# Patient Record
Sex: FEMALE | Race: WHITE | Employment: OTHER | URBAN - METROPOLITAN AREA
[De-identification: names, ages, dates, MRNs, and addresses within clinical notes are randomized per-mention and may not be internally consistent; named-entity substitution may affect disease eponyms.]

---

## 2019-09-09 ENCOUNTER — OFFICE VISIT (OUTPATIENT)
Dept: FAMILY MEDICINE CLINIC | Age: 74
End: 2019-09-09
Payer: MEDICARE

## 2019-09-09 VITALS
BODY MASS INDEX: 40.92 KG/M2 | SYSTOLIC BLOOD PRESSURE: 136 MMHG | HEIGHT: 59 IN | WEIGHT: 203 LBS | DIASTOLIC BLOOD PRESSURE: 80 MMHG

## 2019-09-09 DIAGNOSIS — Z76.89 ENCOUNTER TO ESTABLISH CARE: Primary | ICD-10-CM

## 2019-09-09 DIAGNOSIS — J45.40 MODERATE PERSISTENT ASTHMA WITHOUT COMPLICATION: ICD-10-CM

## 2019-09-09 DIAGNOSIS — R60.0 LOWER EXTREMITY EDEMA: ICD-10-CM

## 2019-09-09 DIAGNOSIS — E66.01 MORBID OBESITY WITH BMI OF 40.0-44.9, ADULT (HCC): ICD-10-CM

## 2019-09-09 LAB
A/G RATIO: 1.6 (ref 1.1–2.2)
ALBUMIN SERPL-MCNC: 4.5 G/DL (ref 3.4–5)
ALP BLD-CCNC: 75 U/L (ref 40–129)
ALT SERPL-CCNC: 18 U/L (ref 10–40)
ANION GAP SERPL CALCULATED.3IONS-SCNC: 17 MMOL/L (ref 3–16)
AST SERPL-CCNC: 26 U/L (ref 15–37)
BASOPHILS ABSOLUTE: 0 K/UL (ref 0–0.2)
BASOPHILS RELATIVE PERCENT: 0.8 %
BILIRUB SERPL-MCNC: 0.8 MG/DL (ref 0–1)
BUN BLDV-MCNC: 21 MG/DL (ref 7–20)
CALCIUM SERPL-MCNC: 10 MG/DL (ref 8.3–10.6)
CHLORIDE BLD-SCNC: 97 MMOL/L (ref 99–110)
CO2: 27 MMOL/L (ref 21–32)
CREAT SERPL-MCNC: 0.8 MG/DL (ref 0.6–1.2)
EOSINOPHILS ABSOLUTE: 0.1 K/UL (ref 0–0.6)
EOSINOPHILS RELATIVE PERCENT: 1.5 %
GFR AFRICAN AMERICAN: >60
GFR NON-AFRICAN AMERICAN: >60
GLOBULIN: 2.9 G/DL
GLUCOSE BLD-MCNC: 81 MG/DL (ref 70–99)
HCT VFR BLD CALC: 41.8 % (ref 36–48)
HEMOGLOBIN: 14.4 G/DL (ref 12–16)
LYMPHOCYTES ABSOLUTE: 1.7 K/UL (ref 1–5.1)
LYMPHOCYTES RELATIVE PERCENT: 35.1 %
MCH RBC QN AUTO: 31.2 PG (ref 26–34)
MCHC RBC AUTO-ENTMCNC: 34.5 G/DL (ref 31–36)
MCV RBC AUTO: 90.5 FL (ref 80–100)
MONOCYTES ABSOLUTE: 0.4 K/UL (ref 0–1.3)
MONOCYTES RELATIVE PERCENT: 9.2 %
NEUTROPHILS ABSOLUTE: 2.5 K/UL (ref 1.7–7.7)
NEUTROPHILS RELATIVE PERCENT: 53.4 %
PDW BLD-RTO: 13.1 % (ref 12.4–15.4)
PLATELET # BLD: 149 K/UL (ref 135–450)
PMV BLD AUTO: 9.6 FL (ref 5–10.5)
POTASSIUM SERPL-SCNC: 3.8 MMOL/L (ref 3.5–5.1)
RBC # BLD: 4.61 M/UL (ref 4–5.2)
SODIUM BLD-SCNC: 141 MMOL/L (ref 136–145)
TOTAL PROTEIN: 7.4 G/DL (ref 6.4–8.2)
WBC # BLD: 4.7 K/UL (ref 4–11)

## 2019-09-09 PROCEDURE — 1036F TOBACCO NON-USER: CPT | Performed by: FAMILY MEDICINE

## 2019-09-09 PROCEDURE — 99203 OFFICE O/P NEW LOW 30 MIN: CPT | Performed by: FAMILY MEDICINE

## 2019-09-09 PROCEDURE — 1090F PRES/ABSN URINE INCON ASSESS: CPT | Performed by: FAMILY MEDICINE

## 2019-09-09 PROCEDURE — G8427 DOCREV CUR MEDS BY ELIG CLIN: HCPCS | Performed by: FAMILY MEDICINE

## 2019-09-09 PROCEDURE — 1123F ACP DISCUSS/DSCN MKR DOCD: CPT | Performed by: FAMILY MEDICINE

## 2019-09-09 PROCEDURE — G8400 PT W/DXA NO RESULTS DOC: HCPCS | Performed by: FAMILY MEDICINE

## 2019-09-09 PROCEDURE — 3017F COLORECTAL CA SCREEN DOC REV: CPT | Performed by: FAMILY MEDICINE

## 2019-09-09 PROCEDURE — 4040F PNEUMOC VAC/ADMIN/RCVD: CPT | Performed by: FAMILY MEDICINE

## 2019-09-09 PROCEDURE — 36415 COLL VENOUS BLD VENIPUNCTURE: CPT | Performed by: FAMILY MEDICINE

## 2019-09-09 PROCEDURE — G8417 CALC BMI ABV UP PARAM F/U: HCPCS | Performed by: FAMILY MEDICINE

## 2019-09-09 RX ORDER — METOLAZONE 5 MG/1
5 TABLET ORAL DAILY
COMMUNITY
End: 2019-09-09 | Stop reason: DRUGHIGH

## 2019-09-09 RX ORDER — ALBUTEROL SULFATE 90 UG/1
2 AEROSOL, METERED RESPIRATORY (INHALATION) EVERY 6 HOURS PRN
COMMUNITY
End: 2020-01-02 | Stop reason: SDUPTHER

## 2019-09-09 SDOH — HEALTH STABILITY: MENTAL HEALTH: HOW OFTEN DO YOU HAVE A DRINK CONTAINING ALCOHOL?: NEVER

## 2019-09-09 ASSESSMENT — PATIENT HEALTH QUESTIONNAIRE - PHQ9
SUM OF ALL RESPONSES TO PHQ QUESTIONS 1-9: 0
1. LITTLE INTEREST OR PLEASURE IN DOING THINGS: 0
SUM OF ALL RESPONSES TO PHQ QUESTIONS 1-9: 0
2. FEELING DOWN, DEPRESSED OR HOPELESS: 0
SUM OF ALL RESPONSES TO PHQ9 QUESTIONS 1 & 2: 0

## 2019-09-09 ASSESSMENT — ENCOUNTER SYMPTOMS
SHORTNESS OF BREATH: 0
TROUBLE SWALLOWING: 0
VOMITING: 0
FACIAL SWELLING: 0
DIARRHEA: 0
SINUS PRESSURE: 0
SORE THROAT: 0
CHEST TIGHTNESS: 0
NAUSEA: 0
WHEEZING: 0
COUGH: 0
ABDOMINAL PAIN: 0
RHINORRHEA: 0

## 2019-09-09 NOTE — PROGRESS NOTES
Prior to Visit Medications    Medication Sig Taking?  Authorizing Provider   albuterol sulfate HFA (PROAIR HFA) 108 (90 Base) MCG/ACT inhaler Inhale 2 puffs into the lungs every 6 hours as needed for Wheezing Yes Historical Provider, MD        Allergies   Allergen Reactions    Dye [Iodides] Other (See Comments)     IVP dye, pt does not remember he reaction, just that the nurses stated to not let anyone give to her ever again       Past Medical History:   Diagnosis Date    Asthma        Past Surgical History:   Procedure Laterality Date    APPENDECTOMY      CHOLECYSTECTOMY         Social History     Socioeconomic History    Marital status: Single     Spouse name: Not on file    Number of children: Not on file    Years of education: Not on file    Highest education level: Not on file   Occupational History    Not on file   Social Needs    Financial resource strain: Not on file    Food insecurity:     Worry: Not on file     Inability: Not on file    Transportation needs:     Medical: Not on file     Non-medical: Not on file   Tobacco Use    Smoking status: Never Smoker    Smokeless tobacco: Never Used   Substance and Sexual Activity    Alcohol use: Never     Frequency: Never    Drug use: Never    Sexual activity: Not on file   Lifestyle    Physical activity:     Days per week: Not on file     Minutes per session: Not on file    Stress: Not on file   Relationships    Social connections:     Talks on phone: Not on file     Gets together: Not on file     Attends Nondenominational service: Not on file     Active member of club or organization: Not on file     Attends meetings of clubs or organizations: Not on file     Relationship status: Not on file    Intimate partner violence:     Fear of current or ex partner: Not on file     Emotionally abused: Not on file     Physically abused: Not on file     Forced sexual activity: Not on file   Other Topics Concern    Not on file   Social History Narrative   

## 2019-09-12 ASSESSMENT — ENCOUNTER SYMPTOMS: BACK PAIN: 1

## 2019-09-23 ENCOUNTER — OFFICE VISIT (OUTPATIENT)
Dept: FAMILY MEDICINE CLINIC | Age: 74
End: 2019-09-23
Payer: MEDICARE

## 2019-09-23 VITALS
BODY MASS INDEX: 41.49 KG/M2 | WEIGHT: 205.8 LBS | SYSTOLIC BLOOD PRESSURE: 132 MMHG | HEIGHT: 59 IN | DIASTOLIC BLOOD PRESSURE: 78 MMHG

## 2019-09-23 DIAGNOSIS — J45.901 MODERATE ASTHMA WITH EXACERBATION, UNSPECIFIED WHETHER PERSISTENT: Primary | ICD-10-CM

## 2019-09-23 DIAGNOSIS — J30.9 ALLERGIC RHINITIS, UNSPECIFIED SEASONALITY, UNSPECIFIED TRIGGER: ICD-10-CM

## 2019-09-23 PROCEDURE — 99213 OFFICE O/P EST LOW 20 MIN: CPT | Performed by: FAMILY MEDICINE

## 2019-09-23 PROCEDURE — G8417 CALC BMI ABV UP PARAM F/U: HCPCS | Performed by: FAMILY MEDICINE

## 2019-09-23 PROCEDURE — G8400 PT W/DXA NO RESULTS DOC: HCPCS | Performed by: FAMILY MEDICINE

## 2019-09-23 PROCEDURE — 3017F COLORECTAL CA SCREEN DOC REV: CPT | Performed by: FAMILY MEDICINE

## 2019-09-23 PROCEDURE — G8427 DOCREV CUR MEDS BY ELIG CLIN: HCPCS | Performed by: FAMILY MEDICINE

## 2019-09-23 PROCEDURE — 1123F ACP DISCUSS/DSCN MKR DOCD: CPT | Performed by: FAMILY MEDICINE

## 2019-09-23 PROCEDURE — 4040F PNEUMOC VAC/ADMIN/RCVD: CPT | Performed by: FAMILY MEDICINE

## 2019-09-23 PROCEDURE — 1036F TOBACCO NON-USER: CPT | Performed by: FAMILY MEDICINE

## 2019-09-23 PROCEDURE — 1090F PRES/ABSN URINE INCON ASSESS: CPT | Performed by: FAMILY MEDICINE

## 2019-09-23 RX ORDER — LORATADINE 10 MG/1
10 TABLET ORAL DAILY
Qty: 30 TABLET | Refills: 2 | Status: SHIPPED | OUTPATIENT
Start: 2019-09-23 | End: 2019-10-23

## 2019-09-23 RX ORDER — ALBUTEROL SULFATE 2.5 MG/3ML
2.5 SOLUTION RESPIRATORY (INHALATION) EVERY 6 HOURS PRN
Qty: 120 EACH | Refills: 3 | Status: SHIPPED | OUTPATIENT
Start: 2019-09-23 | End: 2021-01-08

## 2019-09-23 ASSESSMENT — ENCOUNTER SYMPTOMS
FACIAL SWELLING: 0
WHEEZING: 0
TROUBLE SWALLOWING: 0
ABDOMINAL PAIN: 0
SORE THROAT: 0
SINUS PRESSURE: 0
VOMITING: 0
NAUSEA: 0
DIARRHEA: 0
CHEST TIGHTNESS: 1
RHINORRHEA: 0
SHORTNESS OF BREATH: 0
COUGH: 1

## 2019-09-26 ENCOUNTER — OFFICE VISIT (OUTPATIENT)
Dept: FAMILY MEDICINE CLINIC | Age: 74
End: 2019-09-26
Payer: MEDICARE

## 2019-09-26 VITALS
DIASTOLIC BLOOD PRESSURE: 82 MMHG | BODY MASS INDEX: 41.24 KG/M2 | SYSTOLIC BLOOD PRESSURE: 130 MMHG | HEIGHT: 59 IN | WEIGHT: 204.6 LBS

## 2019-09-26 DIAGNOSIS — S40.022A CONTUSION OF LEFT UPPER EXTREMITY, INITIAL ENCOUNTER: Primary | ICD-10-CM

## 2019-09-26 DIAGNOSIS — M79.602 LEFT ARM PAIN: ICD-10-CM

## 2019-09-26 PROCEDURE — 1123F ACP DISCUSS/DSCN MKR DOCD: CPT | Performed by: FAMILY MEDICINE

## 2019-09-26 PROCEDURE — G8417 CALC BMI ABV UP PARAM F/U: HCPCS | Performed by: FAMILY MEDICINE

## 2019-09-26 PROCEDURE — 3017F COLORECTAL CA SCREEN DOC REV: CPT | Performed by: FAMILY MEDICINE

## 2019-09-26 PROCEDURE — 1036F TOBACCO NON-USER: CPT | Performed by: FAMILY MEDICINE

## 2019-09-26 PROCEDURE — 1090F PRES/ABSN URINE INCON ASSESS: CPT | Performed by: FAMILY MEDICINE

## 2019-09-26 PROCEDURE — 4040F PNEUMOC VAC/ADMIN/RCVD: CPT | Performed by: FAMILY MEDICINE

## 2019-09-26 PROCEDURE — G8400 PT W/DXA NO RESULTS DOC: HCPCS | Performed by: FAMILY MEDICINE

## 2019-09-26 PROCEDURE — G8427 DOCREV CUR MEDS BY ELIG CLIN: HCPCS | Performed by: FAMILY MEDICINE

## 2019-09-26 PROCEDURE — 99213 OFFICE O/P EST LOW 20 MIN: CPT | Performed by: FAMILY MEDICINE

## 2019-09-26 ASSESSMENT — ENCOUNTER SYMPTOMS
SORE THROAT: 0
ABDOMINAL PAIN: 0
VOMITING: 0
SHORTNESS OF BREATH: 0
NAUSEA: 0
SINUS PRESSURE: 0
RHINORRHEA: 0
COLOR CHANGE: 1
DIARRHEA: 0

## 2019-09-26 NOTE — PROGRESS NOTES
lb 9.6 oz (92.8 kg). Chemistry        Component Value Date/Time     09/09/2019 1202    K 3.8 09/09/2019 1202    CL 97 (L) 09/09/2019 1202    CO2 27 09/09/2019 1202    BUN 21 (H) 09/09/2019 1202    CREATININE 0.8 09/09/2019 1202        Component Value Date/Time    CALCIUM 10.0 09/09/2019 1202    ALKPHOS 75 09/09/2019 1202    AST 26 09/09/2019 1202    ALT 18 09/09/2019 1202    BILITOT 0.8 09/09/2019 1202          Lab Results   Component Value Date    WBC 4.7 09/09/2019    HGB 14.4 09/09/2019    HCT 41.8 09/09/2019    MCV 90.5 09/09/2019     09/09/2019     No results found for: LABA1C  No results found for: EAG  No results found for: LABA1C  No components found for: CHLPL  No results found for: TRIG  No results found for: HDL  No results found for: LDLCALC  No results found for: LABVLDL    Physical Exam   Constitutional: She is oriented to person, place, and time. She appears well-developed and well-nourished. HENT:   Head: Normocephalic and atraumatic. Cardiovascular: Normal rate, regular rhythm and normal heart sounds. No murmur heard. Pulmonary/Chest: Effort normal and breath sounds normal. She has no wheezes. Musculoskeletal: She exhibits tenderness. She exhibits no edema. Patient had pain with palpation over left bicep   Patient had normal ROM  Color change from mid bicep to distal bicep  Black blue in color  No pain in elbow or shoulder noted. Flexion extension wnl   Neurological: She is alert and oriented to person, place, and time. Skin: Skin is warm and dry. There is erythema. ASSESSMENT/PLAN:  1. Contusion of left upper extremity, initial encounter  Patient declined imaging at this time  Will use conservative treatment  Educated on signs and symptoms for immediate evaluation in the ER. Questions were answered during the visit. 2. Left arm pain  Tylenol/Ibuprofen will be used  Elevate arm  Ice as needed  RTC in one week for follow up sooner if needed.        Return in about 1 week (around 10/3/2019) for Folllow up for arm pain.

## 2019-10-03 ENCOUNTER — OFFICE VISIT (OUTPATIENT)
Dept: FAMILY MEDICINE CLINIC | Age: 74
End: 2019-10-03
Payer: MEDICARE

## 2019-10-03 VITALS
HEIGHT: 59 IN | WEIGHT: 205.6 LBS | DIASTOLIC BLOOD PRESSURE: 80 MMHG | SYSTOLIC BLOOD PRESSURE: 132 MMHG | BODY MASS INDEX: 41.45 KG/M2

## 2019-10-03 DIAGNOSIS — S46.212D TEAR OF LEFT BICEPS MUSCLE, SUBSEQUENT ENCOUNTER: Primary | ICD-10-CM

## 2019-10-03 DIAGNOSIS — M79.644 THUMB PAIN, RIGHT: ICD-10-CM

## 2019-10-03 PROCEDURE — 1090F PRES/ABSN URINE INCON ASSESS: CPT | Performed by: FAMILY MEDICINE

## 2019-10-03 PROCEDURE — G8417 CALC BMI ABV UP PARAM F/U: HCPCS | Performed by: FAMILY MEDICINE

## 2019-10-03 PROCEDURE — 3017F COLORECTAL CA SCREEN DOC REV: CPT | Performed by: FAMILY MEDICINE

## 2019-10-03 PROCEDURE — 4040F PNEUMOC VAC/ADMIN/RCVD: CPT | Performed by: FAMILY MEDICINE

## 2019-10-03 PROCEDURE — G8400 PT W/DXA NO RESULTS DOC: HCPCS | Performed by: FAMILY MEDICINE

## 2019-10-03 PROCEDURE — 1123F ACP DISCUSS/DSCN MKR DOCD: CPT | Performed by: FAMILY MEDICINE

## 2019-10-03 PROCEDURE — G8427 DOCREV CUR MEDS BY ELIG CLIN: HCPCS | Performed by: FAMILY MEDICINE

## 2019-10-03 PROCEDURE — G8484 FLU IMMUNIZE NO ADMIN: HCPCS | Performed by: FAMILY MEDICINE

## 2019-10-03 PROCEDURE — 99213 OFFICE O/P EST LOW 20 MIN: CPT | Performed by: FAMILY MEDICINE

## 2019-10-03 PROCEDURE — 1036F TOBACCO NON-USER: CPT | Performed by: FAMILY MEDICINE

## 2019-10-03 ASSESSMENT — ENCOUNTER SYMPTOMS
CHEST TIGHTNESS: 0
DIARRHEA: 0
RHINORRHEA: 0
ABDOMINAL PAIN: 0
NAUSEA: 0
VOMITING: 0
SHORTNESS OF BREATH: 0
SORE THROAT: 0

## 2019-10-07 ENCOUNTER — OFFICE VISIT (OUTPATIENT)
Dept: ORTHOPEDIC SURGERY | Age: 74
End: 2019-10-07
Payer: MEDICARE

## 2019-10-07 VITALS
HEART RATE: 83 BPM | RESPIRATION RATE: 17 BRPM | WEIGHT: 205.69 LBS | BODY MASS INDEX: 41.47 KG/M2 | HEIGHT: 59 IN | SYSTOLIC BLOOD PRESSURE: 178 MMHG | DIASTOLIC BLOOD PRESSURE: 78 MMHG

## 2019-10-07 DIAGNOSIS — S46.212A BICEPS MUSCLE TEAR, LEFT, INITIAL ENCOUNTER: ICD-10-CM

## 2019-10-07 DIAGNOSIS — M25.522 LEFT ELBOW PAIN: Primary | ICD-10-CM

## 2019-10-07 PROCEDURE — 1123F ACP DISCUSS/DSCN MKR DOCD: CPT | Performed by: ORTHOPAEDIC SURGERY

## 2019-10-07 PROCEDURE — 99203 OFFICE O/P NEW LOW 30 MIN: CPT | Performed by: ORTHOPAEDIC SURGERY

## 2019-10-07 PROCEDURE — 4040F PNEUMOC VAC/ADMIN/RCVD: CPT | Performed by: ORTHOPAEDIC SURGERY

## 2019-10-07 PROCEDURE — G8417 CALC BMI ABV UP PARAM F/U: HCPCS | Performed by: ORTHOPAEDIC SURGERY

## 2019-10-07 PROCEDURE — 3017F COLORECTAL CA SCREEN DOC REV: CPT | Performed by: ORTHOPAEDIC SURGERY

## 2019-10-07 PROCEDURE — G8484 FLU IMMUNIZE NO ADMIN: HCPCS | Performed by: ORTHOPAEDIC SURGERY

## 2019-10-07 PROCEDURE — G8427 DOCREV CUR MEDS BY ELIG CLIN: HCPCS | Performed by: ORTHOPAEDIC SURGERY

## 2019-10-07 PROCEDURE — G8400 PT W/DXA NO RESULTS DOC: HCPCS | Performed by: ORTHOPAEDIC SURGERY

## 2019-10-07 PROCEDURE — 1036F TOBACCO NON-USER: CPT | Performed by: ORTHOPAEDIC SURGERY

## 2019-10-07 PROCEDURE — 1090F PRES/ABSN URINE INCON ASSESS: CPT | Performed by: ORTHOPAEDIC SURGERY

## 2019-11-14 ENCOUNTER — OFFICE VISIT (OUTPATIENT)
Dept: FAMILY MEDICINE CLINIC | Age: 74
End: 2019-11-14
Payer: MEDICARE

## 2019-11-14 VITALS
HEIGHT: 59 IN | BODY MASS INDEX: 42.62 KG/M2 | DIASTOLIC BLOOD PRESSURE: 78 MMHG | SYSTOLIC BLOOD PRESSURE: 134 MMHG | WEIGHT: 211.4 LBS

## 2019-11-14 DIAGNOSIS — M79.644 THUMB PAIN, RIGHT: Primary | ICD-10-CM

## 2019-11-14 PROCEDURE — 4040F PNEUMOC VAC/ADMIN/RCVD: CPT | Performed by: FAMILY MEDICINE

## 2019-11-14 PROCEDURE — G8400 PT W/DXA NO RESULTS DOC: HCPCS | Performed by: FAMILY MEDICINE

## 2019-11-14 PROCEDURE — 3017F COLORECTAL CA SCREEN DOC REV: CPT | Performed by: FAMILY MEDICINE

## 2019-11-14 PROCEDURE — 1123F ACP DISCUSS/DSCN MKR DOCD: CPT | Performed by: FAMILY MEDICINE

## 2019-11-14 PROCEDURE — 1090F PRES/ABSN URINE INCON ASSESS: CPT | Performed by: FAMILY MEDICINE

## 2019-11-14 PROCEDURE — G8417 CALC BMI ABV UP PARAM F/U: HCPCS | Performed by: FAMILY MEDICINE

## 2019-11-14 PROCEDURE — 99213 OFFICE O/P EST LOW 20 MIN: CPT | Performed by: FAMILY MEDICINE

## 2019-11-14 PROCEDURE — G8427 DOCREV CUR MEDS BY ELIG CLIN: HCPCS | Performed by: FAMILY MEDICINE

## 2019-11-14 PROCEDURE — G8484 FLU IMMUNIZE NO ADMIN: HCPCS | Performed by: FAMILY MEDICINE

## 2019-11-14 PROCEDURE — 1036F TOBACCO NON-USER: CPT | Performed by: FAMILY MEDICINE

## 2019-11-14 RX ORDER — IBUPROFEN 800 MG/1
800 TABLET ORAL 2 TIMES DAILY PRN
Qty: 60 TABLET | Refills: 0 | Status: SHIPPED | OUTPATIENT
Start: 2019-11-14 | End: 2020-01-30 | Stop reason: ALTCHOICE

## 2019-11-14 ASSESSMENT — ENCOUNTER SYMPTOMS
SHORTNESS OF BREATH: 0
NAUSEA: 0
VOMITING: 0
RHINORRHEA: 0
DIARRHEA: 0
CHEST TIGHTNESS: 0
COUGH: 0
WHEEZING: 0
ABDOMINAL PAIN: 0

## 2019-11-26 ENCOUNTER — OFFICE VISIT (OUTPATIENT)
Dept: ORTHOPEDIC SURGERY | Age: 74
End: 2019-11-26
Payer: MEDICARE

## 2019-11-26 VITALS
WEIGHT: 211.42 LBS | BODY MASS INDEX: 42.62 KG/M2 | RESPIRATION RATE: 16 BRPM | HEIGHT: 59 IN | SYSTOLIC BLOOD PRESSURE: 170 MMHG | DIASTOLIC BLOOD PRESSURE: 76 MMHG

## 2019-11-26 DIAGNOSIS — S60.011A CONTUSION OF RIGHT THUMB WITHOUT DAMAGE TO NAIL, INITIAL ENCOUNTER: ICD-10-CM

## 2019-11-26 DIAGNOSIS — M79.641 PAIN OF RIGHT HAND: Primary | ICD-10-CM

## 2019-11-26 PROCEDURE — 1123F ACP DISCUSS/DSCN MKR DOCD: CPT | Performed by: ORTHOPAEDIC SURGERY

## 2019-11-26 PROCEDURE — 99214 OFFICE O/P EST MOD 30 MIN: CPT | Performed by: ORTHOPAEDIC SURGERY

## 2019-11-26 PROCEDURE — L3908 WHO COCK-UP NONMOLDE PRE OTS: HCPCS | Performed by: ORTHOPAEDIC SURGERY

## 2019-11-26 PROCEDURE — G8427 DOCREV CUR MEDS BY ELIG CLIN: HCPCS | Performed by: ORTHOPAEDIC SURGERY

## 2019-11-26 PROCEDURE — 4040F PNEUMOC VAC/ADMIN/RCVD: CPT | Performed by: ORTHOPAEDIC SURGERY

## 2019-11-26 PROCEDURE — 1090F PRES/ABSN URINE INCON ASSESS: CPT | Performed by: ORTHOPAEDIC SURGERY

## 2019-11-26 PROCEDURE — G8400 PT W/DXA NO RESULTS DOC: HCPCS | Performed by: ORTHOPAEDIC SURGERY

## 2019-11-26 PROCEDURE — 1036F TOBACCO NON-USER: CPT | Performed by: ORTHOPAEDIC SURGERY

## 2019-11-26 PROCEDURE — G8417 CALC BMI ABV UP PARAM F/U: HCPCS | Performed by: ORTHOPAEDIC SURGERY

## 2019-11-26 PROCEDURE — G8484 FLU IMMUNIZE NO ADMIN: HCPCS | Performed by: ORTHOPAEDIC SURGERY

## 2019-11-26 PROCEDURE — 3017F COLORECTAL CA SCREEN DOC REV: CPT | Performed by: ORTHOPAEDIC SURGERY

## 2020-01-02 ENCOUNTER — OFFICE VISIT (OUTPATIENT)
Dept: FAMILY MEDICINE CLINIC | Age: 75
End: 2020-01-02
Payer: MEDICARE

## 2020-01-02 VITALS
BODY MASS INDEX: 43.46 KG/M2 | SYSTOLIC BLOOD PRESSURE: 138 MMHG | HEIGHT: 59 IN | DIASTOLIC BLOOD PRESSURE: 78 MMHG | WEIGHT: 215.6 LBS

## 2020-01-02 PROCEDURE — 3017F COLORECTAL CA SCREEN DOC REV: CPT | Performed by: FAMILY MEDICINE

## 2020-01-02 PROCEDURE — 4040F PNEUMOC VAC/ADMIN/RCVD: CPT | Performed by: FAMILY MEDICINE

## 2020-01-02 PROCEDURE — G8417 CALC BMI ABV UP PARAM F/U: HCPCS | Performed by: FAMILY MEDICINE

## 2020-01-02 PROCEDURE — 1090F PRES/ABSN URINE INCON ASSESS: CPT | Performed by: FAMILY MEDICINE

## 2020-01-02 PROCEDURE — 1123F ACP DISCUSS/DSCN MKR DOCD: CPT | Performed by: FAMILY MEDICINE

## 2020-01-02 PROCEDURE — G8427 DOCREV CUR MEDS BY ELIG CLIN: HCPCS | Performed by: FAMILY MEDICINE

## 2020-01-02 PROCEDURE — G8484 FLU IMMUNIZE NO ADMIN: HCPCS | Performed by: FAMILY MEDICINE

## 2020-01-02 PROCEDURE — 99213 OFFICE O/P EST LOW 20 MIN: CPT | Performed by: FAMILY MEDICINE

## 2020-01-02 PROCEDURE — G8400 PT W/DXA NO RESULTS DOC: HCPCS | Performed by: FAMILY MEDICINE

## 2020-01-02 PROCEDURE — 1036F TOBACCO NON-USER: CPT | Performed by: FAMILY MEDICINE

## 2020-01-02 RX ORDER — AZITHROMYCIN 250 MG/1
250 TABLET, FILM COATED ORAL SEE ADMIN INSTRUCTIONS
Qty: 6 TABLET | Refills: 0 | Status: SHIPPED | OUTPATIENT
Start: 2020-01-02 | End: 2020-01-07

## 2020-01-02 RX ORDER — ALBUTEROL SULFATE 90 UG/1
2 AEROSOL, METERED RESPIRATORY (INHALATION) EVERY 6 HOURS PRN
Qty: 1 INHALER | Refills: 0 | Status: SHIPPED | OUTPATIENT
Start: 2020-01-02

## 2020-01-02 RX ORDER — METHYLPREDNISOLONE 4 MG/1
TABLET ORAL
Qty: 21 TABLET | Refills: 0 | Status: SHIPPED | OUTPATIENT
Start: 2020-01-02 | End: 2020-01-30

## 2020-01-02 ASSESSMENT — ENCOUNTER SYMPTOMS
TROUBLE SWALLOWING: 0
VOMITING: 0
ABDOMINAL PAIN: 0
SORE THROAT: 0
SHORTNESS OF BREATH: 0
DIARRHEA: 0
SINUS PRESSURE: 0
NAUSEA: 0
RHINORRHEA: 0

## 2020-01-02 NOTE — PROGRESS NOTES
2020     Chau Parra (:  1945) is a 69 y.o. female, here for evaluation of the following medical concerns:    Patient presented to the clinic due to one week history of chest pressure, problems taking a deep breath that makes it worse, she feels restricted, possible night time wheezing but somewhat resembles a regular asthma attack other times no, exertion doesn't seems to make it better or worse, doesn't radiate, has mild dry cough, does improve with inhaler, places an instrument and has made it difficult to flow into the instrument, denied edema of her legs, denied chest pain, denied pain radiating down her arm or into her back, no substantial weight gain. Vitals are wnl, Pulse ox 98 percent with rest.     Asthma- generally controlled with rare use of inhaler, has used it more frequently and has improved with symptoms but symptoms haven't resolved. Patient denied n, v, or diarrhea. HPI    Review of Systems   Constitutional: Positive for fatigue. Negative for activity change, fever and unexpected weight change. HENT: Negative for congestion, ear pain, rhinorrhea, sinus pressure, sore throat and trouble swallowing. Eyes: Negative for visual disturbance. Respiratory: Positive for cough, chest tightness and wheezing. Negative for shortness of breath. Cardiovascular: Negative for chest pain and leg swelling. Gastrointestinal: Negative for abdominal pain, diarrhea, nausea and vomiting. Musculoskeletal: Negative for arthralgias. Neurological: Negative for dizziness, syncope, numbness and headaches. Prior to Visit Medications    Medication Sig Taking? Authorizing Provider   azithromycin (ZITHROMAX) 250 MG tablet Take 1 tablet by mouth See Admin Instructions for 5 days 500mg on day 1 followed by 250mg on days 2 - 5 Yes Orlando Munguia, DO   methylPREDNISolone (MEDROL, BRADY,) 4 MG tablet Take as directed.  Yes Manuel Carmona, DO   albuterol sulfate HFA (PROAIR HFA) 108 (90 Base)

## 2020-01-03 ENCOUNTER — TELEPHONE (OUTPATIENT)
Dept: FAMILY MEDICINE CLINIC | Age: 75
End: 2020-01-03

## 2020-01-03 NOTE — TELEPHONE ENCOUNTER
Patient stated that she is running low grade fever and has a headache, she is worried about the medication contributing to this. She denied slurred speech, facial droop, chest pain, or unilateral weakness.   She stated that the headache was minimal.

## 2020-01-03 NOTE — TELEPHONE ENCOUNTER
Patient was in yesterday for an appt with dr Terrell Landa. She states she took the two medications that were presribed and went to bed. She woke up this morning with a terrible headache. Could this be a side effect of the medication. Please return call.

## 2020-01-05 ASSESSMENT — ENCOUNTER SYMPTOMS
CHEST TIGHTNESS: 1
WHEEZING: 1
COUGH: 1

## 2020-01-06 ENCOUNTER — HOSPITAL ENCOUNTER (OUTPATIENT)
Dept: GENERAL RADIOLOGY | Age: 75
Discharge: HOME | End: 2020-01-06
Payer: MEDICARE

## 2020-01-06 ENCOUNTER — HOSPITAL ENCOUNTER (OUTPATIENT)
Age: 75
Discharge: HOME | End: 2020-01-06
Payer: MEDICARE

## 2020-01-06 ENCOUNTER — OFFICE VISIT (OUTPATIENT)
Dept: FAMILY MEDICINE CLINIC | Age: 75
End: 2020-01-06
Payer: MEDICARE

## 2020-01-06 VITALS
WEIGHT: 207.4 LBS | BODY MASS INDEX: 41.81 KG/M2 | HEIGHT: 59 IN | SYSTOLIC BLOOD PRESSURE: 138 MMHG | DIASTOLIC BLOOD PRESSURE: 80 MMHG

## 2020-01-06 PROCEDURE — G8427 DOCREV CUR MEDS BY ELIG CLIN: HCPCS | Performed by: FAMILY MEDICINE

## 2020-01-06 PROCEDURE — 71046 X-RAY EXAM CHEST 2 VIEWS: CPT

## 2020-01-06 PROCEDURE — 4040F PNEUMOC VAC/ADMIN/RCVD: CPT | Performed by: FAMILY MEDICINE

## 2020-01-06 PROCEDURE — 1090F PRES/ABSN URINE INCON ASSESS: CPT | Performed by: FAMILY MEDICINE

## 2020-01-06 PROCEDURE — 99213 OFFICE O/P EST LOW 20 MIN: CPT | Performed by: FAMILY MEDICINE

## 2020-01-06 PROCEDURE — 1036F TOBACCO NON-USER: CPT | Performed by: FAMILY MEDICINE

## 2020-01-06 PROCEDURE — G8484 FLU IMMUNIZE NO ADMIN: HCPCS | Performed by: FAMILY MEDICINE

## 2020-01-06 PROCEDURE — G8400 PT W/DXA NO RESULTS DOC: HCPCS | Performed by: FAMILY MEDICINE

## 2020-01-06 PROCEDURE — 3017F COLORECTAL CA SCREEN DOC REV: CPT | Performed by: FAMILY MEDICINE

## 2020-01-06 PROCEDURE — 71110 X-RAY EXAM RIBS BIL 3 VIEWS: CPT

## 2020-01-06 PROCEDURE — G8417 CALC BMI ABV UP PARAM F/U: HCPCS | Performed by: FAMILY MEDICINE

## 2020-01-06 PROCEDURE — 1123F ACP DISCUSS/DSCN MKR DOCD: CPT | Performed by: FAMILY MEDICINE

## 2020-01-06 RX ORDER — AMOXICILLIN 875 MG/1
875 TABLET, COATED ORAL 2 TIMES DAILY
Qty: 20 TABLET | Refills: 0 | Status: SHIPPED | OUTPATIENT
Start: 2020-01-06 | End: 2020-01-16

## 2020-01-06 ASSESSMENT — PATIENT HEALTH QUESTIONNAIRE - PHQ9
2. FEELING DOWN, DEPRESSED OR HOPELESS: 0
SUM OF ALL RESPONSES TO PHQ QUESTIONS 1-9: 0
SUM OF ALL RESPONSES TO PHQ QUESTIONS 1-9: 0
SUM OF ALL RESPONSES TO PHQ9 QUESTIONS 1 & 2: 0
1. LITTLE INTEREST OR PLEASURE IN DOING THINGS: 0

## 2020-01-06 ASSESSMENT — ENCOUNTER SYMPTOMS
DIARRHEA: 0
ABDOMINAL PAIN: 0
SORE THROAT: 0
WHEEZING: 0
NAUSEA: 0
VOMITING: 0
RHINORRHEA: 1
CHEST TIGHTNESS: 0
SHORTNESS OF BREATH: 0
SINUS PRESSURE: 0
TROUBLE SWALLOWING: 0

## 2020-01-06 NOTE — PROGRESS NOTES
days 500mg on day 1 followed by 250mg on days 2 - 5 Yes Orlando Munguia, DO   methylPREDNISolone (MEDROL, BRADY,) 4 MG tablet Take as directed. Yes Orlando Munguia, DO   albuterol sulfate HFA (PROAIR HFA) 108 (90 Base) MCG/ACT inhaler Inhale 2 puffs into the lungs every 6 hours as needed for Wheezing Yes Orlando Munguia, DO   ibuprofen (ADVIL;MOTRIN) 800 MG tablet Take 1 tablet by mouth 2 times daily as needed for Pain Yes Derrell Jarvis DO   Nebulizers (AIRIAL COMPACT MINI NEBULIZER) MISC 1 nebule by Does not apply route 4 times daily Yes Orlando Munguia, DO   albuterol (PROVENTIL) (2.5 MG/3ML) 0.083% nebulizer solution Take 3 mLs by nebulization every 6 hours as needed for Wheezing Yes Derrell Jarvis DO        Social History     Tobacco Use    Smoking status: Never Smoker    Smokeless tobacco: Never Used   Substance Use Topics    Alcohol use: Never     Frequency: Never        Vitals:    01/06/20 1354   BP: 138/80   Weight: 207 lb 6.4 oz (94.1 kg)   Height: 4' 11\" (1.499 m)     Estimated body mass index is 41.89 kg/m² as calculated from the following:    Height as of this encounter: 4' 11\" (1.499 m). Weight as of this encounter: 207 lb 6.4 oz (94.1 kg). Physical Exam  Vitals signs and nursing note reviewed. Constitutional:       Appearance: She is well-developed. She is obese. HENT:      Head: Normocephalic and atraumatic. Right Ear: Tympanic membrane, ear canal and external ear normal.      Left Ear: Ear canal and external ear normal.      Nose: Nose normal.      Mouth/Throat:      Mouth: Mucous membranes are moist.   Eyes:      Conjunctiva/sclera: Conjunctivae normal.      Pupils: Pupils are equal, round, and reactive to light. Neck:      Musculoskeletal: Normal range of motion and neck supple. Cardiovascular:      Rate and Rhythm: Normal rate and regular rhythm. Heart sounds: Normal heart sounds. Pulmonary:      Breath sounds: Normal breath sounds. No wheezing.       Comments: Patient has

## 2020-01-07 ASSESSMENT — ENCOUNTER SYMPTOMS: COUGH: 1

## 2020-01-28 ENCOUNTER — TELEPHONE (OUTPATIENT)
Dept: FAMILY MEDICINE CLINIC | Age: 75
End: 2020-01-28

## 2020-01-28 NOTE — TELEPHONE ENCOUNTER
Patient states she has been in 2 times in January for sinus and chest congestion. Patient states she has been on steroids, antibiotics, and an inhaler and she continues to have Short of Breath. Patient is requesting a returned call.
Do not rub the eye

## 2020-01-30 ENCOUNTER — OFFICE VISIT (OUTPATIENT)
Dept: FAMILY MEDICINE CLINIC | Age: 75
End: 2020-01-30
Payer: MEDICARE

## 2020-01-30 ENCOUNTER — HOSPITAL ENCOUNTER (OUTPATIENT)
Age: 75
Setting detail: OBSERVATION
Discharge: HOME | End: 2020-01-31
Attending: EMERGENCY MEDICINE | Admitting: INTERNAL MEDICINE
Payer: MEDICARE

## 2020-01-30 ENCOUNTER — APPOINTMENT (OUTPATIENT)
Dept: GENERAL RADIOLOGY | Age: 75
End: 2020-01-30
Payer: MEDICARE

## 2020-01-30 VITALS
BODY MASS INDEX: 42.7 KG/M2 | HEIGHT: 59 IN | SYSTOLIC BLOOD PRESSURE: 150 MMHG | DIASTOLIC BLOOD PRESSURE: 80 MMHG | WEIGHT: 211.8 LBS

## 2020-01-30 PROBLEM — R07.9 CHEST PAIN: Status: ACTIVE | Noted: 2020-01-30

## 2020-01-30 PROBLEM — R06.02 SHORTNESS OF BREATH: Status: ACTIVE | Noted: 2020-01-30

## 2020-01-30 LAB
A/G RATIO: 1.2 (ref 1.1–2.2)
ALBUMIN SERPL-MCNC: 4 G/DL (ref 3.4–5)
ALP BLD-CCNC: 75 U/L (ref 40–129)
ALT SERPL-CCNC: 27 U/L (ref 10–40)
ANION GAP SERPL CALCULATED.3IONS-SCNC: 12 MMOL/L (ref 3–16)
AST SERPL-CCNC: 29 U/L (ref 15–37)
BASOPHILS ABSOLUTE: 0 K/UL (ref 0–0.2)
BASOPHILS RELATIVE PERCENT: 0.7 %
BILIRUB SERPL-MCNC: 0.7 MG/DL (ref 0–1)
BUN BLDV-MCNC: 34 MG/DL (ref 7–20)
CALCIUM SERPL-MCNC: 9.6 MG/DL (ref 8.3–10.6)
CHLORIDE BLD-SCNC: 100 MMOL/L (ref 99–110)
CO2: 28 MMOL/L (ref 21–32)
CREAT SERPL-MCNC: 0.8 MG/DL (ref 0.6–1.2)
D DIMER: <200 NG/ML DDU (ref 0–229)
EKG ATRIAL RATE: 81 BPM
EKG DIAGNOSIS: NORMAL
EKG P AXIS: 35 DEGREES
EKG P-R INTERVAL: 134 MS
EKG Q-T INTERVAL: 386 MS
EKG QRS DURATION: 100 MS
EKG QTC CALCULATION (BAZETT): 448 MS
EKG R AXIS: -2 DEGREES
EKG T AXIS: 56 DEGREES
EKG VENTRICULAR RATE: 81 BPM
EOSINOPHILS ABSOLUTE: 0.1 K/UL (ref 0–0.6)
EOSINOPHILS RELATIVE PERCENT: 2 %
GFR AFRICAN AMERICAN: >60
GFR NON-AFRICAN AMERICAN: >60
GLOBULIN: 3.4 G/DL
GLUCOSE BLD-MCNC: 110 MG/DL (ref 70–99)
HCT VFR BLD CALC: 40.5 % (ref 36–48)
HEMOGLOBIN: 13.9 G/DL (ref 12–16)
LYMPHOCYTES ABSOLUTE: 1.4 K/UL (ref 1–5.1)
LYMPHOCYTES RELATIVE PERCENT: 33 %
MCH RBC QN AUTO: 30.7 PG (ref 26–34)
MCHC RBC AUTO-ENTMCNC: 34.3 G/DL (ref 31–36)
MCV RBC AUTO: 89.4 FL (ref 80–100)
MONOCYTES ABSOLUTE: 0.4 K/UL (ref 0–1.3)
MONOCYTES RELATIVE PERCENT: 9.5 %
NEUTROPHILS ABSOLUTE: 2.4 K/UL (ref 1.7–7.7)
NEUTROPHILS RELATIVE PERCENT: 54.8 %
PDW BLD-RTO: 13.3 % (ref 12.4–15.4)
PLATELET # BLD: 160 K/UL (ref 135–450)
PMV BLD AUTO: 8.7 FL (ref 5–10.5)
POTASSIUM REFLEX MAGNESIUM: 4.3 MMOL/L (ref 3.5–5.1)
PRO-BNP: 72 PG/ML (ref 0–124)
RBC # BLD: 4.54 M/UL (ref 4–5.2)
SODIUM BLD-SCNC: 140 MMOL/L (ref 136–145)
TOTAL PROTEIN: 7.4 G/DL (ref 6.4–8.2)
TROPONIN: <0.01 NG/ML
WBC # BLD: 4.3 K/UL (ref 4–11)

## 2020-01-30 PROCEDURE — 93010 ELECTROCARDIOGRAM REPORT: CPT | Performed by: INTERNAL MEDICINE

## 2020-01-30 PROCEDURE — 6370000000 HC RX 637 (ALT 250 FOR IP): Performed by: INTERNAL MEDICINE

## 2020-01-30 PROCEDURE — G8400 PT W/DXA NO RESULTS DOC: HCPCS | Performed by: FAMILY MEDICINE

## 2020-01-30 PROCEDURE — 85025 COMPLETE CBC W/AUTO DIFF WBC: CPT

## 2020-01-30 PROCEDURE — 36415 COLL VENOUS BLD VENIPUNCTURE: CPT

## 2020-01-30 PROCEDURE — 83880 ASSAY OF NATRIURETIC PEPTIDE: CPT

## 2020-01-30 PROCEDURE — 80053 COMPREHEN METABOLIC PANEL: CPT

## 2020-01-30 PROCEDURE — 93005 ELECTROCARDIOGRAM TRACING: CPT | Performed by: EMERGENCY MEDICINE

## 2020-01-30 PROCEDURE — 6360000002 HC RX W HCPCS: Performed by: INTERNAL MEDICINE

## 2020-01-30 PROCEDURE — 1090F PRES/ABSN URINE INCON ASSESS: CPT | Performed by: FAMILY MEDICINE

## 2020-01-30 PROCEDURE — 4040F PNEUMOC VAC/ADMIN/RCVD: CPT | Performed by: FAMILY MEDICINE

## 2020-01-30 PROCEDURE — 84484 ASSAY OF TROPONIN QUANT: CPT

## 2020-01-30 PROCEDURE — 2580000003 HC RX 258: Performed by: INTERNAL MEDICINE

## 2020-01-30 PROCEDURE — G0378 HOSPITAL OBSERVATION PER HR: HCPCS

## 2020-01-30 PROCEDURE — 85379 FIBRIN DEGRADATION QUANT: CPT

## 2020-01-30 PROCEDURE — 6370000000 HC RX 637 (ALT 250 FOR IP): Performed by: EMERGENCY MEDICINE

## 2020-01-30 PROCEDURE — 94640 AIRWAY INHALATION TREATMENT: CPT

## 2020-01-30 PROCEDURE — G8427 DOCREV CUR MEDS BY ELIG CLIN: HCPCS | Performed by: FAMILY MEDICINE

## 2020-01-30 PROCEDURE — 96374 THER/PROPH/DIAG INJ IV PUSH: CPT

## 2020-01-30 PROCEDURE — 99285 EMERGENCY DEPT VISIT HI MDM: CPT

## 2020-01-30 PROCEDURE — 99214 OFFICE O/P EST MOD 30 MIN: CPT | Performed by: FAMILY MEDICINE

## 2020-01-30 PROCEDURE — 71046 X-RAY EXAM CHEST 2 VIEWS: CPT

## 2020-01-30 PROCEDURE — 1036F TOBACCO NON-USER: CPT | Performed by: FAMILY MEDICINE

## 2020-01-30 PROCEDURE — G8484 FLU IMMUNIZE NO ADMIN: HCPCS | Performed by: FAMILY MEDICINE

## 2020-01-30 PROCEDURE — 1123F ACP DISCUSS/DSCN MKR DOCD: CPT | Performed by: FAMILY MEDICINE

## 2020-01-30 PROCEDURE — 3017F COLORECTAL CA SCREEN DOC REV: CPT | Performed by: FAMILY MEDICINE

## 2020-01-30 PROCEDURE — G8417 CALC BMI ABV UP PARAM F/U: HCPCS | Performed by: FAMILY MEDICINE

## 2020-01-30 PROCEDURE — 94761 N-INVAS EAR/PLS OXIMETRY MLT: CPT

## 2020-01-30 RX ORDER — ASPIRIN 81 MG/1
324 TABLET, CHEWABLE ORAL ONCE
Status: COMPLETED | OUTPATIENT
Start: 2020-01-30 | End: 2020-01-30

## 2020-01-30 RX ORDER — IPRATROPIUM BROMIDE AND ALBUTEROL SULFATE 2.5; .5 MG/3ML; MG/3ML
1 SOLUTION RESPIRATORY (INHALATION) ONCE
Status: COMPLETED | OUTPATIENT
Start: 2020-01-30 | End: 2020-01-30

## 2020-01-30 RX ORDER — IBUPROFEN 400 MG/1
400 TABLET ORAL
Status: DISCONTINUED | OUTPATIENT
Start: 2020-01-30 | End: 2020-01-31 | Stop reason: HOSPADM

## 2020-01-30 RX ORDER — ATORVASTATIN CALCIUM 20 MG/1
20 TABLET, FILM COATED ORAL NIGHTLY
Status: DISCONTINUED | OUTPATIENT
Start: 2020-01-30 | End: 2020-01-31 | Stop reason: HOSPADM

## 2020-01-30 RX ORDER — ALBUTEROL SULFATE 2.5 MG/3ML
2.5 SOLUTION RESPIRATORY (INHALATION) EVERY 6 HOURS PRN
Status: DISCONTINUED | OUTPATIENT
Start: 2020-01-30 | End: 2020-01-31 | Stop reason: HOSPADM

## 2020-01-30 RX ORDER — SODIUM CHLORIDE 0.9 % (FLUSH) 0.9 %
10 SYRINGE (ML) INJECTION EVERY 12 HOURS SCHEDULED
Status: DISCONTINUED | OUTPATIENT
Start: 2020-01-30 | End: 2020-01-31 | Stop reason: HOSPADM

## 2020-01-30 RX ORDER — SODIUM CHLORIDE 0.9 % (FLUSH) 0.9 %
10 SYRINGE (ML) INJECTION PRN
Status: DISCONTINUED | OUTPATIENT
Start: 2020-01-30 | End: 2020-01-31 | Stop reason: HOSPADM

## 2020-01-30 RX ORDER — NITROGLYCERIN 0.4 MG/1
0.4 TABLET SUBLINGUAL EVERY 5 MIN PRN
Status: DISCONTINUED | OUTPATIENT
Start: 2020-01-30 | End: 2020-01-31 | Stop reason: HOSPADM

## 2020-01-30 RX ORDER — ASPIRIN 81 MG/1
81 TABLET, CHEWABLE ORAL DAILY
Status: DISCONTINUED | OUTPATIENT
Start: 2020-01-31 | End: 2020-01-31 | Stop reason: HOSPADM

## 2020-01-30 RX ORDER — IPRATROPIUM BROMIDE AND ALBUTEROL SULFATE 2.5; .5 MG/3ML; MG/3ML
1 SOLUTION RESPIRATORY (INHALATION)
Status: DISCONTINUED | OUTPATIENT
Start: 2020-01-30 | End: 2020-01-31 | Stop reason: HOSPADM

## 2020-01-30 RX ORDER — HYDRALAZINE HYDROCHLORIDE 20 MG/ML
10 INJECTION INTRAMUSCULAR; INTRAVENOUS EVERY 6 HOURS PRN
Status: DISCONTINUED | OUTPATIENT
Start: 2020-01-30 | End: 2020-01-31 | Stop reason: HOSPADM

## 2020-01-30 RX ORDER — ALBUTEROL SULFATE 90 UG/1
2 AEROSOL, METERED RESPIRATORY (INHALATION) EVERY 6 HOURS PRN
Status: DISCONTINUED | OUTPATIENT
Start: 2020-01-30 | End: 2020-01-31 | Stop reason: HOSPADM

## 2020-01-30 RX ORDER — ONDANSETRON 2 MG/ML
4 INJECTION INTRAMUSCULAR; INTRAVENOUS EVERY 6 HOURS PRN
Status: DISCONTINUED | OUTPATIENT
Start: 2020-01-30 | End: 2020-01-31 | Stop reason: HOSPADM

## 2020-01-30 RX ADMIN — IBUPROFEN 400 MG: 400 TABLET ORAL at 16:31

## 2020-01-30 RX ADMIN — ASPIRIN 81 MG 324 MG: 81 TABLET ORAL at 11:45

## 2020-01-30 RX ADMIN — IPRATROPIUM BROMIDE AND ALBUTEROL SULFATE 1 AMPULE: .5; 3 SOLUTION RESPIRATORY (INHALATION) at 21:04

## 2020-01-30 RX ADMIN — IPRATROPIUM BROMIDE AND ALBUTEROL SULFATE 1 AMPULE: .5; 3 SOLUTION RESPIRATORY (INHALATION) at 12:04

## 2020-01-30 RX ADMIN — Medication 10 ML: at 20:42

## 2020-01-30 RX ADMIN — IPRATROPIUM BROMIDE AND ALBUTEROL SULFATE 1 AMPULE: .5; 3 SOLUTION RESPIRATORY (INHALATION) at 16:55

## 2020-01-30 RX ADMIN — ATORVASTATIN CALCIUM 20 MG: 20 TABLET, FILM COATED ORAL at 20:42

## 2020-01-30 RX ADMIN — ENOXAPARIN SODIUM 40 MG: 40 INJECTION SUBCUTANEOUS at 20:42

## 2020-01-30 RX ADMIN — HYDRALAZINE HYDROCHLORIDE 10 MG: 20 INJECTION INTRAMUSCULAR; INTRAVENOUS at 16:31

## 2020-01-30 ASSESSMENT — ENCOUNTER SYMPTOMS
CHEST TIGHTNESS: 1
SHORTNESS OF BREATH: 1
DIARRHEA: 0
WHEEZING: 0
RHINORRHEA: 0
SINUS PRESSURE: 0
ABDOMINAL PAIN: 0
GASTROINTESTINAL NEGATIVE: 1
VOMITING: 0
SHORTNESS OF BREATH: 1
CHEST TIGHTNESS: 1
COUGH: 0
SORE THROAT: 0
NAUSEA: 0
COUGH: 0
EYES NEGATIVE: 1
FACIAL SWELLING: 0
BACK PAIN: 1

## 2020-01-30 ASSESSMENT — HEART SCORE: ECG: 1

## 2020-01-30 ASSESSMENT — PAIN SCALES - GENERAL
PAINLEVEL_OUTOF10: 0

## 2020-01-30 NOTE — PROGRESS NOTES
4 Eyes Admission Assessment     I agree as the admission nurse that 2 RN's have performed a thorough Head to Toe Skin Assessment on the patient. ALL assessment sites listed below have been assessed on admission.        Areas assessed by both nurses: Nori/Kerri  [x]   Head, Face, and Ears   [x]   Shoulders, Back, and Chest  [x]   Arms, Elbows, and Hands   [x]   Coccyx, Sacrum, and Ischum  [x]   Legs, Feet, and Heels        Does the Patient have Skin Breakdown?  scattered abrasions and bruises         Cooper Prevention initiated:  NA   Wound Care Orders initiated:  NA      [facility] nurse consulted for Pressure Injury (Stage 3,4, Unstageable, DTI, NWPT, and Complex wounds):  NA      Nurse 1 eSignature: Electronically signed by Stiven Ruiz RN on 1/30/20 at 3:32 PM    **SHARE this note so that the co-signing nurse is able to place an eSignature**    Nurse 2 eSignature: Electronically signed by Michelle Hadley RN on 1/30/20 at 4:43 PM

## 2020-01-30 NOTE — ED NOTES
[facility] to [facility], all questions answered. Patient has 20G IV in [address] [place] [address]. Patient NSR on cardiac monitor, breathing regular, no distress, is person, place  and time. Sent to room 3103 via stretcher.      Amanda Justice RN  01/30/20 3092

## 2020-01-30 NOTE — PATIENT INSTRUCTIONS
Thank you for coming. Please take you medications as prescribed. Please continue to eat a balanced diet and exercise. Go to ER  If you have questions please let me know via phone or email.

## 2020-01-30 NOTE — PLAN OF CARE
Problem: Falls - Risk of:  Goal: Will remain free from falls  Description  Will remain free from falls  Outcome: Ongoing  Note:   Fall risk assessment completed. Fall precautions in place. Call light within reach. Pt educated on calling for assistance before getting up. Walkway free of clutter. Will continue to monitor. Goal: Absence of physical injury  Description  Absence of physical injury  Outcome: Ongoing     Problem: Tissue Perfusion - Cardiopulmonary, Altered:  Goal: Absence of angina  Description  Absence of angina  Outcome: Ongoing  Note:   Pt denies chest pain thus far. Will continue to monitor. Goal: Hemodynamic stability will improve  Description  Hemodynamic stability will improve  Outcome: Ongoing  Note:   Vitals:    01/30/20 1532   BP: (!) 181/78   Pulse: 85   Resp: 16   Temp: 98.5 °F (36.9 °C)   SpO2: 95%       Goal: Will show no evidence of cardiac arrhythmias  Description  Will show no evidence of cardiac arrhythmias  Outcome: Ongoing  Note:   Pt being monitored on telemetry monitor. Rhythm is NSR. Will continue to monitor.     Electronically signed by Shan Stoll on 1/30/2020 at 4:48 PM

## 2020-01-30 NOTE — ED PROVIDER NOTES
[address] ENCOUNTER        Pt Name: Mauri Alvarez  MRN: [id number]  [place] 1945  Date of evaluation: 1/30/2020  Provider: Ioana Beasley MD  PCP: Yohannes Gold DO      CHIEF COMPLAINT       Chief Complaint   Patient presents with    Shortness of Breath     pt states that she is having SOB and PCP sent her        HISTORY OFPRESENT ILLNESS   (Location/Symptom, Timing/Onset, Context/Setting, Quality, Duration, Modifying Factors,Severity)  Note limiting factors. Mauri Alvarez is a 69 y.o. female with history of asthma, mainly as a child, presenting due to shortness of breath for the last 3 to 4 days, with a feeling of difficulty taking deep breath but she denies any pain or heaviness associated with it. It is more a feeling that she just cannot get a full breath. She has been able to function as usual.  No exact similar episodes in the past.  She denies any recent asthma exacerbations, these were whenever she was much younger. She does not feel that she is wheezing at all. No change in coughing. She denies any cardiac history. No history of DVT or PE. She went to her PCP prior to coming here and was advised that she should come to the ED for further evaluation. Nursing Notes were all reviewed and agreed with or any disagreements were addressed  in the HPI. REVIEW OF SYSTEMS    (2-9 systems for level 4, 10 or more for level 5)     Review of Systems   Constitutional: Negative for activity change, appetite change, chills and fever. HENT: Negative. Eyes: Negative. Respiratory: Positive for chest tightness and shortness of breath. Negative for cough. Cardiovascular: Negative. Negative for chest pain. Gastrointestinal: Negative. Genitourinary: Negative. Negative for dysuria and flank pain. Musculoskeletal: Negative. Skin: Negative. Neurological: Negative. Negative for weakness, light-headedness, numbness and headaches.    Hematological: Value Ref Range    Troponin <0.01 <0.01 ng/mL   Troponin   Result Value Ref Range    Troponin <0.01 <0.01 ng/mL   EKG 12 Lead   Result Value Ref Range    Ventricular Rate 81 BPM    Atrial Rate 81 BPM    P-R Interval 134 ms    QRS Duration 100 ms    Q-T Interval 386 ms    QTc Calculation (Bazett) 448 ms    P Axis 35 degrees    R Axis -2 degrees    T Axis 56 degrees    Diagnosis       Normal sinus rhythmModerate voltage criteria for LVH, may be normal variantPoor R wave progressionNonspecific T wave abnormalityAbnormal ECGNo previous ECGs availableConfirmed by SHADY JIMÉNEZ, Esa Sanchez ([id number]) on 1/30/2020 2:39:19 PM       All other labs were within normal range or not returned as of this dictation. EKG: All EKG's are interpreted by the Emergency Department Physician who either signs or co-signs this chart in the absence of a cardiologist.    EKG by my interpretation shows normal sinus rhythm with rate of 81, normal axis, QTC of 448, possible very slight less than 1 mm ST depression in leads I, V5, with no ST elevation seen. No prior EKG for comparison. EKG interpretation is somewhat limited by some baseline artifact. RADIOLOGY:   plain film images such as CT, Ultrasound and MRI are read by the radiologist. Plain radiographic images are visualized and preliminarily interpreted by the ED Provider with the below findings:    Xr Chest Standard (2 Vw)    Result Date: 1/30/2020  EXAMINATION: TWO XRAY VIEWS OF THE CHEST 1/30/2020 11:51 am COMPARISON: January 6, 2020 HISTORY: ORDERING SYSTEM PROVIDED HISTORY: dyspnea TECHNOLOGIST PROVIDED HISTORY: Reason for exam:->dyspnea Reason for Exam: dyspnea Acuity: Acute Type of Exam: Initial Additional signs and symptoms: Shortness of Breath (pt states that she is having SOB and PCP sent her ) FINDINGS: No evidence of pneumonia, edema or other acute pulmonary process. No evidence of acute process of the cardiac or mediastinal structures.  No evidence of pneumothorax or pleural difficult to characterize chest heaviness versus pressure that feels atypical from prior asthma exacerbations. She is not hypoxic on room air. Initially tachycardic. No history of DVT or PE.  D-dimer undetectable. EKG shows some very slight ST depressions in a few leads with no ST elevations. Initial troponin undetectable. Chest x-ray shows no focal consolidations or infiltrates. Heart score 4. She was given DuoNeb's with some possible improvement of her chest heaviness/pressure and shortness of breath however symptoms did not completely completely resolve and she does still have risk factors for some cardiac disease with age, EKG changes without prior for comparison. Plan will be to admit for ACS rule out and monitoring. FINAL IMPRESSION      1.  Chest pain, unspecified type          DISPOSITION/PLAN   DISPOSITION Admitted 01/30/2020 01:28:21 PM      PATIENT REFERRED TO:  Surya Kaiser DO  [address] [place] [address]  [address] [facility]  [phone]            DISCHARGE MEDICATIONS:  Current Discharge Medication List          DISCONTINUED MEDICATIONS:  Current Discharge Medication List                   (Please note that portions of this note were completed with a voice recognition program.  Efforts were made to edit the dictations but occasionally words are mis-transcribed.)    Francis Hansen MD (electronically signed)            Francis Hansen MD  01/30/20 3633

## 2020-01-30 NOTE — PROGRESS NOTES
Regular rhythm. Tachycardia present. Heart sounds: Normal heart sounds. No murmur. Pulmonary:      Breath sounds: Normal breath sounds. Comments: Very faint inspirations  When taking deep breath complains of ache/sharp chest pain  Unable to appreciate breath sounds  Chest:      Chest wall: Tenderness present. Abdominal:      General: Bowel sounds are normal.      Palpations: Abdomen is soft. Musculoskeletal:         General: Swelling and tenderness present. Skin:     General: Skin is warm and dry. Neurological:      Mental Status: She is alert and oriented to person, place, and time. Psychiatric:         Behavior: Behavior normal.         Thought Content: Thought content normal.         Judgment: Judgment normal.         ASSESSMENT/PLAN:  1. Chest pain varying with breathing  Sent to ER for further workup due to EKG machine not working  Concerned about possible PE? Let swelling  Called and discussed this with ER    2. Dyspnea on exertion  Secondary to asthma? Medication and treatment hasn't worked  No D dimer to this point    3. Fatigue, unspecified type  Secondary to above  Questions answered  Patient will go to the ER  RTC after visit. 4.  Lower leg edema  Worse today  Has been raising feet        Return for Folllow up.

## 2020-01-30 NOTE — ED TRIAGE NOTES
Patient to the ED with c/o SOB. States h/o asthma. Also states she has an inhaler at home but tries not to use it unless she absolutely have to. No signs of distress noted.

## 2020-01-30 NOTE — PROGRESS NOTES
Pharmacy Medication Reconciliation Note     List of medications patient is currently taking is complete.     Allergies:  Dye [iodides]    Source of information:   1. patient    Notes regarding home medications:   1. only has been taking diclofenac routinely    Denies taking any other OTC or herbal medications    Bhavna Gilbert PharmD, BCPS  1/30/2020  3:57 PM

## 2020-01-30 NOTE — H&P
into the lungs every 6 hours as needed for Wheezing 1/2/20   Qing Stevens, DO   ibuprofen (ADVIL;MOTRIN) 800 MG tablet Take 1 tablet by mouth 2 times daily as needed for Pain 11/14/19   Qing Stevens DO   Nebulizers (AIRIAL COMPACT MINI NEBULIZER) MISC 1 nebule by Does not apply route 4 times daily 9/23/19   Qing Stevens DO   albuterol (PROVENTIL) (2.5 MG/3ML) 0.083% nebulizer solution Take 3 mLs by nebulization every 6 hours as needed for Wheezing 9/23/19   Qing Stevens DO       Allergy(ies):  Dye [iodides]    Social History:  TOBACCO:  reports that she has never smoked. She has never used smokeless tobacco.  ETOH:  reports no history of alcohol use. Family History:      Problem Relation Age of Onset   Jameson Rule Migraines Mother        Review of Systems:  Pertinent positives are listed in HPI. At least 10-point ROS reviewed and were negative. Vitals and physical examination:  BP (!) 161/69   Pulse 80   Temp 97.4 °F (36.3 °C)   Resp 12   Wt 212 lb 1.3 oz (96.2 kg)   SpO2 100%   BMI 42.84 kg/m²   Gen/overall appearance: Not in acute distress. Alert. Oriented x3. Head: Normocephalic, atraumatic  Eyes: EOMI, good acuity  ENT: Oral mucosa moist  Neck: No JVD, thyromegaly  CVS: Nml S1S2, no MRG, RRR  Pulm: Clear bilaterally. No crackles/wheezes  Gastrointestinal: Soft, NT/ND, +BS  Musculoskeletal: No edema. Warm  Neuro: No focal deficit. Moves extremity spontaneously. Psychiatry: Appropriate affect. Not agitated. Skin: Warm, dry with normal turgor.  No rash  Capillary refill: Brisk,< 3 seconds   Peripheral Pulses: +2 palpable, equal bilaterally       Labs/imaging/EKG:  CBC:   Recent Labs     01/30/20  1130   WBC 4.3   HGB 13.9        BMP:    Recent Labs     01/30/20  1130      K 4.3      CO2 28   BUN 34*   CREATININE 0.8   GLUCOSE 110*     Hepatic:   Recent Labs     01/30/20  1130   AST 29   ALT 27   BILITOT 0.7   ALKPHOS 75       Xr Chest Standard (2 Vw)    Result Date:

## 2020-01-31 VITALS
SYSTOLIC BLOOD PRESSURE: 154 MMHG | RESPIRATION RATE: 14 BRPM | BODY MASS INDEX: 42.53 KG/M2 | DIASTOLIC BLOOD PRESSURE: 80 MMHG | TEMPERATURE: 98.2 F | HEIGHT: 59 IN | HEART RATE: 101 BPM | WEIGHT: 210.98 LBS | OXYGEN SATURATION: 95 %

## 2020-01-31 LAB
CHOLESTEROL, TOTAL: 222 MG/DL (ref 0–199)
HCT VFR BLD CALC: 39.9 % (ref 36–48)
HDLC SERPL-MCNC: 61 MG/DL (ref 40–60)
HEMOGLOBIN: 13.7 G/DL (ref 12–16)
LDL CHOLESTEROL CALCULATED: 135 MG/DL
LV EF: 58 %
LV EF: 67 %
LVEF MODALITY: NORMAL
LVEF MODALITY: NORMAL
MCH RBC QN AUTO: 30.9 PG (ref 26–34)
MCHC RBC AUTO-ENTMCNC: 34.5 G/DL (ref 31–36)
MCV RBC AUTO: 89.6 FL (ref 80–100)
PDW BLD-RTO: 13.3 % (ref 12.4–15.4)
PLATELET # BLD: 164 K/UL (ref 135–450)
PMV BLD AUTO: 8.5 FL (ref 5–10.5)
RBC # BLD: 4.45 M/UL (ref 4–5.2)
TRIGL SERPL-MCNC: 129 MG/DL (ref 0–150)
VLDLC SERPL CALC-MCNC: 26 MG/DL
WBC # BLD: 6.4 K/UL (ref 4–11)

## 2020-01-31 PROCEDURE — 80061 LIPID PANEL: CPT

## 2020-01-31 PROCEDURE — 93306 TTE W/DOPPLER COMPLETE: CPT

## 2020-01-31 PROCEDURE — A9502 TC99M TETROFOSMIN: HCPCS | Performed by: INTERNAL MEDICINE

## 2020-01-31 PROCEDURE — 6370000000 HC RX 637 (ALT 250 FOR IP): Performed by: INTERNAL MEDICINE

## 2020-01-31 PROCEDURE — 36415 COLL VENOUS BLD VENIPUNCTURE: CPT

## 2020-01-31 PROCEDURE — 85027 COMPLETE CBC AUTOMATED: CPT

## 2020-01-31 PROCEDURE — G0378 HOSPITAL OBSERVATION PER HR: HCPCS

## 2020-01-31 PROCEDURE — 78452 HT MUSCLE IMAGE SPECT MULT: CPT

## 2020-01-31 PROCEDURE — 6360000002 HC RX W HCPCS: Performed by: INTERNAL MEDICINE

## 2020-01-31 PROCEDURE — 2580000003 HC RX 258: Performed by: INTERNAL MEDICINE

## 2020-01-31 PROCEDURE — 3430000000 HC RX DIAGNOSTIC RADIOPHARMACEUTICAL: Performed by: INTERNAL MEDICINE

## 2020-01-31 PROCEDURE — 93017 CV STRESS TEST TRACING ONLY: CPT

## 2020-01-31 RX ORDER — PANTOPRAZOLE SODIUM 40 MG/1
40 TABLET, DELAYED RELEASE ORAL
Qty: 90 TABLET | Refills: 1 | Status: SHIPPED | OUTPATIENT
Start: 2020-01-31 | End: 2021-01-08

## 2020-01-31 RX ADMIN — REGADENOSON 0.4 MG: 0.08 INJECTION, SOLUTION INTRAVENOUS at 10:18

## 2020-01-31 RX ADMIN — ASPIRIN 81 MG 81 MG: 81 TABLET ORAL at 08:17

## 2020-01-31 RX ADMIN — TETROFOSMIN 30 MILLICURIE: 1.38 INJECTION, POWDER, LYOPHILIZED, FOR SOLUTION INTRAVENOUS at 10:31

## 2020-01-31 RX ADMIN — TETROFOSMIN 10 MILLICURIE: 1.38 INJECTION, POWDER, LYOPHILIZED, FOR SOLUTION INTRAVENOUS at 07:07

## 2020-01-31 RX ADMIN — IBUPROFEN 400 MG: 400 TABLET ORAL at 08:17

## 2020-01-31 RX ADMIN — Medication 10 ML: at 08:18

## 2020-01-31 ASSESSMENT — PAIN SCALES - GENERAL: PAINLEVEL_OUTOF10: 0

## 2020-01-31 NOTE — PROGRESS NOTES
Pt rested quietly in bed with eyes closed. Denies needs this morning. VS remain stable. Call light in reach. Will monitor.

## 2020-01-31 NOTE — PROGRESS NOTES
Pt discharged to home. Transportation here with wheelchair. No family present. Transported in personal vehicle by patient. Discharge instructions and personal belongings given to pt. Rx sent electronically to [address]. Explanation of discharge medications and instructions understood by verbal statement. No questions, comments or concerns at this time.  Electronically signed by Uvaldo Roca RN on 1/31/2020 at 2:46 PM

## 2020-01-31 NOTE — PROGRESS NOTES
Pt AAO x4. No c/o pain at this time. Sitting up in the chair. Assessment completed and charted. No SOB. Pt denies needs. Call light in reach. Will monitor.

## 2020-01-31 NOTE — PLAN OF CARE
Problem: Falls - Risk of:  Goal: Will remain free from falls  Description  Will remain free from falls  1/31/2020 0058 by Lianna Álvarez RN  Outcome: Ongoing     Problem: Falls - Risk of:  Goal: Absence of physical injury  Description  Absence of physical injury  1/31/2020 0058 by Lianna Álvarez RN  Outcome: Ongoing   Pt is a moderate fall risk. Gait steady. Able and aware to call for assistance if needed.    Problem: Tissue Perfusion - Cardiopulmonary, Altered:  Goal: Absence of angina  Description  Absence of angina  1/31/2020 0058 by Lianna Álvarez RN  Outcome: Ongoing     Problem: Tissue Perfusion - Cardiopulmonary, Altered:  Goal: Hemodynamic stability will improve  Description  Hemodynamic stability will improve  1/31/2020 0058 by Lianna Álvarez RN  Outcome: Ongoing     Problem: Tissue Perfusion - Cardiopulmonary, Altered:  Goal: Will show no evidence of cardiac arrhythmias  Description  Will show no evidence of cardiac arrhythmias  1/31/2020 0058 by Lianna Álvarez RN  Outcome: Ongoing

## 2020-01-31 NOTE — PROGRESS NOTES
Pt A&O in the chair. Pt has no complaints at this time. AM medications given with a sip of water. Pt UAL in the room. Call light within reach. Able to make needs known. Will monitor.  Electronically signed by Kwesi Lopez RN on 1/31/2020 at 8:29 AM

## 2020-02-06 ENCOUNTER — OFFICE VISIT (OUTPATIENT)
Dept: FAMILY MEDICINE CLINIC | Age: 75
End: 2020-02-06
Payer: MEDICARE

## 2020-02-06 VITALS
HEIGHT: 59 IN | SYSTOLIC BLOOD PRESSURE: 132 MMHG | DIASTOLIC BLOOD PRESSURE: 84 MMHG | BODY MASS INDEX: 41.69 KG/M2 | WEIGHT: 206.8 LBS

## 2020-02-06 PROCEDURE — 4040F PNEUMOC VAC/ADMIN/RCVD: CPT | Performed by: FAMILY MEDICINE

## 2020-02-06 PROCEDURE — 1123F ACP DISCUSS/DSCN MKR DOCD: CPT | Performed by: FAMILY MEDICINE

## 2020-02-06 PROCEDURE — G8417 CALC BMI ABV UP PARAM F/U: HCPCS | Performed by: FAMILY MEDICINE

## 2020-02-06 PROCEDURE — 1036F TOBACCO NON-USER: CPT | Performed by: FAMILY MEDICINE

## 2020-02-06 PROCEDURE — 99214 OFFICE O/P EST MOD 30 MIN: CPT | Performed by: FAMILY MEDICINE

## 2020-02-06 PROCEDURE — G8427 DOCREV CUR MEDS BY ELIG CLIN: HCPCS | Performed by: FAMILY MEDICINE

## 2020-02-06 PROCEDURE — G8484 FLU IMMUNIZE NO ADMIN: HCPCS | Performed by: FAMILY MEDICINE

## 2020-02-06 PROCEDURE — 3017F COLORECTAL CA SCREEN DOC REV: CPT | Performed by: FAMILY MEDICINE

## 2020-02-06 PROCEDURE — 1090F PRES/ABSN URINE INCON ASSESS: CPT | Performed by: FAMILY MEDICINE

## 2020-02-06 PROCEDURE — G8400 PT W/DXA NO RESULTS DOC: HCPCS | Performed by: FAMILY MEDICINE

## 2020-02-06 ASSESSMENT — ENCOUNTER SYMPTOMS
NAUSEA: 0
SINUS PRESSURE: 0
RHINORRHEA: 0
COUGH: 0
SORE THROAT: 0
DIARRHEA: 0
ABDOMINAL PAIN: 0
VOMITING: 0
WHEEZING: 0
SHORTNESS OF BREATH: 0
CHEST TIGHTNESS: 0

## 2020-02-06 NOTE — PROGRESS NOTES
2020     Betsy Oliver (:  1945) is a 69 y.o. female, here for evaluation of the following medical concerns:    1. Hospital follow up- patient was evaluated in the hospital for two days,  BP was elevated, shortness of breath, chest heaviness? Resolved, Troponin's negative, EKG was wnl, stress test unremarkable, Echo unremarkable, BP improved, released without etiology other than asthma. 2.  Chest pain varying with breathing- much improved, resolved? patient has had this for the last month,, nebulizer treatments are helping, last chest x ray was wnl, patient stated that she feels \"fine\" at rest, stated that the pain is a pressure/ache, BP is elevated today but other vitals are wnl. Patient is pleasant in the exam room.      2.  asthma/Dyspnea on exertion-patient has bilateral leg edema, she has had SOB with movement but much improved, BP is wnl today, continue on medication.     She is currently stable and is able to get to the ER.        HPI    Review of Systems   Constitutional: Negative for activity change, fatigue, fever and unexpected weight change. HENT: Negative for congestion, ear pain, mouth sores, rhinorrhea, sinus pressure and sore throat. Eyes: Negative for visual disturbance. Respiratory: Negative for cough, chest tightness, shortness of breath and wheezing. Cardiovascular: Negative for chest pain and leg swelling. Gastrointestinal: Negative for abdominal pain, diarrhea, nausea and vomiting. Endocrine: Negative for cold intolerance, heat intolerance, polydipsia and polyphagia. Genitourinary: Negative for flank pain, frequency, pelvic pain and urgency. Musculoskeletal: Positive for arthralgias. Skin: Negative for rash. Allergic/Immunologic: Negative for food allergies. Neurological: Negative for dizziness, tremors, syncope, numbness and headaches. Hematological: Does not bruise/bleed easily. Psychiatric/Behavioral: Negative for dysphoric mood.  The patient is No wheezing or rales. Abdominal:      General: Bowel sounds are normal.      Palpations: Abdomen is soft. Tenderness: There is no abdominal tenderness. Skin:     General: Skin is warm and dry. Neurological:      Mental Status: She is alert and oriented to person, place, and time. Psychiatric:         Behavior: Behavior normal.         Thought Content: Thought content normal.         Judgment: Judgment normal.         ASSESSMENT/PLAN:  1. Hospital discharge follow-up  Much improved  Discussed medication  Answered questions   Patient will continue to follow up as needed. 2. Moderate persistent asthma without complication  Need to PFT but patient is reluctant at this time due to just getting out of hospital  Stable today  Will follow up in one month to discuss this further. 3. Shortness of breath  Resolved today    4. Chest pain, unspecified type  Resolved today  Questions answered  Discussed signs and symptoms for immediate evaluation in the ER. If returns or symptoms still present may refer to Cardiology      Return in about 4 weeks (around 3/5/2020).

## 2020-03-05 ENCOUNTER — OFFICE VISIT (OUTPATIENT)
Dept: FAMILY MEDICINE CLINIC | Age: 75
End: 2020-03-05
Payer: MEDICARE

## 2020-03-05 VITALS
HEIGHT: 59 IN | SYSTOLIC BLOOD PRESSURE: 130 MMHG | DIASTOLIC BLOOD PRESSURE: 84 MMHG | BODY MASS INDEX: 42.78 KG/M2 | WEIGHT: 212.2 LBS

## 2020-03-05 PROCEDURE — 1090F PRES/ABSN URINE INCON ASSESS: CPT | Performed by: FAMILY MEDICINE

## 2020-03-05 PROCEDURE — 99214 OFFICE O/P EST MOD 30 MIN: CPT | Performed by: FAMILY MEDICINE

## 2020-03-05 PROCEDURE — 1036F TOBACCO NON-USER: CPT | Performed by: FAMILY MEDICINE

## 2020-03-05 PROCEDURE — G8417 CALC BMI ABV UP PARAM F/U: HCPCS | Performed by: FAMILY MEDICINE

## 2020-03-05 PROCEDURE — G8427 DOCREV CUR MEDS BY ELIG CLIN: HCPCS | Performed by: FAMILY MEDICINE

## 2020-03-05 PROCEDURE — 1123F ACP DISCUSS/DSCN MKR DOCD: CPT | Performed by: FAMILY MEDICINE

## 2020-03-05 PROCEDURE — 4040F PNEUMOC VAC/ADMIN/RCVD: CPT | Performed by: FAMILY MEDICINE

## 2020-03-05 PROCEDURE — G8400 PT W/DXA NO RESULTS DOC: HCPCS | Performed by: FAMILY MEDICINE

## 2020-03-05 PROCEDURE — 3017F COLORECTAL CA SCREEN DOC REV: CPT | Performed by: FAMILY MEDICINE

## 2020-03-05 PROCEDURE — G8484 FLU IMMUNIZE NO ADMIN: HCPCS | Performed by: FAMILY MEDICINE

## 2020-03-05 RX ORDER — FUROSEMIDE 20 MG/1
20 TABLET ORAL DAILY
Qty: 30 TABLET | Refills: 0 | Status: SHIPPED | OUTPATIENT
Start: 2020-03-05 | End: 2020-04-27

## 2020-03-05 ASSESSMENT — ENCOUNTER SYMPTOMS
SINUS PRESSURE: 0
TROUBLE SWALLOWING: 0
ABDOMINAL PAIN: 0
NAUSEA: 0
SORE THROAT: 0
RHINORRHEA: 0
WHEEZING: 0
SHORTNESS OF BREATH: 0
CHEST TIGHTNESS: 0
VOMITING: 0
DIARRHEA: 0

## 2020-03-05 NOTE — PROGRESS NOTES
3/5/2020     Maxx Gunn (:  1945) is a 69 y.o. female, here for evaluation of the following medical concerns:  Patient is following up from recent hospitalization she had last month. Feeling well today and doesn't have a new complaint. Asthma- patient is stable today, feels well, discussed treatment, patient feels she is stable on Proair as needed and has nebulizer if needed. Leg edema- acute on chronic issues, patient hasn't had leg edema since she started with me as her PCP, patient is elevating her feet at night and reduce sodium. Patient was on Lasix in the past but hasn't used this for sometime, patient denied pain but believes there has been mild erythema. Obesity- discussed need to lose weight, need to improve exercise and improving her diet. Today, denied chest pain, sob, nv, or diarrhea. HPI    Review of Systems   Constitutional: Negative for activity change, fatigue, fever and unexpected weight change. HENT: Negative for congestion, ear pain, mouth sores, rhinorrhea, sinus pressure, sore throat and trouble swallowing. Respiratory: Positive for cough. Negative for chest tightness, shortness of breath and wheezing. Cardiovascular: Positive for leg swelling. Negative for chest pain and palpitations. Gastrointestinal: Negative for abdominal pain, diarrhea, nausea and vomiting. Musculoskeletal: Negative for arthralgias. Skin: Negative for color change and rash. Neurological: Negative for dizziness, tremors, syncope, numbness and headaches. Psychiatric/Behavioral: Negative for dysphoric mood. The patient is not nervous/anxious. Prior to Visit Medications    Medication Sig Taking?  Authorizing Provider   furosemide (LASIX) 20 MG tablet Take 1 tablet by mouth daily Yes Orlando Munguia DO   pantoprazole (PROTONIX) 40 MG tablet Take 1 tablet by mouth every morning (before breakfast) Yes Analilia Bennett MD   diclofenac (VOLTAREN) 50 MG EC tablet Take 1 oriented to person, place, and time. ASSESSMENT/PLAN:  1. Moderate persistent asthma without complication  Take medication as prescribed. Continue to use nebulizer when needed  Discussed conservative treatment  Discussed signs and symptoms for immediate evaluation in the ER  RTC if no improved. Tylenol/Ibuprofen for pain    2. Bilateral leg edema  Take medication as prescribed. Raise feet, reduce sodium  Discussed conservative treatment  Discussed signs and symptoms for immediate evaluation in the ER  RTC if no improved. Tylenol/Ibuprofen for pain    3. Class 3 severe obesity due to excess calories without serious comorbidity with body mass index (BMI) of 40.0 to 44.9 in adult [facility])  Discussed the need to exercise 30 minutes each day. Monitor diet and push water. Reduce refined carbs and replace with fiber and vegetables. Decrease portion size and limit snacking, stop eating after dinner  Count calories. Return in about 3 months (around 6/5/2020).

## 2020-03-08 ASSESSMENT — ENCOUNTER SYMPTOMS
COLOR CHANGE: 0
COUGH: 1

## 2020-04-27 ENCOUNTER — TELEPHONE (OUTPATIENT)
Dept: FAMILY MEDICINE CLINIC | Age: 75
End: 2020-04-27

## 2020-04-27 ENCOUNTER — VIRTUAL VISIT (OUTPATIENT)
Dept: FAMILY MEDICINE CLINIC | Age: 75
End: 2020-04-27
Payer: MEDICARE

## 2020-04-27 VITALS — WEIGHT: 212 LBS | BODY MASS INDEX: 42.74 KG/M2 | HEIGHT: 59 IN

## 2020-04-27 PROCEDURE — 99442 PR PHYS/QHP TELEPHONE EVALUATION 11-20 MIN: CPT | Performed by: FAMILY MEDICINE

## 2020-04-27 RX ORDER — METOLAZONE 5 MG/1
TABLET ORAL
Qty: 30 TABLET | Refills: 1 | Status: SHIPPED | OUTPATIENT
Start: 2020-04-27 | End: 2020-06-04

## 2020-04-27 NOTE — PROGRESS NOTES
Clive Jackson is a 69 y.o. female evaluated via telephone on 4/27/2020. Consent:  She and/or health care decision maker is aware that that she may receive a bill for this telephone service, depending on her insurance coverage, and has provided verbal consent to proceed: Yes      Documentation:  I communicated with the patient and/or health care decision maker about     1. peripherial edema- patient has had increased edema recently, on feet at home, was prescribed Lasix but forgot to inform me she couldn't tolerate this, tends to give her GI symptoms. Patient generally takes Metolazone 5 mg as needed, stated that feet and lower ankles have pitting edema, heavy at times. Today, she denied chest pain, sob, n, v, or diarrhea. Details of this discussion including any medical advice provided:     Edema lower legs. Take medication as prescribed. Educated on side effects. Raise legs   Discussed conservative treatment such as reducing sodium and losing weight. Discussed signs and symptoms for immediate evaluation in the ER  RTC if no improved. I affirm this is a Patient Initiated Episode with an Established Patient who has not had a related appointment within my department in the past 7 days or scheduled within the next 24 hours.     Patient identification was verified at the start of the visit: Yes    Total Time: minutes: 11-20 minutes    Note: not billable if this call serves to triage the patient into an appointment for the relevant concern      Vicky Mai

## 2020-04-27 NOTE — TELEPHONE ENCOUNTER
Patient states she cannot take Lasix due to it causing major GI upset. Patient requesting to see if MD could call an alternative diuretic to [facility]. Patient requesting a returned call.

## 2020-06-03 ENCOUNTER — OFFICE VISIT (OUTPATIENT)
Dept: FAMILY MEDICINE CLINIC | Age: 75
End: 2020-06-03
Payer: MEDICARE

## 2020-06-03 VITALS
WEIGHT: 213.8 LBS | SYSTOLIC BLOOD PRESSURE: 142 MMHG | BODY MASS INDEX: 43.1 KG/M2 | DIASTOLIC BLOOD PRESSURE: 82 MMHG | HEIGHT: 59 IN

## 2020-06-03 PROCEDURE — 1090F PRES/ABSN URINE INCON ASSESS: CPT | Performed by: FAMILY MEDICINE

## 2020-06-03 PROCEDURE — 1123F ACP DISCUSS/DSCN MKR DOCD: CPT | Performed by: FAMILY MEDICINE

## 2020-06-03 PROCEDURE — 3017F COLORECTAL CA SCREEN DOC REV: CPT | Performed by: FAMILY MEDICINE

## 2020-06-03 PROCEDURE — G8400 PT W/DXA NO RESULTS DOC: HCPCS | Performed by: FAMILY MEDICINE

## 2020-06-03 PROCEDURE — 99213 OFFICE O/P EST LOW 20 MIN: CPT | Performed by: FAMILY MEDICINE

## 2020-06-03 PROCEDURE — 1036F TOBACCO NON-USER: CPT | Performed by: FAMILY MEDICINE

## 2020-06-03 PROCEDURE — G8417 CALC BMI ABV UP PARAM F/U: HCPCS | Performed by: FAMILY MEDICINE

## 2020-06-03 PROCEDURE — G8427 DOCREV CUR MEDS BY ELIG CLIN: HCPCS | Performed by: FAMILY MEDICINE

## 2020-06-03 PROCEDURE — 4040F PNEUMOC VAC/ADMIN/RCVD: CPT | Performed by: FAMILY MEDICINE

## 2020-06-04 RX ORDER — METOLAZONE 5 MG/1
TABLET ORAL
Qty: 30 TABLET | Refills: 1 | Status: SHIPPED | OUTPATIENT
Start: 2020-06-04 | End: 2020-07-30

## 2020-06-04 ASSESSMENT — ENCOUNTER SYMPTOMS
COUGH: 1
WHEEZING: 0
NAUSEA: 0
CHEST TIGHTNESS: 1
ABDOMINAL PAIN: 0
STRIDOR: 0
VOMITING: 0
RHINORRHEA: 0
DIARRHEA: 0
SHORTNESS OF BREATH: 0

## 2020-06-19 ENCOUNTER — OFFICE VISIT (OUTPATIENT)
Dept: FAMILY MEDICINE CLINIC | Age: 75
End: 2020-06-19
Payer: MEDICARE

## 2020-06-19 VITALS
BODY MASS INDEX: 44.43 KG/M2 | SYSTOLIC BLOOD PRESSURE: 140 MMHG | WEIGHT: 220.4 LBS | DIASTOLIC BLOOD PRESSURE: 84 MMHG | HEIGHT: 59 IN

## 2020-06-19 PROCEDURE — 4040F PNEUMOC VAC/ADMIN/RCVD: CPT | Performed by: FAMILY MEDICINE

## 2020-06-19 PROCEDURE — 99214 OFFICE O/P EST MOD 30 MIN: CPT | Performed by: FAMILY MEDICINE

## 2020-06-19 PROCEDURE — G8400 PT W/DXA NO RESULTS DOC: HCPCS | Performed by: FAMILY MEDICINE

## 2020-06-19 PROCEDURE — 1090F PRES/ABSN URINE INCON ASSESS: CPT | Performed by: FAMILY MEDICINE

## 2020-06-19 PROCEDURE — 1036F TOBACCO NON-USER: CPT | Performed by: FAMILY MEDICINE

## 2020-06-19 PROCEDURE — G8417 CALC BMI ABV UP PARAM F/U: HCPCS | Performed by: FAMILY MEDICINE

## 2020-06-19 PROCEDURE — 1123F ACP DISCUSS/DSCN MKR DOCD: CPT | Performed by: FAMILY MEDICINE

## 2020-06-19 PROCEDURE — 3017F COLORECTAL CA SCREEN DOC REV: CPT | Performed by: FAMILY MEDICINE

## 2020-06-19 PROCEDURE — G8427 DOCREV CUR MEDS BY ELIG CLIN: HCPCS | Performed by: FAMILY MEDICINE

## 2020-06-19 NOTE — PROGRESS NOTES
Mental Status: She is alert. She is disoriented. Cranial Nerves: No cranial nerve deficit. Motor: No weakness. Coordination: Coordination normal.      Deep Tendon Reflexes: Reflexes normal.   Psychiatric:         Behavior: Behavior normal.         Thought Content: Thought content normal.         Judgment: Judgment normal.         ASSESSMENT/PLAN:  1. Injury of head, initial encounter  Stable  Continue with otc medication  Discussed signs and symptoms from immediate evaluation in the ER. Answered questions    2. Cervical pain (neck)  Patient will use otc medication  She was educated on the medication and its use. She will  Use ice, heat, and stretching. She was shown stretching exercises. She will push fluids and rest.   RTC if not improved. 3. Fall, initial encounter  Discussed risk prevention  Move rugs  Good shoes  Generally not an issue for the patient. Return in about 3 months (around 9/19/2020).

## 2020-06-21 ASSESSMENT — ENCOUNTER SYMPTOMS
COLOR CHANGE: 1
SHORTNESS OF BREATH: 0
SORE THROAT: 0
ABDOMINAL PAIN: 0
RHINORRHEA: 0
DIARRHEA: 0
NAUSEA: 0
VOMITING: 0

## 2020-06-22 ENCOUNTER — TELEPHONE (OUTPATIENT)
Dept: FAMILY MEDICINE CLINIC | Age: 75
End: 2020-06-22

## 2020-07-02 ENCOUNTER — OFFICE VISIT (OUTPATIENT)
Dept: FAMILY MEDICINE CLINIC | Age: 75
End: 2020-07-02
Payer: MEDICARE

## 2020-07-02 VITALS
BODY MASS INDEX: 43.99 KG/M2 | TEMPERATURE: 97.7 F | WEIGHT: 218.2 LBS | HEIGHT: 59 IN | DIASTOLIC BLOOD PRESSURE: 88 MMHG | SYSTOLIC BLOOD PRESSURE: 136 MMHG

## 2020-07-02 PROCEDURE — 4040F PNEUMOC VAC/ADMIN/RCVD: CPT | Performed by: FAMILY MEDICINE

## 2020-07-02 PROCEDURE — G8417 CALC BMI ABV UP PARAM F/U: HCPCS | Performed by: FAMILY MEDICINE

## 2020-07-02 PROCEDURE — 1090F PRES/ABSN URINE INCON ASSESS: CPT | Performed by: FAMILY MEDICINE

## 2020-07-02 PROCEDURE — G8427 DOCREV CUR MEDS BY ELIG CLIN: HCPCS | Performed by: FAMILY MEDICINE

## 2020-07-02 PROCEDURE — 1036F TOBACCO NON-USER: CPT | Performed by: FAMILY MEDICINE

## 2020-07-02 PROCEDURE — 1123F ACP DISCUSS/DSCN MKR DOCD: CPT | Performed by: FAMILY MEDICINE

## 2020-07-02 PROCEDURE — 3017F COLORECTAL CA SCREEN DOC REV: CPT | Performed by: FAMILY MEDICINE

## 2020-07-02 PROCEDURE — G8400 PT W/DXA NO RESULTS DOC: HCPCS | Performed by: FAMILY MEDICINE

## 2020-07-02 PROCEDURE — 99214 OFFICE O/P EST MOD 30 MIN: CPT | Performed by: FAMILY MEDICINE

## 2020-07-02 ASSESSMENT — ENCOUNTER SYMPTOMS
NAUSEA: 0
DIARRHEA: 0
VOMITING: 0
SHORTNESS OF BREATH: 0
SINUS PRESSURE: 0
RHINORRHEA: 0
ABDOMINAL PAIN: 0
SORE THROAT: 0

## 2020-07-02 NOTE — PATIENT INSTRUCTIONS
Thank you for coming. Please take you medications as prescribed. Please continue to eat a balanced diet and exercise. Continue with ice. If you have questions please let me know via phone or email.

## 2020-07-02 NOTE — PROGRESS NOTES
2020     Marie Graham (:  1945) is a 69 y.o. female, here for evaluation of the following medical concerns:    HPI     1. Fall- tripped over step at home carrying groceries, fell striking her head. She denied LOC, has a large abrasion on forehead, denied falling due to dizziness or weakness. This occurred two weeks agoand she didn't seek help until two weeks ago. She reported edema and had CT ordered. Patient never had imaging obtained due to edema and pain improving.      2.  Head injury- struck left forehead, has abrasion, mild edema over left eye, no LOC, no memory problems no headache, denied dizziness, denied vision loss, denied hearing problem, denied other symptoms secondary to concussion. Much improved today, edema has almost completely resolved.      Today, denied chest pain, sob, n, v, or diarrhea. Review of Systems   Constitutional: Negative for activity change, fatigue, fever and unexpected weight change. HENT: Negative for congestion, ear pain, rhinorrhea, sinus pressure and sore throat. Eyes: Negative for visual disturbance. Respiratory: Negative for shortness of breath. Cardiovascular: Negative for chest pain and leg swelling. Gastrointestinal: Negative for abdominal pain, diarrhea, nausea and vomiting. Musculoskeletal: Negative for arthralgias. Neurological: Positive for headaches. Negative for dizziness, tremors, syncope and numbness. Psychiatric/Behavioral: Negative for suicidal ideas. The patient is not nervous/anxious. Prior to Visit Medications    Medication Sig Taking?  Authorizing Provider   metOLazone (ZAROXOLYN) 5 MG tablet TAKE ONE TABLET DAILY AS NEEDED FOR SWELLING Yes Orlando Munguia DO   pantoprazole (PROTONIX) 40 MG tablet Take 1 tablet by mouth every morning (before breakfast) Yes Analilia Collier MD   diclofenac (VOLTAREN) 50 MG EC tablet Take 1 tablet by mouth 3 times daily (with meals) Yes Lula Mcardle, DO   albuterol sulfate HFA (PROAIR HFA) 108 (90 Base) MCG/ACT inhaler Inhale 2 puffs into the lungs every 6 hours as needed for Wheezing Yes Lula Mcardle, DO   Nebulizers (AIRIAL COMPACT MINI NEBULIZER) MISC 1 nebule by Does not apply route 4 times daily Yes Orlando Munguia, DO   albuterol (PROVENTIL) (2.5 MG/3ML) 0.083% nebulizer solution Take 3 mLs by nebulization every 6 hours as needed for Wheezing Yes Lula Mcardle, DO        Social History     Tobacco Use    Smoking status: Never Smoker    Smokeless tobacco: Never Used   Substance Use Topics    Alcohol use: Never     Frequency: Never        Vitals:    07/02/20 1320   BP: 136/88   Temp: 97.7 °F (36.5 °C)   Weight: 218 lb 3.2 oz (99 kg)   Height: 4' 11\" (1.499 m)     Estimated body mass index is 44.07 kg/m² as calculated from the following:    Height as of this encounter: 4' 11\" (1.499 m). Weight as of this encounter: 218 lb 3.2 oz (99 kg). Physical Exam  Vitals signs and nursing note reviewed. Constitutional:       Appearance: She is well-developed. She is obese. HENT:      Head: Normocephalic and atraumatic. Right Ear: Tympanic membrane, ear canal and external ear normal.      Left Ear: Tympanic membrane, ear canal and external ear normal.      Nose: Nose normal.   Eyes:      Conjunctiva/sclera: Conjunctivae normal.      Pupils: Pupils are equal, round, and reactive to light. Neck:      Musculoskeletal: Normal range of motion and neck supple. Cardiovascular:      Rate and Rhythm: Normal rate and regular rhythm. Heart sounds: Normal heart sounds. No murmur. Pulmonary:      Effort: Pulmonary effort is normal.      Breath sounds: Normal breath sounds. No wheezing. Abdominal:      General: Bowel sounds are normal.      Palpations: Abdomen is soft. Tenderness: There is no abdominal tenderness. Skin:     General: Skin is warm and dry. Neurological:      Mental Status: She is alert and oriented to person, place, and time.    Psychiatric: Behavior: Behavior normal.         Thought Content: Thought content normal.         Judgment: Judgment normal.         ASSESSMENT/PLAN:  1. Injury of head, sequela  Stable  Continue with otc medication  Discussed signs and symptoms from immediate evaluation in the ER. Answered questions    2. Edema of left orbit  Told to have imaging if symptoms dont improve  Answered quesitons  Reassured the patient      No follow-ups on file.

## 2020-07-13 ENCOUNTER — TELEPHONE (OUTPATIENT)
Dept: FAMILY MEDICINE CLINIC | Age: 75
End: 2020-07-13

## 2020-07-13 NOTE — TELEPHONE ENCOUNTER
Patient is calling because someone called about a order from the hospital that Dr. Anais Reddy ordered. I looked in her chart and it might have been the CT's that were ordered back on 6/22/20, but I'm not sure. I advised her to call the number back to be certain. Patient said she isn't having any more double vision or symptoms so she will likely NOT get those CT's done at this time, but will discuss it further with Dr. Anais Reddy at her next appt.

## 2020-07-16 ENCOUNTER — TELEPHONE (OUTPATIENT)
Dept: FAMILY MEDICINE CLINIC | Age: 75
End: 2020-07-16

## 2020-07-16 NOTE — TELEPHONE ENCOUNTER
Patient states she received a call from this office number stating they needed to update her records. Stated they are from Dr. Marguerite Santos office and stated they had a thick accent. Patient thought the call was fishy and ended the call with them quickly. Told patient our office is not calling to update records and that we update patient info in office on a signed form. Patient just wanted to inform this office.

## 2020-07-30 RX ORDER — METOLAZONE 5 MG/1
TABLET ORAL
Qty: 30 TABLET | Refills: 1 | Status: SHIPPED | OUTPATIENT
Start: 2020-07-30 | End: 2020-09-24

## 2020-08-03 ENCOUNTER — OFFICE VISIT (OUTPATIENT)
Dept: FAMILY MEDICINE CLINIC | Age: 75
End: 2020-08-03
Payer: MEDICARE

## 2020-08-03 VITALS
BODY MASS INDEX: 44.43 KG/M2 | DIASTOLIC BLOOD PRESSURE: 82 MMHG | HEIGHT: 59 IN | WEIGHT: 220.4 LBS | SYSTOLIC BLOOD PRESSURE: 132 MMHG | TEMPERATURE: 98 F

## 2020-08-03 PROCEDURE — 1036F TOBACCO NON-USER: CPT | Performed by: FAMILY MEDICINE

## 2020-08-03 PROCEDURE — G8400 PT W/DXA NO RESULTS DOC: HCPCS | Performed by: FAMILY MEDICINE

## 2020-08-03 PROCEDURE — G8417 CALC BMI ABV UP PARAM F/U: HCPCS | Performed by: FAMILY MEDICINE

## 2020-08-03 PROCEDURE — 3017F COLORECTAL CA SCREEN DOC REV: CPT | Performed by: FAMILY MEDICINE

## 2020-08-03 PROCEDURE — 1123F ACP DISCUSS/DSCN MKR DOCD: CPT | Performed by: FAMILY MEDICINE

## 2020-08-03 PROCEDURE — 4040F PNEUMOC VAC/ADMIN/RCVD: CPT | Performed by: FAMILY MEDICINE

## 2020-08-03 PROCEDURE — 99214 OFFICE O/P EST MOD 30 MIN: CPT | Performed by: FAMILY MEDICINE

## 2020-08-03 PROCEDURE — 1090F PRES/ABSN URINE INCON ASSESS: CPT | Performed by: FAMILY MEDICINE

## 2020-08-03 PROCEDURE — G8427 DOCREV CUR MEDS BY ELIG CLIN: HCPCS | Performed by: FAMILY MEDICINE

## 2020-08-03 NOTE — PROGRESS NOTES
8/3/2020     Aubrey Wiley (:  1945) is a 70 y.o. female, here for evaluation of the following medical concerns:    HPI  1.  Fall- tripped over step at home carrying groceries, fell striking her head.  She denied LOC, has a large abrasion on forehead, denied falling due to dizziness or weakness.  This occurred just under two months ago, she didn't seek help until several weeks ago. She reported edema and had CT ordered but never had this obtained. .  Head injury- struck left forehead, has abrasion, mild edema over left eye, no LOC, no memory problems no headache, denied dizziness, denied vision loss, denied hearing problem, denied other symptoms secondary to concussion.  Much improved today, edema has almost completely resolved. 2. Colon cancer screening-  Patient is needing screening, never has had this before. 3.  Asthma- patient is stable today, feels well, discussed treatment, patient feels she is stable on Proair as needed and has nebulizer if needed     Today, denied chest pain, sob, n, v, or diarrhea. Review of Systems   Constitutional: Negative for activity change, fatigue, fever and unexpected weight change. HENT: Negative for congestion, ear pain, rhinorrhea and sore throat. Respiratory: Negative for shortness of breath. Cardiovascular: Negative for chest pain. Gastrointestinal: Negative for abdominal pain, diarrhea, nausea and vomiting. Neurological: Negative for dizziness, facial asymmetry, speech difficulty, light-headedness, numbness and headaches. Prior to Visit Medications    Medication Sig Taking?  Authorizing Provider   metOLazone (ZAROXOLYN) 5 MG tablet TAKE ONE TABLET DAILY AS NEEDED FOR SWELLING Yes Orlando Munguia,    pantoprazole (PROTONIX) 40 MG tablet Take 1 tablet by mouth every morning (before breakfast) Yes Analilia Handley MD   diclofenac (VOLTAREN) 50 MG EC tablet Take 1 tablet by mouth 3 times daily (with meals) Yes Jose Rafael Pichardo,  albuterol sulfate HFA (PROAIR HFA) 108 (90 Base) MCG/ACT inhaler Inhale 2 puffs into the lungs every 6 hours as needed for Wheezing Yes Renetta Renee DO   Nebulizers (AIRIAL COMPACT MINI NEBULIZER) MISC 1 nebule by Does not apply route 4 times daily Yes Orlando Munguia, DO   albuterol (PROVENTIL) (2.5 MG/3ML) 0.083% nebulizer solution Take 3 mLs by nebulization every 6 hours as needed for Wheezing Yes Renetta Renee, DO        Social History     Tobacco Use    Smoking status: Never Smoker    Smokeless tobacco: Never Used   Substance Use Topics    Alcohol use: Never     Frequency: Never        Vitals:    08/03/20 1057 08/03/20 1117   BP: (!) 142/88 132/82   Temp: 98 °F (36.7 °C)    Weight: 220 lb 6.4 oz (100 kg)    Height: 4' 11\" (1.499 m)      Estimated body mass index is 44.52 kg/m² as calculated from the following:    Height as of this encounter: 4' 11\" (1.499 m). Weight as of this encounter: 220 lb 6.4 oz (100 kg). Physical Exam  Vitals signs and nursing note reviewed. HENT:      Head: Normocephalic. Right Ear: Tympanic membrane, ear canal and external ear normal.      Left Ear: Tympanic membrane, ear canal and external ear normal.      Nose: Nose normal.   Cardiovascular:      Rate and Rhythm: Regular rhythm. Heart sounds: Normal heart sounds. No murmur. Pulmonary:      Breath sounds: Normal breath sounds. No wheezing. Abdominal:      Palpations: Abdomen is soft. Tenderness: There is no abdominal tenderness. Neurological:      General: No focal deficit present. Mental Status: She is alert and oriented to person, place, and time. Cranial Nerves: No cranial nerve deficit. Motor: No weakness. Psychiatric:         Behavior: Behavior normal.         Judgment: Judgment normal.         ASSESSMENT/PLAN:  1. Moderate persistent asthma without complication  Stable  Continue with medication  Answered questions    2.  Colon cancer screening  Referred for procedure  Educated on the importance of having this done. Answered questions  - Cologuaelpidio (For External Results Only); Future    3. Injury of head, sequela  Stable  Continue with medication for pain as needed  Answered questions    *I continue to educate the patient on the need for AWV as well as vaccinations. She understands but refuses to have these obtained. Return in about 3 months (around 11/3/2020).

## 2020-08-08 ASSESSMENT — ENCOUNTER SYMPTOMS
SHORTNESS OF BREATH: 0
SORE THROAT: 0
RHINORRHEA: 0
VOMITING: 0
DIARRHEA: 0
ABDOMINAL PAIN: 0
NAUSEA: 0

## 2020-08-12 ENCOUNTER — OFFICE VISIT (OUTPATIENT)
Dept: FAMILY MEDICINE CLINIC | Age: 75
End: 2020-08-12
Payer: MEDICARE

## 2020-08-12 VITALS
HEIGHT: 59 IN | DIASTOLIC BLOOD PRESSURE: 88 MMHG | WEIGHT: 219.6 LBS | TEMPERATURE: 98.4 F | BODY MASS INDEX: 44.27 KG/M2 | SYSTOLIC BLOOD PRESSURE: 138 MMHG

## 2020-08-12 PROCEDURE — 1123F ACP DISCUSS/DSCN MKR DOCD: CPT | Performed by: FAMILY MEDICINE

## 2020-08-12 PROCEDURE — 4040F PNEUMOC VAC/ADMIN/RCVD: CPT | Performed by: FAMILY MEDICINE

## 2020-08-12 PROCEDURE — G8417 CALC BMI ABV UP PARAM F/U: HCPCS | Performed by: FAMILY MEDICINE

## 2020-08-12 PROCEDURE — G8400 PT W/DXA NO RESULTS DOC: HCPCS | Performed by: FAMILY MEDICINE

## 2020-08-12 PROCEDURE — G8427 DOCREV CUR MEDS BY ELIG CLIN: HCPCS | Performed by: FAMILY MEDICINE

## 2020-08-12 PROCEDURE — 1036F TOBACCO NON-USER: CPT | Performed by: FAMILY MEDICINE

## 2020-08-12 PROCEDURE — 99213 OFFICE O/P EST LOW 20 MIN: CPT | Performed by: FAMILY MEDICINE

## 2020-08-12 PROCEDURE — 3017F COLORECTAL CA SCREEN DOC REV: CPT | Performed by: FAMILY MEDICINE

## 2020-08-12 PROCEDURE — 1090F PRES/ABSN URINE INCON ASSESS: CPT | Performed by: FAMILY MEDICINE

## 2020-08-12 ASSESSMENT — ENCOUNTER SYMPTOMS
VOMITING: 0
COUGH: 0
SINUS PRESSURE: 0
SORE THROAT: 0
WHEEZING: 0
NAUSEA: 0
RHINORRHEA: 0
ABDOMINAL PAIN: 0
DIARRHEA: 0
SHORTNESS OF BREATH: 0
CHEST TIGHTNESS: 0

## 2020-08-12 NOTE — PROGRESS NOTES
2020     Stephanie Rosales (:  1945) is a 70 y.o. female, here for evaluation of the following medical concerns:    HPI     Asthma-  patient has chest congestion today,  patient feels she is stable on Proair as needed and has nebulizer if needed, chest to help breathing, patient is not using nebulizer at this time jut her inhaler, mild wheezing today, difficult at times with deep inspiration, denied fever or chills, Denied rhinorrhea or nasal congestion. Today, denied chest pain, sob, n,, v, or diarrhea. Review of Systems   Constitutional: Negative for activity change, fatigue, fever and unexpected weight change. HENT: Negative for congestion, ear pain, rhinorrhea, sinus pressure and sore throat. Respiratory: Negative for cough, chest tightness, shortness of breath and wheezing. Cardiovascular: Negative for chest pain and leg swelling. Gastrointestinal: Negative for abdominal pain, diarrhea, nausea and vomiting. Musculoskeletal: Negative for arthralgias. Skin: Negative for rash. Neurological: Negative for dizziness, tremors, syncope, numbness and headaches. Psychiatric/Behavioral: Negative for dysphoric mood. The patient is not nervous/anxious. Prior to Visit Medications    Medication Sig Taking?  Authorizing Provider   metOLazone (ZAROXOLYN) 5 MG tablet TAKE ONE TABLET DAILY AS NEEDED FOR SWELLING Yes Orlando Munguia DO   pantoprazole (PROTONIX) 40 MG tablet Take 1 tablet by mouth every morning (before breakfast) Yes Analilia Petty MD   diclofenac (VOLTAREN) 50 MG EC tablet Take 1 tablet by mouth 3 times daily (with meals) Yes Orlando Munguia DO   albuterol sulfate HFA (PROAIR HFA) 108 (90 Base) MCG/ACT inhaler Inhale 2 puffs into the lungs every 6 hours as needed for Wheezing Yes Haley Williamson, DO   Nebulizers (AIRIAL COMPACT MINI NEBULIZER) MISC 1 nebule by Does not apply route 4 times daily Yes Orlando Munguia, DO   albuterol (PROVENTIL) (2.5 MG/3ML) 0.083% nebulizer solution Take 3 mLs by nebulization every 6 hours as needed for Wheezing Yes Nuria Cole DO        Social History     Tobacco Use    Smoking status: Never Smoker    Smokeless tobacco: Never Used   Substance Use Topics    Alcohol use: Never     Frequency: Never        Vitals:    08/12/20 1037   BP: 138/88   Temp: 98.4 °F (36.9 °C)   Weight: 219 lb 9.6 oz (99.6 kg)   Height: 4' 11\" (1.499 m)     Estimated body mass index is 44.35 kg/m² as calculated from the following:    Height as of this encounter: 4' 11\" (1.499 m). Weight as of this encounter: 219 lb 9.6 oz (99.6 kg). Physical Exam  Vitals signs reviewed. Constitutional:       Appearance: She is well-developed. HENT:      Head: Normocephalic and atraumatic. Right Ear: External ear normal.      Left Ear: External ear normal.   Cardiovascular:      Rate and Rhythm: Normal rate and regular rhythm. Heart sounds: Normal heart sounds. No murmur. Pulmonary:      Effort: Pulmonary effort is normal. No respiratory distress. Breath sounds: Normal breath sounds. No stridor. No wheezing, rhonchi or rales. Chest:      Chest wall: No tenderness. Abdominal:      General: Bowel sounds are normal.      Palpations: Abdomen is soft. Tenderness: There is no abdominal tenderness. Neurological:      Mental Status: She is alert and oriented to person, place, and time. Psychiatric:         Behavior: Behavior normal.         Judgment: Judgment normal.         ASSESSMENT/PLAN:  1. Moderate persistent asthma without complication  Take medication as prescribed. Needs to use nebulizer more. Push fluids and rest  Discussed conservative treatment  Discussed signs and symptoms for immediate evaluation in the ER  RTC if no improved. Tylenol/Ibuprofen for pain      Return in about 3 months (around 11/12/2020).

## 2020-08-14 ENCOUNTER — TELEPHONE (OUTPATIENT)
Dept: FAMILY MEDICINE CLINIC | Age: 75
End: 2020-08-14

## 2020-08-14 NOTE — TELEPHONE ENCOUNTER
Spoke to pt - states she got a call that her Cologuard results were in and she tried to get the results, but the number didn't work.  I told the pt that they usually fax us the results and I have not come across them yet, but will give her a call back when they come in

## 2020-08-14 NOTE — TELEPHONE ENCOUNTER
----- Message from Ranjit Shah sent at 8/14/2020  4:31 PM EDT -----  Subject: Results Request    QUESTIONS  Which lab or imaging result is the patient calling about? color guard test  Which provider ordered the test?   At what location was the test performed? Date the test was performed? Additional Information for Provider?   ---------------------------------------------------------------------------  --------------  CALL BACK INFO  What is the best way for the office to contact you? OK to leave message on   voicemail  Preferred Call Back Phone Number?  [phone]

## 2020-08-20 ENCOUNTER — TELEPHONE (OUTPATIENT)
Dept: FAMILY MEDICINE CLINIC | Age: 75
End: 2020-08-20

## 2020-08-20 NOTE — TELEPHONE ENCOUNTER
Patient is calling for her Cologuard results. I do see them scanned in but wasn't sure if Dr. Maegan Thomas had reviewed them yet.  Please call patient at [phone]

## 2020-09-24 RX ORDER — METOLAZONE 5 MG/1
TABLET ORAL
Qty: 30 TABLET | Refills: 1 | Status: SHIPPED | OUTPATIENT
Start: 2020-09-24 | End: 2020-11-06

## 2020-11-03 ENCOUNTER — OFFICE VISIT (OUTPATIENT)
Dept: FAMILY MEDICINE CLINIC | Age: 75
End: 2020-11-03
Payer: MEDICARE

## 2020-11-03 ENCOUNTER — HOSPITAL ENCOUNTER (OUTPATIENT)
Dept: VASCULAR LAB | Age: 75
Discharge: HOME | End: 2020-11-03
Payer: MEDICARE

## 2020-11-03 VITALS
DIASTOLIC BLOOD PRESSURE: 84 MMHG | SYSTOLIC BLOOD PRESSURE: 132 MMHG | WEIGHT: 223.6 LBS | BODY MASS INDEX: 45.08 KG/M2 | HEIGHT: 59 IN | TEMPERATURE: 97.7 F

## 2020-11-03 PROBLEM — E66.01 CLASS 3 SEVERE OBESITY DUE TO EXCESS CALORIES WITHOUT SERIOUS COMORBIDITY WITH BODY MASS INDEX (BMI) OF 45.0 TO 49.9 IN ADULT (HCC): Status: ACTIVE | Noted: 2020-11-03

## 2020-11-03 PROBLEM — E66.813 CLASS 3 SEVERE OBESITY DUE TO EXCESS CALORIES WITHOUT SERIOUS COMORBIDITY WITH BODY MASS INDEX (BMI) OF 45.0 TO 49.9 IN ADULT: Status: ACTIVE | Noted: 2020-11-03

## 2020-11-03 LAB
BASOPHILS ABSOLUTE: 0 K/UL (ref 0–0.2)
BASOPHILS RELATIVE PERCENT: 0.6 %
EOSINOPHILS ABSOLUTE: 0.1 K/UL (ref 0–0.6)
EOSINOPHILS RELATIVE PERCENT: 1.6 %
HCT VFR BLD CALC: 39 % (ref 36–48)
HEMOGLOBIN: 13.5 G/DL (ref 12–16)
LYMPHOCYTES ABSOLUTE: 1.9 K/UL (ref 1–5.1)
LYMPHOCYTES RELATIVE PERCENT: 34 %
MCH RBC QN AUTO: 29.9 PG (ref 26–34)
MCHC RBC AUTO-ENTMCNC: 34.6 G/DL (ref 31–36)
MCV RBC AUTO: 86.6 FL (ref 80–100)
MONOCYTES ABSOLUTE: 0.5 K/UL (ref 0–1.3)
MONOCYTES RELATIVE PERCENT: 8.6 %
NEUTROPHILS ABSOLUTE: 3.2 K/UL (ref 1.7–7.7)
NEUTROPHILS RELATIVE PERCENT: 55.2 %
PDW BLD-RTO: 13.4 % (ref 12.4–15.4)
PLATELET # BLD: 206 K/UL (ref 135–450)
PMV BLD AUTO: 8.7 FL (ref 5–10.5)
RBC # BLD: 4.51 M/UL (ref 4–5.2)
WBC # BLD: 5.7 K/UL (ref 4–11)

## 2020-11-03 PROCEDURE — 36415 COLL VENOUS BLD VENIPUNCTURE: CPT | Performed by: FAMILY MEDICINE

## 2020-11-03 PROCEDURE — G8417 CALC BMI ABV UP PARAM F/U: HCPCS | Performed by: FAMILY MEDICINE

## 2020-11-03 PROCEDURE — G8484 FLU IMMUNIZE NO ADMIN: HCPCS | Performed by: FAMILY MEDICINE

## 2020-11-03 PROCEDURE — 1123F ACP DISCUSS/DSCN MKR DOCD: CPT | Performed by: FAMILY MEDICINE

## 2020-11-03 PROCEDURE — 4040F PNEUMOC VAC/ADMIN/RCVD: CPT | Performed by: FAMILY MEDICINE

## 2020-11-03 PROCEDURE — G8427 DOCREV CUR MEDS BY ELIG CLIN: HCPCS | Performed by: FAMILY MEDICINE

## 2020-11-03 PROCEDURE — 99214 OFFICE O/P EST MOD 30 MIN: CPT | Performed by: FAMILY MEDICINE

## 2020-11-03 PROCEDURE — 93971 EXTREMITY STUDY: CPT

## 2020-11-03 PROCEDURE — G8400 PT W/DXA NO RESULTS DOC: HCPCS | Performed by: FAMILY MEDICINE

## 2020-11-03 PROCEDURE — 1090F PRES/ABSN URINE INCON ASSESS: CPT | Performed by: FAMILY MEDICINE

## 2020-11-03 PROCEDURE — 1036F TOBACCO NON-USER: CPT | Performed by: FAMILY MEDICINE

## 2020-11-03 PROCEDURE — 3017F COLORECTAL CA SCREEN DOC REV: CPT | Performed by: FAMILY MEDICINE

## 2020-11-03 RX ORDER — CEPHALEXIN 500 MG/1
500 CAPSULE ORAL 3 TIMES DAILY
Qty: 30 CAPSULE | Refills: 0 | Status: SHIPPED | OUTPATIENT
Start: 2020-11-03 | End: 2020-11-13

## 2020-11-03 ASSESSMENT — ENCOUNTER SYMPTOMS
DIARRHEA: 0
NAUSEA: 0
SINUS PRESSURE: 0
ABDOMINAL PAIN: 0
WHEEZING: 0
TROUBLE SWALLOWING: 0
SORE THROAT: 0
RHINORRHEA: 0
COLOR CHANGE: 1
CHEST TIGHTNESS: 0
COUGH: 0
VOMITING: 0
SHORTNESS OF BREATH: 0

## 2020-11-03 ASSESSMENT — PATIENT HEALTH QUESTIONNAIRE - PHQ9
1. LITTLE INTEREST OR PLEASURE IN DOING THINGS: 0
SUM OF ALL RESPONSES TO PHQ QUESTIONS 1-9: 0
SUM OF ALL RESPONSES TO PHQ9 QUESTIONS 1 & 2: 0
SUM OF ALL RESPONSES TO PHQ QUESTIONS 1-9: 0
2. FEELING DOWN, DEPRESSED OR HOPELESS: 0
SUM OF ALL RESPONSES TO PHQ QUESTIONS 1-9: 0

## 2020-11-03 NOTE — PROGRESS NOTES
11/3/2020     Rigo Godwin (:  1945) is a 70 y.o. female, here for evaluation of the following medical concerns:    HPI     Asthma-  patient has chest congestion today,  patient feels she is stable on Proair as needed and has nebulizer if needed, chest to help breathing, patient is not using nebulizer at this time jut her inhaler, mild wheezing today, difficult at times with deep inspiration, denied fever or chills, Denied rhinorrhea or nasal congestion. Right leg edema- back of the leg, patient has swelling in her leg, erythematous,has possible nodule secondary to cellulitis, patient is having pain and discomfort. Very erythematous and edematous today, particularly above the knee, medial aspect, pain with palpation, she did add much improved since several days ago.      Today, denied chest pain, sob, n,, v, or diarrhea  Review of Systems   Constitutional: Negative for activity change, fatigue, fever and unexpected weight change. HENT: Negative for congestion, ear pain, rhinorrhea, sinus pressure, sore throat and trouble swallowing. Respiratory: Negative for cough, chest tightness, shortness of breath and wheezing. Cardiovascular: Negative for chest pain and leg swelling. Gastrointestinal: Negative for abdominal pain, diarrhea, nausea and vomiting. Endocrine: Negative for cold intolerance, heat intolerance, polydipsia and polyphagia. Musculoskeletal: Positive for arthralgias, gait problem and myalgias. Skin: Positive for color change and wound. Negative for rash. Neurological: Negative for dizziness, tremors, syncope, numbness and headaches. Psychiatric/Behavioral: Negative for dysphoric mood. The patient is not nervous/anxious. Prior to Visit Medications    Medication Sig Taking?  Authorizing Provider   cephALEXin (KEFLEX) 500 MG capsule Take 1 capsule by mouth 3 times daily for 10 days Yes Orlando Munguia DO   metOLazone (ZAROXOLYN) 5 MG tablet TAKE ONE TABLET DAILY AS NEEDED FOR SWELLING Yes Orlando Munguia, DO   pantoprazole (PROTONIX) 40 MG tablet Take 1 tablet by mouth every morning (before breakfast) Yes Analilia Engle MD   diclofenac (VOLTAREN) 50 MG EC tablet Take 1 tablet by mouth 3 times daily (with meals) Yes Orlando UPTON Ezra, DO   albuterol sulfate HFA (PROAIR HFA) 108 (90 Base) MCG/ACT inhaler Inhale 2 puffs into the lungs every 6 hours as needed for Wheezing Yes Chika Meals, DO   Nebulizers (AIRIAL COMPACT MINI NEBULIZER) MISC 1 nebule by Does not apply route 4 times daily Yes Orlando UPTON Ezra, DO   albuterol (PROVENTIL) (2.5 MG/3ML) 0.083% nebulizer solution Take 3 mLs by nebulization every 6 hours as needed for Wheezing Yes Chika Meals, DO        Social History     Tobacco Use    Smoking status: Never Smoker    Smokeless tobacco: Never Used   Substance Use Topics    Alcohol use: Never     Frequency: Never        Vitals:    11/03/20 1054   BP: 132/84   Temp: 97.7 °F (36.5 °C)   Weight: 223 lb 9.6 oz (101.4 kg)   Height: 4' 11\" (1.499 m)     Estimated body mass index is 45.16 kg/m² as calculated from the following:    Height as of this encounter: 4' 11\" (1.499 m). Weight as of this encounter: 223 lb 9.6 oz (101.4 kg). Physical Exam  Vitals signs and nursing note reviewed. Constitutional:       Appearance: She is well-developed. HENT:      Head: Normocephalic and atraumatic. Right Ear: Tympanic membrane, ear canal and external ear normal.      Left Ear: Tympanic membrane, ear canal and external ear normal.   Neck:      Musculoskeletal: Neck supple. Thyroid: No thyromegaly. Cardiovascular:      Rate and Rhythm: Normal rate and regular rhythm. Heart sounds: No murmur. Pulmonary:      Effort: Pulmonary effort is normal.      Breath sounds: Normal breath sounds. No wheezing or rales. Abdominal:      General: Bowel sounds are normal.      Palpations: Abdomen is soft. Tenderness: There is no abdominal tenderness. Musculoskeletal: Normal range of motion. General: Swelling and tenderness present. Comments: See HPI  Edema, nodule? Difficult to see due to location   Skin:     Findings: Erythema and rash present. Neurological:      Mental Status: She is alert and oriented to person, place, and time. Psychiatric:         Behavior: Behavior normal.         Judgment: Judgment normal.         ASSESSMENT/PLAN:  1. Moderate persistent asthma without complication  Take medication as prescribed. Discussed conservative treatment  Discussed signs and symptoms for immediate evaluation in the ER  RTC if no improved. Tylenol/Ibuprofen for pain  - CBC Auto Differential    2. Class 3 severe obesity due to excess calories without serious comorbidity with body mass index (BMI) of 45.0 to 49.9 in [facility])  Discussed the need to exercise 30 minutes each day. Reduce refined carbs and replace with fiber and vegetables. Decrease portion size and limit snacking, stop eating after dinner  Count calories. - CBC Auto Differential    3. Cellulitis of right lower extremity  Take medication as prescribed. Push fluids and rest  Discussed conservative treatment  Discussed signs and symptoms for immediate evaluation in the ER  RTC if no improved. Tylenol/Ibuprofen for pain  - VL Extremity Venous Right; Future  - CBC Auto Differential    4. Leg edema, right  Will obtained ultrasound stat  Discussed signs and symptoms for immediate evaluation in the ER. Questions answered  Reassured the patient. - VL Extremity Venous Right; Future  - CBC Auto Differential      Return in about 4 weeks (around 12/1/2020).

## 2020-11-03 NOTE — PATIENT INSTRUCTIONS
Thank you for coming. Please take you medications as prescribed. Please continue to eat a balanced diet and exercise. Labs were obtained and we will contact you with the results. Clean with soap and water. If you have questions please let me know via phone or email.

## 2020-11-06 RX ORDER — METOLAZONE 5 MG/1
TABLET ORAL
Qty: 30 TABLET | Refills: 1 | Status: SHIPPED | OUTPATIENT
Start: 2020-11-06 | End: 2020-12-01 | Stop reason: SDUPTHER

## 2020-12-01 ENCOUNTER — TELEPHONE (OUTPATIENT)
Dept: FAMILY MEDICINE CLINIC | Age: 75
End: 2020-12-01

## 2020-12-01 ENCOUNTER — VIRTUAL VISIT (OUTPATIENT)
Dept: FAMILY MEDICINE CLINIC | Age: 75
End: 2020-12-01
Payer: MEDICARE

## 2020-12-01 PROCEDURE — 99442 PR PHYS/QHP TELEPHONE EVALUATION 11-20 MIN: CPT | Performed by: FAMILY MEDICINE

## 2020-12-01 RX ORDER — METOLAZONE 5 MG/1
TABLET ORAL
Qty: 30 TABLET | Refills: 2 | Status: SHIPPED | OUTPATIENT
Start: 2020-12-01 | End: 2021-03-04

## 2020-12-01 NOTE — PROGRESS NOTES
Maegan Garcia is a 70 y.o. female evaluated via telephone on 12/1/2020. Consent:  She and/or health care decision maker is aware that that she may receive a bill for this telephone service, depending on her insurance coverage, and has provided verbal consent to proceed: Yes      Documentation:  I communicated with the patient and/or health care decision maker about     1. Asthma- well controlled at this time, patient is using medication as needed, not have a problem today. 2.  Ulcer-on right ankle-has two, 1 the size of dime, the other nickel size, has had these in the past and has used steroid cream on these, believes there is some discharge, open, no pus, no erythema? Patient was advised to clean with soap and water, send a picture and please follow up. Details of this discussion including any medical advice provided:     1. Asthma  Take medication as prescribed. Discussed conservative treatment  Discussed signs and symptoms for immediate evaluation in the ER  RTC if no improved. 2.  Wound  Use medication as prescribed. Clean with soap and water  Keep raised  Discussed sending pictures   Discussed signs and symptoms for immediate evaluation in the ER,discussed signs of infection. RTC if no improved. Tylenol/Ibuprofen for pain    I affirm this is a Patient Initiated Episode with a Patient who has not had a related appointment within my department in the past 7 days or scheduled within the next 24 hours.     Patient identification was verified at the start of the visit: Yes    Total Time: minutes: 11-20 minutes    Note: not billable if this call serves to triage the patient into an appointment for the relevant concern      Anyi Pierson

## 2020-12-01 NOTE — TELEPHONE ENCOUNTER
Patient is calling to let Marco Like know that she uploaded the images in my chart of her leg that he requested she was unsure what she should do please advise

## 2020-12-02 ENCOUNTER — TELEPHONE (OUTPATIENT)
Dept: FAMILY MEDICINE CLINIC | Age: 75
End: 2020-12-02

## 2020-12-02 NOTE — TELEPHONE ENCOUNTER
Patient is calling stating Mabelgayle Read saw the picture of her legs and he was wondering what the white stuff was on the top of the sore she states it may of been the clotrimazole she put on it she put that on it on Tuesday or she thinks it may be from the glare of the sun her daughter took the picture at a weird angle she said please call her cell phone first [phone] if you dont get her there call her home number and leave a message thanks

## 2020-12-03 NOTE — TELEPHONE ENCOUNTER
I left a message for the patient informing her that I did see her scabs and to continue with medication that was provided.

## 2020-12-11 NOTE — TELEPHONE ENCOUNTER
Patient states she needs a med refill on the below medication for a lesion on her left ankle. Please return patient's call to confirm med refilled.

## 2020-12-18 ENCOUNTER — TELEPHONE (OUTPATIENT)
Dept: FAMILY MEDICINE CLINIC | Age: 75
End: 2020-12-18

## 2021-01-08 ENCOUNTER — OFFICE VISIT (OUTPATIENT)
Dept: FAMILY MEDICINE CLINIC | Age: 76
End: 2021-01-08
Payer: MEDICARE

## 2021-01-08 VITALS
DIASTOLIC BLOOD PRESSURE: 78 MMHG | BODY MASS INDEX: 46.33 KG/M2 | WEIGHT: 229.8 LBS | SYSTOLIC BLOOD PRESSURE: 136 MMHG | HEIGHT: 59 IN | TEMPERATURE: 97.5 F

## 2021-01-08 DIAGNOSIS — L03.90 CELLULITIS, UNSPECIFIED CELLULITIS SITE: ICD-10-CM

## 2021-01-08 DIAGNOSIS — L97.919 ULCER OF RIGHT LOWER EXTREMITY, UNSPECIFIED ULCER STAGE (HCC): Primary | ICD-10-CM

## 2021-01-08 PROCEDURE — 99214 OFFICE O/P EST MOD 30 MIN: CPT | Performed by: FAMILY MEDICINE

## 2021-01-08 PROCEDURE — 3017F COLORECTAL CA SCREEN DOC REV: CPT | Performed by: FAMILY MEDICINE

## 2021-01-08 PROCEDURE — G8427 DOCREV CUR MEDS BY ELIG CLIN: HCPCS | Performed by: FAMILY MEDICINE

## 2021-01-08 PROCEDURE — 4040F PNEUMOC VAC/ADMIN/RCVD: CPT | Performed by: FAMILY MEDICINE

## 2021-01-08 PROCEDURE — 1090F PRES/ABSN URINE INCON ASSESS: CPT | Performed by: FAMILY MEDICINE

## 2021-01-08 PROCEDURE — 1036F TOBACCO NON-USER: CPT | Performed by: FAMILY MEDICINE

## 2021-01-08 PROCEDURE — G8417 CALC BMI ABV UP PARAM F/U: HCPCS | Performed by: FAMILY MEDICINE

## 2021-01-08 PROCEDURE — G8484 FLU IMMUNIZE NO ADMIN: HCPCS | Performed by: FAMILY MEDICINE

## 2021-01-08 PROCEDURE — 1123F ACP DISCUSS/DSCN MKR DOCD: CPT | Performed by: FAMILY MEDICINE

## 2021-01-08 PROCEDURE — G8400 PT W/DXA NO RESULTS DOC: HCPCS | Performed by: FAMILY MEDICINE

## 2021-01-08 RX ORDER — CEPHALEXIN 500 MG/1
500 CAPSULE ORAL 3 TIMES DAILY
Qty: 30 CAPSULE | Refills: 0 | Status: SHIPPED | OUTPATIENT
Start: 2021-01-08 | End: 2021-01-26 | Stop reason: ALTCHOICE

## 2021-01-08 RX ORDER — TRIAMTERENE AND HYDROCHLOROTHIAZIDE 37.5; 25 MG/1; MG/1
1 TABLET ORAL PRN
COMMUNITY
Start: 2020-03-27 | End: 2021-05-24 | Stop reason: SDUPTHER

## 2021-01-08 RX ORDER — CLINDAMYCIN HYDROCHLORIDE 300 MG/1
300 CAPSULE ORAL 3 TIMES DAILY
Qty: 30 CAPSULE | Refills: 0 | Status: SHIPPED
Start: 2021-01-08 | End: 2021-01-08 | Stop reason: SINTOL

## 2021-01-08 ASSESSMENT — PATIENT HEALTH QUESTIONNAIRE - PHQ9
1. LITTLE INTEREST OR PLEASURE IN DOING THINGS: 0
SUM OF ALL RESPONSES TO PHQ QUESTIONS 1-9: 0
2. FEELING DOWN, DEPRESSED OR HOPELESS: 0
SUM OF ALL RESPONSES TO PHQ QUESTIONS 1-9: 0

## 2021-01-08 ASSESSMENT — ENCOUNTER SYMPTOMS
ABDOMINAL PAIN: 0
DIARRHEA: 0
SHORTNESS OF BREATH: 0
VOMITING: 0
NAUSEA: 0

## 2021-01-08 NOTE — PROGRESS NOTES
2021     Dale Almodovar (:  1945) is a 70 y.o. female, here for evaluation of the following medical concerns:    HPI     Asthma-  patient has chest congestion today,  patient feels she is stable on Proair as needed and has nebulizer if needed, chest to help breathing, patient is not using nebulizer at this time jut her inhaler, mild wheezing today, difficult at times with deep inspiration, denied fever or chills, Denied rhinorrhea or nasal congestion.      Right leg cellulitis/edema- right leg, lateral aspect, ankle, patient has swelling in her leg, erythematous,has possible nodule secondary to cellulitis, patient is not  having pain or discomfort at this time. Very erythematous and edematous today, particularly above right ankle,  medial and lateral aspect, pain with palpation, she believes his is much improved from several weeks ago?, patient stated she has similar ulcer infection that took one year to resolve. Present for the past 4-5 weeks.      Today, denied chest pain, sob, n,, v, or diarrhea  Review of Systems   Constitutional: Negative for activity change, fatigue, fever and unexpected weight change. Respiratory: Negative for shortness of breath. Cardiovascular: Positive for leg swelling. Negative for chest pain and palpitations. Gastrointestinal: Negative for abdominal pain, diarrhea, nausea and vomiting. Musculoskeletal: Positive for arthralgias. Skin: Positive for color change. Negative for rash. Neurological: Negative for dizziness, numbness and headaches. Psychiatric/Behavioral: Negative for hallucinations. Prior to Visit Medications    Medication Sig Taking?  Authorizing Provider   triamterene-hydroCHLOROthiazide (MAXZIDE-25) 37.5-25 MG per tablet Take 1 tablet by mouth as needed Yes Historical Provider, MD   cephALEXin (KEFLEX) 500 MG capsule Take 1 capsule by mouth 3 times daily Yes En Kaur DO mupirocin (BACTROBAN) 2 % ointment APPLY TO AFFECTED AREA THREE TIMES A DAY Yes Orlando Munguia, DO   metOLazone (ZAROXOLYN) 5 MG tablet TAKE ONE TABLET DAILY AS NEEDED FOR SWELLING Yes Orlando Munguia, DO   albuterol sulfate HFA (PROAIR HFA) 108 (90 Base) MCG/ACT inhaler Inhale 2 puffs into the lungs every 6 hours as needed for Wheezing Yes Timoteo Mckenzie DO   Nebulizers (AIRIAL COMPACT MINI NEBULIZER) MISC 1 nebule by Does not apply route 4 times daily Yes Timoteo Mckenzie, DO        Social History     Tobacco Use    Smoking status: Never Smoker    Smokeless tobacco: Never Used   Substance Use Topics    Alcohol use: Never     Frequency: Never        Vitals:    01/08/21 1253 01/08/21 1318   BP: (!) 142/84 136/78   Temp: 97.5 °F (36.4 °C)    Weight: 229 lb 12.8 oz (104.2 kg)    Height: 4' 11\" (1.499 m)      Estimated body mass index is 46.41 kg/m² as calculated from the following:    Height as of this encounter: 4' 11\" (1.499 m). Weight as of this encounter: 229 lb 12.8 oz (104.2 kg). Physical Exam  Vitals signs and nursing note reviewed. Constitutional:       Appearance: She is well-developed. HENT:      Head: Normocephalic and atraumatic. Right Ear: External ear normal.      Left Ear: External ear normal.   Cardiovascular:      Rate and Rhythm: Normal rate and regular rhythm. Heart sounds: Normal heart sounds. Pulmonary:      Effort: Pulmonary effort is normal.      Breath sounds: Normal breath sounds. Musculoskeletal:         General: Swelling and tenderness present. Comments: Tim. 2 cm ulcer on lateral aspect of right ankle  Medial aspect of ankle very erythematous and edematous   Skin:     General: Skin is dry. Findings: Erythema and lesion present. Neurological:      Mental Status: She is alert and oriented to person, place, and time. Psychiatric:         Behavior: Behavior normal.         Thought Content:  Thought content normal.         Judgment: Judgment normal. ASSESSMENT/PLAN:  1. Ulcer of right lower extremity, unspecified ulcer stage ([address]  Stable but needs to have this evaluated as soon as possible by wound cliic  Continue with medication   Keep appointments with specialist.   Answered questions  - External Referral To Wound Clinic    2. Cellulitis, unspecified cellulitis site  Antibiotic provided for 10 days  Push fluids and rest  Use Tylenol/Ibuprofen for pain  Please rtc if not improved. Clean with soap and water  Very important to follow up  Continue to use topical antibotic  - External Referral To Wound Clinic  *spent dina. 25 minutes educated and informing patient concerning her infection, answered questions and reassured. Return in about 1 week (around 1/15/2021).

## 2021-01-10 ASSESSMENT — ENCOUNTER SYMPTOMS: COLOR CHANGE: 1

## 2021-01-11 ENCOUNTER — TELEPHONE (OUTPATIENT)
Dept: FAMILY MEDICINE CLINIC | Age: 76
End: 2021-01-11

## 2021-01-11 NOTE — TELEPHONE ENCOUNTER
Pt called and states she was put on cephalexin and clindamycin. The pharmacy filled both even tho the clinda was d/c. She states she is having very runny yellow loose stools since taking them. She would like to know if she is not supposed to be taking them both and if this is what's causing her stool problems.

## 2021-01-11 NOTE — TELEPHONE ENCOUNTER
I called the patient and told her to only take the Cephalexin. The other one was discontinued. She appreciated the call. She had only taken one of the Clindamycin.

## 2021-01-12 ENCOUNTER — HOSPITAL ENCOUNTER (OUTPATIENT)
Dept: WOUND CARE | Age: 76
Discharge: HOME | End: 2021-01-12
Payer: MEDICARE

## 2021-01-12 VITALS
HEIGHT: 59 IN | SYSTOLIC BLOOD PRESSURE: 143 MMHG | HEART RATE: 96 BPM | RESPIRATION RATE: 16 BRPM | TEMPERATURE: 97.6 F | WEIGHT: 229.94 LBS | DIASTOLIC BLOOD PRESSURE: 76 MMHG | BODY MASS INDEX: 46.36 KG/M2

## 2021-01-12 DIAGNOSIS — R60.0 LOCALIZED EDEMA: ICD-10-CM

## 2021-01-12 DIAGNOSIS — L97.511 RIGHT FOOT ULCER, LIMITED TO BREAKDOWN OF SKIN (HCC): ICD-10-CM

## 2021-01-12 DIAGNOSIS — I87.2 PERIPHERAL VENOUS INSUFFICIENCY: ICD-10-CM

## 2021-01-12 DIAGNOSIS — L97.915 NON-PRESSURE CHRONIC ULCER OF RIGHT LOWER LEG WITH MUSCLE INVOLVEMENT WITHOUT EVIDENCE OF NECROSIS (HCC): Primary | ICD-10-CM

## 2021-01-12 PROCEDURE — 11104 PUNCH BX SKIN SINGLE LESION: CPT | Performed by: EMERGENCY MEDICINE

## 2021-01-12 PROCEDURE — 11104 PUNCH BX SKIN SINGLE LESION: CPT

## 2021-01-12 PROCEDURE — 99204 OFFICE O/P NEW MOD 45 MIN: CPT | Performed by: EMERGENCY MEDICINE

## 2021-01-12 PROCEDURE — 99213 OFFICE O/P EST LOW 20 MIN: CPT

## 2021-01-12 PROCEDURE — 11043 DBRDMT MUSC&/FSCA 1ST 20/<: CPT | Performed by: EMERGENCY MEDICINE

## 2021-01-12 PROCEDURE — 88305 TISSUE EXAM BY PATHOLOGIST: CPT

## 2021-01-12 RX ORDER — LIDOCAINE HYDROCHLORIDE 20 MG/ML
JELLY TOPICAL ONCE
Status: CANCELLED | OUTPATIENT
Start: 2021-01-12 | End: 2021-01-12

## 2021-01-12 RX ORDER — LIDOCAINE 40 MG/G
CREAM TOPICAL ONCE
Status: CANCELLED | OUTPATIENT
Start: 2021-01-12 | End: 2021-01-12

## 2021-01-12 RX ORDER — GINSENG 100 MG
CAPSULE ORAL ONCE
Status: CANCELLED | OUTPATIENT
Start: 2021-01-12 | End: 2021-01-12

## 2021-01-12 RX ORDER — BACITRACIN ZINC AND POLYMYXIN B SULFATE 500; 1000 [USP'U]/G; [USP'U]/G
OINTMENT TOPICAL ONCE
Status: CANCELLED | OUTPATIENT
Start: 2021-01-12 | End: 2021-01-12

## 2021-01-12 RX ORDER — BACITRACIN, NEOMYCIN, POLYMYXIN B 400; 3.5; 5 [USP'U]/G; MG/G; [USP'U]/G
OINTMENT TOPICAL ONCE
Status: CANCELLED | OUTPATIENT
Start: 2021-01-12 | End: 2021-01-12

## 2021-01-12 RX ORDER — GENTAMICIN SULFATE 1 MG/G
OINTMENT TOPICAL ONCE
Status: CANCELLED | OUTPATIENT
Start: 2021-01-12 | End: 2021-01-12

## 2021-01-12 RX ORDER — BETAMETHASONE DIPROPIONATE 0.05 %
OINTMENT (GRAM) TOPICAL ONCE
Status: CANCELLED | OUTPATIENT
Start: 2021-01-12 | End: 2021-01-12

## 2021-01-12 RX ORDER — LIDOCAINE 50 MG/G
OINTMENT TOPICAL ONCE
Status: CANCELLED | OUTPATIENT
Start: 2021-01-12 | End: 2021-01-12

## 2021-01-12 RX ORDER — LIDOCAINE HYDROCHLORIDE 40 MG/ML
SOLUTION TOPICAL ONCE
Status: CANCELLED | OUTPATIENT
Start: 2021-01-12 | End: 2021-01-12

## 2021-01-12 RX ORDER — CLOBETASOL PROPIONATE 0.5 MG/G
OINTMENT TOPICAL ONCE
Status: CANCELLED | OUTPATIENT
Start: 2021-01-12 | End: 2021-01-12

## 2021-01-12 RX ORDER — LIDOCAINE HYDROCHLORIDE 40 MG/ML
SOLUTION TOPICAL ONCE
Status: COMPLETED | OUTPATIENT
Start: 2021-01-12 | End: 2021-01-12

## 2021-01-12 RX ADMIN — LIDOCAINE HYDROCHLORIDE 2.5 ML: 40 SOLUTION TOPICAL at 08:38

## 2021-01-12 ASSESSMENT — PAIN SCALES - GENERAL: PAINLEVEL_OUTOF10: 0

## 2021-01-12 NOTE — LETTER
[address] [facility]  [phone]  Dept: [phone]                 Dear Sarah Sow DO:     Luis Fernando RECORD NUMBER: [id number]   AGE: 70 y.o. GENDER: female : 1945   TODAYS DATE: 2021        We would like to thank you for referring Ms. Whitney to our Wound Care and [facility]. We appreciate your confidence in us and will strive to provide the best care possible for your patient. You can follow their plan of care in [facility] as needed. Please call us if you have any questions and we look forward to assisting your patient.     Sincerely,      [address] [place] [address] Pkwy and Hyperbaric Oxygen Therapy
[address]  [address] [facility]  [phone]  Dept: [phone]        Thank you MsTracie Devonte for choosing [facility] for your Wound Care needs. We hope you found your first visit both encouraging and educational.  We look forward to serving you throughout the healing process. Before your next visit please review the information you received in your [employer]. The first visit can be overwhelming and this is a useful tool to refresh what information you may have been given. If you find yourself with any questions prior to your upcoming appointment, please feel free to contact us. Often wounds can be challenging and it is our goal to see you through the healing process with as much support possible. Again, thank you for choosing [facility]!     Sincerely,      The Staff of [address] [place] [address] Pkwy and Hyperbaric Oxygen Therapy
preferably within the same week to promote the most effective healing time for your wound(s). 7. If you will be late for an appointment, please call our center to be sure that the provider can still see you when you arrive. If you are more than 15 minutes late your appointment may need to be rescheduled. 8. If two (2) appointments are missed without notifying us, your care plan may be discontinued. The same may happen if multiple visits are cancelled or rescheduled, even with notice. A missed visit is time when another patient, who also needs care, could have been seen. Thank you for your understanding and consideration.

## 2021-01-12 NOTE — H&P
[address]   History and Physical Note   Procedure Note    Eric Gilford  MEDICAL RECORD NUMBER:  [id number]  AGE: 70 y.o. GENDER: female  : 1945  EPISODE DATE:  2021    Subjective:     Chief Complaint   Patient presents with    Wound Check     Initial Wound Visit on Right Lower Leg and Right Plantar Foot; Pt has been using Betamethasone cream and was referred by Dr. Zonia Bruno of [facility]     Eric Gilford is a 70 y.o. female who presents today as initial visit to the wound center for an evaluation of a wound/ulcer. Wound started on 2020    History of Wound Context: 72-year-old female with a history of asthma, hypertension who has a nonhealing wound to the right lower extremity with associated edema, erythema since around 2020. The patient has been using betamethasone cream without significant improvement. She was evaluated by her PCP recently and subsequently referred here for wound care recommendations. Does have some associated pain with the wound. Otherwise no other complaints. Denies any fever, chills, chest pain, shortness of breath. Patient has been treated for cellulitis recently and was initially placed on Keflex as well as Clindamycin but did have some loose watery stools after taking the antibiotics. Clindamycin has since been discontinued. She is currently still on Keflex. Patient states she has a history of right knee surgeries, multiple, and unfortunately has remaining right lower extremity edema with right knee deformity, immobilization.   Patient has a history of a large ulcer to the right lateral leg previously that went on to heal.    Wound/Ulcer Pain Timing/Severity: intermittent  Quality of pain: aching, burning  Severity:  2 / 10   Modifying Factors: Pain worsens with walking and Pain is relieved/improved with rest  Associated Signs/Symptoms: edema, erythema and pain    Ulcer Identification:  Ulcer Type: Respiratory: Denies cough or shortness of breath. Cardiovascular: Denies chest pain or edema. GI: Denies abdominal pain, nausea, vomiting, bloody stools or diarrhea. : Denies dysuria. Musculoskeletal: Denies back pain or joint pain. Integument: Denies rash. Neurologic: Denies headache, focal weakness or sensory changes. Endocrine: Denies polyuria or polydipsia. Lymphatic: Denies swollen glands. Psychiatric: Denies depression or anxiety. otherwise neg     Objective:      BP (!) 143/76   Pulse 96   Temp 97.6 °F (36.4 °C) (Temporal)   Resp 16   Ht 4' 11\" (1.499 m)   Wt 229 lb 15 oz (104.3 kg)   BMI 46.44 kg/m²     Wt Readings from Last 3 Encounters:   01/12/21 229 lb 15 oz (104.3 kg)   01/08/21 229 lb 12.8 oz (104.2 kg)   11/03/20 223 lb 9.6 oz (101.4 kg)       PHYSICAL EXAM  General Appearance/Constitutional: alert and oriented to person, place and time, well-developed and well nourished, and in no acute distress. nontoxic  Skin: warm and dry, no rash, positive wound per LDA documentation  Head: normocephalic and atraumatic  Eyes: extraocular eye movements intact, conjunctivae normal, and sclera anicteric  ENT: hearing grossly normal bilaterally. Normal appearance  Pulmonary/Chest: no chest wall tenderness and clear anteriorly  Cardiovascular: normal rate and regular rhythm  GI: abdomen soft, non-tender and non-distended  Musculoskeletal: normal range of motion of joints. Nontender calves. No cyanosis. Nontender calves. Edema 2-3+ RLE.1-2+ LLE. Large scar noted to right lateral leg  Neurologic: no gross cranial nerve deficit and speech normal. No focal deficits. Mental status normal      Assessment:     77-year-old female with a nonhealing nonpressure ulcer to right lateral lower leg. Severity of fascia muscle involvement without necrosis.   Unclear etiology but I suspect there is some underlying venous stasis, with stasis dermatitis, severe leg edema due to previous knee surgery as well as possible autoimmune Lidocaine Liquid Topical(2.5 ml)     Debridement: Excisional Debridement    Using forceps and #11 blade scalpel the wound(s)/ulcer(s) was/were debrided down through and including the removal of muscle/fascia.         Devitalized Tissue Debrided:  fibrin, biofilm, slough, necrotic/eschar and exudate    Pre Debridement Measurements:  Are located in the [facility]  Documentation Flow Sheet    Wound/Ulcer #: 1    Post Debridement Measurements:  Wound/Ulcer Descriptions are Pre Debridement except measurements:    Wound 01/12/21 Leg Right;Lateral;Lower #1 Noted 1-1-21 (Active)   Wound Image   01/12/21 0835   Dressing Status Other (Comment) 01/12/21 0835   Wound Length (cm) 2 cm 01/12/21 0835   Wound Width (cm) 1.4 cm 01/12/21 0835   Wound Depth (cm) 0.2 cm 01/12/21 0835   Wound Surface Area (cm^2) 2.8 cm^2 01/12/21 0835   Wound Volume (cm^3) 0.56 cm^3 01/12/21 0835   Wound Assessment Slough 01/12/21 0835   Odor None 01/12/21 0835   Consuelo-wound Assessment Dry/flaky 01/12/21 0835   Margins Attached edges 01/12/21 0835   Wound Thickness Description not for Pressure Injury Full thickness 01/12/21 0835   Number of days: 0       Wound 01/12/21 Foot Right;Plantar #2 Noted 1/12/21 (Active)   Wound Image   01/12/21 0835   Dressing Status Other (Comment) 01/12/21 0835   Wound Length (cm) 0.5 cm 01/12/21 0835   Wound Width (cm) 0.3 cm 01/12/21 0835   Wound Depth (cm) 0.1 cm 01/12/21 0835   Wound Surface Area (cm^2) 0.15 cm^2 01/12/21 0835   Wound Volume (cm^3) 0.02 cm^3 01/12/21 0835   Wound Assessment Granulation tissue;Slough 01/12/21 0835   Drainage Amount Scant 01/12/21 0835   Drainage Description Sanguinous 01/12/21 0835   Odor None 01/12/21 0835   Consuelo-wound Assessment Dry/flaky 01/12/21 0835   Margins Attached edges 01/12/21 0835   Wound Thickness Description not for Pressure Injury Full thickness 01/12/21 0835   Number of days: 0          Percent of Wound(s)/Ulcer(s) Debrided: 100%    Total Surface Area Debrided:  2.8 sq cm Diabetic/Pressure/Non Pressure Ulcers only:  Ulcer: Non-Pressure ulcer, Muscle/fascia involvement without necrosis     Estimated Blood Loss:  Minimal    Hemostasis Achieved:  by pressure    Procedural Pain:  0  / 10     Post Procedural Pain:  0 / 10     Response to treatment:  Well tolerated by patient. Procedure  Biopsy Note    Biopsy Type:  Incisional Biopsy of skin single lesion (wedge including simple closure, when performed)    Performed by: Cecilio Bell MD    Consent obtained: Yes    Time out taken: Yes    Pain Control: Anesthetic: 2% Lidocaine Injectable(1ml)    Location of Biopsy:  Wound/Ulcer Number(s)  Wound/Ulcer # 1    Wound 01/12/21 Leg Right;Lateral;Lower #1 Noted 1-1-21 (Active)   Wound Image   01/12/21 0835   Dressing Status Other (Comment) 01/12/21 0835   Wound Length (cm) 2 cm 01/12/21 0835   Wound Width (cm) 1.4 cm 01/12/21 0835   Wound Depth (cm) 0.2 cm 01/12/21 0835   Wound Surface Area (cm^2) 2.8 cm^2 01/12/21 0835   Wound Volume (cm^3) 0.56 cm^3 01/12/21 0835   Post-Procedure Length (cm) 2 cm 01/12/21 0918   Post-Procedure Width (cm) 1.4 cm 01/12/21 0918   Post-Procedure Depth (cm) 0.3 cm 01/12/21 0918   Post-Procedure Surface Area (cm^2) 2.8 cm^2 01/12/21 0918   Post-Procedure Volume (cm^3) 0.84 cm^3 01/12/21 0918   Wound Assessment Slough 01/12/21 0835   Odor None 01/12/21 0835   Consuelo-wound Assessment Dry/flaky 01/12/21 0835   Margins Attached edges 01/12/21 0835   Wound Thickness Description not for Pressure Injury Full thickness 01/12/21 0835   Number of days: 0       Wound 01/12/21 Foot Right;Plantar #2 Noted 1/12/21 (Active)   Wound Image   01/12/21 0835   Dressing Status Other (Comment) 01/12/21 0835   Wound Length (cm) 0.5 cm 01/12/21 0835   Wound Width (cm) 0.3 cm 01/12/21 0835   Wound Depth (cm) 0.1 cm 01/12/21 0835   Wound Surface Area (cm^2) 0.15 cm^2 01/12/21 0835   Wound Volume (cm^3) 0.02 cm^3 01/12/21 0835   Post-Procedure Length (cm) 0.5 cm 01/12/21 0918   Post-Procedure Width (cm) 0.3 cm 01/12/21 0918   Post-Procedure Depth (cm) 0.1 cm 01/12/21 0918   Post-Procedure Surface Area (cm^2) 0.15 cm^2 01/12/21 0918   Post-Procedure Volume (cm^3) 0.02 cm^3 01/12/21 0918   Wound Assessment Granulation tissue;Slough 01/12/21 0835   Drainage Amount Scant 01/12/21 0835   Drainage Description Sanguinous 01/12/21 0835   Odor None 01/12/21 0835   Consuelo-wound Assessment Dry/flaky 01/12/21 0835   Margins Attached edges 01/12/21 0835   Wound Thickness Description not for Pressure Injury Full thickness 01/12/21 0835   Number of days: 0     Biopsy performed with: #11 blade    Instruments:#11 blade scalpel     Specimen sent to Pathology:Yes    Total number of Biopsy Specimen: 1     Estimated Blood Loss: with a small amount of bleeding    Hemostasis Achieved:by pressure    Procedural Pain:0  / 10     Post Procedural Pain:0 / 10     Response to treatment:Well tolerated by patient. Treatment Note please see attached Discharge Instructions    Written patient dismissal instructions given to patient and signed by patient or POA.              Electronically signed by Shira So MD on 1/12/2021 at 8:58 AM

## 2021-01-13 NOTE — PLAN OF CARE
Patient Name:  Levonne Mcburney  YOB: 1945  Today's Date:  January 13, 2021  Medical Record Number:  [id number]  Provider:    [address] [place] [address] [address]kw[address]   Appointment Treatment Guidelines        The [address] [place] [address] Pkwy Appointment Treatment Guidelines were reviewed on January 13, 2021 with the patient. Ms. Rueben Severin understanding of the [address] [place] [address] Pkwy Appointment Treatment Guidelines.       Electronically signed by Anita Pederson RN on 1/13/21 at 8:45 AM EST

## 2021-01-19 ENCOUNTER — HOSPITAL ENCOUNTER (OUTPATIENT)
Dept: WOUND CARE | Age: 76
Discharge: HOME | End: 2021-01-19
Payer: MEDICARE

## 2021-01-19 VITALS
TEMPERATURE: 97.1 F | SYSTOLIC BLOOD PRESSURE: 152 MMHG | HEART RATE: 91 BPM | RESPIRATION RATE: 16 BRPM | DIASTOLIC BLOOD PRESSURE: 84 MMHG

## 2021-01-19 DIAGNOSIS — I87.2 PERIPHERAL VENOUS INSUFFICIENCY: ICD-10-CM

## 2021-01-19 DIAGNOSIS — L97.915 NON-PRESSURE CHRONIC ULCER OF RIGHT LOWER LEG WITH MUSCLE INVOLVEMENT WITHOUT EVIDENCE OF NECROSIS (HCC): Primary | ICD-10-CM

## 2021-01-19 DIAGNOSIS — R60.0 LOCALIZED EDEMA: ICD-10-CM

## 2021-01-19 DIAGNOSIS — L97.511 RIGHT FOOT ULCER, LIMITED TO BREAKDOWN OF SKIN (HCC): ICD-10-CM

## 2021-01-19 PROCEDURE — 11043 DBRDMT MUSC&/FSCA 1ST 20/<: CPT | Performed by: EMERGENCY MEDICINE

## 2021-01-19 PROCEDURE — 11043 DBRDMT MUSC&/FSCA 1ST 20/<: CPT

## 2021-01-19 RX ORDER — LIDOCAINE HYDROCHLORIDE 20 MG/ML
JELLY TOPICAL ONCE
Status: CANCELLED | OUTPATIENT
Start: 2021-01-19 | End: 2021-01-19

## 2021-01-19 RX ORDER — LIDOCAINE 50 MG/G
OINTMENT TOPICAL ONCE
Status: CANCELLED | OUTPATIENT
Start: 2021-01-19 | End: 2021-01-19

## 2021-01-19 RX ORDER — LIDOCAINE 40 MG/G
CREAM TOPICAL ONCE
Status: CANCELLED | OUTPATIENT
Start: 2021-01-19 | End: 2021-01-19

## 2021-01-19 RX ORDER — LIDOCAINE HYDROCHLORIDE 40 MG/ML
SOLUTION TOPICAL ONCE
Status: COMPLETED | OUTPATIENT
Start: 2021-01-19 | End: 2021-01-19

## 2021-01-19 RX ORDER — CLOBETASOL PROPIONATE 0.5 MG/G
OINTMENT TOPICAL ONCE
Status: CANCELLED | OUTPATIENT
Start: 2021-01-19 | End: 2021-01-19

## 2021-01-19 RX ORDER — LIDOCAINE HYDROCHLORIDE 40 MG/ML
SOLUTION TOPICAL ONCE
Status: CANCELLED | OUTPATIENT
Start: 2021-01-19 | End: 2021-01-19

## 2021-01-19 RX ORDER — BACITRACIN ZINC AND POLYMYXIN B SULFATE 500; 1000 [USP'U]/G; [USP'U]/G
OINTMENT TOPICAL ONCE
Status: CANCELLED | OUTPATIENT
Start: 2021-01-19 | End: 2021-01-19

## 2021-01-19 RX ORDER — GINSENG 100 MG
CAPSULE ORAL ONCE
Status: CANCELLED | OUTPATIENT
Start: 2021-01-19 | End: 2021-01-19

## 2021-01-19 RX ORDER — GENTAMICIN SULFATE 1 MG/G
OINTMENT TOPICAL ONCE
Status: CANCELLED | OUTPATIENT
Start: 2021-01-19 | End: 2021-01-19

## 2021-01-19 RX ORDER — BACITRACIN, NEOMYCIN, POLYMYXIN B 400; 3.5; 5 [USP'U]/G; MG/G; [USP'U]/G
OINTMENT TOPICAL ONCE
Status: CANCELLED | OUTPATIENT
Start: 2021-01-19 | End: 2021-01-19

## 2021-01-19 RX ORDER — BETAMETHASONE DIPROPIONATE 0.05 %
OINTMENT (GRAM) TOPICAL ONCE
Status: CANCELLED | OUTPATIENT
Start: 2021-01-19 | End: 2021-01-19

## 2021-01-19 RX ADMIN — LIDOCAINE HYDROCHLORIDE 2.5 ML: 40 SOLUTION TOPICAL at 10:57

## 2021-01-19 ASSESSMENT — PAIN SCALES - GENERAL: PAINLEVEL_OUTOF10: 0

## 2021-01-26 ENCOUNTER — HOSPITAL ENCOUNTER (OUTPATIENT)
Dept: WOUND CARE | Age: 76
Discharge: HOME | End: 2021-01-26
Payer: MEDICARE

## 2021-01-26 VITALS — HEART RATE: 108 BPM | DIASTOLIC BLOOD PRESSURE: 80 MMHG | SYSTOLIC BLOOD PRESSURE: 158 MMHG | TEMPERATURE: 97 F

## 2021-01-26 DIAGNOSIS — L97.912 NON-PRESSURE CHRONIC ULCER OF RIGHT LOWER LEG WITH FAT LAYER EXPOSED (HCC): ICD-10-CM

## 2021-01-26 DIAGNOSIS — L97.915 NON-PRESSURE CHRONIC ULCER OF RIGHT LOWER LEG WITH MUSCLE INVOLVEMENT WITHOUT EVIDENCE OF NECROSIS (HCC): Primary | ICD-10-CM

## 2021-01-26 DIAGNOSIS — L97.511 RIGHT FOOT ULCER, LIMITED TO BREAKDOWN OF SKIN (HCC): ICD-10-CM

## 2021-01-26 DIAGNOSIS — I87.2 PERIPHERAL VENOUS INSUFFICIENCY: ICD-10-CM

## 2021-01-26 DIAGNOSIS — R60.0 LOCALIZED EDEMA: ICD-10-CM

## 2021-01-26 PROCEDURE — 11042 DBRDMT SUBQ TIS 1ST 20SQCM/<: CPT | Performed by: EMERGENCY MEDICINE

## 2021-01-26 PROCEDURE — 11042 DBRDMT SUBQ TIS 1ST 20SQCM/<: CPT

## 2021-01-26 RX ORDER — BETAMETHASONE DIPROPIONATE 0.05 %
OINTMENT (GRAM) TOPICAL ONCE
Status: CANCELLED | OUTPATIENT
Start: 2021-01-26 | End: 2021-01-26

## 2021-01-26 RX ORDER — BACITRACIN ZINC AND POLYMYXIN B SULFATE 500; 1000 [USP'U]/G; [USP'U]/G
OINTMENT TOPICAL ONCE
Status: CANCELLED | OUTPATIENT
Start: 2021-01-26 | End: 2021-01-26

## 2021-01-26 RX ORDER — LIDOCAINE 50 MG/G
OINTMENT TOPICAL ONCE
Status: CANCELLED | OUTPATIENT
Start: 2021-01-26 | End: 2021-01-26

## 2021-01-26 RX ORDER — BACITRACIN, NEOMYCIN, POLYMYXIN B 400; 3.5; 5 [USP'U]/G; MG/G; [USP'U]/G
OINTMENT TOPICAL ONCE
Status: CANCELLED | OUTPATIENT
Start: 2021-01-26 | End: 2021-01-26

## 2021-01-26 RX ORDER — GINSENG 100 MG
CAPSULE ORAL ONCE
Status: CANCELLED | OUTPATIENT
Start: 2021-01-26 | End: 2021-01-26

## 2021-01-26 RX ORDER — LIDOCAINE 40 MG/G
CREAM TOPICAL ONCE
Status: CANCELLED | OUTPATIENT
Start: 2021-01-26 | End: 2021-01-26

## 2021-01-26 RX ORDER — LIDOCAINE HYDROCHLORIDE 20 MG/ML
JELLY TOPICAL ONCE
Status: CANCELLED | OUTPATIENT
Start: 2021-01-26 | End: 2021-01-26

## 2021-01-26 RX ORDER — LIDOCAINE HYDROCHLORIDE 40 MG/ML
SOLUTION TOPICAL ONCE
Status: CANCELLED | OUTPATIENT
Start: 2021-01-26 | End: 2021-01-26

## 2021-01-26 RX ORDER — LIDOCAINE HYDROCHLORIDE 40 MG/ML
SOLUTION TOPICAL ONCE
Status: COMPLETED | OUTPATIENT
Start: 2021-01-26 | End: 2021-01-26

## 2021-01-26 RX ORDER — CLOBETASOL PROPIONATE 0.5 MG/G
OINTMENT TOPICAL ONCE
Status: CANCELLED | OUTPATIENT
Start: 2021-01-26 | End: 2021-01-26

## 2021-01-26 RX ORDER — GENTAMICIN SULFATE 1 MG/G
OINTMENT TOPICAL ONCE
Status: CANCELLED | OUTPATIENT
Start: 2021-01-26 | End: 2021-01-26

## 2021-01-26 RX ADMIN — LIDOCAINE HYDROCHLORIDE 2.5 ML: 40 SOLUTION TOPICAL at 11:07

## 2021-01-26 ASSESSMENT — PAIN SCALES - GENERAL: PAINLEVEL_OUTOF10: 0

## 2021-01-26 NOTE — PROGRESS NOTES
[address]   Progress Note       [address] RECORD NUMBER:  [id number]  AGE: 70 y.o. GENDER: female  : 1945  EPISODE DATE:  2021    Subjective:     Chief Complaint   Patient presents with    Wound Check     F/U VISIT - right leg wound        Patient without any new wound care issues. Symptoms or pertinent wound history since last visit: none Denies any other constitutional symptoms otherwise. Has been tolerating wound care dressings without problems. Patient feels that wound is improving. Assessment:     61-year-old female with a nonhealing nonpressure ulcer to right lateral lower leg. Severity of fascia muscle involvement without necrosis. Unclear etiology but I suspect there is some underlying venous stasis, with stasis dermatitis, severe leg edema due to previous knee surgery     Patient Active Problem List   Diagnosis Code    Moderate persistent asthma without complication Q97.81    Shortness of breath R06.02    Chest pain R07.9    Class 3 severe obesity due to excess calories without serious comorbidity with body mass index (BMI) of 45.0 to 49.9 in adult ([address] E66.01, Z68.42    Non-pressure chronic ulcer of right lower leg with fat layer exposed ([address] L97.912    Peripheral venous insufficiency I87.2    Localized edema R60.0    Right foot ulcer, limited to breakdown of skin ([address] L97.511       Comorbid conditions affecting wound healing and/or addressed today: Steroid use/immunosuppression, asthma, hypertension, venous stasis, obesity  Education and discussion held with patient regarding these disease processes including issues related to the wound(s). Wound evaluation:   Right plantar surface wound is healed today. Right lateral lower leg wound still with scarred down tissue, fibrotic but it is sig less. Fibrous nonviable tissue noted. Overall wound is getting smaller, more shallow, improving. Plan:     1.  Wound care: Debridement done (see note below). Please see attached Discharge Instructions for specific wound treatment plan. 2. Offloading and edema control: Elevation. Since we need to change to Santyl to the wound daily, will use Ace for compression. 3. Infection: No signs of acute infection. Continue to monitor. 4. Circulation: Strongly palpable pulses. No ischemic vascular changes. We will continue to monitor. I think the patient probably has adequate outflow for healing but may need additional studies depending on progress at next visit. 5. Nutrition: Encourage protein emphasis with meals. Continue with adding collagen supplement to her diet. Patient was provided samples of liquigel protein shots. 6. Biopsy results reviewed with patient previously. The findings are those of an ulcer bed with adjacent reactive epidermal hyperplasia.  The findings are not those of a vasculitis or   pyogenic granuloma.         Procedure Note  Indications:  Based on my examination of this patient's wound(s)/ulcer(s) today, debridement is required to promote healing and evaluate the wound base. Performed by: Annie Watson MD  Consent obtained:  Yes  Time out taken:  Yes  Pain Control: Anesthetic  Anesthetic: 4% Lidocaine Liquid Topical(2.5ml)     Debridement: Excisional Debridement  Using curette the wound(s)/ulcer(s) was/were debrided down through and including the removal of subcutaneous tissue.         Devitalized Tissue Debrided:  fibrin, biofilm, slough and exudate    Pre Debridement Measurements:  Are located in the [facility]  Documentation Flow Sheet    Wound/Ulcer #: 1    Post Debridement Measurements:  Wound/Ulcer Descriptions are Pre Debridement except measurements:    Wound 01/12/21 Leg Right;Lateral;Lower #1 Noted 1-1-21 (Active)   Wound Image   01/12/21 0835   Dressing Status New dressing applied 01/19/21 1126   Wound Cleansed Cleansed with saline 01/19/21 1126   Dressing/Treatment Other (comment) 01/19/21 1126   Wound Length the wound. Otherwise no other complaints. Denies any fever, chills, chest pain, shortness of breath. Patient has been treated for cellulitis recently and was initially placed on Keflex as well as Clindamycin but did have some loose watery stools after taking the antibiotics. Clindamycin has since been discontinued. She is currently still on Keflex. Patient states she has a history of right knee surgeries, multiple, and unfortunately has remaining right lower extremity edema with right knee deformity, immobilization. Patient has a history of a large ulcer to the right lateral leg previously that went on to heal.     Wound/Ulcer Pain Timing/Severity: intermittent  Quality of pain: aching, burning  Severity:  2 / 10   Modifying Factors: Pain worsens with walking and Pain is relieved/improved with rest  Associated Signs/Symptoms: edema, erythema and pain     Ulcer Identification:  Ulcer Type: venous    Objective:    BP (!) 158/80   Pulse 108   Temp 97 °F (36.1 °C) (Temporal)   Wt Readings from Last 3 Encounters:   01/12/21 229 lb 15 oz (104.3 kg)   01/08/21 229 lb 12.8 oz (104.2 kg)   11/03/20 223 lb 9.6 oz (101.4 kg)       PHYSICAL EXAM  General Appearance: alert and oriented to person, place and time, well-developed and well nourished, and in no acute distress  Skin: warm and dry, no rash  Head: normocephalic and atraumatic  Eyes: extraocular eye movements intact, conjunctivae normal, and sclera anicteric  ENT: hearing grossly normal bilaterally. normal  Pulmonary/Chest: no chest wall tenderness and clear anteriorly  Cardiovascular: normal rate and regular rhythm  Abdomen: soft, non-tender and non-distended  Extremities: no cyanosis and no clubbing  Musculoskeletal: normal range of motion of joints. Nontender calves. No cyanosis. Edema 1+ BLE  Neurologic: no cranial nerve deficit and speech normal. No focal deficits.  Mental status normal    PAST MEDICAL HISTORY        Diagnosis Date    Asthma        PAST SURGICAL HISTORY    Past Surgical History:   Procedure Laterality Date    APPENDECTOMY      CHOLECYSTECTOMY         FAMILY HISTORY    Family History   Problem Relation Age of Onset    Migraines Mother        SOCIAL HISTORY    Social History     Tobacco Use    Smoking status: Never Smoker    Smokeless tobacco: Never Used   Substance Use Topics    Alcohol Use     Frequency: Never    Drug use: Never       ALLERGIES    Allergies   Allergen Reactions    Dye [Iodides] Other (See Comments)     IVP dye, pt does not remember he reaction, just that the nurses stated to not let anyone give to her ever again       MEDICATIONS    Current Outpatient Medications on File Prior to Encounter   Medication Sig Dispense Refill    triamterene-hydroCHLOROthiazide (MAXZIDE-25) 37.5-25 MG per tablet Take 1 tablet by mouth as needed      mupirocin (BACTROBAN) 2 % ointment APPLY TO AFFECTED AREA THREE TIMES A DAY 22 g 0    metOLazone (ZAROXOLYN) 5 MG tablet TAKE ONE TABLET DAILY AS NEEDED FOR SWELLING 30 tablet 2    albuterol sulfate HFA (PROAIR HFA) 108 (90 Base) MCG/ACT inhaler Inhale 2 puffs into the lungs every 6 hours as needed for Wheezing 1 Inhaler 0    Nebulizers (AIRIAL COMPACT MINI NEBULIZER) MISC 1 nebule by Does not apply route 4 times daily 1 each 0     No current facility-administered medications on file prior to encounter. REVIEW OF SYSTEMS  A comprehensive review of systems was negative except for: wound, mild edema    Written patient dismissal instructions given to patient and signed by patient or POA.          Discharge Instructions       [address] and [facility]   Physician Orders and Discharge Instructions  [place] [id number], [place] [id number]  Telephone: [phone]     NAME:  Antonia Zamarripa OF BIRTH:  1945  MEDICAL RECORD NUMBER:  [id number]  DATE:  1/26/2021     Wound affected leg(s) from mid foot to knee making sure to cover the heel. Remove every night before going to bed.              []? Elevate leg(s) above the level of the heart when sitting. []? Avoid prolonged standing in one place. []? Elevate arm/hand above the level of the heart      []? RightArm     []? LeftArm             Compression:  Apply:  []? Multilayer Compression Wrap Applied in Clinic    []? RightLeg      []? Left Leg              []? Multi-layer compression. Do not get leg(s) with wrap wet. If wraps become too tight call the center or completely remove the wrap. []? Elevate leg(s) above the level of the heart when sitting. []? Avoid prolonged standing in one place.     Off-Loading:   []? Off-loading when    []? walking       []? in bed         []? sitting  []? Total non-weight bearing  []? Right Leg  []? Left Leg          []? Assistive Device     []? [place]        []? Cane           []? Wheelchair  []? Crutches              []? Surgical shoe    []? Podus Boot(s)   []? Foam Boot(s)  []? Roll About              []? Cast Boot   []? CROW Boot  []? Other:           Dietary:  [x]? Diet as tolerated:                    Activity:  [x]? Activity as tolerated:       If you are still having pain after you go home:  [x]? Elevate the affected limb. [x]? Use over-the-counter medications you would normally use for pain as permitted by your family doctor. [x]? For persistent pain not relieved by the above interventions, please call your family doctor.   Michael Mejia   Return Appointment:  []? Wound and dressing supply provider:   []? ECF or Home Healthcare:  []? Wound Assessment:          []? Physician or NP scheduled for Wound Assessment:   [x]? Return Appointment: With Dr. Antoine Reyes  in  [facility])  []?  Ordered tests:      Nurse Case Manger:  [place]   Electronically signed by Laura Ortega RN on 1/26/2021 at 11:16 AM     [address] Information: Should you experience any significant changes in your wound(s) or have questions about your wound care, please contact the [address] [place] [address] at [address] 8:00 am - 4:30 pm and Friday 8:00 am - 12:30 pm.  If you need help with your wound outside these hours and cannot wait until we are again available, contact your PCP or go to the hospital emergency room.      PLEASE NOTE: IF YOU ARE UNABLE TO OBTAIN WOUND SUPPLIES, CONTINUE TO USE THE SUPPLIES YOU HAVE AVAILABLE UNTIL YOU ARE ABLE TO [facility]. IT IS MOST IMPORTANT TO KEEP THE WOUND COVERED AT ALL TIMES.      Physician Signature:_______________________     Date: ___________ Time:  ____________                                Dr. Sammy Dhaliwal        Electronically signed by Ness [place] MD on 1/26/2021 at 11:17 AM

## 2021-02-02 ENCOUNTER — HOSPITAL ENCOUNTER (OUTPATIENT)
Dept: WOUND CARE | Age: 76
Discharge: HOME | End: 2021-02-02
Payer: MEDICARE

## 2021-02-02 VITALS
RESPIRATION RATE: 16 BRPM | TEMPERATURE: 97.2 F | WEIGHT: 228.62 LBS | SYSTOLIC BLOOD PRESSURE: 152 MMHG | HEART RATE: 94 BPM | DIASTOLIC BLOOD PRESSURE: 86 MMHG | BODY MASS INDEX: 46.18 KG/M2

## 2021-02-02 DIAGNOSIS — R60.0 LOCALIZED EDEMA: ICD-10-CM

## 2021-02-02 DIAGNOSIS — L97.912 NON-PRESSURE CHRONIC ULCER OF RIGHT LOWER LEG WITH FAT LAYER EXPOSED (HCC): Primary | ICD-10-CM

## 2021-02-02 DIAGNOSIS — L97.511 RIGHT FOOT ULCER, LIMITED TO BREAKDOWN OF SKIN (HCC): ICD-10-CM

## 2021-02-02 DIAGNOSIS — I87.2 PERIPHERAL VENOUS INSUFFICIENCY: ICD-10-CM

## 2021-02-02 PROCEDURE — 11042 DBRDMT SUBQ TIS 1ST 20SQCM/<: CPT

## 2021-02-02 PROCEDURE — 11042 DBRDMT SUBQ TIS 1ST 20SQCM/<: CPT | Performed by: EMERGENCY MEDICINE

## 2021-02-02 RX ORDER — BACITRACIN, NEOMYCIN, POLYMYXIN B 400; 3.5; 5 [USP'U]/G; MG/G; [USP'U]/G
OINTMENT TOPICAL ONCE
Status: CANCELLED | OUTPATIENT
Start: 2021-02-02 | End: 2021-02-02

## 2021-02-02 RX ORDER — CLOBETASOL PROPIONATE 0.5 MG/G
OINTMENT TOPICAL ONCE
Status: CANCELLED | OUTPATIENT
Start: 2021-02-02 | End: 2021-02-02

## 2021-02-02 RX ORDER — LIDOCAINE HYDROCHLORIDE 40 MG/ML
SOLUTION TOPICAL ONCE
Status: CANCELLED | OUTPATIENT
Start: 2021-02-02 | End: 2021-02-02

## 2021-02-02 RX ORDER — BACITRACIN ZINC AND POLYMYXIN B SULFATE 500; 1000 [USP'U]/G; [USP'U]/G
OINTMENT TOPICAL ONCE
Status: CANCELLED | OUTPATIENT
Start: 2021-02-02 | End: 2021-02-02

## 2021-02-02 RX ORDER — GENTAMICIN SULFATE 1 MG/G
OINTMENT TOPICAL ONCE
Status: CANCELLED | OUTPATIENT
Start: 2021-02-02 | End: 2021-02-02

## 2021-02-02 RX ORDER — LIDOCAINE HYDROCHLORIDE 40 MG/ML
SOLUTION TOPICAL ONCE
Status: COMPLETED | OUTPATIENT
Start: 2021-02-02 | End: 2021-02-02

## 2021-02-02 RX ORDER — LIDOCAINE HYDROCHLORIDE 20 MG/ML
JELLY TOPICAL ONCE
Status: CANCELLED | OUTPATIENT
Start: 2021-02-02 | End: 2021-02-02

## 2021-02-02 RX ORDER — BETAMETHASONE DIPROPIONATE 0.05 %
OINTMENT (GRAM) TOPICAL ONCE
Status: CANCELLED | OUTPATIENT
Start: 2021-02-02 | End: 2021-02-02

## 2021-02-02 RX ORDER — LIDOCAINE 50 MG/G
OINTMENT TOPICAL ONCE
Status: CANCELLED | OUTPATIENT
Start: 2021-02-02 | End: 2021-02-02

## 2021-02-02 RX ORDER — LIDOCAINE 40 MG/G
CREAM TOPICAL ONCE
Status: CANCELLED | OUTPATIENT
Start: 2021-02-02 | End: 2021-02-02

## 2021-02-02 RX ORDER — GINSENG 100 MG
CAPSULE ORAL ONCE
Status: CANCELLED | OUTPATIENT
Start: 2021-02-02 | End: 2021-02-02

## 2021-02-02 RX ADMIN — LIDOCAINE HYDROCHLORIDE 2.5 ML: 40 SOLUTION TOPICAL at 11:07

## 2021-02-02 ASSESSMENT — PAIN SCALES - GENERAL: PAINLEVEL_OUTOF10: 0

## 2021-02-02 NOTE — PROGRESS NOTES
[address]   Progress Note       [address] RECORD NUMBER:  [id number]  AGE: 70 y.o. GENDER: female  : 1945  EPISODE DATE:  2021    Subjective:     Chief Complaint   Patient presents with    Wound Check     Follow up for right lower leg wound. Patient without any new wound care issues. Symptoms or pertinent wound history since last visit: none Denies any other constitutional symptoms otherwise. Has been tolerating wound care dressings without problems. Patient feels that wound is improving. Assessment:     29-year-old female with a nonhealing nonpressure ulcer to right lateral lower leg. Severity of fascia muscle involvement without necrosis. Unclear etiology but I suspect there is some underlying venous stasis, with stasis dermatitis, severe leg edema due to previous knee surgery     Problem List Items Addressed This Visit     Non-pressure chronic ulcer of right lower leg with fat layer exposed ([address] - Primary    Peripheral venous insufficiency    Localized edema    Right foot ulcer, limited to breakdown of skin (HCC)        Comorbid conditions affecting wound healing and/or addressed today: Steroid use/immunosuppression, asthma, hypertension, venous stasis, obesity  Education and discussion held with patient regarding these disease processes including issues related to the wound(s). Wound evaluation:   Right plantar surface wound is healed today. Right lateral lower leg wound still with scarred down tissue, fibrotic but it is again sig less. Fibrous nonviable tissue noted. Again, overall wound is getting smaller, more shallow, improving. Plan:     1. Wound care: Debridement done (see note below). Please see attached Discharge Instructions for specific wound treatment plan. 2. Offloading and edema control: Elevation. Since we need to change to [place] to the wound daily, will use Ace for compression.   3. Infection: No signs of acute infection. Continue to monitor. 4. Circulation: Strongly palpable pulses. No ischemic vascular changes. We will continue to monitor. I think the patient probably has adequate outflow for healing but may need additional studies depending on progress at next visit. 5. Nutrition: Encourage protein emphasis with meals. Continue with adding collagen supplement to her diet. Patient was provided samples of liquigel protein shots. 6. Biopsy results reviewed with patient previously. The findings are those of an ulcer bed with adjacent reactive epidermal hyperplasia.  The findings are not those of a vasculitis or   pyogenic granuloma.         Procedure Note  Indications:  Based on my examination of this patient's wound(s)/ulcer(s) today, debridement is required to promote healing and evaluate the wound base. Performed by: Heriberto Suarez MD  Consent obtained:  Yes  Time out taken:  Yes  Pain Control: Anesthetic  Anesthetic: 4% Lidocaine Liquid Topical(2.5)     Debridement: Excisional Debridement  Using curette the wound(s)/ulcer(s) was/were debrided down through and including the removal of subcutaneous tissue. Devitalized Tissue Debrided:  fibrin, biofilm, slough and exudate    Pre Debridement Measurements:  Are located in the [place]  Documentation Flow Sheet    Wound/Ulcer #: 1    Post Debridement Measurements:  Wound/Ulcer Descriptions are Pre Debridement except measurements:    Wound 01/12/21 Leg Right;Lateral;Lower #1 Noted 1-1-21 (Active)   Wound Image   02/02/21 1101   Dressing Status Old drainage noted 02/02/21 1101   Wound Cleansed Cleansed with saline 01/19/21 1126   Dressing/Treatment Alginate;Collagen with Ag;Dry dressing;Moisten with saline; Roll gauze 01/26/21 1130   Wound Length (cm) 1.1 cm 02/02/21 1101   Wound Width (cm) 0.4 cm 02/02/21 1101   Wound Depth (cm) 0.1 cm 02/02/21 1101   Wound Surface Area (cm^2) 0.44 cm^2 02/02/21 1101   Change in Wound Size % (l*w) 84.29 02/02/21 1101   Wound have some loose watery stools after taking the antibiotics. Clindamycin has since been discontinued. She is currently still on Keflex. Patient states she has a history of right knee surgeries, multiple, and unfortunately has remaining right lower extremity edema with right knee deformity, immobilization. Patient has a history of a large ulcer to the right lateral leg previously that went on to heal.     Wound/Ulcer Pain Timing/Severity: intermittent  Quality of pain: aching, burning  Severity:  2 / 10   Modifying Factors: Pain worsens with walking and Pain is relieved/improved with rest  Associated Signs/Symptoms: edema, erythema and pain     Ulcer Identification:  Ulcer Type: venous    Objective:    BP (!) 152/86   Pulse 94   Temp 97.2 °F (36.2 °C) (Infrared)   Resp 16   Wt 228 lb 9.9 oz (103.7 kg)   BMI 46.18 kg/m²   Wt Readings from Last 3 Encounters:   02/02/21 228 lb 9.9 oz (103.7 kg)   01/12/21 229 lb 15 oz (104.3 kg)   01/08/21 229 lb 12.8 oz (104.2 kg)       PHYSICAL EXAM  General Appearance: alert and oriented to person, place and time, well-developed and well nourished, and in no acute distress  Skin: warm and dry, no rash  Head: normocephalic and atraumatic  Eyes: extraocular eye movements intact, conjunctivae normal, and sclera anicteric  ENT: hearing grossly normal bilaterally. normal  Pulmonary/Chest: no chest wall tenderness and clear anteriorly  Cardiovascular: normal rate and regular rhythm  Abdomen: soft, non-tender and non-distended  Extremities: no cyanosis and no clubbing  Musculoskeletal: normal range of motion of joints. Nontender calves. No cyanosis. Edema 1+ BLE  Neurologic: no cranial nerve deficit and speech normal. No focal deficits.  Mental status normal    PAST MEDICAL HISTORY        Diagnosis Date    Asthma        PAST SURGICAL HISTORY    Past Surgical History:   Procedure Laterality Date    APPENDECTOMY      CHOLECYSTECTOMY         FAMILY HISTORY    Family History   Problem Relation Age of Onset    Migraines Mother        SOCIAL HISTORY    Social History     Tobacco Use    Smoking status: Never Smoker    Smokeless tobacco: Never Used   Substance Use Topics    Alcohol Use     Frequency: Never    Drug use: Never       ALLERGIES    Allergies   Allergen Reactions    Dye [Iodides] Other (See Comments)     IVP dye, pt does not remember he reaction, just that the nurses stated to not let anyone give to her ever again       MEDICATIONS    Current Outpatient Medications on File Prior to Encounter   Medication Sig Dispense Refill    triamterene-hydroCHLOROthiazide (MAXZIDE-25) 37.5-25 MG per tablet Take 1 tablet by mouth as needed      mupirocin (BACTROBAN) 2 % ointment APPLY TO AFFECTED AREA THREE TIMES A DAY 22 g 0    metOLazone (ZAROXOLYN) 5 MG tablet TAKE ONE TABLET DAILY AS NEEDED FOR SWELLING 30 tablet 2    albuterol sulfate HFA (PROAIR HFA) 108 (90 Base) MCG/ACT inhaler Inhale 2 puffs into the lungs every 6 hours as needed for Wheezing 1 Inhaler 0    Nebulizers (AIRIAL COMPACT MINI NEBULIZER) MISC 1 nebule by Does not apply route 4 times daily 1 each 0     No current facility-administered medications on file prior to encounter. REVIEW OF SYSTEMS  A comprehensive review of systems was negative except for: wound, mild edema    Written patient dismissal instructions given to patient and signed by patient or POA. Discharge [address] and Hyperbaric Oxygen Therapy   Physician Orders and Discharge Instructions  [place] [id number], [facility]  Telephone: [phone]      FAX [phone]     NAME: Heather Gunn  DATE OF BIRTH:  1945  MEDICAL RECORD NUMBER:  [id number]  DATE:  2/2/2021     Wound Cleansing:   Do not scrub or use excessive force. Cleanse wound prior to applying a clean dressing with:  [x]? ? Normal Saline  [x]? ? Keep Wound Dry in [employer]    []?? Wound Cleanser   []? ? Cleanse wound with Mild Soap & Water  []? ? May Shower at Discharge   []? ? Other:        Topical Treatments:  Do not apply lotions, creams, or ointments to wound bed unless directed. []?? Apply moisturizing lotion to skin surrounding the wound prior to dressing change. []?? Apply antifungal ointment to skin surrounding the wound prior to dressing change. []?? Apply thin film of moisture barrier ointment to skin immediately around wound. []?? Other:                  Dressings:                  Wound Location Right Lower Leg   [x]? ? Apply Primary Dressing:                                            [x]? ? COLLAGEN WITH SILVER DAMPENED WITH NORMAL SALINE, PLAIN ALGINATE  [x]? ? Cover and Secure with:                   [x]? ? Gauze        [x]? ? Filiberto          []? ? Roll gauze              [x]? ? Ace Wrap Toes to Knee   []? ? Cover Roll Tape     []? ? ABD                                      []? ? Other:               Avoid contact of tape with skin. [x]? ? Change dressing:   []? ?Kaitlin Cooney           []? ? Every Other Day    [x]? ? Three times per week              []? ? Once a week          []? ? Do Not Change Dressing     []? ? Other:                Edema Control:  Apply:  []?? Compression Stocking       []? ? Right Leg     []? ? Left Leg              []? ? Tubigrip      []? ? Right Leg Double Layer      []? ? Left Leg Double Layer                                                  []? ? Right Leg Single Layer       []? ? Left Leg Single Layer              []? ? SpandaGrip            []? ? Right Leg     []? ? Left Leg                                      []? ?Low compression 5-10 mm/Hg                                             []? ? Medium compression 10-20 mm/Hg                                      []? ? High compression  20-30 mm/Hg              every morning immediately when getting up should be applied to affected leg(s) from mid foot to knee making sure to cover the heel.  Remove every night before going to bed.              []? ? Elevate leg(s) above the level of the heart when sitting.                 []? ? Avoid prolonged standing in one place.              []? ? Elevate arm/hand above the level of the heart      []? ? RightArm     []? ? LeftArm             Compression:  Apply:  []?? Multilayer Compression Wrap Applied in Clinic    []? ? RightLeg      []? ? Left Leg              []? ? Multi-layer compression.  Do not get leg(s) with wrap wet.  If wraps become too tight call the center or completely remove the wrap.                      []? ? Elevate leg(s) above the level of the heart when sitting.                 []? ? Avoid prolonged standing in one place.     Off-Loading:   []?? Off-loading when    []? ? walking       []? ? in bed         []? ? sitting  []? ? Total non-weight bearing  []? ? Right Leg  []? ? Left Leg          []? ?Deb Sender Device     []? ? Walker        []? ? Cane           []? ? Wheelchair  []? ? Crutches              []? ? Surgical shoe    []? ? Podus Boot(s)   []? ? Foam Boot(s)  []? ? Roll About              []? ? Cast Boot   []? ?[employer]  []? ? Other:           Dietary:  [x]? ? Diet as tolerated:                    Activity:  [x]? ? Activity as tolerated:       If you are still having pain after you go home:  [x]? ? Elevate the affected limb.    [x]? ? Use over-the-counter medications you would normally use for pain as permitted by your family doctor. [x]? ? For persistent pain not relieved by the above interventions, please call your family doctor.             Return Appointment:  []?? Wound and dressing supply provider:   []?? ECF or Home Healthcare:  []?? Wound Assessment:          []? ? Physician or NP scheduled for Wound Assessment:   [x]? ? Return Appointment: With Dr. Warren Oliveros Week(s)  []? ? Ordered tests:      Nurse Case Manger: [id number] Information: Should you experience any significant changes in your wound(s) or have questions about your wound care, please contact the [facility] [address] 8:00 am - 4:30 pm and Friday 8:00 am - 12:30 pm.  If you need help with your wound outside these hours and cannot wait until we are again available, contact your PCP or go to the hospital emergency room.      PLEASE NOTE: IF YOU ARE UNABLE TO [address], CONTINUE TO USE THE SUPPLIES YOU HAVE AVAILABLE UNTIL YOU ARE ABLE TO [facility].  IT IS MOST IMPORTANT TO KEEP THE WOUND COVERED AT ALL TIMES.     Physician Signature:_______________________     Date: ___________ Time:  ____________                                Dr. Danielle August           Electronically signed by Ana Das MD on 2/2/2021 at 11:17 AM

## 2021-02-08 ENCOUNTER — OFFICE VISIT (OUTPATIENT)
Dept: FAMILY MEDICINE CLINIC | Age: 76
End: 2021-02-08
Payer: MEDICARE

## 2021-02-08 ENCOUNTER — HOSPITAL ENCOUNTER (OUTPATIENT)
Dept: WOUND CARE | Age: 76
Discharge: HOME | End: 2021-02-08
Payer: MEDICARE

## 2021-02-08 VITALS
TEMPERATURE: 97.7 F | BODY MASS INDEX: 45.76 KG/M2 | DIASTOLIC BLOOD PRESSURE: 84 MMHG | SYSTOLIC BLOOD PRESSURE: 132 MMHG | WEIGHT: 227 LBS | HEIGHT: 59 IN

## 2021-02-08 VITALS
TEMPERATURE: 97.8 F | SYSTOLIC BLOOD PRESSURE: 148 MMHG | HEART RATE: 101 BPM | RESPIRATION RATE: 16 BRPM | DIASTOLIC BLOOD PRESSURE: 83 MMHG

## 2021-02-08 DIAGNOSIS — L97.912 NON-PRESSURE CHRONIC ULCER OF RIGHT LOWER LEG WITH FAT LAYER EXPOSED (HCC): Primary | ICD-10-CM

## 2021-02-08 DIAGNOSIS — J45.40 MODERATE PERSISTENT ASTHMA WITHOUT COMPLICATION: ICD-10-CM

## 2021-02-08 DIAGNOSIS — I87.2 PERIPHERAL VENOUS INSUFFICIENCY: ICD-10-CM

## 2021-02-08 DIAGNOSIS — R60.0 LOCALIZED EDEMA: ICD-10-CM

## 2021-02-08 DIAGNOSIS — L97.511 RIGHT FOOT ULCER, LIMITED TO BREAKDOWN OF SKIN (HCC): ICD-10-CM

## 2021-02-08 DIAGNOSIS — E66.01 CLASS 3 SEVERE OBESITY DUE TO EXCESS CALORIES WITHOUT SERIOUS COMORBIDITY WITH BODY MASS INDEX (BMI) OF 45.0 TO 49.9 IN ADULT (HCC): ICD-10-CM

## 2021-02-08 DIAGNOSIS — L97.511 RIGHT FOOT ULCER, LIMITED TO BREAKDOWN OF SKIN (HCC): Primary | ICD-10-CM

## 2021-02-08 PROCEDURE — 11042 DBRDMT SUBQ TIS 1ST 20SQCM/<: CPT | Performed by: NURSE PRACTITIONER

## 2021-02-08 PROCEDURE — 11042 DBRDMT SUBQ TIS 1ST 20SQCM/<: CPT

## 2021-02-08 PROCEDURE — 1090F PRES/ABSN URINE INCON ASSESS: CPT | Performed by: FAMILY MEDICINE

## 2021-02-08 PROCEDURE — G8484 FLU IMMUNIZE NO ADMIN: HCPCS | Performed by: FAMILY MEDICINE

## 2021-02-08 PROCEDURE — 99214 OFFICE O/P EST MOD 30 MIN: CPT | Performed by: FAMILY MEDICINE

## 2021-02-08 PROCEDURE — 1036F TOBACCO NON-USER: CPT | Performed by: FAMILY MEDICINE

## 2021-02-08 PROCEDURE — G8400 PT W/DXA NO RESULTS DOC: HCPCS | Performed by: FAMILY MEDICINE

## 2021-02-08 PROCEDURE — G8427 DOCREV CUR MEDS BY ELIG CLIN: HCPCS | Performed by: FAMILY MEDICINE

## 2021-02-08 PROCEDURE — G8417 CALC BMI ABV UP PARAM F/U: HCPCS | Performed by: FAMILY MEDICINE

## 2021-02-08 PROCEDURE — 3017F COLORECTAL CA SCREEN DOC REV: CPT | Performed by: FAMILY MEDICINE

## 2021-02-08 PROCEDURE — 1123F ACP DISCUSS/DSCN MKR DOCD: CPT | Performed by: FAMILY MEDICINE

## 2021-02-08 PROCEDURE — 4040F PNEUMOC VAC/ADMIN/RCVD: CPT | Performed by: FAMILY MEDICINE

## 2021-02-08 RX ORDER — GENTAMICIN SULFATE 1 MG/G
OINTMENT TOPICAL ONCE
Status: CANCELLED | OUTPATIENT
Start: 2021-02-08 | End: 2021-02-08

## 2021-02-08 RX ORDER — LIDOCAINE HYDROCHLORIDE 20 MG/ML
JELLY TOPICAL ONCE
Status: CANCELLED | OUTPATIENT
Start: 2021-02-08 | End: 2021-02-08

## 2021-02-08 RX ORDER — BETAMETHASONE DIPROPIONATE 0.05 %
OINTMENT (GRAM) TOPICAL ONCE
Status: CANCELLED | OUTPATIENT
Start: 2021-02-08 | End: 2021-02-08

## 2021-02-08 RX ORDER — LIDOCAINE 40 MG/G
CREAM TOPICAL ONCE
Status: CANCELLED | OUTPATIENT
Start: 2021-02-08 | End: 2021-02-08

## 2021-02-08 RX ORDER — BACITRACIN ZINC AND POLYMYXIN B SULFATE 500; 1000 [USP'U]/G; [USP'U]/G
OINTMENT TOPICAL ONCE
Status: CANCELLED | OUTPATIENT
Start: 2021-02-08 | End: 2021-02-08

## 2021-02-08 RX ORDER — LIDOCAINE HYDROCHLORIDE 40 MG/ML
SOLUTION TOPICAL ONCE
Status: CANCELLED | OUTPATIENT
Start: 2021-02-08 | End: 2021-02-08

## 2021-02-08 RX ORDER — GINSENG 100 MG
CAPSULE ORAL ONCE
Status: CANCELLED | OUTPATIENT
Start: 2021-02-08 | End: 2021-02-08

## 2021-02-08 RX ORDER — LIDOCAINE HYDROCHLORIDE 40 MG/ML
SOLUTION TOPICAL ONCE
Status: COMPLETED | OUTPATIENT
Start: 2021-02-08 | End: 2021-02-08

## 2021-02-08 RX ORDER — BACITRACIN, NEOMYCIN, POLYMYXIN B 400; 3.5; 5 [USP'U]/G; MG/G; [USP'U]/G
OINTMENT TOPICAL ONCE
Status: CANCELLED | OUTPATIENT
Start: 2021-02-08 | End: 2021-02-08

## 2021-02-08 RX ORDER — CLOBETASOL PROPIONATE 0.5 MG/G
OINTMENT TOPICAL ONCE
Status: CANCELLED | OUTPATIENT
Start: 2021-02-08 | End: 2021-02-08

## 2021-02-08 RX ORDER — LIDOCAINE 50 MG/G
OINTMENT TOPICAL ONCE
Status: CANCELLED | OUTPATIENT
Start: 2021-02-08 | End: 2021-02-08

## 2021-02-08 RX ADMIN — LIDOCAINE HYDROCHLORIDE: 40 SOLUTION TOPICAL at 14:49

## 2021-02-08 ASSESSMENT — ENCOUNTER SYMPTOMS
NAUSEA: 0
SHORTNESS OF BREATH: 0
BACK PAIN: 0
COLOR CHANGE: 0
VOMITING: 0
ABDOMINAL PAIN: 0

## 2021-02-08 NOTE — PATIENT INSTRUCTIONS
Thank you for coming. Please take you medications as prescribed. Please continue to eat a balanced diet and exercise. If you have questions please let me know via phone or email.

## 2021-02-08 NOTE — PROGRESS NOTES
2021     Anita Hunt (:  1945) is a 70 y.o. female, here for evaluation of the following medical concerns:    HPI     Asthma-  patient has chest congestion today,  patient feels she is stable on Proair as needed and has nebulizer if needed, chest to help breathing, patient is not using nebulizer at this time jut her inhaler, mild wheezing today, difficult at times with deep inspiration, denied fever or chills, Denied rhinorrhea or nasal congestion.      Right leg cellulitis/edema- right leg, patient is going to the wound clinic, is still treating it at home,  lateral aspect, ankle, patient has swelling in her leg, erythematous,has possible nodule secondary to cellulitis, patient is not  having pain or discomfort at this time.  Very erythematous and edematous today, particularly above right ankle,  medial and lateral aspect, pain with palpation, she believes his is much improved from several weeks ago?, patient stated she has similar ulcer infection that took one year to resolve. Much improved at this time.      Today, denied chest pain, sob, n,, v, or diarrhea  Review of Systems   Constitutional: Negative for activity change, fatigue, fever and unexpected weight change. Respiratory: Negative for shortness of breath. Cardiovascular: Negative for chest pain. Gastrointestinal: Negative for abdominal pain, nausea and vomiting. Musculoskeletal: Positive for arthralgias. Negative for back pain and gait problem. Skin: Positive for wound. Negative for color change. Neurological: Negative for light-headedness and headaches. Psychiatric/Behavioral: Negative for dysphoric mood. The patient is not nervous/anxious. Prior to Visit Medications    Medication Sig Taking?  Authorizing Provider   triamterene-hydroCHLOROthiazide (MAXZIDE-25) 37.5-25 MG per tablet Take 1 tablet by mouth as needed Yes Historical Provider, MD mupirocin (BACTROBAN) 2 % ointment APPLY TO AFFECTED AREA THREE TIMES A DAY Yes Orlando Munguia, DO   metOLazone (ZAROXOLYN) 5 MG tablet TAKE ONE TABLET DAILY AS NEEDED FOR SWELLING Yes Orlando Munguia, DO   albuterol sulfate HFA (PROAIR HFA) 108 (90 Base) MCG/ACT inhaler Inhale 2 puffs into the lungs every 6 hours as needed for Wheezing Yes Darnell Johnston, DO   Nebulizers (AIRIAL COMPACT MINI NEBULIZER) MISC 1 nebule by Does not apply route 4 times daily Yes Norvel Preston, DO        Social History     Tobacco Use    Smoking status: Never Smoker    Smokeless tobacco: Never Used   Substance Use Topics    Alcohol Use     Frequency: Never        Vitals:    02/08/21 1336   BP: 132/84   Temp: 97.7 °F (36.5 °C)   Weight: 227 lb (103 kg)   Height: 4' 11\" (1.499 m)     Estimated body mass index is 45.85 kg/m² as calculated from the following:    Height as of this encounter: 4' 11\" (1.499 m). Weight as of this encounter: 227 lb (103 kg). Physical Exam  Vitals signs and nursing note reviewed. Constitutional:       Appearance: She is well-developed. HENT:      Head: Normocephalic and atraumatic. Right Ear: External ear normal.      Left Ear: External ear normal.   Cardiovascular:      Rate and Rhythm: Normal rate and regular rhythm. Heart sounds: Normal heart sounds. No murmur. Pulmonary:      Effort: Pulmonary effort is normal.      Breath sounds: Normal breath sounds. No wheezing. Abdominal:      General: Bowel sounds are normal.      Palpations: Abdomen is soft. Tenderness: There is no abdominal tenderness. Skin:     General: Skin is dry. Findings: Lesion and rash present. Neurological:      Mental Status: She is alert and oriented to person, place, and time. Psychiatric:         Behavior: Behavior normal.         Thought Content: Thought content normal.         ASSESSMENT/PLAN:  1.  Right foot ulcer, limited to breakdown of skin ([address]  Stable  Continue with medication Keep appointments with specialist.   May Hanna questions    2. Moderate persistent asthma without complication  Take medication as prescribed. Push fluids and rest  Discussed conservative treatment  Discussed signs and symptoms for immediate evaluation in the ER  RTC if no improved. Tylenol/Ibuprofen for pain    3. Obesity  Discussed the need to exercise 30 minutes each day. Monitor diet and push water. Reduce refined carbs and replace with fiber and vegetables. Decrease portion size and limit snacking, stop eating after dinner  Count calories. Return in about 3 months (around 5/8/2021).

## 2021-02-09 ENCOUNTER — HOSPITAL ENCOUNTER (OUTPATIENT)
Dept: WOUND CARE | Age: 76
Discharge: HOME | End: 2021-02-09
Payer: MEDICARE

## 2021-02-09 NOTE — PROGRESS NOTES
[address]   Progress Note and Procedure Note      [address] RECORD NUMBER:  [id number]  AGE: 70 y.o. GENDER: female  : 1945  EPISODE DATE:  2021    Subjective:     Chief Complaint   Patient presents with    Wound Check     Follow up for right lower leg wound          HISTORY of PRESENT ILLNESS HPI  Patient without any new wound care issues. Pt changes visit day related to expected weather conditions in am and pt's travel distance. Denies any other constitutional symptoms. Has been tolerating wound care dressings without problems. Patient feels that wound is improving. Just saw PCP this afternoon. 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 layer exposed ([address] 2021    Peripheral venous insufficiency 2021       PAST SURGICAL HISTORY    Past Surgical History:   Procedure Laterality Date    APPENDECTOMY      CHOLECYSTECTOMY         FAMILY HISTORY    Family History   Problem Relation Age of Onset    Migraines Mother        SOCIAL HISTORY    Social History     Tobacco Use    Smoking status: Never Smoker    Smokeless tobacco: Never Used   Substance Use Topics    Alcohol Use     Frequency: Never    Drug use: Never       ALLERGIES    Allergies   Allergen Reactions    Iodides Other (See Comments)     IVP dye, pt does not remember he reaction, just that the nurses stated to not let anyone give to her ever again    Furosemide      Pt. States it makes her \"ditzy\", dizzy, and gives her muscle ridgity and nausea.         MEDICATIONS    Current Outpatient Medications on File Prior to Encounter   Medication Sig Dispense Refill    triamterene-hydroCHLOROthiazide (MAXZIDE-25) 37.5-25 MG per tablet Take 1 tablet by mouth as needed      mupirocin (BACTROBAN) 2 % ointment APPLY TO AFFECTED AREA THREE TIMES A DAY 22 g 0    metOLazone (ZAROXOLYN) 5 MG tablet TAKE ONE TABLET DAILY AS NEEDED FOR SWELLING 30 tablet 2    albuterol sulfate HFA (PROAIR HFA) 108 (90 Base) MCG/ACT inhaler Inhale 2 puffs into the lungs every 6 hours as needed for Wheezing 1 Inhaler 0    Nebulizers (AIRIAL COMPACT MINI NEBULIZER) MISC 1 nebule by Does not apply route 4 times daily 1 each 0     No current facility-administered medications on file prior to encounter. REVIEW OF SYSTEMS  Review of Systems    Pertinent items are noted in HPI. Objective:      BP (!) 148/83   Pulse 101   Temp 97.8 °F (36.6 °C) (Temporal)   Resp 16     Wt Readings from Last 3 Encounters:   02/08/21 227 lb (103 kg)   02/02/21 228 lb 9.9 oz (103.7 kg)   01/12/21 229 lb 15 oz (104.3 kg)       PHYSICAL EXAM  Physical Exam    General Appearance: alert and oriented to person, place and time, obese and pale  Skin: warm and dry  Head: normocephalic and atraumatic  Eyes: pupils equal, round, and reactive to light  Pulmonary/Chest:  normal air movement, no respiratory distress  Cardiovascular: normal rate, regular rhythm and intact distal pulses  Wound:  Wound size improving, debrided to granular tissue, no overt signs of infection. Assessment:        Problem List Items Addressed This Visit     Non-pressure chronic ulcer of right lower leg with fat layer exposed ([address] - Primary    Peripheral venous insufficiency    Localized edema    Right foot ulcer, limited to breakdown of skin ([address]           Procedure Note  Indications:  Based on my examination of this patient's wound(s)/ulcer(s) today, debridement is required to promote healing and evaluate the wound base.     Performed by: VIDA Woodson - CNP    Consent obtained:  Yes    Time out taken:  Yes    Pain Control: Anesthetic  Anesthetic: 4% Lidocaine Liquid Topical(2.5 ml)       Debridement: Excisional Debridement    Using curette the wound(s)/ulcer(s) was/were debrided down through and including the removal of epidermis, dermis and subcutaneous tissue. Devitalized Tissue Debrided:  fibrin, biofilm and slough    Pre Debridement Measurements:  Are located in the [place]  Documentation Flow Sheet    Diabetic/Pressure/Non Pressure Ulcers only:  Ulcer: Non-Pressure ulcer, fat layer exposed     Wound/Ulcer #: 1    Post Debridement Measurements:  Wound/Ulcer Descriptions are Pre Debridement except measurements:    Wound 01/12/21 Leg Right;Lateral;Lower #1 Noted 1-1-21 (Active)   Wound Image   02/02/21 1101   Dressing Status New dressing applied 02/08/21 1517   Wound Cleansed Cleansed with saline 02/08/21 1517   Dressing/Treatment Other (comment) 02/08/21 1517   Wound Length (cm) 0.7 cm 02/08/21 1438   Wound Width (cm) 0.2 cm 02/08/21 1438   Wound Depth (cm) 0.1 cm 02/08/21 1438   Wound Surface Area (cm^2) 0.14 cm^2 02/08/21 1438   Change in Wound Size % (l*w) 95 02/08/21 1438   Wound Volume (cm^3) 0.01 cm^3 02/08/21 1438   Wound Healing % 98 02/08/21 1438   Post-Procedure Length (cm) 0.8 cm 02/08/21 1508   Post-Procedure Width (cm) 0.3 cm 02/08/21 1508   Post-Procedure Depth (cm) 0.2 cm 02/08/21 1508   Post-Procedure Surface Area (cm^2) 0.24 cm^2 02/08/21 1508   Post-Procedure Volume (cm^3) 0.05 cm^3 02/08/21 1508   Wound Assessment Granulation tissue;Slough 02/08/21 1438   Drainage Amount None 02/08/21 1438   Drainage Description Serosanguinous 02/08/21 1438   Odor None 02/08/21 1438   Consuelo-wound Assessment Dry/flaky 02/08/21 1438   Margins Attached edges 02/08/21 1438   Wound Thickness Description not for Pressure Injury Full thickness 02/08/21 1438   Number of days: 28          Total Surface Area Debrided:  0.24 sq cm     Estimated Blood Loss:  Minimal    Hemostasis Achieved:  not needed    Procedural Pain:  0  / 10     Post Procedural Pain:  0 / 10     Response to treatment:  Well tolerated by patient. Plan:   Pt education related to progress of wound to healing, very little wound depth, changed plan of care to provide moist wound healing. Pt in agreement and questions answered. Treatment Note please see attached Discharge Instructions    Written patient dismissal instructions given to patient and signed by patient or POA. Discharge Instructions         Discharge Instructions        [facility] [place] [facility]   Physician Orders and Discharge Instructions  [facility] [place]  [address] [facility] [address], [facility] [address]  Telephone: [phone]      FAX [phone]     NAME: Ralph Cunningham OF BIRTH:  1945  MEDICAL RECORD NUMBER:  [id number]  DATE:  2/8/2021     Wound Cleansing:   Do not scrub or use excessive force. Cleanse wound prior to applying a clean dressing with:  [x]? ?? Normal Saline  [x]? ?? Keep Wound Dry in Shower    []??? Wound Cleanser   []? ?? Cleanse wound with Mild Soap & Water  []??? May Shower at Discharge   []? ?? Other:        Topical Treatments:  Do not apply lotions, creams, or ointments to wound bed unless directed.   []??? Apply moisturizing lotion to skin surrounding the wound prior to dressing change.  []??? Apply antifungal ointment to skin surrounding the wound prior to dressing change.  []??? Apply thin film of moisture barrier ointment to skin immediately around wound.  []??? Other:                  Dressings:                  Wound Location Right Lower Leg   [x]? ?? Apply Primary Dressing:                                            [x]? ?? MEPITEL  [x]? ?? Cover and Secure with:                   [x]? ??Jolan Todd? ??           []? ?? Roll gauze              [x]? ?? Ace Wrap Toes to Knee   []? ?? Cover Roll Tape     []??? ABD                                      []? ?? Other:               Avoid contact of tape with skin. [x]??? Change dressing:   []??? Daily           []? ?? Every Other Day    [x]? ?? TWO times per week              []? ?? Once a week          []? ?? Do Not Change Dressing     []? ?? Other:                Edema Control:                          ACE WRAP TO RIGHT LEG   Apply:  []??? Compression Stocking       []? ? ? Right Leg     []? ? ?Left Leg              []? ?? Tubigrip      []? ? ? Right Leg Double Layer      []? ? ?Left Leg Double Layer                                                  []? ? ? Right Leg Single Layer       []? ? ?Left Leg Single Layer              []??? SpandaGrip            []? ? ? Right Leg     []? ? ?Left Leg                                      []? ? ?Low compression 5-10 mm/Hg                                             []? ? ? Medium compression 10-20 mm/Hg                                      []? ? ? High compression  20-30 mm/Hg              every morning immediately when getting up should be applied to affected leg(s) from mid foot to knee making sure to cover the heel.  Remove every night before going to bed.              [x]? ?? Elevate leg(s) above the level of the heart when sitting.                 [x]? ?? Avoid prolonged standing in one place.              []??? Elevate arm/hand above the level of the heart      []? ? ? RightArm     []? ? ? LeftArm             Compression:  Apply:  []??? Multilayer Compression Wrap Applied in Clinic    []? ? ? RightLeg      []? ? ?Left Leg              []? ?? Multi-layer compression.  Do not get leg(s) with wrap wet.  If wraps become too tight call the center or completely remove the wrap.                      []??? Elevate leg(s) above the level of the heart when sitting.                 []? ?? Avoid prolonged standing in one place.     Off-Loading:   []??? Off-loading when    []? ?? walking       []? ?? in bed         []? ?? sitting  []? ?? Total non-weight bearing  []??? Right Leg  []??? Left Leg          []??? Assistive Device     []??? Walker        []? ?? Cane           []??? Wheelchair  []??? Crutches              []??? Surgical shoe    []? ?? Podus Boot(s)   []??? Foam Boot(s)  []??? Roll About              []? ?? Cast Boot   []? ?? CROW Boot  []??? Other:           Dietary:  [x]??? Diet as tolerated:                    Activity:  [x]??? Activity as tolerated:       If you are still having pain after you go home:  [x]??? Elevate the affected limb.    [x]? ?? Use over-the-counter medications you would normally use for pain as permitted by your family doctor. [x]??? For persistent pain not relieved by the above interventions, please call your family doctor.             Return Appointment:  []??? Wound and dressing supply provider:   []??? ECF or Home Healthcare:  []??? Wound Assessment:          []? ?? Physician or NP scheduled for Wound Assessment:   [x]??? Return Appointment: With Dr. Stefania Mann [facility] Week(s)  []??? Ordered tests:      Nurse Case Manger: Jaylen Winkler    Electronically signed by Mar Nolan RN on 2/8/2021 at 3:09 PM    [address] [place] [address] Information: Should you experience any significant changes in your wound(s) or have questions about your wound care, please contact the [address] [place] [address] [phone] [address] 8:00 am - 4:30 pm and Friday 8:00 am - 12:30 pm.  If you need help with your wound outside these hours and cannot wait until we are again available, contact your PCP or go to the hospital emergency room.      PLEASE NOTE: IF YOU ARE UNABLE TO OBTAIN WOUND SUPPLIES, CONTINUE TO USE THE SUPPLIES YOU HAVE AVAILABLE UNTIL YOU ARE ABLE TO [facility].  IT IS MOST IMPORTANT TO KEEP THE WOUND COVERED AT ALL TIMES.     Physician Signature:_______________________     Date: ___________ Time:  ____________                                HR. Jaylen Larson CNP          Electronically signed by VIDA Bass CNP on 2/9/2021 at 12:36 PM

## 2021-02-16 ENCOUNTER — HOSPITAL ENCOUNTER (OUTPATIENT)
Dept: WOUND CARE | Age: 76
Discharge: HOME | End: 2021-02-16
Payer: MEDICARE

## 2021-02-23 ENCOUNTER — HOSPITAL ENCOUNTER (OUTPATIENT)
Dept: WOUND CARE | Age: 76
Discharge: HOME | End: 2021-02-23
Payer: MEDICARE

## 2021-02-23 VITALS
HEART RATE: 97 BPM | RESPIRATION RATE: 16 BRPM | TEMPERATURE: 97.2 F | SYSTOLIC BLOOD PRESSURE: 147 MMHG | DIASTOLIC BLOOD PRESSURE: 69 MMHG

## 2021-02-23 DIAGNOSIS — I87.2 PERIPHERAL VENOUS INSUFFICIENCY: ICD-10-CM

## 2021-02-23 DIAGNOSIS — L97.912 NON-PRESSURE CHRONIC ULCER OF RIGHT LOWER LEG WITH FAT LAYER EXPOSED (HCC): Primary | ICD-10-CM

## 2021-02-23 PROCEDURE — 99212 OFFICE O/P EST SF 10 MIN: CPT

## 2021-02-23 PROCEDURE — 99212 OFFICE O/P EST SF 10 MIN: CPT | Performed by: EMERGENCY MEDICINE

## 2021-02-23 ASSESSMENT — PAIN SCALES - GENERAL: PAINLEVEL_OUTOF10: 0

## 2021-02-23 NOTE — PROGRESS NOTES
[address]   Progress Note       [address] RECORD NUMBER:  [id number]  AGE: 70 y.o. GENDER: female  : 1945  EPISODE DATE:  2021    Subjective:     Chief Complaint   Patient presents with    Wound Check     Follow up for right lower leg wound. Patient without any new wound care issues. Symptoms or pertinent wound history since last visit: none Denies any other constitutional symptoms otherwise. Has been tolerating wound care dressings without problems. Patient feels that wound is improving. Assessment:     26-year-old female with a nonhealing nonpressure ulcer to right lateral lower leg. Severity of fascia muscle involvement without necrosis. Unclear etiology but I suspect there is some underlying venous stasis, with stasis dermatitis, severe leg edema due to previous knee surgery     Problem List Items Addressed This Visit     Non-pressure chronic ulcer of right lower leg with fat layer exposed ([address] - Primary    Peripheral venous insufficiency        Comorbid conditions affecting wound healing and/or addressed today: Steroid use/immunosuppression, asthma, hypertension, venous stasis, obesity  Education and discussion held with patient regarding these disease processes including issues related to the wound(s). Wound evaluation: All wounds healed. Plan:     1. Wound care: Please see attached Discharge Instructions for specific wound treatment plan. 2. Offloading and edema control: Elevation. Compression stockings. 3. Infection: No signs of acute infection. Continue to monitor. 4. Circulation: Strongly palpable pulses. No ischemic vascular changes. 5. Nutrition: Encourage protein emphasis with meals. Continue with adding collagen supplement to her diet. 6. Biopsy results reviewed with patient previously.   The findings are those of an ulcer bed with adjacent reactive epidermal hyperplasia.  The findings are not those of a vasculitis or   pyogenic granuloma.           Wound 01/12/21 Leg Right;Lateral;Lower #1 Noted 1-1-21 (Active)   Wound Image   02/23/21 1055   Dressing Status New dressing applied 02/08/21 1517   Wound Cleansed Cleansed with saline 02/08/21 1517   Dressing/Treatment Other (comment) 02/08/21 1517   Wound Length (cm) 0 cm 02/23/21 1055   Wound Width (cm) 0 cm 02/23/21 1055   Wound Depth (cm) 0 cm 02/23/21 1055   Wound Surface Area (cm^2) 0 cm^2 02/23/21 1055   Change in Wound Size % (l*w) 100 02/23/21 1055   Wound Volume (cm^3) 0 cm^3 02/23/21 1055   Wound Healing % 100 02/23/21 1055   Post-Procedure Length (cm) 0 cm 02/23/21 1117   Post-Procedure Width (cm) 0 cm 02/23/21 1117   Post-Procedure Depth (cm) 0 cm 02/23/21 1117   Post-Procedure Surface Area (cm^2) 0 cm^2 02/23/21 1117   Post-Procedure Volume (cm^3) 0 cm^3 02/23/21 1117   Wound Assessment Epithelialization 02/23/21 1055   Drainage Amount None 02/08/21 1438   Drainage Description Serosanguinous 02/08/21 1438   Odor None 02/08/21 1438   Consuelo-wound Assessment Dry/flaky 02/08/21 1438   Margins Attached edges 02/08/21 1438   Wound Thickness Description not for Pressure Injury Full thickness 02/08/21 1438   Number of days: 43        HISTORY of PRESENT ILLNESS HPI     Christian Wilburn is a 70 y.o. female who presents today for wound/ulcer evaluation. History of Wound Context on initial evaluation: Patient presenting as follow-up visit regarding a nonhealing wound to right lateral leg. Per initial evaluation history:70year-old female with a history of asthma, hypertension who has a nonhealing wound to the right lower extremity with associated edema, erythema since around 12/8/2020. The patient has been using betamethasone cream without significant improvement. She was evaluated by her PCP recently and subsequently referred here for wound care recommendations. Does have some associated pain with the wound. Otherwise no other complaints.   Denies any fever, lower leg with fat layer exposed ([facility] [address] 1/12/2021    Peripheral venous insufficiency 1/12/2021       PAST SURGICAL HISTORY    Past Surgical History:   Procedure Laterality Date    APPENDECTOMY      CHOLECYSTECTOMY         FAMILY HISTORY    Family History   Problem Relation Age of Onset    Migraines Mother        SOCIAL HISTORY    Social History     Tobacco Use    Smoking status: Never Smoker    Smokeless tobacco: Never Used   Substance Use Topics    Alcohol Use     Frequency: Never    Drug use: Never       ALLERGIES    Allergies   Allergen Reactions    Iodides Other (See Comments)     IVP dye, pt does not remember he reaction, just that the nurses stated to not let anyone give to her ever again    Furosemide      Pt. States it makes her \"ditzy\", dizzy, and gives her muscle ridgity and nausea. MEDICATIONS    Current Outpatient Medications on File Prior to Encounter   Medication Sig Dispense Refill    triamterene-hydroCHLOROthiazide (MAXZIDE-25) 37.5-25 MG per tablet Take 1 tablet by mouth as needed      mupirocin (BACTROBAN) 2 % ointment APPLY TO AFFECTED AREA THREE TIMES A DAY 22 g 0    metOLazone (ZAROXOLYN) 5 MG tablet TAKE ONE TABLET DAILY AS NEEDED FOR SWELLING 30 tablet 2    albuterol sulfate HFA (PROAIR HFA) 108 (90 Base) MCG/ACT inhaler Inhale 2 puffs into the lungs every 6 hours as needed for Wheezing 1 Inhaler 0    Nebulizers (AIRIAL COMPACT MINI NEBULIZER) MISC 1 nebule by Does not apply route 4 times daily 1 each 0     No current facility-administered medications on file prior to encounter. REVIEW OF SYSTEMS  A comprehensive review of systems was negative except for: wound, mild edema    Written patient dismissal instructions given to patient and signed by patient or POA.          Discharge Instructions         [address]  Physician Orders   [facility]  [address] [facility] [address], [address]  Telephone: [phone]    NAME:  Surjit Sexton OF BIRTH:  1945  MEDICAL RECORD NUMBER:  [id number]  DATE:  2/23/2021    Congratulations! You have completed your treatment. Return to your Primary Care Physician for all your health issues. Resume your ordinary activities as tolerated. Take your medications as prescribed by your primary care physician. Check your skin daily for cracks, bruises, sores, or dryness. Use a moisturizer as needed. Clean and dry your skin, using mild soap and warm water (not hot). Avoid alcohol and caffeine and do not smoke. Maintain a nutritious diet. Avoid pressure on your wound site. Keep your legs elevated above the level of the heart whenever possible. Physician Signature:______________________    Date: ___________ Time:  ____________    [facility]. [facility] LE      Electronically signed by Leighton Najjar, RN on 2/23/2021 at 11:18 AM                            Electronically signed by Addis Canales MD on 2/23/2021 at 11:19 AM

## 2021-03-04 RX ORDER — METOLAZONE 5 MG/1
TABLET ORAL
Qty: 30 TABLET | Refills: 1 | Status: SHIPPED | OUTPATIENT
Start: 2021-03-04 | End: 2021-05-12 | Stop reason: SDUPTHER

## 2021-03-10 ENCOUNTER — TELEPHONE (OUTPATIENT)
Dept: FAMILY MEDICINE CLINIC | Age: 76
End: 2021-03-10

## 2021-03-10 NOTE — TELEPHONE ENCOUNTER
I called the patient and she stated that she is getting calls from our office? But generally it is people trying to sell her medical equipment?

## 2021-03-10 NOTE — TELEPHONE ENCOUNTER
Patient is calling stating she received two calls from us and no message was left did  try to call her I don't see any notes in the chart

## 2021-03-12 NOTE — TELEPHONE ENCOUNTER
Called and spoke to Linh Ni. It seems she noticed our office phone # is being spoofed and someone is calling her saying you are ordering knee braces for her. She knew it was not accurate and wanted us to know.

## 2021-04-12 ENCOUNTER — HOSPITAL ENCOUNTER (OUTPATIENT)
Dept: WOUND CARE | Age: 76
Discharge: HOME | End: 2021-04-12
Payer: MEDICARE

## 2021-04-12 VITALS
DIASTOLIC BLOOD PRESSURE: 67 MMHG | TEMPERATURE: 97.2 F | HEART RATE: 91 BPM | RESPIRATION RATE: 16 BRPM | SYSTOLIC BLOOD PRESSURE: 161 MMHG

## 2021-04-12 DIAGNOSIS — L97.912 NON-PRESSURE CHRONIC ULCER OF RIGHT LOWER LEG WITH FAT LAYER EXPOSED (HCC): Primary | ICD-10-CM

## 2021-04-12 DIAGNOSIS — L97.511 RIGHT FOOT ULCER, LIMITED TO BREAKDOWN OF SKIN (HCC): ICD-10-CM

## 2021-04-12 DIAGNOSIS — R60.0 LOCALIZED EDEMA: ICD-10-CM

## 2021-04-12 DIAGNOSIS — M79.89 LEG SWELLING: ICD-10-CM

## 2021-04-12 DIAGNOSIS — L97.911 SKIN ULCER OF RIGHT LOWER LEG, LIMITED TO BREAKDOWN OF SKIN (HCC): ICD-10-CM

## 2021-04-12 DIAGNOSIS — I87.2 PERIPHERAL VENOUS INSUFFICIENCY: ICD-10-CM

## 2021-04-12 PROCEDURE — 99213 OFFICE O/P EST LOW 20 MIN: CPT

## 2021-04-12 PROCEDURE — 29581 APPL MULTLAYER CMPRN SYS LEG: CPT

## 2021-04-12 PROCEDURE — 11042 DBRDMT SUBQ TIS 1ST 20SQCM/<: CPT

## 2021-04-12 PROCEDURE — 97597 DBRDMT OPN WND 1ST 20 CM/<: CPT | Performed by: NURSE PRACTITIONER

## 2021-04-12 RX ORDER — GENTAMICIN SULFATE 1 MG/G
OINTMENT TOPICAL ONCE
Status: CANCELLED | OUTPATIENT
Start: 2021-04-12 | End: 2021-04-12

## 2021-04-12 RX ORDER — BACITRACIN, NEOMYCIN, POLYMYXIN B 400; 3.5; 5 [USP'U]/G; MG/G; [USP'U]/G
OINTMENT TOPICAL ONCE
Status: CANCELLED | OUTPATIENT
Start: 2021-04-12 | End: 2021-04-12

## 2021-04-12 RX ORDER — BACITRACIN ZINC AND POLYMYXIN B SULFATE 500; 1000 [USP'U]/G; [USP'U]/G
OINTMENT TOPICAL ONCE
Status: CANCELLED | OUTPATIENT
Start: 2021-04-12 | End: 2021-04-12

## 2021-04-12 RX ORDER — LIDOCAINE 50 MG/G
OINTMENT TOPICAL ONCE
Status: CANCELLED | OUTPATIENT
Start: 2021-04-12 | End: 2021-04-12

## 2021-04-12 RX ORDER — BETAMETHASONE DIPROPIONATE 0.05 %
OINTMENT (GRAM) TOPICAL ONCE
Status: CANCELLED | OUTPATIENT
Start: 2021-04-12 | End: 2021-04-12

## 2021-04-12 RX ORDER — LIDOCAINE 40 MG/G
CREAM TOPICAL ONCE
Status: CANCELLED | OUTPATIENT
Start: 2021-04-12 | End: 2021-04-12

## 2021-04-12 RX ORDER — GINSENG 100 MG
CAPSULE ORAL ONCE
Status: CANCELLED | OUTPATIENT
Start: 2021-04-12 | End: 2021-04-12

## 2021-04-12 RX ORDER — LIDOCAINE HYDROCHLORIDE 20 MG/ML
JELLY TOPICAL ONCE
Status: CANCELLED | OUTPATIENT
Start: 2021-04-12 | End: 2021-04-12

## 2021-04-12 RX ORDER — LIDOCAINE HYDROCHLORIDE 40 MG/ML
SOLUTION TOPICAL ONCE
Status: COMPLETED | OUTPATIENT
Start: 2021-04-12 | End: 2021-04-12

## 2021-04-12 RX ORDER — CLOBETASOL PROPIONATE 0.5 MG/G
OINTMENT TOPICAL ONCE
Status: CANCELLED | OUTPATIENT
Start: 2021-04-12 | End: 2021-04-12

## 2021-04-12 RX ORDER — LIDOCAINE HYDROCHLORIDE 40 MG/ML
SOLUTION TOPICAL ONCE
Status: CANCELLED | OUTPATIENT
Start: 2021-04-12 | End: 2021-04-12

## 2021-04-12 RX ADMIN — LIDOCAINE HYDROCHLORIDE 5 ML: 40 SOLUTION TOPICAL at 09:27

## 2021-04-12 ASSESSMENT — PAIN SCALES - GENERAL: PAINLEVEL_OUTOF10: 0

## 2021-04-12 NOTE — PLAN OF CARE
Multilayer Compression Wrap   (Not Unna) Below the Knee    NAME:  Sujey Aguilar OF BIRTH:  1945  MEDICAL RECORD NUMBER:  [id number]  DATE:  4/12/2021    Multilayer compression wrap: Removed old Multilayer wrap if indicated and wash leg with mild soap/water. Applied moisturizing agent to dry skin as needed. Applied primary and secondary dressing as ordered. Applied multilayered dressing below the knee to right lower leg. Applied multilayered dressing below the knee to left lower leg. Instructed patient/caregiver not to remove dressing and to keep it clean and dry. Instructed patient/caregiver on complications to report to provider, such as pain, numbness in toes, heavy drainage, and slippage of dressing. Instructed patient on purpose of compression dressing and on activity and exercise recommendations.       Electronically signed by Trevor Bailey RN on 4/12/2021 at 10:12 AM

## 2021-04-12 NOTE — PROGRESS NOTES
[address]   Progress Note and Procedure Note      [address] RECORD NUMBER:  [id number]  AGE: 70 y.o. GENDER: female  : 1945  EPISODE DATE:  2021    Subjective:     Chief Complaint   Patient presents with    Wound Check     reopened wound right lower lateral leg   and  new right lower posterior calf , noticed drainage , applied salve and kept covered         HISTORY of PRESENT ILLNESS HPI     Izabel Hedrick is a 70 y.o. female who presents today for wound/ulcer evaluation. History of Wound Context: recurrent right lower lateral and posterior leg ulcers. Patient reports that on 21 she noticed her bedding was wet and discovered the wound. She has not been wearing any compression; she relies solely on diuretics.   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 with fat layer exposed ([address] 2021    Peripheral venous insufficiency 2021    Right foot ulcer, limited to breakdown of skin ([address] 2021       PAST SURGICAL HISTORY    Past Surgical History:   Procedure Laterality Date    APPENDECTOMY      CHOLECYSTECTOMY         FAMILY HISTORY    Family History   Problem Relation Age of Onset    Migraines Mother        SOCIAL HISTORY    Social History     Tobacco Use    Smoking status: Never Smoker    Smokeless tobacco: Never Used   Substance Use Topics    Alcohol Use     Frequency: Never    Drug use: Never       ALLERGIES    Allergies   Allergen Reactions    Iodides Other (See Comments) IVP dye, pt does not remember he reaction, just that the nurses stated to not let anyone give to her ever again    Furosemide      Pt. States it makes her \"ditzy\", dizzy, and gives her muscle ridgity and nausea. MEDICATIONS    Current Outpatient Medications on File Prior to Encounter   Medication Sig Dispense Refill    metOLazone (ZAROXOLYN) 5 MG tablet TAKE ONE TABLET BY MOUTH DAILY AS NEEDED FOR SWELLING 30 tablet 1    triamterene-hydroCHLOROthiazide (MAXZIDE-25) 37.5-25 MG per tablet Take 1 tablet by mouth as needed      mupirocin (BACTROBAN) 2 % ointment APPLY TO AFFECTED AREA THREE TIMES A DAY 22 g 0    albuterol sulfate HFA (PROAIR HFA) 108 (90 Base) MCG/ACT inhaler Inhale 2 puffs into the lungs every 6 hours as needed for Wheezing 1 Inhaler 0    Nebulizers (AIRIAL COMPACT MINI NEBULIZER) MISC 1 nebule by Does not apply route 4 times daily 1 each 0     No current facility-administered medications on file prior to encounter. REVIEW OF SYSTEMS    Pertinent items are noted in HPI.       Objective:      BP (!) 161/67   Pulse 91   Temp 97.2 °F (36.2 °C) (Temporal)   Resp 16     Wt Readings from Last 3 Encounters:   02/08/21 227 lb (103 kg)   02/02/21 228 lb 9.9 oz (103.7 kg)   01/12/21 229 lb 15 oz (104.3 kg)       PHYSICAL EXAM    General Appearance: alert and oriented to person, place and time, well developed and well- nourished, in no acute distress  Skin: warm and dry, no rash or erythema  Head: normocephalic and atraumatic  Eyes: pupils equal, round, and reactive to light, extraocular eye movements intact, conjunctivae normal  Neck: supple and non-tender without mass  Pulmonary/Chest: normal air movement, no respiratory distress  Cardiovascular: Right DP +1  Extremities: no cyanosis or clubbing, RLE +2 pitting edema  Musculoskeletal: normal range of motion, no joint swelling, deformity or tenderness  Neurologic: reflexes normal and symmetric, no cranial nerve deficit, gait, coordination and speech normal      Assessment:        Problem List Items Addressed This Visit     Skin ulcer of right lower leg, limited to breakdown of skin (HCC)    Peripheral venous insufficiency    Localized edema    Leg swelling             Non-Excisional Debridement Procedure Note    Performed by: VIDA Aj CNP    Consent obtained:  Yes    Time out taken:  Yes     Pain Control: Anesthetic: 4% Lidocaine Liquid Topical(5ml)N/A    Debridement: Non-excisional Debridement    Devitalized Tissue Debrided: slough and necrotic/eschar      Instruments Used:  curette     Pre Debridement Measurements:  Are located in the [facility]  Documentation Flow Sheet  Wound/Ulcer #: 1 and 2     Post  Debridement Measurements:  Wound 04/12/21 Leg Right; Lower; Lateral #1 (4/7/21) (Active)   Wound Image   04/12/21 0927   Wound Etiology Venous 04/12/21 0927   Dressing/Treatment Collagen; Other (comment) 04/12/21 1012   Wound Length (cm) 1.8 cm 04/12/21 0927   Wound Width (cm) 1.6 cm 04/12/21 0927   Wound Depth (cm) 0.1 cm 04/12/21 0927   Wound Surface Area (cm^2) 2.88 cm^2 04/12/21 0927   Wound Volume (cm^3) 0.29 cm^3 04/12/21 0927   Post-Procedure Length (cm) 1.9 cm 04/12/21 0941   Post-Procedure Width (cm) 1.7 cm 04/12/21 0941   Post-Procedure Depth (cm) 0.2 cm 04/12/21 0941   Post-Procedure Surface Area (cm^2) 3.23 cm^2 04/12/21 0941   Post-Procedure Volume (cm^3) 0.65 cm^3 04/12/21 0941   Wound Assessment Granulation tissue;Slough 04/12/21 0927   Drainage Amount Scant 04/12/21 0927   Drainage Description Serosanguinous 04/12/21 0927   Odor None 04/12/21 0927   Consuelo-wound Assessment Dry/flaky 04/12/21 0927   Margins Undefined edges 04/12/21 0927   Wound Thickness Description not for Pressure Injury Full thickness 04/12/21 0927   Number of days: 0       Wound 04/12/21 Leg Right;Posterior; Lower #2 (4/7/21) (Active)   Wound Image   04/12/21 0927   Wound Etiology Venous 04/12/21 0927   Dressing/Treatment Collagen; Other (comment) 04/12/21 1012   Wound Length (cm) 2.2 cm 04/12/21 0927   Wound Width (cm) 2.3 cm 04/12/21 0927   Wound Depth (cm) 0.1 cm 04/12/21 0927   Wound Surface Area (cm^2) 5.06 cm^2 04/12/21 0927   Wound Volume (cm^3) 0.51 cm^3 04/12/21 0927   Post-Procedure Length (cm) 2.3 cm 04/12/21 0941   Post-Procedure Width (cm) 2.4 cm 04/12/21 0941   Post-Procedure Depth (cm) 0.2 cm 04/12/21 0941   Post-Procedure Surface Area (cm^2) 5.52 cm^2 04/12/21 0941   Post-Procedure Volume (cm^3) 1.1 cm^3 04/12/21 0941   Wound Assessment Slough;Granulation tissue 04/12/21 0927   Drainage Amount Small 04/12/21 0927   Drainage Description Serosanguinous 04/12/21 0927   Odor None 04/12/21 0927   Consuelo-wound Assessment Dry/flaky 04/12/21 0927   Margins Undefined edges 04/12/21 0927   Wound Thickness Description not for Pressure Injury Full thickness 04/12/21 0927   Number of days: 0       Percent of Wound/Ulcer Debrided:  100%    Total Surface Area Debrided:  8.75 sq cm    Diabetic/Pressure/Non Pressure Ulcers only:  Ulcer: N/A    Bleeding:  Minimal    Hemostasis Achieved:  by pressure    Procedural Pain: 0  / 10     Post Procedural Pain:  0 / 10     Response to treatment:  Well tolerated by patient. PATIENT EDUCATION focused on need for continued multi-layered compression wraps. They support vascular problems, help to heal leg ulcers and control leg swelling. The dressings can be worn up to a week before they need changed. Patient must keep the compression wraps dry. Plan for Circ-Aids (CompreFlex Lite) when wounds heal and leg swelling resolves. Plan:     Treatment Note please see attached Discharge Instructions    In my professional opinion this patient would benefit from HBO Therapy: No    Written patient dismissal instructions given to patient and signed by patient or POA.          Discharge Instructions       [address] and Hyperbaric Oxygen Therapy   Physician Orders and Discharge Instructions  [address]   [address], [address]  Telephone: [phone]    NAME:  Gabi Sebastian OF BIRTH:  1945  MEDICAL RECORD NUMBER:  [id number]  DATE:  4/12/2021    Wound Cleansing:   Do not scrub or use excessive force. Cleanse wound prior to applying a clean dressing with:  [x] Normal Saline  [x] Keep Wound Dry in Shower    [] Wound Cleanser   [] Cleanse wound with Mild Soap & Water  [] May Shower at Discharge   [] Other:       Topical Treatments:  Do not apply lotions, creams, or ointments to wound bed unless directed. [] Apply moisturizing lotion to skin surrounding the wound prior to dressing change.  [] Apply antifungal ointment to skin surrounding the wound prior to dressing change.  [] Apply thin film of moisture barrier ointment to skin immediately around wound. [] Other:       Dressings:      Wound Location : RIGHT LATERAL AND POSTERIOR LEG WOUNDS     [x] Apply Primary Dressing:         [x] Collagen      [x] Moisten with Saline      [] Other:      [x] Cover and Secure with:     [] Gauze [] Filiberto [] Roll gauze   [] Ace Wrap [] Cover Roll Tape [] ABD     [x] Other: OPTILOCK   Avoid contact of tape with skin. [] Change dressing: [] Daily    [] Every Other Day [] Three times per week   [] Once a week [x] Do Not Change Dressing   [] Other:         Edema Control:  Apply: [] Compression Stocking []Right Leg []Left Leg   [] Tubigrip []Right Leg Double Layer []Left Leg Double Layer       []Right Leg Single Layer []Left Leg Single Layer   [] SpandaGrip []Right Leg  []Left Leg      []Low compression 5-10 mm/Hg      []Medium compression 10-20 mm/Hg     []High compression  20-30 mm/Hg   every morning immediately when getting up should be applied to affected leg(s) from mid foot to knee making sure to cover the heel. Remove every night before going to bed.    [] Elevate leg(s) above the level of the heart when sitting. [] Avoid prolonged standing in one place. Compression: COBAN 2   Apply: [x] Multilayer Compression Wrap Applied in Clinic [x]RightLeg [x]Left Leg   [x] Multi-layer compression. Do not get leg(s) with wrap wet. If wraps become too tight call the center or completely remove the wrap. [x] Elevate leg(s) above the level of the heart when sitting. [x] Avoid prolonged standing in one place. Off-Loading:   [] Off-loading when [] walking  [] in bed [] sitting  [] Total non-weight bearing  [] Right Leg  [] Left Leg   [] Assistive Device [] Walker [] Cane  [] Wheelchair  [] Crutches   [] Surgical shoe    [] Podus Boot(s)   [] Foam Boot(s)  [] Roll About    [] Cast Boot [] CROW Boot  [] Other:       Dietary:  [x] Diet as tolerated:   [] Calorie Diabetic Diet:   [] No Added Salt:  [] Increase Protein:   [] Other:     Activity:  [x] Activity as tolerated:  [] Patient has no activity restrictions     [] Strict Bedrest: [] Remain off Work:     [] May return to full duty work:                                   [] Return to work with restrictions: If you are still having pain after you go home:  [x] Elevate the affected limb. [x] Use over-the-counter medications you would normally use for pain as permitted by your family doctor. [x] For persistent pain not relieved by the above interventions, please call your family doctor.              Return Appointment:  [] Wound and dressing supply provider:   [] ECF or Home Healthcare:  [] Wound Assessment: [] Physician or NP scheduled for Wound Assessment:   [x] Return Appointment: With DR Huy Liu  in  1 Week(s)  [] Ordered tests:     **4/12/2021 : SCRIPT GIVEN FOR CIRC-AIDS**    Nurse Case Manger: Ana Kamara     Electronically signed by Nakia Richards RN on 4/12/2021 at 9:28 AM     [facility] Information: Should you experience any significant changes in your wound(s) or have questions about your wound care, please contact the [facility] at [address] 8:00 am - 4:30 pm and Friday 8:00 am - 12:30 pm.  If you need help with your wound outside these hours and cannot wait until we are again available, contact your PCP or go to the hospital emergency room. PLEASE NOTE: IF YOU ARE UNABLE TO OBTAIN WOUND SUPPLIES, CONTINUE TO USE THE SUPPLIES YOU HAVE AVAILABLE UNTIL YOU ARE ABLE TO REACH US. IT IS MOST IMPORTANT TO KEEP THE WOUND COVERED AT ALL TIMES.      Physician Signature:_______________________    Date: ___________ Time:  ____________        Rojas Groves CNP                       Electronically signed by VIDA Galicia CNP on 4/12/2021 at 12:12 PM

## 2021-04-19 ENCOUNTER — HOSPITAL ENCOUNTER (OUTPATIENT)
Dept: WOUND CARE | Age: 76
Discharge: HOME | End: 2021-04-19
Payer: MEDICARE

## 2021-04-19 VITALS
DIASTOLIC BLOOD PRESSURE: 83 MMHG | TEMPERATURE: 97.5 F | RESPIRATION RATE: 16 BRPM | SYSTOLIC BLOOD PRESSURE: 156 MMHG | HEART RATE: 115 BPM

## 2021-04-19 DIAGNOSIS — L97.911 SKIN ULCER OF RIGHT LOWER LEG, LIMITED TO BREAKDOWN OF SKIN (HCC): ICD-10-CM

## 2021-04-19 DIAGNOSIS — M79.89 LEG SWELLING: Primary | ICD-10-CM

## 2021-04-19 DIAGNOSIS — I87.2 PERIPHERAL VENOUS INSUFFICIENCY: ICD-10-CM

## 2021-04-19 PROCEDURE — 97597 DBRDMT OPN WND 1ST 20 CM/<: CPT

## 2021-04-19 PROCEDURE — 97597 DBRDMT OPN WND 1ST 20 CM/<: CPT | Performed by: NURSE PRACTITIONER

## 2021-04-19 PROCEDURE — 29580 STRAPPING UNNA BOOT: CPT

## 2021-04-19 RX ORDER — GINSENG 100 MG
CAPSULE ORAL ONCE
Status: CANCELLED | OUTPATIENT
Start: 2021-04-19 | End: 2021-04-19

## 2021-04-19 RX ORDER — LIDOCAINE HYDROCHLORIDE 20 MG/ML
JELLY TOPICAL ONCE
Status: CANCELLED | OUTPATIENT
Start: 2021-04-19 | End: 2021-04-19

## 2021-04-19 RX ORDER — GENTAMICIN SULFATE 1 MG/G
OINTMENT TOPICAL ONCE
Status: CANCELLED | OUTPATIENT
Start: 2021-04-19 | End: 2021-04-19

## 2021-04-19 RX ORDER — BACITRACIN ZINC AND POLYMYXIN B SULFATE 500; 1000 [USP'U]/G; [USP'U]/G
OINTMENT TOPICAL ONCE
Status: CANCELLED | OUTPATIENT
Start: 2021-04-19 | End: 2021-04-19

## 2021-04-19 RX ORDER — LIDOCAINE HYDROCHLORIDE 40 MG/ML
SOLUTION TOPICAL ONCE
Status: COMPLETED | OUTPATIENT
Start: 2021-04-19 | End: 2021-04-19

## 2021-04-19 RX ORDER — LIDOCAINE 50 MG/G
OINTMENT TOPICAL ONCE
Status: CANCELLED | OUTPATIENT
Start: 2021-04-19 | End: 2021-04-19

## 2021-04-19 RX ORDER — BACITRACIN, NEOMYCIN, POLYMYXIN B 400; 3.5; 5 [USP'U]/G; MG/G; [USP'U]/G
OINTMENT TOPICAL ONCE
Status: CANCELLED | OUTPATIENT
Start: 2021-04-19 | End: 2021-04-19

## 2021-04-19 RX ORDER — CLOBETASOL PROPIONATE 0.5 MG/G
OINTMENT TOPICAL ONCE
Status: CANCELLED | OUTPATIENT
Start: 2021-04-19 | End: 2021-04-19

## 2021-04-19 RX ORDER — LIDOCAINE HYDROCHLORIDE 40 MG/ML
SOLUTION TOPICAL ONCE
Status: CANCELLED | OUTPATIENT
Start: 2021-04-19 | End: 2021-04-19

## 2021-04-19 RX ORDER — BETAMETHASONE DIPROPIONATE 0.05 %
OINTMENT (GRAM) TOPICAL ONCE
Status: CANCELLED | OUTPATIENT
Start: 2021-04-19 | End: 2021-04-19

## 2021-04-19 RX ORDER — LIDOCAINE 40 MG/G
CREAM TOPICAL ONCE
Status: CANCELLED | OUTPATIENT
Start: 2021-04-19 | End: 2021-04-19

## 2021-04-19 RX ADMIN — LIDOCAINE HYDROCHLORIDE 5 ML: 40 SOLUTION TOPICAL at 10:39

## 2021-04-19 ASSESSMENT — PAIN SCALES - GENERAL
PAINLEVEL_OUTOF10: 2
PAINLEVEL_OUTOF10: 0

## 2021-04-19 ASSESSMENT — PAIN DESCRIPTION - DESCRIPTORS: DESCRIPTORS: BURNING

## 2021-04-19 ASSESSMENT — PAIN DESCRIPTION - ORIENTATION: ORIENTATION: RIGHT;OUTER

## 2021-04-19 ASSESSMENT — PAIN DESCRIPTION - FREQUENCY: FREQUENCY: INTERMITTENT

## 2021-04-19 NOTE — PLAN OF CARE
Multilayer Compression Wrap   (Not Unna) Below the Knee    NAME:  Jose Baig  YOB: 1945  MEDICAL RECORD NUMBER:  [id number]  DATE:  4/19/2021    Multilayer compression wrap: Removed old Multilayer wrap if indicated and wash leg with mild soap/water. Applied primary and secondary dressing as ordered. Applied multilayered dressing below the knee to right lower leg. Applied multilayered dressing below the knee to left lower leg. Instructed patient/caregiver not to remove dressing and to keep it clean and dry. Instructed patient/caregiver on complications to report to provider, such as pain, numbness in toes, heavy drainage, and slippage of dressing. Instructed patient on purpose of compression dressing and on activity and exercise recommendations.       Electronically signed by Savanna Vang RN on 4/19/2021 at 11:34 AM

## 2021-04-19 NOTE — PROGRESS NOTES
ALLERGIES    Allergies   Allergen Reactions    Iodides Other (See Comments)     IVP dye, pt does not remember he reaction, just that the nurses stated to not let anyone give to her ever again    Furosemide      Pt. States it makes her \"ditzy\", dizzy, and gives her muscle ridgity and nausea. MEDICATIONS    Current Outpatient Medications on File Prior to Encounter   Medication Sig Dispense Refill    metOLazone (ZAROXOLYN) 5 MG tablet TAKE ONE TABLET BY MOUTH DAILY AS NEEDED FOR SWELLING 30 tablet 1    triamterene-hydroCHLOROthiazide (MAXZIDE-25) 37.5-25 MG per tablet Take 1 tablet by mouth as needed      mupirocin (BACTROBAN) 2 % ointment APPLY TO AFFECTED AREA THREE TIMES A DAY 22 g 0    albuterol sulfate HFA (PROAIR HFA) 108 (90 Base) MCG/ACT inhaler Inhale 2 puffs into the lungs every 6 hours as needed for Wheezing 1 Inhaler 0    Nebulizers (AIRIAL COMPACT MINI NEBULIZER) MISC 1 nebule by Does not apply route 4 times daily 1 each 0     No current facility-administered medications on file prior to encounter. REVIEW OF SYSTEMS    Pertinent items are noted in HPI.       Objective:      BP (!) 156/83   Pulse 115   Temp 97.5 °F (36.4 °C) (Temporal)   Resp 16     Wt Readings from Last 3 Encounters:   02/08/21 227 lb (103 kg)   02/02/21 228 lb 9.9 oz (103.7 kg)   01/12/21 229 lb 15 oz (104.3 kg)       PHYSICAL EXAM    General Appearance: alert and oriented to person, place and time, well developed and well- nourished, in no acute distress  Skin: warm and dry, no rash or erythema  Head: normocephalic and atraumatic  Eyes: pupils equal, round, and reactive to light, extraocular eye movements intact, conjunctivae normal  Neck: supple and non-tender without mass  Pulmonary/Chest: normal air movement, no respiratory distress  Cardiovascular: Right DP +1  Extremities: no cyanosis or clubbing, RLE +2 pitting edema  Musculoskeletal: normal range of motion, no joint swelling, deformity or tenderness  Neurologic: reflexes normal and symmetric, no cranial nerve deficit, gait, coordination and speech normal      Assessment:        Problem List Items Addressed This Visit     Skin ulcer of right lower leg, limited to breakdown of skin [facility])    Relevant Orders    Initiate Outpatient Wound Care Protocol    Peripheral venous insufficiency    Relevant Orders    Initiate Outpatient Wound Care Protocol    Leg swelling - Primary    Relevant Orders    Initiate Outpatient Wound Care Protocol           Non-Excisional Debridement Procedure Note    Performed by: VIDA Chaudhary CNP    Consent obtained:  Yes    Time out taken:  Yes     Pain Control: Anesthetic: 4% Lidocaine Liquid Topical(5 ml)     Debridement: Non-excisional Debridement    Devitalized Tissue Debrided: slough      Instruments Used:  curette     Pre Debridement Measurements:  Are located in the [facility]  Documentation Flow Sheet  Wound/Ulcer #: 1 and 2     Post  Debridement Measurements:  Wound 04/12/21 Leg Right; Lower; Lateral #1 (4/7/21) (Active)   Wound Image   04/12/21 0927   Wound Etiology Venous 04/12/21 0927   Dressing Status New dressing applied 04/19/21 1115   Wound Cleansed Cleansed with saline 04/19/21 1115   Dressing/Treatment Collagen 04/19/21 1115   Wound Length (cm) 1.6 cm 04/19/21 1031   Wound Width (cm) 1.4 cm 04/19/21 1031   Wound Depth (cm) 0.1 cm 04/19/21 1031   Wound Surface Area (cm^2) 2.24 cm^2 04/19/21 1031   Change in Wound Size % (l*w) 22.22 04/19/21 1031   Wound Volume (cm^3) 0.22 cm^3 04/19/21 1031   Wound Healing % 24 04/19/21 1031   Post-Procedure Length (cm) 1.7 cm 04/19/21 1041   Post-Procedure Width (cm) 1.5 cm 04/19/21 1041   Post-Procedure Depth (cm) 0.2 cm 04/19/21 1041   Post-Procedure Surface Area (cm^2) 2.55 cm^2 04/19/21 1041   Post-Procedure Volume (cm^3) 0.51 cm^3 04/19/21 1041   Wound Assessment Granulation tissue;Slough 04/19/21 1031   Drainage Amount Moderate 04/19/21 1031   Drainage Description Serosanguinous 04/19/21 1031   Odor None 04/19/21 1031   Consuelo-wound Assessment Dry/flaky 04/19/21 1031   Margins Undefined edges 04/19/21 1031   Wound Thickness Description not for Pressure Injury Full thickness 04/19/21 1031   Number of days: 7       Wound 04/12/21 Leg Right;Posterior; Lower #2 (4/7/21) (Active)   Wound Image   04/12/21 0927   Wound Etiology Venous 04/12/21 0927   Dressing Status New dressing applied 04/19/21 1115   Wound Cleansed Cleansed with saline 04/19/21 1115   Dressing/Treatment Collagen 04/19/21 1115   Wound Length (cm) 1.6 cm 04/19/21 1031   Wound Width (cm) 1.8 cm 04/19/21 1031   Wound Depth (cm) 0.1 cm 04/19/21 1031   Wound Surface Area (cm^2) 2.88 cm^2 04/19/21 1031   Change in Wound Size % (l*w) 43.08 04/19/21 1031   Wound Volume (cm^3) 0.29 cm^3 04/19/21 1031   Wound Healing % 43 04/19/21 1031   Post-Procedure Length (cm) 1.7 cm 04/19/21 1041   Post-Procedure Width (cm) 1.9 cm 04/19/21 1041   Post-Procedure Depth (cm) 0.2 cm 04/19/21 1041   Post-Procedure Surface Area (cm^2) 3.23 cm^2 04/19/21 1041   Post-Procedure Volume (cm^3) 0.65 cm^3 04/19/21 1041   Wound Assessment Slough;Granulation tissue 04/19/21 1031   Drainage Amount Moderate 04/19/21 1031   Drainage Description Serosanguinous 04/19/21 1031   Odor None 04/19/21 1031   Consuelo-wound Assessment Dry/flaky 04/19/21 1031   Margins Undefined edges 04/19/21 1031   Wound Thickness Description not for Pressure Injury Full thickness 04/19/21 1031   Number of days: 7       Percent of Wound/Ulcer Debrided:  100%    Total Surface Area Debrided:  5.78 sq cm    Diabetic/Pressure/Non Pressure Ulcers only:  Ulcer: N/A    Bleeding:  Minimal    Hemostasis Achieved:  by pressure    Procedural Pain: 0  / 10     Post Procedural Pain:  0 / 10     Response to treatment:  Well tolerated by patient. PATIENT EDUCATION focused on need for continued compression.  It was decided that we would try Coban 2.   They support vascular problems, help to heal leg ulcers and control leg swelling.  The dressings can be worn up to a week before they need changed.  Patient must keep the compression wraps dry.       Plan for Circ-Aids (CompreFlex Lite) when wounds heal and leg swelling resolves.       Plan:     Treatment Note please see attached Discharge Instructions    In my professional opinion this patient would benefit from HBO Therapy: No    Written patient dismissal instructions given to patient and signed by patient or POA. Discharge Instructions       [address] and Hyperbaric Oxygen Therapy   Physician Orders and Discharge Instructions  [facility]  [address] [facility] [address], [facility] [address]  Telephone: [phone]     NAME:  Eduin Worrell OF BIRTH:  1945  MEDICAL RECORD NUMBER:  [id number]  DATE:  4/19/2021     Wound Cleansing:   Do not scrub or use excessive force. Cleanse wound prior to applying a clean dressing with:  [x]? Normal Saline  [x]? Keep Wound Dry in Shower    []? Wound Cleanser   []? Cleanse wound with Mild Soap & Water  []? May Shower at Discharge   []? Other:        Topical Treatments:  Do not apply lotions, creams, or ointments to wound bed unless directed. []? Apply moisturizing lotion to skin surrounding the wound prior to dressing change. []? Apply antifungal ointment to skin surrounding the wound prior to dressing change. []? Apply thin film of moisture barrier ointment to skin immediately around wound. []? Other:                  Dressings:      Wound Location : RIGHT LATERAL AND POSTERIOR LEG WOUNDS                [x]? Apply Primary Dressing:                                             [x]? Collagen                     [x]? Moisten with Saline                                       []? Other:       [x]? Cover and Secure with:                   []? Gauze         []? Hunt Jennifer           []? Roll gauze              []? Ace Wrap   []?  Cover Roll Tape     []? ABD                                      [x]? Other: OPTILOCK              Avoid contact of tape with skin. []? Change dressing:   []? Daily           []? Every Other Day    []? Three times per week              []? Once a week          [x]? Do Not Change Dressing              []? Other:                      Edema Control:  Apply:  []? Compression Stocking       []? Right Leg     []? Left Leg              []? Tubigrip      []? Right Leg Double Layer      []? Left Leg Double Layer                                                  []?Right Leg Single Layer       []? Left Leg Single Layer              []? SpandaGrip            []? Right Leg     []? Left Leg                                      []?Low compression 5-10 mm/Hg                                             []?Medium compression 10-20 mm/Hg                                      []?High compression  20-30 mm/Hg              every morning immediately when getting up should be applied to affected leg(s) from mid foot to knee making sure to cover the heel. Remove every night before going to bed.              []? Elevate leg(s) above the level of the heart when sitting. []? Avoid prolonged standing in one place.                   Compression: COBAN 2 LITE  Apply:  [x]? Multilayer Compression Wrap Applied in Clinic    [x]? RightLeg      [x]? Left Leg              [x]? Multi-layer compression. Do not get leg(s) with wrap wet. If wraps become too tight call the center or completely remove the wrap. [x]? Elevate leg(s) above the level of the heart when sitting. [x]? Avoid prolonged standing in one place.     Off-Loading:   []? Off-loading when    []? walking       []? in bed         []? sitting  []? Total non-weight bearing  []? Right Leg  []? Left Leg          []? Assistive Device     []? Siena Fierro        []? Cane           []? Wheelchair  []? Crutches              []? Surgical shoe    []? Podus Boot(s)   []?

## 2021-04-26 ENCOUNTER — HOSPITAL ENCOUNTER (OUTPATIENT)
Dept: WOUND CARE | Age: 76
Discharge: HOME | End: 2021-04-26
Payer: MEDICARE

## 2021-04-26 VITALS
SYSTOLIC BLOOD PRESSURE: 144 MMHG | HEART RATE: 87 BPM | TEMPERATURE: 98.4 F | DIASTOLIC BLOOD PRESSURE: 79 MMHG | RESPIRATION RATE: 18 BRPM

## 2021-04-26 DIAGNOSIS — L97.911 SKIN ULCER OF RIGHT LOWER LEG, LIMITED TO BREAKDOWN OF SKIN (HCC): ICD-10-CM

## 2021-04-26 DIAGNOSIS — I87.2 PERIPHERAL VENOUS INSUFFICIENCY: ICD-10-CM

## 2021-04-26 DIAGNOSIS — M79.89 LEG SWELLING: Primary | ICD-10-CM

## 2021-04-26 PROCEDURE — 97597 DBRDMT OPN WND 1ST 20 CM/<: CPT

## 2021-04-26 PROCEDURE — 97597 DBRDMT OPN WND 1ST 20 CM/<: CPT | Performed by: NURSE PRACTITIONER

## 2021-04-26 RX ORDER — LIDOCAINE HYDROCHLORIDE 40 MG/ML
SOLUTION TOPICAL ONCE
Status: CANCELLED | OUTPATIENT
Start: 2021-04-26 | End: 2021-04-26

## 2021-04-26 RX ORDER — BACITRACIN ZINC AND POLYMYXIN B SULFATE 500; 1000 [USP'U]/G; [USP'U]/G
OINTMENT TOPICAL ONCE
Status: CANCELLED | OUTPATIENT
Start: 2021-04-26 | End: 2021-04-26

## 2021-04-26 RX ORDER — LIDOCAINE HYDROCHLORIDE 20 MG/ML
JELLY TOPICAL ONCE
Status: CANCELLED | OUTPATIENT
Start: 2021-04-26 | End: 2021-04-26

## 2021-04-26 RX ORDER — GENTAMICIN SULFATE 1 MG/G
OINTMENT TOPICAL ONCE
Status: CANCELLED | OUTPATIENT
Start: 2021-04-26 | End: 2021-04-26

## 2021-04-26 RX ORDER — LIDOCAINE 50 MG/G
OINTMENT TOPICAL ONCE
Status: CANCELLED | OUTPATIENT
Start: 2021-04-26 | End: 2021-04-26

## 2021-04-26 RX ORDER — CLOBETASOL PROPIONATE 0.5 MG/G
OINTMENT TOPICAL ONCE
Status: CANCELLED | OUTPATIENT
Start: 2021-04-26 | End: 2021-04-26

## 2021-04-26 RX ORDER — GINSENG 100 MG
CAPSULE ORAL ONCE
Status: CANCELLED | OUTPATIENT
Start: 2021-04-26 | End: 2021-04-26

## 2021-04-26 RX ORDER — BACITRACIN, NEOMYCIN, POLYMYXIN B 400; 3.5; 5 [USP'U]/G; MG/G; [USP'U]/G
OINTMENT TOPICAL ONCE
Status: CANCELLED | OUTPATIENT
Start: 2021-04-26 | End: 2021-04-26

## 2021-04-26 RX ORDER — BETAMETHASONE DIPROPIONATE 0.05 %
OINTMENT (GRAM) TOPICAL ONCE
Status: CANCELLED | OUTPATIENT
Start: 2021-04-26 | End: 2021-04-26

## 2021-04-26 RX ORDER — LIDOCAINE HYDROCHLORIDE 40 MG/ML
SOLUTION TOPICAL ONCE
Status: COMPLETED | OUTPATIENT
Start: 2021-04-26 | End: 2021-04-26

## 2021-04-26 RX ORDER — LIDOCAINE 40 MG/G
CREAM TOPICAL ONCE
Status: CANCELLED | OUTPATIENT
Start: 2021-04-26 | End: 2021-04-26

## 2021-04-26 RX ADMIN — LIDOCAINE HYDROCHLORIDE 5 ML: 40 SOLUTION TOPICAL at 10:58

## 2021-04-26 ASSESSMENT — PAIN SCALES - GENERAL: PAINLEVEL_OUTOF10: 0

## 2021-04-26 NOTE — PROGRESS NOTES
[address]   Progress Note and Procedure Note      [address] RECORD NUMBER:  [id number]  AGE: 70 y.o. GENDER: female  : 1945  EPISODE DATE:  2021    Subjective:     Chief Complaint   Patient presents with    Wound Check     follow up right lower leg wound         HISTORY of PRESENT ILLNESS HPI    Flash Tabares a 70 y. o. female who presents today for wound/ulcer evaluation. History of Wound Context: recurrent right lower lateral and posterior leg ulcers.  Patient reports that on 21 she noticed her bedding was wet and discovered the wound.  She had not been wearing any compression, relying solely on diuretics. Two weeks ago we applied bilateral multi-layered compression wraps and last week we applied Coban Lites. In both instances, the patient complained that they were too tight.       Wound/Ulcer Pain Timing/Severity: none  Quality of pain: N/A  Severity:  0 / 10   Modifying Factors: Pain worsens with an increase in the amount of swelling in her lower leg  Associated Signs/Symptoms: edema and drainage     Ulcer Identification:  Ulcer Type: venous     Contributing Factors: edema, venous stasis and obesity     Acute Wound: N/A not an acute wound    PAST MEDICAL HISTORY        Diagnosis Date    Asthma     Localized edema 2021    Non-pressure chronic ulcer of right lower leg with fat layer exposed ([address] 2021    Peripheral venous insufficiency 2021    Right foot ulcer, limited to breakdown of skin ([address] 2021       PAST SURGICAL HISTORY    Past Surgical History:   Procedure Laterality Date    APPENDECTOMY      CHOLECYSTECTOMY         FAMILY HISTORY    Family History   Problem Relation Age of Onset    Migraines Mother        SOCIAL HISTORY    Social History     Tobacco Use    Smoking status: Never Smoker    Smokeless tobacco: Never Used   Substance Use Topics    Alcohol Use     Frequency: Never    Drug use: Never ALLERGIES    Allergies   Allergen Reactions    Iodides Other (See Comments)     IVP dye, pt does not remember he reaction, just that the nurses stated to not let anyone give to her ever again    Furosemide      Pt. States it makes her \"ditzy\", dizzy, and gives her muscle ridgity and nausea. MEDICATIONS    Current Outpatient Medications on File Prior to Encounter   Medication Sig Dispense Refill    metOLazone (ZAROXOLYN) 5 MG tablet TAKE ONE TABLET BY MOUTH DAILY AS NEEDED FOR SWELLING 30 tablet 1    triamterene-hydroCHLOROthiazide (MAXZIDE-25) 37.5-25 MG per tablet Take 1 tablet by mouth as needed      mupirocin (BACTROBAN) 2 % ointment APPLY TO AFFECTED AREA THREE TIMES A DAY 22 g 0    albuterol sulfate HFA (PROAIR HFA) 108 (90 Base) MCG/ACT inhaler Inhale 2 puffs into the lungs every 6 hours as needed for Wheezing 1 Inhaler 0    Nebulizers (AIRIAL COMPACT MINI NEBULIZER) MISC 1 nebule by Does not apply route 4 times daily 1 each 0     No current facility-administered medications on file prior to encounter. REVIEW OF SYSTEMS    Pertinent items are noted in HPI.       Objective:      BP (!) 144/79   Pulse 87   Temp 98.4 °F (36.9 °C) (Oral)   Resp 18     Wt Readings from Last 3 Encounters:   02/08/21 227 lb (103 kg)   02/02/21 228 lb 9.9 oz (103.7 kg)   01/12/21 229 lb 15 oz (104.3 kg)       PHYSICAL EXAM    General Appearance: alert and oriented to person, place and time, well developed and well- nourished, in no acute distress  Skin: warm and dry, no rash or erythema  Head: normocephalic and atraumatic  Eyes: pupils equal, round, and reactive to light, extraocular eye movements intact, conjunctivae normal  Neck: supple and non-tender without mass  Pulmonary/Chest: normal air movement, no respiratory distress  Cardiovascular: Right DP +1  Extremities: no cyanosis or clubbing, RLE nonpitting edema  Musculoskeletal: normal range of motion, no joint swelling, deformity or tenderness  Neurologic: reflexes normal and symmetric, no cranial nerve deficit, gait, coordination and speech normal      Assessment:        Problem List Items Addressed This Visit     Skin ulcer of right lower leg, limited to breakdown of skin [facility])    Relevant Orders    Initiate Outpatient Wound Care Protocol    Peripheral venous insufficiency    Relevant Orders    Initiate Outpatient Wound Care Protocol    Leg swelling - Primary    Relevant Orders    Initiate Outpatient Wound Care Protocol            Non-Excisional Debridement Procedure Note    Performed by: VIDA Whitney CNP    Consent obtained:  Yes    Time out taken:  Yes     Pain Control: Anesthetic: 4% Lidocaine Liquid Topical     Debridement: Non-excisional Debridement    Devitalized Tissue Debrided: slough and necrotic/eschar      Instruments Used:  curette     Pre Debridement Measurements:  Are located in the [place]  Documentation Flow Sheet  Wound/Ulcer #: 1 and 2     Post  Debridement Measurements:  Wound 04/12/21 Leg Right; Lower; Lateral #1 (4/7/21) (Active)   Wound Image   04/12/21 0927   Wound Etiology Venous 04/12/21 0927   Dressing Status New dressing applied 04/19/21 1115   Wound Cleansed Cleansed with saline 04/26/21 1206   Dressing/Treatment Alginate with Ag 04/26/21 1206   Wound Length (cm) 3.7 cm 04/26/21 1028   Wound Width (cm) 1.8 cm 04/26/21 1028   Wound Depth (cm) 0.2 cm 04/26/21 1028   Wound Surface Area (cm^2) 6.66 cm^2 04/26/21 1028   Change in Wound Size % (l*w) -131.25 04/26/21 1028   Wound Volume (cm^3) 1.33 cm^3 04/26/21 1028   Wound Healing % -359 04/26/21 1028   Post-Procedure Length (cm) 3.8 cm 04/26/21 1121   Post-Procedure Width (cm) 1.9 cm 04/26/21 1121   Post-Procedure Depth (cm) 0.3 cm 04/26/21 1121   Post-Procedure Surface Area (cm^2) 7.22 cm^2 04/26/21 1121   Post-Procedure Volume (cm^3) 2.17 cm^3 04/26/21 1121   Wound Assessment Granulation tissue;Slough 04/26/21 1028   Drainage Amount Scant 04/26/21 1028 Drainage Description Serosanguinous 04/26/21 1028   Odor None 04/26/21 1028   Consuelo-wound Assessment Dry/flaky 04/26/21 1028   Margins Undefined edges 04/26/21 1028   Wound Thickness Description not for Pressure Injury Full thickness 04/26/21 1028   Number of days: 14       Wound 04/12/21 Leg Right;Posterior; Lower #2 (4/7/21) (Active)   Wound Image   04/12/21 0927   Wound Etiology Venous 04/12/21 0927   Dressing Status New dressing applied 04/19/21 1115   Wound Cleansed Cleansed with saline 04/26/21 1206   Dressing/Treatment Alginate with Ag;Roll gauze 04/26/21 1206   Wound Length (cm) 1.3 cm 04/26/21 1028   Wound Width (cm) 1.2 cm 04/26/21 1028   Wound Depth (cm) 0.1 cm 04/26/21 1028   Wound Surface Area (cm^2) 1.56 cm^2 04/26/21 1028   Change in Wound Size % (l*w) 69.17 04/26/21 1028   Wound Volume (cm^3) 0.16 cm^3 04/26/21 1028   Wound Healing % 69 04/26/21 1028   Post-Procedure Length (cm) 1.4 cm 04/26/21 1121   Post-Procedure Width (cm) 1.3 cm 04/26/21 1121   Post-Procedure Depth (cm) 0.2 cm 04/26/21 1121   Post-Procedure Surface Area (cm^2) 1.82 cm^2 04/26/21 1121   Post-Procedure Volume (cm^3) 0.36 cm^3 04/26/21 1121   Wound Assessment Slough;Granulation tissue 04/26/21 1028   Drainage Amount Moderate 04/26/21 1028   Drainage Description Serosanguinous 04/26/21 1028   Odor Mild 04/26/21 1028   Consuelo-wound Assessment Maceration;Blanchable erythema 04/26/21 1028   Margins Undefined edges 04/26/21 1028   Wound Thickness Description not for Pressure Injury Full thickness 04/26/21 1028   Number of days: 14       Percent of Wound/Ulcer Debrided:  100%    Total Surface Area Debrided:  9.04 sq cm    Diabetic/Pressure/Non Pressure Ulcers only:  Ulcer: N/A    Bleeding:  Minimal    Hemostasis Achieved:  by pressure    Procedural Pain: 0  / 10     Post Procedural Pain:  0 / 10     Response to treatment:  Well tolerated by patient.     Cory Christian focused on the need for compression. We are applying Spandagrips. They improve blood flow in the leg, prevent blood clotting and inhibit the progression of a variety of venous disorders. Spandagrips help squeeze the venous blood back up toward the heart, to enhance circulation. Plan:     Treatment Note please see attached Discharge Instructions    In my professional opinion this patient would benefit from HBO Therapy: No    Written patient dismissal instructions given to patient and signed by patient or POA. Discharge [address] and Hyperbaric Oxygen Therapy   Physician Orders and Discharge [address]  [address]   [address]  Telephone: [phone]      FAX [phone]     NAME: Akanksha Newsome OF BIRTH:  1945  MEDICAL RECORD NUMBER:  [id number]  DATE:  4/26/2021     Wound Cleansing:   Do not scrub or use excessive force. Cleanse wound prior to applying a clean dressing with:  [x]? ? Normal Saline  [x]? ? Keep Wound Dry in Shower    []? ? Wound Cleanser   []? ? Cleanse wound with Mild Soap & Water  []? ? May Shower at Discharge   []? ? Other:        Topical Treatments:  Do not apply lotions, creams, or ointments to wound bed unless directed. []?? Apply moisturizing lotion to skin surrounding the wound prior to dressing change. []?? Apply antifungal ointment to skin surrounding the wound prior to dressing change. []?? Apply thin film of moisture barrier ointment to skin immediately around wound. []?? Other:                  Dressings:      Wound Location : RIGHT LATERAL AND POSTERIOR LEG WOUNDS                [x]? ? Apply Primary Dressing:                                             [x]? ?Sara Mutter              []? ?                                        []? ? Other:       [x]? ? Cover and Secure with:                   []? ? Gauze         []? ? Filiberto           [x]? ? Roll gauze              []? ? Ace Wrap   []? ?[employer] Tape     []? ? ABD                                      [x]? ? Other: OPTILOCK OR [employer]IN              Avoid contact of tape with skin. [x]? ? Change dressing:   []? ?Debbora Sicilian           []? ? Every Other Day    [x]? ? Three times per week              []? ? Once a week          []? ? Do Not Change Dressing              []? ? Other:                      Edema Control:  Apply:  []?? Compression Stocking       []? ? Right Leg     []? ? Left Leg              []? ? Tubigrip      []? ? Right Leg Double Layer      []? ? Left Leg Double Layer                                                  []? ? Right Leg Single Layer       []? ? Left Leg Single Layer              [x]? ? SpandaGrip            [x]? ? Right Leg     [x]? ? Left Leg                                      []? ?Low compression 5-10 mm/Hg                                             [x]? ? Medium compression 10-20 mm/Hg                                      []? ? High compression  20-30 mm/Hg              every morning immediately when getting up should be applied to affected leg(s) from mid foot to knee making sure to cover the heel.  Remove every night before going to bed.              [x]? ? Elevate leg(s) above the level of the heart when sitting.                 [x]? ? Avoid prolonged standing in one place.                   Compression:  Apply:  []?? Multilayer Compression Wrap Applied in Clinic    []? ? RightLeg      []? ? Left Leg              []? ? Multi-layer compression.  Do not get leg(s) with wrap wet.  If wraps become too tight call the center or completely remove the wrap.                      []? ? Elevate leg(s) above the level of the heart when sitting.                 []? ? Avoid prolonged standing in one place.     Off-Loading:   []?? Off-loading when    []? ? walking       []? ? in bed         []? ? sitting  []? ? Total non-weight bearing  []? ? Right Leg  []? ? Left Leg          []? ?Donald Bella Device     []? ? Walker        []? ?[employer] [facility] [employer]           []? ? Wheelchair  []? ? Crutches              []? ? Surgical shoe    []? ? Podus Boot(s)   []? ? Foam Boot(s)  []? ? Roll About              []? ? Cast Boot   []? ?[employer]  []? ? Other:                   Dietary:  [x]? ? Diet as tolerated:     []? ? Calorie Diabetic Diet:           []? ? No Added Salt:  []?? Increase Protein:     []? ? Other:                Activity:  [x]? ? Activity as tolerated:  []?? Patient has no activity restrictions     []? ? Strict Bedrest: []? ? Remain off Work:     []? ? May return to full duty work:                                    []? ? Return to work with [address]     If you are still having pain after you go home:  [x]? ? Elevate the affected limb.    [x]? ? Use over-the-counter medications you would normally use for pain as permitted by your family doctor. [x]? ? For persistent pain not relieved by the above interventions, please call your family doctor.             Return Appointment:  []?? Wound and dressing supply provider:   []?? ECF or Home Healthcare:  []?? Wound Assessment:          []? ? Physician or NP scheduled for Wound Assessment:   [x]? ? Return Appointment: Roly Flood  in  1 Week(s)  []? ? Ordered tests:        **PLEASE BRING YOUR COMPRESSION HOSE WITH YOU TO EACH VISIT**     **4/12/2021 : SCRIPT GIVEN FOR CIRC-AIDS**     Nurse Case Manger: JOSE MANUEL     Electronically signed by Mahnaz Du RN on 4/26/2021 at 11:23 AM    [address] [place] [address] Information: Should you experience any significant changes in your wound(s) or have questions about your wound care, please contact the [address] [place] [address] [phone] [address] 8:00 am - 4:30 pm and Friday 8:00 am - 12:30 pm.  If you need help with your wound outside these hours and cannot wait until we are again available, contact your PCP or go to the hospital emergency room.      PLEASE NOTE: IF YOU ARE UNABLE TO OBTAIN WOUND SUPPLIES, CONTINUE TO USE THE [address] [place] [address] ARE ABLE TO REACH US.  IT IS MOST IMPORTANT TO KEEP THE WOUND COVERED AT ALL TIMES.     Physician Signature:_______________________     Date: ___________ Time:  ____________        Twila Mckeon CNP        Electronically signed by VIDA Cerda CNP on 4/26/2021 at 12:35 PM

## 2021-05-03 ENCOUNTER — HOSPITAL ENCOUNTER (OUTPATIENT)
Dept: WOUND CARE | Age: 76
Discharge: HOME | End: 2021-05-03
Payer: MEDICARE

## 2021-05-03 VITALS
SYSTOLIC BLOOD PRESSURE: 158 MMHG | DIASTOLIC BLOOD PRESSURE: 69 MMHG | BODY MASS INDEX: 35.89 KG/M2 | HEART RATE: 91 BPM | RESPIRATION RATE: 18 BRPM | WEIGHT: 223.33 LBS | HEIGHT: 66 IN | TEMPERATURE: 97.9 F

## 2021-05-03 DIAGNOSIS — L97.911 SKIN ULCER OF RIGHT LOWER LEG, LIMITED TO BREAKDOWN OF SKIN (HCC): ICD-10-CM

## 2021-05-03 DIAGNOSIS — I87.2 PERIPHERAL VENOUS INSUFFICIENCY: ICD-10-CM

## 2021-05-03 DIAGNOSIS — M79.89 LEG SWELLING: Primary | ICD-10-CM

## 2021-05-03 PROCEDURE — 11042 DBRDMT SUBQ TIS 1ST 20SQCM/<: CPT

## 2021-05-03 PROCEDURE — 11042 DBRDMT SUBQ TIS 1ST 20SQCM/<: CPT | Performed by: SURGERY

## 2021-05-03 RX ORDER — BACITRACIN ZINC AND POLYMYXIN B SULFATE 500; 1000 [USP'U]/G; [USP'U]/G
OINTMENT TOPICAL ONCE
Status: CANCELLED | OUTPATIENT
Start: 2021-05-03 | End: 2021-05-03

## 2021-05-03 RX ORDER — LIDOCAINE HYDROCHLORIDE 40 MG/ML
SOLUTION TOPICAL ONCE
Status: CANCELLED | OUTPATIENT
Start: 2021-05-03 | End: 2021-05-03

## 2021-05-03 RX ORDER — LIDOCAINE HYDROCHLORIDE 40 MG/ML
SOLUTION TOPICAL ONCE
Status: COMPLETED | OUTPATIENT
Start: 2021-05-03 | End: 2021-05-03

## 2021-05-03 RX ORDER — CLOBETASOL PROPIONATE 0.5 MG/G
OINTMENT TOPICAL ONCE
Status: CANCELLED | OUTPATIENT
Start: 2021-05-03 | End: 2021-05-03

## 2021-05-03 RX ORDER — LIDOCAINE 40 MG/G
CREAM TOPICAL ONCE
Status: CANCELLED | OUTPATIENT
Start: 2021-05-03 | End: 2021-05-03

## 2021-05-03 RX ORDER — LIDOCAINE 50 MG/G
OINTMENT TOPICAL ONCE
Status: CANCELLED | OUTPATIENT
Start: 2021-05-03 | End: 2021-05-03

## 2021-05-03 RX ORDER — BACITRACIN, NEOMYCIN, POLYMYXIN B 400; 3.5; 5 [USP'U]/G; MG/G; [USP'U]/G
OINTMENT TOPICAL ONCE
Status: CANCELLED | OUTPATIENT
Start: 2021-05-03 | End: 2021-05-03

## 2021-05-03 RX ORDER — LIDOCAINE HYDROCHLORIDE 20 MG/ML
JELLY TOPICAL ONCE
Status: CANCELLED | OUTPATIENT
Start: 2021-05-03 | End: 2021-05-03

## 2021-05-03 RX ORDER — GENTAMICIN SULFATE 1 MG/G
OINTMENT TOPICAL ONCE
Status: CANCELLED | OUTPATIENT
Start: 2021-05-03 | End: 2021-05-03

## 2021-05-03 RX ORDER — BETAMETHASONE DIPROPIONATE 0.05 %
OINTMENT (GRAM) TOPICAL ONCE
Status: CANCELLED | OUTPATIENT
Start: 2021-05-03 | End: 2021-05-03

## 2021-05-03 RX ORDER — GINSENG 100 MG
CAPSULE ORAL ONCE
Status: CANCELLED | OUTPATIENT
Start: 2021-05-03 | End: 2021-05-03

## 2021-05-03 RX ADMIN — LIDOCAINE HYDROCHLORIDE 5 ML: 40 SOLUTION TOPICAL at 10:48

## 2021-05-03 ASSESSMENT — PAIN SCALES - GENERAL: PAINLEVEL_OUTOF10: 0

## 2021-05-03 NOTE — PROGRESS NOTES
Smoker    Smokeless tobacco: Never Used   Substance Use Topics    Alcohol Use        Frequency: Never    Drug use: Never         ALLERGIES           Allergies   Allergen Reactions    Iodides Other (See Comments)       IVP dye, pt does not remember he reaction, just that the nurses stated to not let anyone give to her ever again    Furosemide         Pt.  States it makes her \"ditzy\", dizzy, and gives her muscle ridgity and nausea.          MEDICATIONS            Current Outpatient Medications on File Prior to Encounter   Medication Sig Dispense Refill    metOLazone (ZAROXOLYN) 5 MG tablet TAKE ONE TABLET BY MOUTH DAILY AS NEEDED FOR SWELLING 30 tablet 1    triamterene-hydroCHLOROthiazide (MAXZIDE-25) 37.5-25 MG per tablet Take 1 tablet by mouth as needed        mupirocin (BACTROBAN) 2 % ointment APPLY TO AFFECTED AREA THREE TIMES A DAY 22 g 0    albuterol sulfate HFA (PROAIR HFA) 108 (90 Base) MCG/ACT inhaler Inhale 2 puffs into the lungs every 6 hours as needed for Wheezing 1 Inhaler 0    Nebulizers (AIRIAL COMPACT MINI NEBULIZER) MISC 1 nebule by Does not apply route 4 times daily 1 each 0      No current facility-administered medications on file prior to encounter.          REVIEW OF SYSTEMS     Pertinent items are noted in HPI.        Objective:       BP (!) 144/79   Pulse 87   Temp 98.4 °F (36.9 °C) (Oral)   Resp 18          Wt Readings from Last 3 Encounters:   02/08/21 227 lb (103 kg)   02/02/21 228 lb 9.9 oz (103.7 kg)   01/12/21 229 lb 15 oz (104.3 kg)         PHYSICAL EXAM     General Appearance: alert and oriented to person, place and time, well developed and well- nourished, in no acute distress  Skin: warm and dry, no rash or erythema  Head: normocephalic and atraumatic  Eyes: pupils equal, round, and reactive to light, extraocular eye movements intact, conjunctivae normal  Neck: supple and non-tender without mass  Pulmonary/Chest: normal air movement, no respiratory distress  Cardiovascular: Right DP +1  Extremities: no cyanosis or clubbing, RLE nonpitting edema  Musculoskeletal: normal range of motion, no joint swelling, deformity or tenderness  Neurologic: reflexes normal and symmetric, no cranial nerve deficit, gait, coordination and speech normal        Assessment:              Problem List Items Addressed This Visit           Skin ulcer of right lower leg, limited to breakdown of skin (HCC)      Relevant Orders      Initiate Outpatient Wound Care Protocol      Peripheral venous insufficiency      Relevant Orders      Initiate Outpatient Wound Care Protocol      Leg swelling - Primary      Relevant Orders      Initiate Outpatient Wound Care Protocol                  Excisional Debridement Procedure Note  Indications:  Based on my examination of this patient's wound(s)/ulcer(s) today, debridement is required to promote healing and evaluate the wound base. Performed by: Marie Hernández MD    Consent obtained? Yes    Time out taken: Yes    Pain Control: Anesthetic: 4% Lidocaine Liquid Topical     Debridement:Excisional Debridement    Using curette the wound/ulcer was sharply debrided    down through and included excision of  dermis and subcutaneous tissue. Devitalized Tissue Debrided:  biofilm, slough and necrotic/eschar      Pre Debridement Measurements:  Are located in the [place]  Documentation Flow Sheet   Wound/Ulcer #: 1 and 2     Post  Debridement Measurements:  Wound 04/12/21 Leg Right; Lower; Lateral #1 (4/7/21) (Active)   Wound Image   05/03/21 1032   Wound Etiology Venous 04/12/21 0927   Dressing Status New dressing applied 04/19/21 1115   Wound Cleansed Cleansed with saline 04/26/21 1206   Dressing/Treatment Alginate with Ag;Dry dressing; Other (comment); Roll gauze 05/03/21 1138   Wound Length (cm) 5 cm 05/03/21 1032   Wound Width (cm) 3 cm 05/03/21 1032   Wound Depth (cm) 0.1 cm 05/03/21 1032   Wound Surface Area (cm^2) 15 cm^2 05/03/21 1032   Change in Wound Size % (l*w) -420.83 05/03/21 1032   Wound Volume (cm^3) 1.5 cm^3 05/03/21 1032   Wound Healing % -417 05/03/21 1032   Post-Procedure Length (cm) 5.1 cm 05/03/21 1121   Post-Procedure Width (cm) 3.1 cm 05/03/21 1121   Post-Procedure Depth (cm) 0.1 cm 05/03/21 1121   Post-Procedure Surface Area (cm^2) 15.81 cm^2 05/03/21 1121   Post-Procedure Volume (cm^3) 1.58 cm^3 05/03/21 1121   Wound Assessment Granulation tissue;Slough 05/03/21 1032   Drainage Amount Moderate 05/03/21 1032   Drainage Description Serosanguinous 05/03/21 1032   Odor None 05/03/21 1032   Consuelo-wound Assessment Dry/flaky 05/03/21 1032   Margins Undefined edges 05/03/21 1032   Wound Thickness Description not for Pressure Injury Full thickness 05/03/21 1032   Number of days: 21       Wound 04/12/21 Leg Right;Posterior; Lower #2 (4/7/21) (Active)   Wound Image   05/03/21 1032   Wound Etiology Venous 04/12/21 0927   Dressing Status New dressing applied 04/19/21 1115   Wound Cleansed Cleansed with saline 04/26/21 1206   Dressing/Treatment Alginate with Ag;Roll gauze;Dry dressing;Gauze dressing/dressing sponge 05/03/21 1138   Wound Length (cm) 0.6 cm 05/03/21 1032   Wound Width (cm) 0.5 cm 05/03/21 1032   Wound Depth (cm) 0.1 cm 05/03/21 1032   Wound Surface Area (cm^2) 0.3 cm^2 05/03/21 1032   Change in Wound Size % (l*w) 94.07 05/03/21 1032   Wound Volume (cm^3) 0.03 cm^3 05/03/21 1032   Wound Healing % 94 05/03/21 1032   Post-Procedure Length (cm) 0.7 cm 05/03/21 1121   Post-Procedure Width (cm) 0.6 cm 05/03/21 1121   Post-Procedure Depth (cm) 0.1 cm 05/03/21 1121   Post-Procedure Surface Area (cm^2) 0.42 cm^2 05/03/21 1121   Post-Procedure Volume (cm^3) 0.04 cm^3 05/03/21 1121   Wound Assessment Slough;Granulation tissue 05/03/21 1032   Drainage Amount moderate 05/03/21 1032   Drainage Description Sanguinous 05/03/21 1032   Odor None 05/03/21 1032   Consuelo-wound Assessment Dry/flaky 05/03/21 1032   Margins Undefined edges 05/03/21 1032   Wound Thickness Description not for Pressure Injury Full thickness 05/03/21 1032   Number of days: 21       Percent of Wound/Ulcer Debrided: 100%    Total Surface Area Debrided:  16.23sq cm    Diabetic/Pressure/Non Pressure Ulcers only:  Ulcer: N/A    Bleeding: Minimal    Hemostasis Achieved: by pressure    Procedural Pain: 1 / 10     Post Procedural Pain: 0 / 10     Response to treatment:  Well tolerated by patient. Discharge [address] and Hyperbaric Oxygen Therapy   Physician Orders and Discharge [facility]  [address] [facility], [address]  Telephone: [phone]      FAX [phone]     NAME: Payal Collier  DATE OF BIRTH:  1945  MEDICAL RECORD NUMBER:  [id number]  DATE:  5/3/2021     Wound Cleansing:   Do not scrub or use excessive force. Cleanse wound prior to applying a clean dressing with:  [x]? ?? Normal Saline  [x]? ?? Keep Wound Dry in Shower    []??? Wound Cleanser   []? ?? Cleanse wound with Mild Soap & Water  []??? May Shower at Discharge   []? ?? Other:        Topical Treatments:  Do not apply lotions, creams, or ointments to wound bed unless directed.   []??? Apply moisturizing lotion to skin surrounding the wound prior to dressing change.  []??? Apply antifungal ointment to skin surrounding the wound prior to dressing change.  []??? Apply thin film of moisture barrier ointment to skin immediately around wound.  []??? Other:                  Dressings:      Wound Location : RIGHT LATERAL AND POSTERIOR LEG WOUNDS                [x]? ?? Apply Primary Dressing:                                             [x]? ?? SILVER ALGINATE                    []? ??                                        []? ?? Other:       [x]? ?? Cover and Secure with:                   [x]? ?? Gauze         []? ?? Filiberto           [x]? ?? Roll gauze              []??? Ace Wrap   []? ??[employer] Tape     []??? ABD                                      []? ?? Other:               Avoid contact of tape with skin. [x]??? Change dressing:   []??? Daily           []? ?? Every Other Day    [x]? ?? Three times per week              []? ?? Once a week          []? ?? Do Not Change Dressing              []? ?? Other:                      Edema Control:  Apply:  []??? Compression Stocking       []? ? ? Right Leg     []? ? ?Left Leg              []? ?? Tubigrip      []? ? ? Right Leg Double Layer      []? ? ?Left Leg Double Layer                                                  []? ? ? Right Leg Single Layer       []? ? ?Left Leg Single Layer              [x]? ?? SpandaGrip            [x]? ? ? Right Leg     [x]? ? ? Left Leg                                      []? ? ?Low compression 5-10 mm/Hg                                             [x]? ? ? Medium compression 10-20 mm/Hg                                      []? ? ? High compression  20-30 mm/Hg              every morning immediately when getting up should be applied to affected leg(s) from mid foot to knee making sure to cover the heel.  Remove every night before going to bed.              [x]? ?? Elevate leg(s) above the level of the heart when sitting.                 [x]? ?? Avoid prolonged standing in one place.                   Compression:  Apply:  []??? Multilayer Compression Wrap Applied in Clinic    []? ? ? RightLeg      []? ? ?Left Leg              []? ?? Multi-layer compression.  Do not get leg(s) with wrap wet.  If wraps become too tight call the center or completely remove the wrap.                      []??? Elevate leg(s) above the level of the heart when sitting.                 []? ?? Avoid prolonged standing in one place.     Off-Loading:   []??? Off-loading when    []? ?? walking       []? ?? in bed         []? ?? sitting  []? ?? Total non-weight bearing  []??? Right Leg  []??? Left Leg          []??? Assistive Device     []??? Walker        []? ??[facility] [employer]           []??? Wheelchair  []??? Crutches              []??? Surgical shoe    []? ?? Podus Boot(s)   []? ?? Foam Boot(s)  []??? Roll About              []? ?? Cast Boot   []? ?? CROW Boot  []??? Other:                   Dietary:  [x]??? Diet as tolerated:     []??? Calorie Diabetic Diet:           []??? No Added Salt:  []??? Increase Protein:     []??? Other:                Activity:  [x]??? Activity as tolerated:  []??? Patient has no activity restrictions     []??? Strict Bedrest: []??? Remain off Work:     []? ?? May return to full duty work:                                    []? ?? Return to work with [address]     If you are still having pain after you go home:  [x]??? Elevate the affected limb.    [x]? ?? Use over-the-counter medications you would normally use for pain as permitted by your family doctor. [x]??? For persistent pain not relieved by the above interventions, please call your family doctor.             Return Appointment:  [x]??? Wound and dressing supply provider: [facility]  []??? ECF or Home Healthcare:  []??? Wound Assessment:          []? ?? Physician or NP scheduled for Wound Assessment:   [x]??? Return Appointment: With DR KEENE  in  1 Week(s)  []??? Ordered tests:        **PLEASE BRING YOUR COMPRESSION HOSE WITH YOU TO EACH VISIT**     **4/12/2021 : SCRIPT GIVEN FOR CIRC-AIDS**     Nurse Case Manger: JOSE MANUEL     Electronically signed by Camden Newman RN on 5/3/2021 at [address] Information: Should you experience any significant changes in your wound(s) or have questions about your wound care, please contact the [facility] [phone] [address] 8:00 am - 4:30 pm and Friday 8:00 am - 12:30 pm.  If you need help with your wound outside these hours and cannot wait until we are again available, contact your PCP or go to the hospital emergency room.      PLEASE NOTE: IF YOU ARE UNABLE TO OBTAIN WOUND SUPPLIES, CONTINUE TO USE THE SUPPLIES YOU HAVE AVAILABLE UNTIL YOU ARE ABLE TO REACH US.  IT IS MOST IMPORTANT TO KEEP THE WOUND COVERED AT ALL TIMES.     Physician Signature:_______________________     Date: ___________ Time:  ____________        Yenny Lynne CNP  [X] DR Darwin Vela

## 2021-05-03 NOTE — PLAN OF CARE
[address]:     [employer] [employer] [id number] [address] p: [phone] f: [phone]     Ordering Center:     [place] [address] [address]  [address]  [facility] [place] [address]  [phone]  WOUND CARE Dept: [phone]   FAX NUMBER [unfilled]    Patient Information:      [id number]   [phone]   : 1945  AGE: 70 y.o. GENDER: female   TODAYS DATE:  5/3/2021    Insurance:      PRIMARY INSURANCE:  Plan: MEDICARE PART A AND B  Coverage: MEDICARE  Effective Date: 2015  [id number] - (Medicare)    SECONDARY INSURANCE:  Plan: [address] SUPP  Coverage: [employer]  Effective Date: 2015  [unfilled]    [unfilled]   [unfilled]     Patient Wound Information:      Problem List Items Addressed This Visit     Skin ulcer of right lower leg, limited to breakdown of skin [facility])    Relevant Orders    Initiate Outpatient Wound Care Protocol    Peripheral venous insufficiency    Relevant Orders    Initiate Outpatient Wound Care Protocol    Leg swelling - Primary    Relevant Orders    Initiate Outpatient Wound Care Protocol        ICD-10 codes: K13.661    WOUNDS REQUIRING DRESSING SUPPLIES:     Wound 21 Leg Right; Lower; Lateral #1 (21) (Active)   Wound Image   21 1032   Wound Etiology Venous 21 0927   Dressing Status New dressing applied 21 1115   Wound Cleansed Cleansed with saline 21 1206   Dressing/Treatment Alginate with Ag;Dry dressing; Other (comment); Roll gauze 21 1138   Wound Length (cm) 5 cm 21 1032   Wound Width (cm) 3 cm 21 1032   Wound Depth (cm) 0.1 cm 21 1032   Wound Surface Area (cm^2) 15 cm^2 21 1032   Change in Wound Size % (l*w) -420.83 21 1032   Wound Volume (cm^3) 1.5 cm^3 21 1032   Wound Healing % -417 21 1032   Post-Procedure Length (cm) 5.1 cm 05/03/21 1121   Post-Procedure Width (cm) 3.1 cm 05/03/21 1121   Post-Procedure Depth (cm) 0.1 cm 05/03/21 1121   Post-Procedure Surface Area (cm^2) 15.81 cm^2 05/03/21 1121   Post-Procedure Volume (cm^3) 1.58 cm^3 05/03/21 1121   Wound Assessment Granulation tissue;Slough 05/03/21 1032   Drainage Amount Moderate 05/03/21 1032   Drainage Description Serosanguinous 05/03/21 1032   Odor None 05/03/21 1032   Consuelo-wound Assessment Dry/flaky 05/03/21 1032   Margins Undefined edges 05/03/21 1032   Wound Thickness Description not for Pressure Injury Full thickness 05/03/21 1032   Number of days: 21       Wound 04/12/21 Leg Right;Posterior; Lower #2 (4/7/21) (Active)   Wound Image   05/03/21 1032   Wound Etiology Venous 04/12/21 0927   Dressing Status New dressing applied 04/19/21 1115   Wound Cleansed Cleansed with saline 04/26/21 1206   Dressing/Treatment Alginate with Ag;Roll gauze;Dry dressing;Gauze dressing/dressing sponge 05/03/21 1138   Wound Length (cm) 0.6 cm 05/03/21 1032   Wound Width (cm) 0.5 cm 05/03/21 1032   Wound Depth (cm) 0.1 cm 05/03/21 1032   Wound Surface Area (cm^2) 0.3 cm^2 05/03/21 1032   Change in Wound Size % (l*w) 94.07 05/03/21 1032   Wound Volume (cm^3) 0.03 cm^3 05/03/21 1032   Wound Healing % 94 05/03/21 1032   Post-Procedure Length (cm) 0.7 cm 05/03/21 1121   Post-Procedure Width (cm) 0.6 cm 05/03/21 1121   Post-Procedure Depth (cm) 0.1 cm 05/03/21 1121   Post-Procedure Surface Area (cm^2) 0.42 cm^2 05/03/21 1121   Post-Procedure Volume (cm^3) 0.04 cm^3 05/03/21 1121   Wound Assessment Slough;Granulation tissue 05/03/21 1032   Drainage Amount None 05/03/21 1032   Drainage Description Sanguinous 05/03/21 1032   Odor None 05/03/21 1032   Consuelo-wound Assessment Dry/flaky 05/03/21 1032   Margins Undefined edges 05/03/21 1032   Wound Thickness Description not for Pressure Injury Full thickness 05/03/21 1032   Number of days: 21          Supplies Requested :      WOUND #: 1 and 2   PRIMARY DRESSING:  Alginate with silver pad   Cover and Secure with: 4X4 gauze pad AND KERLIX     FREQUENCY OF DRESSING CHANGES:  Three times per week           ADDITIONAL ITEMS:  [] Gloves Small  [] Gloves Medium [] Gloves Large [] Gloves XLarge  [x] Tape 1\" [] Tape 2\" [] Tape 3\"  [] Medipore Tape  [x] Saline  [] Skin Prep   [] Adhesive Remover   [] Cotton Tip Applicators   [] Other:    Patient Wound(s) Debrided: [x] Yes   [] No    Debribement Type: subcutaneous tissue    Debridement Date: 5/3/2021    Patient currently being seen by Home Health: [] Yes   [x] No    Duration for needed supplies:  []15  []30  []60  [x]90 Days    Provider Information:      PROVIDER'S NAME: Km Peace MD     NPI: Kurt FERRELL [id number]

## 2021-05-10 ENCOUNTER — HOSPITAL ENCOUNTER (OUTPATIENT)
Dept: WOUND CARE | Age: 76
Discharge: HOME | End: 2021-05-10
Payer: MEDICARE

## 2021-05-10 VITALS
RESPIRATION RATE: 18 BRPM | TEMPERATURE: 97 F | SYSTOLIC BLOOD PRESSURE: 169 MMHG | HEART RATE: 106 BPM | DIASTOLIC BLOOD PRESSURE: 88 MMHG

## 2021-05-10 DIAGNOSIS — M79.89 LEG SWELLING: Primary | ICD-10-CM

## 2021-05-10 DIAGNOSIS — L97.911 SKIN ULCER OF RIGHT LOWER LEG, LIMITED TO BREAKDOWN OF SKIN (HCC): ICD-10-CM

## 2021-05-10 DIAGNOSIS — I87.2 PERIPHERAL VENOUS INSUFFICIENCY: ICD-10-CM

## 2021-05-10 PROCEDURE — 11042 DBRDMT SUBQ TIS 1ST 20SQCM/<: CPT | Performed by: NURSE PRACTITIONER

## 2021-05-10 PROCEDURE — 11042 DBRDMT SUBQ TIS 1ST 20SQCM/<: CPT

## 2021-05-10 RX ORDER — GINSENG 100 MG
CAPSULE ORAL ONCE
Status: CANCELLED | OUTPATIENT
Start: 2021-05-10 | End: 2021-05-10

## 2021-05-10 RX ORDER — LIDOCAINE HYDROCHLORIDE 20 MG/ML
JELLY TOPICAL ONCE
Status: CANCELLED | OUTPATIENT
Start: 2021-05-10 | End: 2021-05-10

## 2021-05-10 RX ORDER — LIDOCAINE HYDROCHLORIDE 40 MG/ML
SOLUTION TOPICAL ONCE
Status: CANCELLED | OUTPATIENT
Start: 2021-05-10 | End: 2021-05-10

## 2021-05-10 RX ORDER — LIDOCAINE 40 MG/G
CREAM TOPICAL ONCE
Status: CANCELLED | OUTPATIENT
Start: 2021-05-10 | End: 2021-05-10

## 2021-05-10 RX ORDER — BACITRACIN ZINC AND POLYMYXIN B SULFATE 500; 1000 [USP'U]/G; [USP'U]/G
OINTMENT TOPICAL ONCE
Status: CANCELLED | OUTPATIENT
Start: 2021-05-10 | End: 2021-05-10

## 2021-05-10 RX ORDER — LIDOCAINE HYDROCHLORIDE 40 MG/ML
SOLUTION TOPICAL ONCE
Status: COMPLETED | OUTPATIENT
Start: 2021-05-10 | End: 2021-05-10

## 2021-05-10 RX ORDER — GENTAMICIN SULFATE 1 MG/G
OINTMENT TOPICAL ONCE
Status: CANCELLED | OUTPATIENT
Start: 2021-05-10 | End: 2021-05-10

## 2021-05-10 RX ORDER — BACITRACIN, NEOMYCIN, POLYMYXIN B 400; 3.5; 5 [USP'U]/G; MG/G; [USP'U]/G
OINTMENT TOPICAL ONCE
Status: CANCELLED | OUTPATIENT
Start: 2021-05-10 | End: 2021-05-10

## 2021-05-10 RX ORDER — BETAMETHASONE DIPROPIONATE 0.05 %
OINTMENT (GRAM) TOPICAL ONCE
Status: CANCELLED | OUTPATIENT
Start: 2021-05-10 | End: 2021-05-10

## 2021-05-10 RX ORDER — CLOBETASOL PROPIONATE 0.5 MG/G
OINTMENT TOPICAL ONCE
Status: CANCELLED | OUTPATIENT
Start: 2021-05-10 | End: 2021-05-10

## 2021-05-10 RX ORDER — LIDOCAINE 50 MG/G
OINTMENT TOPICAL ONCE
Status: CANCELLED | OUTPATIENT
Start: 2021-05-10 | End: 2021-05-10

## 2021-05-10 RX ADMIN — LIDOCAINE HYDROCHLORIDE: 40 SOLUTION TOPICAL at 11:01

## 2021-05-10 NOTE — PROGRESS NOTES
[address]   Progress Note and Procedure Note      [address] RECORD NUMBER:  [id number]  AGE: 70 y.o. GENDER: female  : 1945  EPISODE DATE:  5/10/2021    Subjective:     Chief Complaint   Patient presents with    Wound Check     Follow up for right lower leg wounds          HISTORY of PRESENT ILLNESS HPI    Karen German a 70 y. o. female who presents today for wound/ulcer evaluation. History of Wound Context: recurrent right lower lateral and posterior leg ulcers.  Patient reports that on 21 she noticed her bedding was wet and discovered the wound.  She had not been wearing any compression, relying solely on diuretics.  We tried using bilateral multi-layered compression wraps and Coban Lite wraps. In both instances, the patient complained that they were too tight.   She is able to tolerate Spandagrips with no problem.      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 layer exposed ([address] 2021    Peripheral venous insufficiency 2021    Right foot ulcer, limited to breakdown of skin ([address] 2021       PAST SURGICAL HISTORY    Past Surgical History:   Procedure Laterality Date    APPENDECTOMY      CHOLECYSTECTOMY         FAMILY HISTORY    Family History   Problem Relation Age of Onset    Migraines Mother        SOCIAL HISTORY    Social History     Tobacco Use    Smoking status: Never Smoker    Smokeless tobacco: Never Used   Substance Use Topics    Alcohol Use     Frequency: Never    Drug use: Never       ALLERGIES    Allergies   Allergen Reactions    Iodides Other (See Comments)     IVP dye, pt does not remember he reaction, just that the nurses stated to not let anyone give to her ever again    Furosemide      Pt. States it makes her \"ditzy\", dizzy, and gives her muscle ridgity and nausea. MEDICATIONS    Current Outpatient Medications on File Prior to Encounter   Medication Sig Dispense Refill    metOLazone (ZAROXOLYN) 5 MG tablet TAKE ONE TABLET BY MOUTH DAILY AS NEEDED FOR SWELLING 30 tablet 1    triamterene-hydroCHLOROthiazide (MAXZIDE-25) 37.5-25 MG per tablet Take 1 tablet by mouth as needed      mupirocin (BACTROBAN) 2 % ointment APPLY TO AFFECTED AREA THREE TIMES A DAY 22 g 0    albuterol sulfate HFA (PROAIR HFA) 108 (90 Base) MCG/ACT inhaler Inhale 2 puffs into the lungs every 6 hours as needed for Wheezing 1 Inhaler 0    Nebulizers (AIRIAL COMPACT MINI NEBULIZER) MISC 1 nebule by Does not apply route 4 times daily 1 each 0     No current facility-administered medications on file prior to encounter. REVIEW OF SYSTEMS    Pertinent items are noted in HPI.       Objective:      BP (!) 169/88   Pulse 106   Temp 97 °F (36.1 °C) (Oral)   Resp 18     Wt Readings from Last 3 Encounters:   05/03/21 223 lb 5.2 oz (101.3 kg)   02/08/21 227 lb (103 kg)   02/02/21 228 lb 9.9 oz (103.7 kg)       PHYSICAL EXAM    General Appearance: alert and oriented to person, place and time, well developed and well- nourished, in no acute distress  Skin: warm and dry, no rash or erythema  Head: normocephalic and atraumatic  Eyes: pupils equal, round, and reactive to light, extraocular eye movements intact, conjunctivae normal  Neck: supple and non-tender without mass  Pulmonary/Chest: normal air movement, no respiratory distress  Cardiovascular: Right DP +1  Extremities: no cyanosis or clubbing, RLE +2 pitting edema  Musculoskeletal: normal range of motion, no joint swelling, deformity or tenderness  Neurologic: reflexes normal and symmetric, no cranial nerve deficit, gait, coordination and speech normal      Assessment:        Problem List Items Addressed This Visit     Skin ulcer of right lower leg with fat layer exposed ([address]    Peripheral venous insufficiency    Relevant Orders    Initiate Outpatient Wound Care Protocol    Leg swelling - Primary    Relevant Orders    Initiate Outpatient Wound Care Protocol           Procedure Note  Indications:  Based on my examination of this patient's wound(s)/ulcer(s) today, debridement is required to promote healing and evaluate the wound base. Performed by: VIDA Jose CNP    Consent obtained:  Yes    Time out taken:  Yes    Pain Control: Anesthetic  Anesthetic: 4% Lidocaine Liquid Topical(2.5 ml )       Debridement: Excisional Debridement    Using curette the wound(s)/ulcer(s) was/were debrided down through and including the removal of dermis and subcutaneous tissue. Devitalized Tissue Debrided:  biofilm and slough    Pre Debridement Measurements:  Are located in the [place]  Documentation Flow Sheet    Diabetic/Pressure/Non Pressure Ulcers only:  Ulcer: N/A     Wound/Ulcer #: 1    Post Debridement Measurements:  Wound/Ulcer Descriptions are Pre Debridement except measurements:    Wound 04/12/21 Leg Right; Lower; Lateral #1 (4/7/21) (Active)   Wound Image   05/03/21 1032   Wound Etiology Venous 04/12/21 0927   Dressing Status New dressing applied 05/10/21 1124   Wound Cleansed Cleansed with saline 05/10/21 1124   Dressing/Treatment Alginate with Ag 05/10/21 1124   Wound Length (cm) 4.1 cm 05/10/21 1101   Wound Width (cm) 2.2 cm 05/10/21 1101   Wound Depth (cm) 0.1 cm 05/10/21 1101   Wound Surface Area (cm^2) 9.02 cm^2 05/10/21 1101   Change in Wound Size % (l*w) -213.19 05/10/21 1101   Wound Volume (cm^3) 0.9 cm^3 05/10/21 1101   Wound Healing % -210 05/10/21 1101   Post-Procedure Length (cm) 4.2 cm 05/10/21 1113 Post-Procedure Width (cm) 2.3 cm 05/10/21 1113   Post-Procedure Depth (cm) 0.2 cm 05/10/21 1113   Post-Procedure Surface Area (cm^2) 9.66 cm^2 05/10/21 1113   Post-Procedure Volume (cm^3) 1.93 cm^3 05/10/21 1113   Wound Assessment Granulation tissue;Slough 05/10/21 1101   Drainage Amount Moderate 05/10/21 1101   Drainage Description Serosanguinous 05/10/21 1101   Odor None 05/10/21 1101   Consuelo-wound Assessment Dry/flaky 05/10/21 1101   Margins Undefined edges 05/10/21 1101   Wound Thickness Description not for Pressure Injury Full thickness 05/10/21 1101   Number of days: 28       Wound 04/12/21 Leg Right;Posterior; Lower #2 (4/7/21) (Active)   Wound Image   05/10/21 1101   Wound Etiology Venous 04/12/21 0927   Dressing Status New dressing applied 04/19/21 1115   Wound Cleansed Cleansed with saline 04/26/21 1206   Dressing/Treatment Alginate with Ag;Roll gauze;Dry dressing;Gauze dressing/dressing sponge 05/03/21 1138   Wound Length (cm) 0 cm 05/10/21 1101   Wound Width (cm) 0 cm 05/10/21 1101   Wound Depth (cm) 0 cm 05/10/21 1101   Wound Surface Area (cm^2) 0 cm^2 05/10/21 1101   Change in Wound Size % (l*w) 100 05/10/21 1101   Wound Volume (cm^3) 0 cm^3 05/10/21 1101   Wound Healing % 100 05/10/21 1101   Post-Procedure Length (cm) 0 cm 05/10/21 1113   Post-Procedure Width (cm) 0 cm 05/10/21 1113   Post-Procedure Depth (cm) 0 cm 05/10/21 1113   Post-Procedure Surface Area (cm^2) 0 cm^2 05/10/21 1113   Post-Procedure Volume (cm^3) 0 cm^3 05/10/21 1113   Wound Assessment Epithelialization 05/10/21 1101   Drainage Amount None 05/03/21 1032   Drainage Description Sanguinous 05/03/21 1032   Odor Moderate 05/03/21 1121   Consuelo-wound Assessment Dry/flaky 05/03/21 1032   Margins Undefined edges 05/03/21 1032   Wound Thickness Description not for Pressure Injury Full thickness 05/03/21 1032   Number of days: 28          Total Surface Area Debrided:  9.66 sq cm     Estimated Blood Loss:  Minimal    Hemostasis Achieved:  by pressure    Procedural Pain:  0  / 10     Post Procedural Pain:  0 / 10     Response to treatment:  Well tolerated by patient.     Rajeev Boucher focused on the need for compression. We applied bilateral Spandagrips. they improve blood flow in the leg, prevent blood clotting and inhibit the progression of a variety of venous disorders. The Spandagrips help squeeze the venous blood back up toward the heart, to enhance circulation. Plan:     Treatment Note please see attached Discharge Instructions    Written patient dismissal instructions given to patient and signed by patient or POA. Discharge [address] and Hyperbaric Oxygen Therapy   Physician Orders and Discharge [address]  [address]   [address] [place], [facility] [address]  Telephone: [phone]      FAX [phone]     NAME: Lara Concepcion  DATE OF BIRTH:  1945  MEDICAL RECORD NUMBER:  [id number]  DATE:  5/10/2021     Wound Cleansing:   Do not scrub or use excessive force. Cleanse wound prior to applying a clean dressing with:  [x]???? Normal Saline  [x]???? Keep Wound Dry in Shower    []???? Wound Cleanser   []???? Cleanse wound with Mild Soap & Water  []???? May Shower at Discharge   []???? Other:        Topical Treatments:  Do not apply lotions, creams, or ointments to wound bed unless directed.   []???? Apply moisturizing lotion to skin surrounding the wound prior to dressing change.  []???? Apply antifungal ointment to skin surrounding the wound prior to dressing change.  []???? Apply thin film of moisture barrier ointment to skin immediately around wound.  []???? Other:                  Dressings:      Wound Location : RIGHT LATERAL LEG WOUND                [x]? ??? Apply Primary Dressing:                                             [x]? ??? SILVER ALGINATE                    []????                                        []???? Other:       [x]? ??? Cover and Secure with:                   [x]? ??? Gauze         []???? Filiberto           [x]? ??? Roll gauze              []???? Ace Wrap   []???? Cover Roll Tape     []???? ABD                                      []???? Other:               Avoid contact of tape with skin. [x]???? Change dressing:   []???? Daily           []???? Every Other Day    [x]? ??? Three times per week              []???? Once a week          []???? Do Not Change Dressing              []???? Other:                      Edema Control:  Apply:  []???? Compression Stocking       []????Right Leg     []?? ??Left Leg              []???? Tubigrip      []????Right Leg Double Layer      []?? ??Left Leg Double Layer                                                  []????Right Leg Single Layer       []?? ??Left Leg Single Layer              [x]? ??? SpandaGrip            [x]????Right Leg     [x]? ? ??Left Leg                                      []?? ??Low compression 5-10 mm/Hg                                             [x]? ? ??Medium compression 10-20 mm/Hg                                      []?? ?? High compression  20-30 mm/Hg              every morning immediately when getting up should be applied to affected leg(s) from mid foot to knee making sure to cover the heel.  Remove every night before going to bed.              [x]? ??? Elevate leg(s) above the level of the heart when sitting.                 [x]? ??? Avoid prolonged standing in one place.                   Compression:  Apply:  []???? Multilayer Compression Wrap Applied in Clinic    []????RightLeg      []?? ??Left Leg              []???? Multi-layer compression.  Do not get leg(s) with wrap wet.  If wraps become too tight call the center or completely remove the wrap.                      []???? Elevate leg(s) above the level of the heart when sitting.                 []???? Avoid prolonged standing in one place.     Off-Loading:   []???? Off-loading when    []???? walking       []???? in bed         []???? sitting  []???? Total non-weight bearing  []???? Right Leg  []???? Left Leg          []???? Assistive Device     []???? Walker        []???? Cane           []???? Wheelchair  []???? Crutches              []???? Surgical shoe    []???? Podus Boot(s)   []???? Foam Boot(s)  []???? Roll About              []???? Cast Boot   []???? CROW Boot  []???? Other:                   Dietary:  [x]???? Diet as tolerated:     []???? Calorie Diabetic Diet:           []???? No Added Salt:  []???? Increase Protein:     []???? Other:                Activity:  [x]???? Activity as tolerated:  []???? Patient has no activity restrictions     []???? Strict Bedrest: []???? Remain off Work:     []???? May return to full duty work:                                    []???? Return to work with [address]     If you are still having pain after you go home:  [x]???? Elevate the affected limb.    [x]???? Use over-the-counter medications you would normally use for pain as permitted by your family doctor.   [x]???? For persistent pain not relieved by the above interventions, please call your family doctor.             Return Appointment:  [x]???? Wound and dressing supply provider: [facility]  []???? ECF or Home Healthcare:  []???? Wound Assessment:          []???? Physician or NP scheduled for Wound Assessment:   [x]???? Return Appointment: With DR GALLEGOS OR Dario Pacheco CNP  in  1 Week(s)  []???? Ordered tests:        **PLEASE BRING YOUR COMPRESSION HOSE WITH YOU TO EACH VISIT**     **4/12/2021 : SCRIPT GIVEN FOR CIRC-AIDS**     Nurse Case Manger: JOSE MANUEL     Electronically signed by Gladys De Luna RN on 5/10/2021 at [facility] Information: Should you experience any significant changes in your wound(s) or have questions about your wound care, please contact the [address] [place] [address] [phone] [address] 8:00 am - 4:30 pm and Friday 8:00 am - 12:30 pm.  If you need help with your wound outside these hours and cannot wait until we are again available, contact your PCP or go to the hospital emergency room.      PLEASE NOTE: IF YOU ARE UNABLE TO [address], CONTINUE TO USE THE SUPPLIES YOU HAVE AVAILABLE UNTIL YOU ARE ABLE TO [facility].  IT IS MOST IMPORTANT TO KEEP THE WOUND COVERED AT ALL TIMES.     Physician Signature:_______________________     Date: ___________ Time:  ____________                                [X] Stephanie Zhou CNP  [ ] DR Justina Clemens          Electronically signed by VIDA Wheat CNP on 5/10/2021 at 11:53 AM

## 2021-05-12 ENCOUNTER — OFFICE VISIT (OUTPATIENT)
Dept: FAMILY MEDICINE CLINIC | Age: 76
End: 2021-05-12
Payer: MEDICARE

## 2021-05-12 VITALS
DIASTOLIC BLOOD PRESSURE: 80 MMHG | HEIGHT: 66 IN | SYSTOLIC BLOOD PRESSURE: 132 MMHG | WEIGHT: 222.2 LBS | BODY MASS INDEX: 35.71 KG/M2

## 2021-05-12 DIAGNOSIS — J45.40 MODERATE PERSISTENT ASTHMA WITHOUT COMPLICATION: Primary | ICD-10-CM

## 2021-05-12 DIAGNOSIS — L97.912 SKIN ULCER OF RIGHT LOWER LEG WITH FAT LAYER EXPOSED (HCC): ICD-10-CM

## 2021-05-12 DIAGNOSIS — M79.89 LEG SWELLING: ICD-10-CM

## 2021-05-12 LAB
A/G RATIO: 1.6 (ref 1.1–2.2)
ALBUMIN SERPL-MCNC: 4.5 G/DL (ref 3.4–5)
ALP BLD-CCNC: 78 U/L (ref 40–129)
ALT SERPL-CCNC: 16 U/L (ref 10–40)
ANION GAP SERPL CALCULATED.3IONS-SCNC: 18 MMOL/L (ref 3–16)
AST SERPL-CCNC: 26 U/L (ref 15–37)
BILIRUB SERPL-MCNC: 0.9 MG/DL (ref 0–1)
BUN BLDV-MCNC: 18 MG/DL (ref 7–20)
CALCIUM SERPL-MCNC: 9.8 MG/DL (ref 8.3–10.6)
CHLORIDE BLD-SCNC: 100 MMOL/L (ref 99–110)
CO2: 24 MMOL/L (ref 21–32)
CREAT SERPL-MCNC: 0.6 MG/DL (ref 0.6–1.2)
GFR AFRICAN AMERICAN: >60
GFR NON-AFRICAN AMERICAN: >60
GLOBULIN: 2.9 G/DL
GLUCOSE BLD-MCNC: 104 MG/DL (ref 70–99)
HCT VFR BLD CALC: 41.8 % (ref 36–48)
HEMOGLOBIN: 14.4 G/DL (ref 12–16)
MCH RBC QN AUTO: 30.1 PG (ref 26–34)
MCHC RBC AUTO-ENTMCNC: 34.5 G/DL (ref 31–36)
MCV RBC AUTO: 87.2 FL (ref 80–100)
PDW BLD-RTO: 13.8 % (ref 12.4–15.4)
PLATELET # BLD: 164 K/UL (ref 135–450)
PMV BLD AUTO: 9.2 FL (ref 5–10.5)
POTASSIUM SERPL-SCNC: 3.9 MMOL/L (ref 3.5–5.1)
RBC # BLD: 4.8 M/UL (ref 4–5.2)
SODIUM BLD-SCNC: 142 MMOL/L (ref 136–145)
TOTAL PROTEIN: 7.4 G/DL (ref 6.4–8.2)
WBC # BLD: 5.2 K/UL (ref 4–11)

## 2021-05-12 PROCEDURE — 4040F PNEUMOC VAC/ADMIN/RCVD: CPT | Performed by: FAMILY MEDICINE

## 2021-05-12 PROCEDURE — G8400 PT W/DXA NO RESULTS DOC: HCPCS | Performed by: FAMILY MEDICINE

## 2021-05-12 PROCEDURE — 3017F COLORECTAL CA SCREEN DOC REV: CPT | Performed by: FAMILY MEDICINE

## 2021-05-12 PROCEDURE — G8427 DOCREV CUR MEDS BY ELIG CLIN: HCPCS | Performed by: FAMILY MEDICINE

## 2021-05-12 PROCEDURE — G8417 CALC BMI ABV UP PARAM F/U: HCPCS | Performed by: FAMILY MEDICINE

## 2021-05-12 PROCEDURE — 1090F PRES/ABSN URINE INCON ASSESS: CPT | Performed by: FAMILY MEDICINE

## 2021-05-12 PROCEDURE — 99213 OFFICE O/P EST LOW 20 MIN: CPT | Performed by: FAMILY MEDICINE

## 2021-05-12 PROCEDURE — 1123F ACP DISCUSS/DSCN MKR DOCD: CPT | Performed by: FAMILY MEDICINE

## 2021-05-12 PROCEDURE — 36415 COLL VENOUS BLD VENIPUNCTURE: CPT | Performed by: FAMILY MEDICINE

## 2021-05-12 PROCEDURE — 1036F TOBACCO NON-USER: CPT | Performed by: FAMILY MEDICINE

## 2021-05-12 RX ORDER — METOLAZONE 5 MG/1
TABLET ORAL
Qty: 30 TABLET | Refills: 1 | Status: SHIPPED | OUTPATIENT
Start: 2021-05-12 | End: 2021-06-21

## 2021-05-12 ASSESSMENT — ENCOUNTER SYMPTOMS
SHORTNESS OF BREATH: 0
SORE THROAT: 0
DIARRHEA: 0
RHINORRHEA: 0
NAUSEA: 0
ABDOMINAL PAIN: 0
SINUS PRESSURE: 0
VOMITING: 0
COUGH: 0

## 2021-05-12 NOTE — PROGRESS NOTES
DO   albuterol sulfate HFA (PROAIR HFA) 108 (90 Base) MCG/ACT inhaler Inhale 2 puffs into the lungs every 6 hours as needed for Wheezing  Royanne Coupe, DO   Nebulizers (AIRIAL COMPACT MINI NEBULIZER) MISC 1 nebule by Does not apply route 4 times daily  Royanne Coupe, DO        Social History     Tobacco Use    Smoking status: Never Smoker    Smokeless tobacco: Never Used   Substance Use Topics    Alcohol use: Not on file        Vitals:    05/12/21 1054   BP: 132/80   Cuff Size: Large Adult   Weight: 222 lb 3.2 oz (100.8 kg)   Height: 5' 6\" (1.676 m)     Estimated body mass index is 35.86 kg/m² as calculated from the following:    Height as of this encounter: 5' 6\" (1.676 m). Weight as of this encounter: 222 lb 3.2 oz (100.8 kg). Physical Exam  Nursing note reviewed. Constitutional:       Appearance: She is well-developed. HENT:      Head: Normocephalic and atraumatic. Right Ear: External ear normal.      Left Ear: External ear normal.   Cardiovascular:      Rate and Rhythm: Normal rate and regular rhythm. Heart sounds: Normal heart sounds. No murmur heard. Pulmonary:      Effort: Pulmonary effort is normal.      Breath sounds: Normal breath sounds. No wheezing. Musculoskeletal:         General: Tenderness present. Skin:     General: Skin is warm and dry. Neurological:      Mental Status: She is alert and oriented to person, place, and time. Psychiatric:         Behavior: Behavior normal.         ASSESSMENT/PLAN:  1. Moderate persistent asthma without complication  Take medication as prescribed. Discussed conservative treatment  Discussed signs and symptoms for immediate evaluation in the ER  RTC if no improved. - CBC  - Comprehensive Metabolic Panel    2. Skin ulcer of right lower leg with fat layer exposed ([address]  Stable  Continue with medication  Keep appointments with specialist.   Kellie Tavarez questions    3. Leg swelling  Take medication as prescribed.    Raise feet in the

## 2021-05-16 ASSESSMENT — ENCOUNTER SYMPTOMS: COLOR CHANGE: 1

## 2021-05-17 ENCOUNTER — HOSPITAL ENCOUNTER (OUTPATIENT)
Dept: WOUND CARE | Age: 76
Discharge: HOME | End: 2021-05-17
Payer: MEDICARE

## 2021-05-17 VITALS
RESPIRATION RATE: 18 BRPM | SYSTOLIC BLOOD PRESSURE: 148 MMHG | DIASTOLIC BLOOD PRESSURE: 87 MMHG | HEART RATE: 86 BPM | TEMPERATURE: 97.1 F

## 2021-05-17 DIAGNOSIS — M79.89 LEG SWELLING: Primary | ICD-10-CM

## 2021-05-17 DIAGNOSIS — I87.2 PERIPHERAL VENOUS INSUFFICIENCY: ICD-10-CM

## 2021-05-17 DIAGNOSIS — L97.912 SKIN ULCER OF RIGHT LOWER LEG WITH FAT LAYER EXPOSED (HCC): ICD-10-CM

## 2021-05-17 PROCEDURE — 11042 DBRDMT SUBQ TIS 1ST 20SQCM/<: CPT | Performed by: NURSE PRACTITIONER

## 2021-05-17 PROCEDURE — 11042 DBRDMT SUBQ TIS 1ST 20SQCM/<: CPT

## 2021-05-17 RX ORDER — LIDOCAINE 50 MG/G
OINTMENT TOPICAL ONCE
Status: CANCELLED | OUTPATIENT
Start: 2021-05-17 | End: 2021-05-17

## 2021-05-17 RX ORDER — BETAMETHASONE DIPROPIONATE 0.05 %
OINTMENT (GRAM) TOPICAL ONCE
Status: CANCELLED | OUTPATIENT
Start: 2021-05-17 | End: 2021-05-17

## 2021-05-17 RX ORDER — LIDOCAINE 40 MG/G
CREAM TOPICAL ONCE
Status: CANCELLED | OUTPATIENT
Start: 2021-05-17 | End: 2021-05-17

## 2021-05-17 RX ORDER — GENTAMICIN SULFATE 1 MG/G
OINTMENT TOPICAL ONCE
Status: CANCELLED | OUTPATIENT
Start: 2021-05-17 | End: 2021-05-17

## 2021-05-17 RX ORDER — GINSENG 100 MG
CAPSULE ORAL ONCE
Status: CANCELLED | OUTPATIENT
Start: 2021-05-17 | End: 2021-05-17

## 2021-05-17 RX ORDER — CLOBETASOL PROPIONATE 0.5 MG/G
OINTMENT TOPICAL ONCE
Status: CANCELLED | OUTPATIENT
Start: 2021-05-17 | End: 2021-05-17

## 2021-05-17 RX ORDER — LIDOCAINE HYDROCHLORIDE 20 MG/ML
JELLY TOPICAL ONCE
Status: CANCELLED | OUTPATIENT
Start: 2021-05-17 | End: 2021-05-17

## 2021-05-17 RX ORDER — BACITRACIN, NEOMYCIN, POLYMYXIN B 400; 3.5; 5 [USP'U]/G; MG/G; [USP'U]/G
OINTMENT TOPICAL ONCE
Status: CANCELLED | OUTPATIENT
Start: 2021-05-17 | End: 2021-05-17

## 2021-05-17 RX ORDER — LIDOCAINE HYDROCHLORIDE 40 MG/ML
SOLUTION TOPICAL ONCE
Status: CANCELLED | OUTPATIENT
Start: 2021-05-17 | End: 2021-05-17

## 2021-05-17 RX ORDER — LIDOCAINE HYDROCHLORIDE 40 MG/ML
SOLUTION TOPICAL ONCE
Status: COMPLETED | OUTPATIENT
Start: 2021-05-17 | End: 2021-05-17

## 2021-05-17 RX ORDER — BACITRACIN ZINC AND POLYMYXIN B SULFATE 500; 1000 [USP'U]/G; [USP'U]/G
OINTMENT TOPICAL ONCE
Status: CANCELLED | OUTPATIENT
Start: 2021-05-17 | End: 2021-05-17

## 2021-05-17 RX ADMIN — LIDOCAINE HYDROCHLORIDE: 40 SOLUTION TOPICAL at 11:11

## 2021-05-17 ASSESSMENT — PAIN SCALES - GENERAL: PAINLEVEL_OUTOF10: 0

## 2021-05-17 NOTE — PROGRESS NOTES
Drug use: Never       ALLERGIES    Allergies   Allergen Reactions    Iodides Other (See Comments)     IVP dye, pt does not remember he reaction, just that the nurses stated to not let anyone give to her ever again    Furosemide      Pt. States it makes her \"ditzy\", dizzy, and gives her muscle ridgity and nausea. MEDICATIONS    Current Outpatient Medications on File Prior to Encounter   Medication Sig Dispense Refill    metOLazone (ZAROXOLYN) 5 MG tablet TAKE ONE TABLET BY MOUTH DAILY AS NEEDED FOR SWELLING 30 tablet 1    triamterene-hydroCHLOROthiazide (MAXZIDE-25) 37.5-25 MG per tablet Take 1 tablet by mouth as needed      mupirocin (BACTROBAN) 2 % ointment APPLY TO AFFECTED AREA THREE TIMES A DAY 22 g 0    albuterol sulfate HFA (PROAIR HFA) 108 (90 Base) MCG/ACT inhaler Inhale 2 puffs into the lungs every 6 hours as needed for Wheezing 1 Inhaler 0    Nebulizers (AIRIAL COMPACT MINI NEBULIZER) MISC 1 nebule by Does not apply route 4 times daily 1 each 0     No current facility-administered medications on file prior to encounter. REVIEW OF SYSTEMS    Pertinent items are noted in HPI.       Objective:      BP (!) 148/87   Pulse 86   Temp 97.1 °F (36.2 °C) (Infrared)   Resp 18     Wt Readings from Last 3 Encounters:   05/12/21 222 lb 3.2 oz (100.8 kg)   05/03/21 223 lb 5.2 oz (101.3 kg)   02/08/21 227 lb (103 kg)       PHYSICAL EXAM    General Appearance: alert and oriented to person, place and time, well developed and well- nourished, in no acute distress  Skin: warm and dry  Head: normocephalic and atraumatic  Eyes: pupils equal, round, and reactive to light, extraocular eye movements intact, conjunctivae normal  Neck: supple and non-tender without mass  Pulmonary/Chest: normal air movement, no respiratory distress  Cardiovascular: Right DP +1  Extremities: no cyanosis or clubbing, RLE +1 pitting edema  Musculoskeletal: normal range of motion, no joint swelling, deformity or tenderness  Neurologic: reflexes normal and symmetric, no cranial nerve deficit, gait, coordination and speech normal      Assessment:        Problem List Items Addressed This Visit     Skin ulcer of right lower leg with fat layer exposed ([address]    Relevant Orders    Initiate Outpatient Wound Care Protocol    Peripheral venous insufficiency    Relevant Orders    Initiate Outpatient Wound Care Protocol    Leg swelling - Primary    Relevant Orders    Initiate Outpatient Wound Care Protocol           Procedure Note  Indications:  Based on my examination of this patient's wound(s)/ulcer(s) today, debridement is required to promote healing and evaluate the wound base. Performed by: VIDA Mas CNP    Consent obtained:  Yes    Time out taken:  Yes    Pain Control: Anesthetic  Anesthetic: 4% Lidocaine Liquid Topical       Debridement: Excisional Debridement    Using curette the wound(s)/ulcer(s) was/were debrided down through and including the removal of epidermis, dermis and subcutaneous tissue. Devitalized Tissue Debrided:  biofilm, slough and necrotic/eschar    Pre Debridement Measurements:  Are located in the [facility]  Documentation Flow Sheet    Diabetic/Pressure/Non Pressure Ulcers only:  Ulcer: Non-Pressure ulcer, fat layer exposed     Wound/Ulcer #: 1    Post Debridement Measurements:  Wound/Ulcer Descriptions are Pre Debridement except measurements:    Wound 04/12/21 Leg Right; Lower; Lateral #1 (4/7/21) (Active)   Wound Image   05/03/21 1032   Wound Etiology Venous 04/12/21 0927   Dressing Status Old drainage noted 05/17/21 1106   Wound Cleansed Cleansed with saline 05/10/21 1124   Dressing/Treatment Alginate with Ag 05/10/21 1124   Wound Length (cm) 4.2 cm 05/17/21 1106   Wound Width (cm) 2.1 cm 05/17/21 1106   Wound Depth (cm) 0.1 cm 05/17/21 1106   Wound Surface Area (cm^2) 8.82 cm^2 05/17/21 1106   Change in Wound Size % (l*w) -206.25 05/17/21 1106   Wound Volume (cm^3) 0.88 cm^3 05/17/21 1106   Wound Healing % -203 05/17/21 1106   Post-Procedure Length (cm) 4.3 cm 05/17/21 1154   Post-Procedure Width (cm) 2.2 cm 05/17/21 1154   Post-Procedure Depth (cm) 0.2 cm 05/17/21 1154   Post-Procedure Surface Area (cm^2) 9.46 cm^2 05/17/21 1154   Post-Procedure Volume (cm^3) 1.89 cm^3 05/17/21 1154   Wound Assessment Granulation tissue;Slough 05/17/21 1106   Drainage Amount Moderate 05/17/21 1106   Drainage Description Serosanguinous 05/17/21 1106   Odor None 05/17/21 1106   Consuelo-wound Assessment Dry/flaky 05/17/21 1106   Margins Undefined edges 05/17/21 1106   Wound Thickness Description not for Pressure Injury Full thickness 05/17/21 1106   Number of days: 35          Total Surface Area Debrided:  9.46 sq cm     Estimated Blood Loss:  Minimal    Hemostasis Achieved:  by pressure    Procedural Pain:  0  / 10     Post Procedural Pain:  0 / 10     Response to treatment:  Well tolerated by patient. PATIENT EDUCATION focused on the need for compression.  We applied bilateral Spandagrips.  They improve blood flow in the leg, prevent blood clotting and inhibit the progression of a variety of venous disorders.  The Spandagrips help squeeze the venous blood back up toward the heart, to enhance circulation.         Plan:     Treatment Note please see attached Discharge Instructions    Written patient dismissal instructions given to patient and signed by patient or POA. Discharge [address] and Hyperbaric Oxygen Therapy   Physician Orders and Discharge Jim [address] [place] [address]   [address] [facility] [address], [facility] [address]  Telephone: [phone]      FAX [phone]     NAME: Calli Martell  DATE OF BIRTH:  1945  MEDICAL RECORD NUMBER:  [id number]  DATE:  5/17/2021     Wound Cleansing:   Do not scrub or use excessive force.   Cleanse wound prior to applying a clean dressing with:  [x]????? Normal Saline  [x]????? Keep Wound Dry in Shower    []????? Wound Cleanser   []????? Cleanse wound with Mild Soap & Water  []????? May Shower at Discharge   []????? Other:        Topical Treatments:  Do not apply lotions, creams, or ointments to wound bed unless directed.   []????? Apply moisturizing lotion to skin surrounding the wound prior to dressing change.  []????? Apply antifungal ointment to skin surrounding the wound prior to dressing change.  []????? Apply thin film of moisture barrier ointment to skin immediately around wound.  []????? Other:                  Dressings:      Wound Location : RIGHT LATERAL LEG WOUND                [x]????? Apply Primary Dressing:                                             [x]????? SILVER ALGINATE                    []?????                                        []????? Other:       [x]????? Cover and Secure with:                   [x]????? Gauze         []????? Filiberto           [x]????? Roll gauze              []????? Ace Wrap   []????? Cover Roll Tape     []????? ABD                                      []????? Other:               Avoid contact of tape with skin. [x]????? Change dressing:   []????? Daily           []????? Every Other Day    [x]????? Three times per week              []????? Once a week          []????? Do Not Change Dressing              []????? Other:                      Edema Control:  Apply:  []????? Compression Stocking       []??? ? ? Right Leg     []??? ? ? Left Leg              []????? Tubigrip      []??? ? ? Right Leg Double Layer      []??? ? ? Left Leg Double Layer                                                  []??? ? ? Right Leg Single Layer       []??? ? ? Left Leg Single Layer              [x]????? SpandaGrip            [x]??? ? ? Right Leg     [x]? ?? ? ? Left Leg                                      []??? ? ?Low compression 5-10 mm/Hg                                             [x]??? ? ? Medium compression 10-20 mm/Hg                                      []??? ? ? High compression  20-30 mm/Hg              every morning immediately when getting up should be applied to affected leg(s) from mid foot to knee making sure to cover the heel.  Remove every night before going to bed.              [x]????? Elevate leg(s) above the level of the heart when sitting.                 [x]????? Avoid prolonged standing in one place.                   Compression:  Apply:  []????? Multilayer Compression Wrap Applied in Clinic    []??? ? ? RightLeg      []??? ? ? Left Leg              []????? Multi-layer compression.  Do not get leg(s) with wrap wet.  If wraps become too tight call the center or completely remove the wrap.                      []????? Elevate leg(s) above the level of the heart when sitting.                 []????? Avoid prolonged standing in one place.     Off-Loading:   []????? Off-loading when    []????? walking       []????? in bed         []????? sitting  []????? Total non-weight bearing  []????? Right Leg  []????? Left Leg          []????? Assistive Device     []????? Walker        []????? Cane           []????? Wheelchair  []????? Crutches              []????? Surgical shoe    []????? Podus Boot(s)   []????? Foam Boot(s)  []????? Roll About              []????? Cast Boot   []????? CROW Boot  []????? Other:                   Dietary:  [x]????? Diet as tolerated:     []????? Calorie Diabetic Diet:           []????? No Added Salt:  []????? Increase Protein:     []????? Other:                Activity:  [x]????? Activity as tolerated:  []????? Patient has no activity restrictions     []????? Strict Bedrest: []????? Remain off Work:     []????? May return to full duty work:                                    []????? Return to work with [address]     If you are still having pain after you go home:  [x]????? Elevate the affected limb.    [x]????? Use over-the-counter medications you would normally use for pain as permitted by your family doctor.   [x]????? For persistent pain not relieved by the above interventions, please call your family doctor.             Return Appointment:  [x]????? Wound and dressing supply provider: [facility]  []????? ECF or Home Healthcare:  []????? Wound Assessment:          []????? Physician or NP scheduled for Wound Assessment:   [x]????? Return Appointment: With DR GALLEGOS OR Dennys Dee CNP  in  1 Week(s)  []????? Ordered tests:        **PLEASE BRING YOUR COMPRESSION HOSE WITH YOU TO EACH VISIT**     **4/12/2021 : SCRIPT GIVEN FOR CIRC-AIDS**     Nurse Case Manger: JOSE MANUEL     Electronically signed by Nick Kulkarni RN on 5/17/2021 at 11:57 AM    [address] [place] [address] Information: Should you experience any significant changes in your wound(s) or have questions about your wound care, please contact the [address] [place] [address] [phone] [address] [place] 8:00 am - 4:30 pm and Friday 8:00 am - 12:30 pm.  If you need help with your wound outside these hours and cannot wait until we are again available, contact your PCP or go to the hospital emergency room.      PLEASE NOTE: IF YOU ARE UNABLE TO OBTAIN WOUND SUPPLIES, CONTINUE TO USE THE SUPPLIES YOU HAVE AVAILABLE UNTIL YOU ARE ABLE TO [facility].  IT IS MOST IMPORTANT TO KEEP THE WOUND COVERED AT ALL TIMES.     Physician Signature:_______________________     Date: ___________ Time:  ____________                                [X] Gely Swain CNP  [ ] DR Veronica Jones        Electronically signed by VIDA Murillo CNP on 5/17/2021 at 12:55 PM

## 2021-05-24 ENCOUNTER — HOSPITAL ENCOUNTER (OUTPATIENT)
Dept: WOUND CARE | Age: 76
Discharge: HOME | End: 2021-05-24
Payer: MEDICARE

## 2021-05-24 VITALS
DIASTOLIC BLOOD PRESSURE: 97 MMHG | RESPIRATION RATE: 18 BRPM | SYSTOLIC BLOOD PRESSURE: 170 MMHG | HEART RATE: 92 BPM | TEMPERATURE: 98.2 F

## 2021-05-24 DIAGNOSIS — I87.2 PERIPHERAL VENOUS INSUFFICIENCY: ICD-10-CM

## 2021-05-24 DIAGNOSIS — M79.89 LEG SWELLING: Primary | ICD-10-CM

## 2021-05-24 DIAGNOSIS — L97.912 SKIN ULCER OF RIGHT LOWER LEG WITH FAT LAYER EXPOSED (HCC): ICD-10-CM

## 2021-05-24 PROCEDURE — 11042 DBRDMT SUBQ TIS 1ST 20SQCM/<: CPT | Performed by: NURSE PRACTITIONER

## 2021-05-24 PROCEDURE — 11042 DBRDMT SUBQ TIS 1ST 20SQCM/<: CPT

## 2021-05-24 RX ORDER — GENTAMICIN SULFATE 1 MG/G
OINTMENT TOPICAL ONCE
Status: CANCELLED | OUTPATIENT
Start: 2021-05-24 | End: 2021-05-24

## 2021-05-24 RX ORDER — CLOBETASOL PROPIONATE 0.5 MG/G
OINTMENT TOPICAL ONCE
Status: CANCELLED | OUTPATIENT
Start: 2021-05-24 | End: 2021-05-24

## 2021-05-24 RX ORDER — LIDOCAINE HYDROCHLORIDE 20 MG/ML
JELLY TOPICAL ONCE
Status: CANCELLED | OUTPATIENT
Start: 2021-05-24 | End: 2021-05-24

## 2021-05-24 RX ORDER — LIDOCAINE 40 MG/G
CREAM TOPICAL ONCE
Status: CANCELLED | OUTPATIENT
Start: 2021-05-24 | End: 2021-05-24

## 2021-05-24 RX ORDER — LIDOCAINE HYDROCHLORIDE 40 MG/ML
SOLUTION TOPICAL ONCE
Status: COMPLETED | OUTPATIENT
Start: 2021-05-24 | End: 2021-05-24

## 2021-05-24 RX ORDER — LIDOCAINE 50 MG/G
OINTMENT TOPICAL ONCE
Status: CANCELLED | OUTPATIENT
Start: 2021-05-24 | End: 2021-05-24

## 2021-05-24 RX ORDER — GINSENG 100 MG
CAPSULE ORAL ONCE
Status: CANCELLED | OUTPATIENT
Start: 2021-05-24 | End: 2021-05-24

## 2021-05-24 RX ORDER — BACITRACIN, NEOMYCIN, POLYMYXIN B 400; 3.5; 5 [USP'U]/G; MG/G; [USP'U]/G
OINTMENT TOPICAL ONCE
Status: CANCELLED | OUTPATIENT
Start: 2021-05-24 | End: 2021-05-24

## 2021-05-24 RX ORDER — BETAMETHASONE DIPROPIONATE 0.05 %
OINTMENT (GRAM) TOPICAL ONCE
Status: CANCELLED | OUTPATIENT
Start: 2021-05-24 | End: 2021-05-24

## 2021-05-24 RX ORDER — TRIAMTERENE AND HYDROCHLOROTHIAZIDE 37.5; 25 MG/1; MG/1
1 TABLET ORAL DAILY
Qty: 90 TABLET | Refills: 1 | Status: SHIPPED | OUTPATIENT
Start: 2021-05-24 | End: 2021-10-06 | Stop reason: SDUPTHER

## 2021-05-24 RX ORDER — BACITRACIN ZINC AND POLYMYXIN B SULFATE 500; 1000 [USP'U]/G; [USP'U]/G
OINTMENT TOPICAL ONCE
Status: CANCELLED | OUTPATIENT
Start: 2021-05-24 | End: 2021-05-24

## 2021-05-24 RX ORDER — LIDOCAINE HYDROCHLORIDE 40 MG/ML
SOLUTION TOPICAL ONCE
Status: CANCELLED | OUTPATIENT
Start: 2021-05-24 | End: 2021-05-24

## 2021-05-24 RX ADMIN — LIDOCAINE HYDROCHLORIDE 5 ML: 40 SOLUTION TOPICAL at 11:03

## 2021-05-24 ASSESSMENT — PAIN SCALES - GENERAL: PAINLEVEL_OUTOF10: 0

## 2021-05-24 NOTE — PROGRESS NOTES
Substance Use Topics    Alcohol use: Not on file    Drug use: Never       ALLERGIES    Allergies   Allergen Reactions    Iodides Other (See Comments)     IVP dye, pt does not remember he reaction, just that the nurses stated to not let anyone give to her ever again    Furosemide      Pt. States it makes her \"ditzy\", dizzy, and gives her muscle ridgity and nausea. MEDICATIONS    Current Outpatient Medications on File Prior to Encounter   Medication Sig Dispense Refill    metOLazone (ZAROXOLYN) 5 MG tablet TAKE ONE TABLET BY MOUTH DAILY AS NEEDED FOR SWELLING 30 tablet 1    mupirocin (BACTROBAN) 2 % ointment APPLY TO AFFECTED AREA THREE TIMES A DAY 22 g 0    albuterol sulfate HFA (PROAIR HFA) 108 (90 Base) MCG/ACT inhaler Inhale 2 puffs into the lungs every 6 hours as needed for Wheezing 1 Inhaler 0    Nebulizers (AIRIAL COMPACT MINI NEBULIZER) MISC 1 nebule by Does not apply route 4 times daily 1 each 0     No current facility-administered medications on file prior to encounter. REVIEW OF SYSTEMS    Pertinent items are noted in HPI.       Objective:      BP (!) 170/97   Pulse 92   Temp 98.2 °F (36.8 °C) (Temporal)   Resp 18     Wt Readings from Last 3 Encounters:   05/12/21 222 lb 3.2 oz (100.8 kg)   05/03/21 223 lb 5.2 oz (101.3 kg)   02/08/21 227 lb (103 kg)       PHYSICAL EXAM    General Appearance: alert and oriented to person, place and time, well developed and well- nourished, in no acute distress  Skin: warm and dry  Head: normocephalic and atraumatic  Eyes: pupils equal, round, and reactive to light, extraocular eye movements intact, conjunctivae normal  Neck: supple and non-tender without mass  Pulmonary/Chest: normal air movement, no respiratory distress  Cardiovascular: Right DP +1  Extremities: no cyanosis or clubbing, RLE +1 pitting edema  Musculoskeletal: normal range of motion, no joint swelling, deformity or tenderness  Neurologic: reflexes normal and symmetric, no cranial nerve deficit, gait, coordination and speech normal      Assessment:        Problem List Items Addressed This Visit     Skin ulcer of right lower leg with fat layer exposed ([address]    Relevant Orders    Initiate Outpatient Wound Care Protocol    Peripheral venous insufficiency    Relevant Orders    Initiate Outpatient Wound Care Protocol    Leg swelling - Primary    Relevant Orders    Initiate Outpatient Wound Care Protocol           Procedure Note  Indications:  Based on my examination of this patient's wound(s)/ulcer(s) today, debridement is required to promote healing and evaluate the wound base. Performed by: Lilyan Duverney, APRN - CNP    Consent obtained:  Yes    Time out taken:  Yes    Pain Control: Anesthetic  Anesthetic: 4% Lidocaine Liquid Topical (5 ml)       Debridement: Excisional Debridement    Using curette the wound(s)/ulcer(s) was/were debrided down through and including the removal of epidermis, dermis and subcutaneous tissue. Devitalized Tissue Debrided:  biofilm and slough    Pre Debridement Measurements:  Are located in the [facility]  Documentation Flow Sheet    Diabetic/Pressure/Non Pressure Ulcers only:  Ulcer: Non-Pressure ulcer, fat layer exposed     Wound/Ulcer #: 1    Post Debridement Measurements:  Wound/Ulcer Descriptions are Pre Debridement except measurements:    Wound 04/12/21 Leg Right; Lower; Lateral #1 (4/7/21) (Active)   Wound Image   05/03/21 1032   Wound Etiology Venous 04/12/21 0927   Dressing Status Old drainage noted 05/17/21 1106   Wound Cleansed Cleansed with saline 05/10/21 1124   Dressing/Treatment Alginate with Ag;Roll gauze;Gauze dressing/dressing sponge 05/24/21 1156   Wound Length (cm) 3.8 cm 05/24/21 1059   Wound Width (cm) 1.6 cm 05/24/21 1059   Wound Depth (cm) 0.1 cm 05/24/21 1059   Wound Surface Area (cm^2) 6.08 cm^2 05/24/21 1059   Change in Wound Size % (l*w) -111.11 05/24/21 1059   Wound Volume (cm^3) 0.61 cm^3 05/24/21 1059   Wound Healing % -110 05/24/21 1059   Post-Procedure Length (cm) 3.9 cm 05/24/21 1119   Post-Procedure Width (cm) 1.7 cm 05/24/21 1119   Post-Procedure Depth (cm) 0.2 cm 05/24/21 1119   Post-Procedure Surface Area (cm^2) 6.63 cm^2 05/24/21 1119   Post-Procedure Volume (cm^3) 1.33 cm^3 05/24/21 1119   Wound Assessment Granulation tissue;Slough 05/24/21 1059   Drainage Amount Moderate 05/24/21 1059   Drainage Description Serosanguinous 05/24/21 1059   Odor None 05/24/21 1059   Consuelo-wound Assessment Dry/flaky 05/24/21 1059   Margins Undefined edges 05/24/21 1059   Wound Thickness Description not for Pressure Injury Full thickness 05/24/21 1059   Number of days: 42          Total Surface Area Debrided:  6.63 sq cm     Estimated Blood Loss:  Minimal    Hemostasis Achieved:  by pressure    Procedural Pain:  0  / 10     Post Procedural Pain:  0 / 10     Response to treatment:  Well tolerated by patient. PATIENT EDUCATION:  Patient is in need of compression. We discussed and demonstrated use of Circ-Aids (the Compreflex Lite System by Angelica Mcmillan). Patient was given a prescription. Plan:     Treatment Note please see attached Discharge Instructions    Written patient dismissal instructions given to patient and signed by patient or POA. Discharge [address] and Hyperbaric Oxygen Therapy   Physician Orders and Discharge Instructions  [place] [address] [place] [place] [address]  Telephone: [phone]      FAX [phone]     NAME: Gaurav Durham OF BIRTH:  1945  MEDICAL RECORD NUMBER:  [id number]  DATE:  5/24/2021     Wound Cleansing:   Do not scrub or use excessive force.   Cleanse wound prior to applying a clean dressing with:  [x]?????? Normal Saline  [x]?????? Keep Wound Dry in Shower    []?????? Wound Cleanser   []?????? Cleanse wound with Mild Soap & Water  []?????? May Shower at Discharge   []?????? Other:        Topical Treatments:  Do not apply lotions, creams, or ointments to wound bed unless directed.   []?????? Apply moisturizing lotion to skin surrounding the wound prior to dressing change.  []?????? Apply antifungal ointment to skin surrounding the wound prior to dressing change.  []?????? Apply thin film of moisture barrier ointment to skin immediately around wound.  []?????? Other:                  Dressings:      Wound Location : RIGHT LATERAL LEG WOUND                [x]?????? Apply Primary Dressing:                                             [x]?????? SILVER ALGINATE                    []??????                                        []?????? Other:       [x]?????? Cover and Secure with:                   [x]?????? Gauze         []?????? Filiberto           [x]?????? Roll gauze              []?????? Ace Wrap   []?????? Cover Roll Tape     []?????? ABD                                      []?????? Other:               Avoid contact of tape with skin. [x]?????? Change dressing:   []?????? Daily           []?????? Every Other Day    [x]?????? Three times per week              []?????? Once a week          []?????? Do Not Change Dressing              []?????? Other:                      Edema Control:  Apply:  []?????? Compression Stocking       []???? ? ? Right Leg     []???? ? ? Left Leg              []?????? Tubigrip      []???? ? ? Right Leg Double Layer      []???? ? ? Left Leg Double Layer                                                  []???? ? ? Right Leg Single Layer       []???? ? ? Left Leg Single Layer              [x]?????? SpandaGrip            [x]???? ? ? Right Leg     [x]???? ? ? Left Leg                                      []???? ??Low compression 5-10 mm/Hg                                             [x]???? ? ? Medium compression 10-20 mm/Hg                                      []???? ? ? High compression  20-30 mm/Hg              every morning immediately when getting up should be applied to affected leg(s) from mid foot to knee making sure to cover the heel.  Remove every night before going to bed.              [x]?????? Elevate leg(s) above the level of the heart when sitting.                 [x]?????? Avoid prolonged standing in one place.                   Compression:  Apply:  []?????? Multilayer Compression Wrap Applied in Clinic    []???? ? ? RightLeg      []???? ? ? Left Leg              []?????? Multi-layer compression.  Do not get leg(s) with wrap wet.  If wraps become too tight call the center or completely remove the wrap.                      []?????? Elevate leg(s) above the level of the heart when sitting.                 []?????? Avoid prolonged standing in one place.     Off-Loading:   []?????? Off-loading when    []?????? walking       []?????? in bed         []?????? sitting  []?????? Total non-weight bearing  []?????? Right Leg  []?????? Left Leg          []?????? Assistive Device     []?????? Walker        []?????? Cane           []?????? Wheelchair  []?????? Crutches              []?????? Surgical shoe    []?????? Podus Boot(s)   []?????? Foam Boot(s)  []?????? Roll About              []?????? Cast Boot   []?????? CROW Boot  []?????? Other:                   Dietary:  [x]?????? Diet as tolerated:     []?????? Calorie Diabetic Diet:           []?????? No Added Salt:  []?????? Increase Protein:     []?????? Other:                Activity:  [x]?????? Activity as tolerated:  []?????? Patient has no activity restrictions     []?????? Strict Bedrest: []?????? Remain off Work:     []?????? May return to full duty work:                                    []?????? Return to work with [address]     If you are still having pain after you go home:  [x]?????? Elevate the affected limb.    [x]?????? Use over-the-counter medications you would normally use for pain as permitted by your family doctor.   [x]?????? For persistent pain not relieved by the above interventions, please call your family doctor.             Return Appointment:  [x]?????? Wound and dressing supply provider: [facility]  []?????? ECF or Home Healthcare:  []?????? Wound Assessment:          []?????? Physician or NP scheduled for Wound Assessment:   [x]?????? Return Appointment: With DR GALLEGOS OR Felix Villa CNP  in  2 Week(s)  []?????? Ordered tests:        **PLEASE BRING YOUR COMPRESSION HOSE WITH YOU TO EACH VISIT**     **4/12/2021 : SCRIPT GIVEN FOR CIRC-AIDS**     Nurse Case Manger: JOSE MANUEL     Electronically signed by Shaji Poe RN on 5/24/2021 at 11:20 AM    [address] [place] [address] Information: Should you experience any significant changes in your wound(s) or have questions about your wound care, please contact the [address] [place] [address] [phone] [address] 8:00 am - 4:30 pm and Friday 8:00 am - 12:30 pm.  If you need help with your wound outside these hours and cannot wait until we are again available, contact your PCP or go to the hospital emergency room.      PLEASE NOTE: IF YOU ARE UNABLE TO OBTAIN WOUND SUPPLIES, CONTINUE TO USE THE SUPPLIES YOU HAVE AVAILABLE UNTIL YOU ARE ABLE TO [facility].  IT IS MOST IMPORTANT TO KEEP THE WOUND COVERED AT ALL TIMES.     Physician Signature:_______________________     Date: ___________ Time:  ____________                                [X] Kenneth Appiah CNP  [ ] DR Sen Jorgensen        Electronically signed by VIDA Stephenson CNP on 5/24/2021 at 12:50 PM

## 2021-06-01 ENCOUNTER — OFFICE VISIT (OUTPATIENT)
Dept: GYNECOLOGY | Age: 76
End: 2021-06-01
Payer: MEDICARE

## 2021-06-01 VITALS
BODY MASS INDEX: 35.19 KG/M2 | HEART RATE: 105 BPM | TEMPERATURE: 98.5 F | DIASTOLIC BLOOD PRESSURE: 80 MMHG | WEIGHT: 218 LBS | SYSTOLIC BLOOD PRESSURE: 140 MMHG

## 2021-06-01 DIAGNOSIS — Z01.419 WELL WOMAN EXAM WITH ROUTINE GYNECOLOGICAL EXAM: Primary | ICD-10-CM

## 2021-06-01 PROCEDURE — G8427 DOCREV CUR MEDS BY ELIG CLIN: HCPCS | Performed by: OBSTETRICS & GYNECOLOGY

## 2021-06-01 PROCEDURE — G8417 CALC BMI ABV UP PARAM F/U: HCPCS | Performed by: OBSTETRICS & GYNECOLOGY

## 2021-06-01 PROCEDURE — G0101 CA SCREEN;PELVIC/BREAST EXAM: HCPCS | Performed by: OBSTETRICS & GYNECOLOGY

## 2021-06-01 NOTE — PROGRESS NOTES
Subjective:      Patient ID: Braeden Duron is a 70 y.o. female. HPI  pts here for annual gyn exam.  She denies complaints. Up to date regarding mammogram and colon cancer screening. She's had a previous hysterectomy. Review of Systems Pertinent review of systems items discussed above. All others systems items not discussed above were negative. Objective:   Physical Exam  Constitutional:       Appearance: She is well-developed. HENT:      Head: Normocephalic and atraumatic. Neck:      Thyroid: No thyromegaly. Trachea: No tracheal deviation. Cardiovascular:      Rate and Rhythm: Normal rate and regular rhythm. Heart sounds: Normal heart sounds. No murmur heard. Pulmonary:      Effort: Pulmonary effort is normal. No respiratory distress. Breath sounds: Normal breath sounds. No wheezing or rales. Chest:      Breasts:         Right: No mass, nipple discharge or skin change. Left: No mass, nipple discharge or skin change. Abdominal:      General: There is no distension. Palpations: Abdomen is soft. There is no mass. Tenderness: There is no abdominal tenderness. There is no rebound. Genitourinary:     Labia:         Right: No lesion. Left: No lesion. Vagina: Normal. No foreign body. No vaginal discharge. Adnexa:         Right: No mass or tenderness. Left: No mass or tenderness. Rectum: Normal. Guaiac result negative. No mass or external hemorrhoid. Comments: Pap performed. Musculoskeletal:         General: Normal range of motion. Lymphadenopathy:      Cervical: No cervical adenopathy. Neurological:      Mental Status: She is alert and oriented to person, place, and time. Assessment:   Normal gyn exam     Plan:   F/u annual gyn exam.  Call with results.        Jared Zavala MD

## 2021-06-04 ENCOUNTER — TELEPHONE (OUTPATIENT)
Dept: GYNECOLOGY | Age: 76
End: 2021-06-04

## 2021-06-07 ENCOUNTER — HOSPITAL ENCOUNTER (OUTPATIENT)
Dept: WOUND CARE | Age: 76
Discharge: HOME | End: 2021-06-07
Payer: MEDICARE

## 2021-06-07 VITALS
WEIGHT: 219.58 LBS | HEART RATE: 94 BPM | DIASTOLIC BLOOD PRESSURE: 80 MMHG | SYSTOLIC BLOOD PRESSURE: 171 MMHG | HEIGHT: 66 IN | TEMPERATURE: 98.1 F | BODY MASS INDEX: 35.29 KG/M2 | RESPIRATION RATE: 18 BRPM

## 2021-06-07 DIAGNOSIS — I87.2 PERIPHERAL VENOUS INSUFFICIENCY: ICD-10-CM

## 2021-06-07 DIAGNOSIS — M79.89 LEG SWELLING: Primary | ICD-10-CM

## 2021-06-07 DIAGNOSIS — L97.912 SKIN ULCER OF RIGHT LOWER LEG WITH FAT LAYER EXPOSED (HCC): ICD-10-CM

## 2021-06-07 PROCEDURE — 11042 DBRDMT SUBQ TIS 1ST 20SQCM/<: CPT | Performed by: NURSE PRACTITIONER

## 2021-06-07 PROCEDURE — 11042 DBRDMT SUBQ TIS 1ST 20SQCM/<: CPT

## 2021-06-07 RX ORDER — LIDOCAINE 40 MG/G
CREAM TOPICAL ONCE
Status: CANCELLED | OUTPATIENT
Start: 2021-06-07 | End: 2021-06-07

## 2021-06-07 RX ORDER — BACITRACIN, NEOMYCIN, POLYMYXIN B 400; 3.5; 5 [USP'U]/G; MG/G; [USP'U]/G
OINTMENT TOPICAL ONCE
Status: CANCELLED | OUTPATIENT
Start: 2021-06-07 | End: 2021-06-07

## 2021-06-07 RX ORDER — GINSENG 100 MG
CAPSULE ORAL ONCE
Status: CANCELLED | OUTPATIENT
Start: 2021-06-07 | End: 2021-06-07

## 2021-06-07 RX ORDER — GENTAMICIN SULFATE 1 MG/G
OINTMENT TOPICAL ONCE
Status: CANCELLED | OUTPATIENT
Start: 2021-06-07 | End: 2021-06-07

## 2021-06-07 RX ORDER — CLOBETASOL PROPIONATE 0.5 MG/G
OINTMENT TOPICAL ONCE
Status: CANCELLED | OUTPATIENT
Start: 2021-06-07 | End: 2021-06-07

## 2021-06-07 RX ORDER — BETAMETHASONE DIPROPIONATE 0.05 %
OINTMENT (GRAM) TOPICAL ONCE
Status: CANCELLED | OUTPATIENT
Start: 2021-06-07 | End: 2021-06-07

## 2021-06-07 RX ORDER — LIDOCAINE HYDROCHLORIDE 20 MG/ML
JELLY TOPICAL ONCE
Status: CANCELLED | OUTPATIENT
Start: 2021-06-07 | End: 2021-06-07

## 2021-06-07 RX ORDER — LIDOCAINE HYDROCHLORIDE 40 MG/ML
SOLUTION TOPICAL ONCE
Status: COMPLETED | OUTPATIENT
Start: 2021-06-07 | End: 2021-06-07

## 2021-06-07 RX ORDER — BACITRACIN ZINC AND POLYMYXIN B SULFATE 500; 1000 [USP'U]/G; [USP'U]/G
OINTMENT TOPICAL ONCE
Status: CANCELLED | OUTPATIENT
Start: 2021-06-07 | End: 2021-06-07

## 2021-06-07 RX ORDER — LIDOCAINE HYDROCHLORIDE 40 MG/ML
SOLUTION TOPICAL ONCE
Status: CANCELLED | OUTPATIENT
Start: 2021-06-07 | End: 2021-06-07

## 2021-06-07 RX ORDER — LIDOCAINE 50 MG/G
OINTMENT TOPICAL ONCE
Status: CANCELLED | OUTPATIENT
Start: 2021-06-07 | End: 2021-06-07

## 2021-06-07 RX ADMIN — LIDOCAINE HYDROCHLORIDE: 40 SOLUTION TOPICAL at 10:50

## 2021-06-07 ASSESSMENT — PAIN SCALES - GENERAL
PAINLEVEL_OUTOF10: 0
PAINLEVEL_OUTOF10: 0

## 2021-06-07 NOTE — PROGRESS NOTES
[address]   Progress Note and Procedure Note      [address] RECORD NUMBER:  [id number]  AGE: 70 y.o. GENDER: female  : 1945  EPISODE DATE:  2021    Subjective:     Chief Complaint   Patient presents with    Wound Check     Follow up for right lower leg wound          HISTORY of PRESENT ILLNESS HPI    Giuliana Ramirez a 70 y. o. female who presents today for wound/ulcer evaluation. History of Wound Context: recurrent right lower lateral and posterior leg ulcers.  Patient reports that on 21 she noticed her bedding was wet and discovered the wound.  She had not been wearing any compression, relying solely on diuretics.  We tried using bilateral multi-layered compression wraps and Coban Lite wraps.  In both instances, the patient complained that they were too tight.  She had been tolerating Spandagrips, but stopped wearing this this week because they are leaving a ring around her leg. She has been sitting a lot with her legs elevated.   Her RLE has +4 pitting edema today.      Wound/Ulcer Pain Timing/Severity: none  Quality of pain: N/A  Severity:  0 / 10   Modifying Factors: Pain worsens with an increase in the amount of swelling in her lower leg  Associated Signs/Symptoms: edema and drainage     Ulcer Identification:  Ulcer Type: venous     Contributing Factors: edema, venous stasis and obesity     Acute Wound: N/A not an acute wound    PAST MEDICAL HISTORY        Diagnosis Date    Asthma     Localized edema 2021    Non-pressure chronic ulcer of right lower leg with fat layer exposed ([address] 2021    Peripheral venous insufficiency 2021    Right foot ulcer, limited to breakdown of skin ([address] 2021       PAST SURGICAL HISTORY    Past Surgical History:   Procedure Laterality Date    APPENDECTOMY      CHOLECYSTECTOMY         FAMILY HISTORY    Family History   Problem Relation Age of Onset    Migraines Mother        SOCIAL HISTORY    Social History     Tobacco Use    Smoking status: Never Smoker    Smokeless tobacco: Never Used   Substance Use Topics    Alcohol use: Not on file    Drug use: Never       ALLERGIES    Allergies   Allergen Reactions    Iodides Other (See Comments)     IVP dye, pt does not remember he reaction, just that the nurses stated to not let anyone give to her ever again    Furosemide      Pt. States it makes her \"ditzy\", dizzy, and gives her muscle ridgity and nausea. MEDICATIONS    Current Outpatient Medications on File Prior to Encounter   Medication Sig Dispense Refill    triamterene-hydroCHLOROthiazide (MAXZIDE-25) 37.5-25 MG per tablet Take 1 tablet by mouth daily 90 tablet 1    metOLazone (ZAROXOLYN) 5 MG tablet TAKE ONE TABLET BY MOUTH DAILY AS NEEDED FOR SWELLING 30 tablet 1    mupirocin (BACTROBAN) 2 % ointment APPLY TO AFFECTED AREA THREE TIMES A DAY 22 g 0    albuterol sulfate HFA (PROAIR HFA) 108 (90 Base) MCG/ACT inhaler Inhale 2 puffs into the lungs every 6 hours as needed for Wheezing 1 Inhaler 0    Nebulizers (AIRIAL COMPACT MINI NEBULIZER) MISC 1 nebule by Does not apply route 4 times daily 1 each 0     No current facility-administered medications on file prior to encounter. REVIEW OF SYSTEMS    Pertinent items are noted in HPI.       Objective:      BP (!) 171/80   Pulse 94   Temp 98.1 °F (36.7 °C) (Oral)   Resp 18   Ht 5' 6\" (1.676 m)   Wt 219 lb 9.3 oz (99.6 kg)   BMI 35.44 kg/m²     Wt Readings from Last 3 Encounters:   06/07/21 219 lb 9.3 oz (99.6 kg)   06/01/21 218 lb (98.9 kg)   05/12/21 222 lb 3.2 oz (100.8 kg)       PHYSICAL EXAM      General Appearance: alert and oriented to person, place and time, well developed and well- nourished, in no acute distress  Skin: warm and dry  Head: normocephalic and atraumatic  Eyes: pupils equal, round, and reactive to light, extraocular eye movements intact, conjunctivae normal  Neck: supple and non-tender without mass  Pulmonary/Chest: normal air movement, no respiratory distress  Cardiovascular: Right DP +1  Extremities: no cyanosis or clubbing, RLE +4 pitting edema  Musculoskeletal: normal range of motion, no joint swelling, deformity or tenderness  Neurologic: reflexes normal and symmetric, no cranial nerve deficit, gait, coordination and speech normal      Assessment:        Problem List Items Addressed This Visit     Skin ulcer of right lower leg with fat layer exposed ([address]    Relevant Orders    Initiate Outpatient Wound Care Protocol    Peripheral venous insufficiency    Relevant Orders    Initiate Outpatient Wound Care Protocol    Leg swelling - Primary    Relevant Orders    Initiate Outpatient Wound Care Protocol           Procedure Note  Indications:  Based on my examination of this patient's wound(s)/ulcer(s) today, debridement is required to promote healing and evaluate the wound base. Performed by: VIDA Lomeli CNP    Consent obtained:  Yes    Time out taken:  Yes    Pain Control: Anesthetic  Anesthetic: 4% Lidocaine Liquid Topical (2.5 ml )       Debridement: Excisional Debridement    Using curette the wound(s)/ulcer(s) was/were debrided down through and including the removal of epidermis, dermis and subcutaneous tissue. Devitalized Tissue Debrided:  biofilm and slough    Pre Debridement Measurements:  Are located in the [facility]  Documentation Flow Sheet    Diabetic/Pressure/Non Pressure Ulcers only:  Ulcer: Non-Pressure ulcer, fat layer exposed     Wound/Ulcer #: 1    Post Debridement Measurements:  Wound/Ulcer Descriptions are Pre Debridement except measurements:    Wound 04/12/21 Leg Right; Lower; Lateral #1 (4/7/21) (Active)   Wound Image   06/07/21 1044   Wound Etiology Venous 04/12/21 0927   Dressing Status New dressing applied 06/07/21 1210   Wound Cleansed Cleansed with saline 06/07/21 1210   Dressing/Treatment Alginate with Ag 06/07/21 1210   Wound Length (cm) 2.8 cm 06/07/21 1044 Wound Width (cm) 1.7 cm 06/07/21 1044   Wound Depth (cm) 0.1 cm 06/07/21 1044   Wound Surface Area (cm^2) 4.76 cm^2 06/07/21 1044   Change in Wound Size % (l*w) -65.28 06/07/21 1044   Wound Volume (cm^3) 0.48 cm^3 06/07/21 1044   Wound Healing % -66 06/07/21 1044   Post-Procedure Length (cm) 2.9 cm 06/07/21 1153   Post-Procedure Width (cm) 1.8 cm 06/07/21 1153   Post-Procedure Depth (cm) 0.2 cm 06/07/21 1153   Post-Procedure Surface Area (cm^2) 5.22 cm^2 06/07/21 1153   Post-Procedure Volume (cm^3) 1.04 cm^3 06/07/21 1153   Wound Assessment Granulation tissue;Slough 06/07/21 1044   Drainage Amount Moderate 06/07/21 1044   Drainage Description Serosanguinous 06/07/21 1044   Odor None 06/07/21 1044   Consuelo-wound Assessment Dry/flaky 06/07/21 1044   Margins Undefined edges 06/07/21 1044   Wound Thickness Description not for Pressure Injury Full thickness 06/07/21 1044   Number of days: 56       Wound 06/07/21 Toe (Comment  which one) Right #2(acquired on 5/31/21-right third toe) (Active)   Wound Image   06/07/21 1200   Wound Etiology Pressure Stage  2 06/07/21 1200   Dressing Status New dressing applied 06/07/21 1210   Dressing/Treatment Antibacterial ointment 06/07/21 1210   Wound Length (cm) 0.4 cm 06/07/21 1200   Wound Width (cm) 0.6 cm 06/07/21 1200   Wound Depth (cm) 0.1 cm 06/07/21 1200   Wound Surface Area (cm^2) 0.24 cm^2 06/07/21 1200   Wound Volume (cm^3) 0.02 cm^3 06/07/21 1200   Post-Procedure Length (cm) 0.5 cm 06/07/21 1200   Post-Procedure Width (cm) 0.7 cm 06/07/21 1200   Post-Procedure Depth (cm) 0.2 cm 06/07/21 1200   Post-Procedure Surface Area (cm^2) 0.35 cm^2 06/07/21 1200   Post-Procedure Volume (cm^3) 0.07 cm^3 06/07/21 1200   Wound Assessment Granulation tissue;Slough 06/07/21 1200   Drainage Amount None 06/07/21 1200   Odor None 06/07/21 1200   Consuelo-wound Assessment Dry/flaky 06/07/21 1200   Margins Attached edges 06/07/21 1200   Wound Thickness Description not for Pressure Injury Full thickness 06/07/21 1200   Number of days: 0          Total Surface Area Debrided:  5.22 sq cm     Estimated Blood Loss:  Minimal    Hemostasis Achieved:  by pressure    Procedural Pain:  0  / 10     Post Procedural Pain:  0 / 10     Response to treatment:  Well tolerated by patient.     Leonidas Morton focused on the need for some compression since she has +4 pitting edema in her RLE. We are applying a Spandagrip light with the lowest compression possible. It improves blood flow in the leg, prevents blood clotting and inhibits the progression of a variety of venous disorders. The Spandagrip helps squeeze the venous blood back up toward the heart, to enhance circulation. Plan:     Treatment Note please see attached Discharge Instructions    Written patient dismissal instructions given to patient and signed by patient or POA. Discharge [address] and Hyperbaric Oxygen Therapy   Physician Orders and Discharge Instructions  [place] [address] [address] [facility] [address], [facility] [address]  Telephone: [phone]      FAX [phone]     NAME: Desmond Ferguson  DATE OF BIRTH:  1945  MEDICAL RECORD NUMBER:  [id number]  DATE:  6/7/2021     Wound Cleansing:   Do not scrub or use excessive force.   Cleanse wound prior to applying a clean dressing with:  [x]??????? Normal Saline  [x]??????? Keep Wound Dry in Shower    []??????? Wound Cleanser   []??????? Cleanse wound with Mild Soap & Water  []??????? May Shower at Discharge   []??????? Other:        Topical Treatments:  Do not apply lotions, creams, or ointments to wound bed unless directed.   []??????? Apply moisturizing lotion to skin surrounding the wound prior to dressing change.  []??????? Apply antifungal ointment to skin surrounding the wound prior to dressing change.  []??????? Apply thin film of moisture barrier ointment to skin immediately around wound.  []??????? Other: place.                   Compression:  Apply:  []??????? Multilayer Compression Wrap Applied in Clinic    []??????? RightLeg      []????? ?? Left Leg              []??????? Multi-layer compression.  Do not get leg(s) with wrap wet.  If wraps become too tight call the center or completely remove the wrap.                      []??????? Elevate leg(s) above the level of the heart when sitting.                 []??????? Avoid prolonged standing in one place.     Off-Loading:   []??????? Off-loading when    []??????? walking       []??????? in bed         []??????? sitting  []??????? Total non-weight bearing  []??????? Right Leg  []??????? Left Leg          []??????? Assistive Device     []??????? Walker        []??????? Cane           []??????? Wheelchair  []??????? Crutches              []??????? Surgical shoe    []??????? Podus Boot(s)   []??????? Foam Boot(s)  []??????? Roll About              []??????? Cast Boot   []??????? CROW Boot  []??????? Other:                   Dietary:  [x]??????? Diet as tolerated:     []??????? Calorie Diabetic Diet:           []??????? No Added Salt:  []??????? Increase Protein:     []??????? Other:                Activity:  [x]??????? Activity as tolerated:  []??????? Patient has no activity restrictions     []??????? Strict Bedrest: []??????? Remain off Work:     []??????? May return to full duty work:                                    []??????? Return to work with [address]     If you are still having pain after you go home:  [x]??????? Elevate the affected limb.    [x]??????? Use over-the-counter medications you would normally use for pain as permitted by your family doctor.   [x]??????? For persistent pain not relieved by the above interventions, please call your family doctor.             Return Appointment:  [x]??????? Wound and dressing supply provider: [facility]  []??????? ECF or Home Healthcare:  []??????? Wound Assessment:          []??????? Physician or NP scheduled for Wound Assessment:   [x]??????? Return Appointment: With DR GALLEGOS  in 1 Week(s) ON Monday THEN DR FELTON THE FOLLOWING WEEK TO ALSO EVALUATE RIGHT 3RD AND 5TH TOES    []??????? Ordered tests:        **PLEASE BRING YOUR COMPRESSION HOSE WITH YOU TO EACH VISIT**     **4/12/2021 : SCRIPT GIVEN FOR CIRC-AIDS**     Nurse Case Manger: JOSE MANUEL     Electronically signed by Hill Rubin RN on 6/7/2021 at 11:54 AM    [address] [place] [address] Information: Should you experience any significant changes in your wound(s) or have questions about your wound care, please contact the [address] [place] [address] [phone] [address] 8:00 am - 4:30 pm and Friday 8:00 am - 12:30 pm.  If you need help with your wound outside these hours and cannot wait until we are again available, contact your PCP or go to the hospital emergency room.      PLEASE NOTE: IF YOU ARE UNABLE TO OBTAIN WOUND SUPPLIES, CONTINUE TO USE THE SUPPLIES YOU HAVE AVAILABLE UNTIL YOU ARE ABLE TO [facility].  IT IS MOST IMPORTANT TO KEEP THE WOUND COVERED AT ALL TIMES.     Physician Signature:_______________________     Date: ___________ Time:  ____________                                [X] Rojas Groves CNP  [ ] DR Fern Sandifer        Electronically signed by VIDA Galicia CNP on 6/7/2021 at 3:08 PM

## 2021-06-07 NOTE — TELEPHONE ENCOUNTER
I called pt and left a message on her vm. If she is continuing to have discomfort she may need a pelvic US.

## 2021-06-14 ENCOUNTER — HOSPITAL ENCOUNTER (OUTPATIENT)
Dept: WOUND CARE | Age: 76
Discharge: HOME | End: 2021-06-14
Payer: MEDICARE

## 2021-06-14 VITALS
RESPIRATION RATE: 18 BRPM | DIASTOLIC BLOOD PRESSURE: 88 MMHG | HEART RATE: 88 BPM | TEMPERATURE: 98.2 F | SYSTOLIC BLOOD PRESSURE: 154 MMHG

## 2021-06-14 DIAGNOSIS — L97.912 SKIN ULCER OF RIGHT LOWER LEG WITH FAT LAYER EXPOSED (HCC): ICD-10-CM

## 2021-06-14 DIAGNOSIS — M79.89 LEG SWELLING: Primary | ICD-10-CM

## 2021-06-14 DIAGNOSIS — I87.2 PERIPHERAL VENOUS INSUFFICIENCY: ICD-10-CM

## 2021-06-14 PROCEDURE — 11042 DBRDMT SUBQ TIS 1ST 20SQCM/<: CPT

## 2021-06-14 PROCEDURE — 11042 DBRDMT SUBQ TIS 1ST 20SQCM/<: CPT | Performed by: SURGERY

## 2021-06-14 RX ORDER — LIDOCAINE HYDROCHLORIDE 40 MG/ML
SOLUTION TOPICAL ONCE
Status: COMPLETED | OUTPATIENT
Start: 2021-06-14 | End: 2021-06-14

## 2021-06-14 RX ORDER — LIDOCAINE HYDROCHLORIDE 20 MG/ML
JELLY TOPICAL ONCE
Status: CANCELLED | OUTPATIENT
Start: 2021-06-14 | End: 2021-06-14

## 2021-06-14 RX ORDER — GENTAMICIN SULFATE 1 MG/G
OINTMENT TOPICAL ONCE
Status: CANCELLED | OUTPATIENT
Start: 2021-06-14 | End: 2021-06-14

## 2021-06-14 RX ORDER — GINSENG 100 MG
CAPSULE ORAL ONCE
Status: CANCELLED | OUTPATIENT
Start: 2021-06-14 | End: 2021-06-14

## 2021-06-14 RX ORDER — LIDOCAINE 50 MG/G
OINTMENT TOPICAL ONCE
Status: CANCELLED | OUTPATIENT
Start: 2021-06-14 | End: 2021-06-14

## 2021-06-14 RX ORDER — BACITRACIN ZINC AND POLYMYXIN B SULFATE 500; 1000 [USP'U]/G; [USP'U]/G
OINTMENT TOPICAL ONCE
Status: CANCELLED | OUTPATIENT
Start: 2021-06-14 | End: 2021-06-14

## 2021-06-14 RX ORDER — BACITRACIN, NEOMYCIN, POLYMYXIN B 400; 3.5; 5 [USP'U]/G; MG/G; [USP'U]/G
OINTMENT TOPICAL ONCE
Status: CANCELLED | OUTPATIENT
Start: 2021-06-14 | End: 2021-06-14

## 2021-06-14 RX ORDER — BETAMETHASONE DIPROPIONATE 0.05 %
OINTMENT (GRAM) TOPICAL ONCE
Status: CANCELLED | OUTPATIENT
Start: 2021-06-14 | End: 2021-06-14

## 2021-06-14 RX ORDER — LIDOCAINE 40 MG/G
CREAM TOPICAL ONCE
Status: CANCELLED | OUTPATIENT
Start: 2021-06-14 | End: 2021-06-14

## 2021-06-14 RX ORDER — CLOBETASOL PROPIONATE 0.5 MG/G
OINTMENT TOPICAL ONCE
Status: CANCELLED | OUTPATIENT
Start: 2021-06-14 | End: 2021-06-14

## 2021-06-14 RX ORDER — LIDOCAINE HYDROCHLORIDE 40 MG/ML
SOLUTION TOPICAL ONCE
Status: CANCELLED | OUTPATIENT
Start: 2021-06-14 | End: 2021-06-14

## 2021-06-14 RX ADMIN — LIDOCAINE HYDROCHLORIDE: 40 SOLUTION TOPICAL at 14:10

## 2021-06-14 ASSESSMENT — PAIN SCALES - GENERAL
PAINLEVEL_OUTOF10: 0
PAINLEVEL_OUTOF10: 0

## 2021-06-14 NOTE — PROGRESS NOTES
FAMILY HISTORY     Family History         Family History   Problem Relation Age of Onset    Migraines Mother              SOCIAL HISTORY     Social History           Tobacco Use    Smoking status: Never Smoker    Smokeless tobacco: Never Used   Substance Use Topics    Alcohol use: Not on file    Drug use: Never         ALLERGIES           Allergies   Allergen Reactions    Iodides Other (See Comments)       IVP dye, pt does not remember he reaction, just that the nurses stated to not let anyone give to her ever again    Furosemide         Pt.  States it makes her \"ditzy\", dizzy, and gives her muscle ridgity and nausea.          MEDICATIONS            Current Outpatient Medications on File Prior to Encounter   Medication Sig Dispense Refill    triamterene-hydroCHLOROthiazide (MAXZIDE-25) 37.5-25 MG per tablet Take 1 tablet by mouth daily 90 tablet 1    metOLazone (ZAROXOLYN) 5 MG tablet TAKE ONE TABLET BY MOUTH DAILY AS NEEDED FOR SWELLING 30 tablet 1    mupirocin (BACTROBAN) 2 % ointment APPLY TO AFFECTED AREA THREE TIMES A DAY 22 g 0    albuterol sulfate HFA (PROAIR HFA) 108 (90 Base) MCG/ACT inhaler Inhale 2 puffs into the lungs every 6 hours as needed for Wheezing 1 Inhaler 0    Nebulizers (AIRIAL COMPACT MINI NEBULIZER) MISC 1 nebule by Does not apply route 4 times daily 1 each 0      No current facility-administered medications on file prior to encounter.         REVIEW OF SYSTEMS     Pertinent items are noted in HPI.        Objective:       BP (!) 171/80   Pulse 94   Temp 98.1 °F (36.7 °C) (Oral)   Resp 18   Ht 5' 6\" (1.676 m)   Wt 219 lb 9.3 oz (99.6 kg)   BMI 35.44 kg/m²      Wt Readings from Last 3 Encounters:   06/07/21 219 lb 9.3 oz (99.6 kg)   06/01/21 218 lb (98.9 kg)   05/12/21 222 lb 3.2 oz (100.8 kg)         PHYSICAL EXAM        General Appearance: alert and oriented to person, place and time, well developed and well- nourished, in no acute distress  Skin: warm and dry  Head: normocephalic and atraumatic  Eyes: pupils equal, round, and reactive to light, extraocular eye movements intact, conjunctivae normal  Neck: supple and non-tender without mass  Pulmonary/Chest: normal air movement, no respiratory distress  Cardiovascular: Right DP +1  Extremities: no cyanosis or clubbing, RLE +4 pitting edema  Musculoskeletal: normal range of motion, no joint swelling, deformity or tenderness  Neurologic: reflexes normal and symmetric, no cranial nerve deficit, gait, coordination and speech normal        Assessment:              Problem List Items Addressed This Visit           Skin ulcer of right lower leg with fat layer exposed ([address]      Relevant Orders      Initiate Outpatient Wound Care Protocol      Peripheral venous insufficiency      Relevant Orders      Initiate Outpatient Wound Care Protocol      Leg swelling - Primary      Relevant Orders      Initiate Outpatient Wound Care Protocol                  Excisional Debridement Procedure Note  Indications:  Based on my examination of this patient's wound(s)/ulcer(s) today, debridement is required to promote healing and evaluate the wound base. Performed by: Nirmal Spear MD    Consent obtained? Yes    Time out taken: Yes    Pain Control: Anesthetic: 4% Lidocaine Liquid Topical     Debridement:Excisional Debridement    Using curette the wound/ulcer was sharply debrided    down through and included excision of  subcutaneous tissue. Devitalized Tissue Debrided:  biofilm and slough      Pre Debridement Measurements:  Are located in the [place]  Documentation Flow Sheet   Wound/Ulcer #: 1     Post  Debridement Measurements:  Wound 04/12/21 Leg Right; Lower; Lateral #1 (4/7/21) (Active)   Wound Image   06/07/21 1044   Wound Etiology Venous 04/12/21 0927   Dressing Status Old drainage noted 06/14/21 1407   Wound Cleansed Cleansed with saline 06/07/21 1210   Dressing/Treatment Alginate with Ag;Dry dressing 06/14/21 1505   Wound Length 1200   Wound Thickness Description not for Pressure Injury Full thickness 06/07/21 1200   Number of days: 7       Percent of Wound/Ulcer Debrided: 100%    Total Surface Area Debrided:  2.6 sq cm    Diabetic/Pressure/Non Pressure Ulcers only:  Ulcer: N/A    Bleeding: Minimal    Hemostasis Achieved: by pressure    Procedural Pain: 0  / 10     Post Procedural Pain: 0 / 10     Response to treatment:  Well tolerated by patient. Cool of right toe looks healed has the cushion around it to protect the hammertoe is going to see Dr. Rajeev Arenas next weeks      Plan:     Treatment Note please see attached Discharge Instructions    Written patient dismissal instructions given to patient and signed by patient or POA. Discharge [address] and Hyperbaric Oxygen Therapy   Physician Orders and Discharge Instructions  [place] [address] [address] [facility] [address], [facility]  Telephone: [phone]      FAX [phone]     NAME: Kaushik Messina  DATE OF BIRTH:  1945  MEDICAL RECORD NUMBER:  [id number]  DATE:  6/14/2021     Wound Cleansing:   Do not scrub or use excessive force.   Cleanse wound prior to applying a clean dressing with:  [x]???????? Normal Saline  [x]???????? Keep Wound Dry in Shower    []???????? Wound Cleanser   []???????? Cleanse wound with Mild Soap & Water  []???????? May Shower at Discharge   []???????? Other:        Topical Treatments:  Do not apply lotions, creams, or ointments to wound bed unless directed.   []???????? Apply moisturizing lotion to skin surrounding the wound prior to dressing change.  []???????? Apply antifungal ointment to skin surrounding the wound prior to dressing change.  []???????? Apply thin film of moisture barrier ointment to skin immediately around wound.  []???????? Other:                  Dressings:      Wound Location : RIGHT LATERAL LEG WOUND                [x]???????? Apply Primary Dressing:                                             [x]???????? SILVER ALGINATE                    []????????                                        []???????? Other:       [x]???????? Cover and Secure with:                   [x]???????? Gauze         []???????? Filiberto           [x]???????? Roll gauze              []???????? Ace Wrap   []???????? Cover Roll Tape     []???????? ABD                                      []???????? Other:               Avoid contact of tape with skin. [x]???????? Change dressing:   []???????? Daily           []???????? Every Other Day    [x]???????? Three times per week              []???????? Once a week          []???????? Do Not Change Dressing              []???????? Other:                   Edema Control:  Apply:  []???????? Compression Stocking       []???????? Right Leg     []?????? ?? Left Leg              []???????? Tubigrip      []???????? Right Leg Double Layer      []?????? ?? Left Leg Double Layer                                                  []???????? Right Leg Single Layer       []?????? ?? Left Leg Single Layer              [x]???????? SpandaGrip            [x]???????? Right Leg     [x]?????? ?? Left Leg                                      [x]?????? ??Low compression 5-10 mm/Hg                                             []?????? ? ? Medium compression 10-20 mm/Hg                                      []?????? ? ? High compression  20-30 mm/Hg              every morning immediately when getting up should be applied to affected leg(s) from mid foot to knee making sure to cover the heel.  Remove every night before going to bed.              [x]???????? Elevate leg(s) above the level of the heart when sitting.                 [x]???????? Avoid prolonged standing in one place.                   Compression:  Apply:  []???????? Multilayer Compression Wrap Applied in Clinic    []???????? RightLeg      []?????? ?? Left Leg              []???????? Multi-layer compression.  Do not get leg(s) with wrap wet.  If wraps become too tight call the center or completely remove the wrap.                      []???????? Elevate leg(s) above the level of the heart when sitting.                 []???????? Avoid prolonged standing in one place.     Off-Loading:   []???????? Off-loading when    []???????? walking       []???????? in bed         []???????? sitting  []???????? Total non-weight bearing  []???????? Right Leg  []???????? Left Leg          []???????? Assistive Device     []???????? Walker        []???????? Cane           []???????? Wheelchair  []???????? Crutches              []???????? Surgical shoe    []???????? Podus Boot(s)   []???????? Foam Boot(s)  []???????? Roll About              []???????? Cast Boot   []???????? CROW Boot  []???????? Other:                   Dietary:  [x]???????? Diet as tolerated:     []???????? Calorie Diabetic Diet:           []???????? No Added Salt:  []???????? Increase Protein:     []???????? Other:                Activity:  [x]???????? Activity as tolerated:  []???????? Patient has no activity restrictions     []???????? Strict Bedrest: []???????? Remain off Work:     []???????? May return to full duty work:                                    []???????? Return to work with [address]     If you are still having pain after you go home:  [x]???????? Elevate the affected limb.    [x]???????? Use over-the-counter medications you would normally use for pain as permitted by your family doctor.   [x]???????? For persistent pain not relieved by the above interventions, please call your family doctor.             Return Appointment:  [x]???????? Wound and dressing supply provider: [facility]  []???????? ECF or Home Healthcare:  []???????? Wound Assessment:          []???????? Physician or NP scheduled for Wound Assessment:   [x]???????? Return Appointment: With DR FELTON  in 1 Week(s)   []???????? Ordered tests:        **PLEASE BRING YOUR COMPRESSION HOSE WITH YOU TO [facility] VISIT**     **4/12/2021 : SCRIPT GIVEN FOR CIRC-AIDS**     Nurse Case Manger: JOSE MANUEL     Electronically signed by Giovanni Cortes RN on 6/14/2021 at 2:45 PM    [facility] Information: Should you experience any significant changes in your wound(s) or have questions about your wound care, please contact the [address] [place] [address] [phone] [address] 8:00 am - 4:30 pm and Friday 8:00 am - 12:30 pm.  If you need help with your wound outside these hours and cannot wait until we are again available, contact your PCP or go to the hospital emergency room.      PLEASE NOTE: IF YOU ARE UNABLE TO OBTAIN WOUND SUPPLIES, CONTINUE TO USE THE SUPPLIES YOU HAVE AVAILABLE UNTIL YOU ARE ABLE TO [facility].  IT IS MOST IMPORTANT TO KEEP THE WOUND COVERED AT ALL TIMES.     Physician Signature:_______________________     Date: ___________ Time:  ____________                                [ ] Viktoria Reyez CNP  [ X] DR Dellie Schlatter  [ ] DR Jeronimo Jaramillo        Electronically signed by Yana Daniel MD on 6/14/2021 at 3:55 PM

## 2021-06-21 RX ORDER — METOLAZONE 5 MG/1
TABLET ORAL
Qty: 30 TABLET | Refills: 1 | Status: SHIPPED | OUTPATIENT
Start: 2021-06-21 | End: 2021-08-02

## 2021-06-24 ENCOUNTER — HOSPITAL ENCOUNTER (OUTPATIENT)
Dept: WOUND CARE | Age: 76
Discharge: HOME | End: 2021-06-24
Payer: MEDICARE

## 2021-06-24 VITALS
TEMPERATURE: 97.8 F | SYSTOLIC BLOOD PRESSURE: 175 MMHG | RESPIRATION RATE: 18 BRPM | HEART RATE: 77 BPM | DIASTOLIC BLOOD PRESSURE: 84 MMHG

## 2021-06-24 DIAGNOSIS — M79.89 LEG SWELLING: Primary | ICD-10-CM

## 2021-06-24 DIAGNOSIS — I87.2 PERIPHERAL VENOUS INSUFFICIENCY: ICD-10-CM

## 2021-06-24 DIAGNOSIS — L97.912 SKIN ULCER OF RIGHT LOWER LEG WITH FAT LAYER EXPOSED (HCC): ICD-10-CM

## 2021-06-24 PROCEDURE — 11042 DBRDMT SUBQ TIS 1ST 20SQCM/<: CPT

## 2021-06-24 RX ORDER — BACITRACIN ZINC AND POLYMYXIN B SULFATE 500; 1000 [USP'U]/G; [USP'U]/G
OINTMENT TOPICAL ONCE
Status: CANCELLED | OUTPATIENT
Start: 2021-06-24 | End: 2021-06-24

## 2021-06-24 RX ORDER — GINSENG 100 MG
CAPSULE ORAL ONCE
Status: CANCELLED | OUTPATIENT
Start: 2021-06-24 | End: 2021-06-24

## 2021-06-24 RX ORDER — LIDOCAINE 40 MG/G
CREAM TOPICAL ONCE
Status: CANCELLED | OUTPATIENT
Start: 2021-06-24 | End: 2021-06-24

## 2021-06-24 RX ORDER — BACITRACIN, NEOMYCIN, POLYMYXIN B 400; 3.5; 5 [USP'U]/G; MG/G; [USP'U]/G
OINTMENT TOPICAL ONCE
Status: CANCELLED | OUTPATIENT
Start: 2021-06-24 | End: 2021-06-24

## 2021-06-24 RX ORDER — CLOBETASOL PROPIONATE 0.5 MG/G
OINTMENT TOPICAL ONCE
Status: CANCELLED | OUTPATIENT
Start: 2021-06-24 | End: 2021-06-24

## 2021-06-24 RX ORDER — GENTAMICIN SULFATE 1 MG/G
OINTMENT TOPICAL ONCE
Status: CANCELLED | OUTPATIENT
Start: 2021-06-24 | End: 2021-06-24

## 2021-06-24 RX ORDER — BETAMETHASONE DIPROPIONATE 0.05 %
OINTMENT (GRAM) TOPICAL ONCE
Status: CANCELLED | OUTPATIENT
Start: 2021-06-24 | End: 2021-06-24

## 2021-06-24 RX ORDER — LIDOCAINE 50 MG/G
OINTMENT TOPICAL ONCE
Status: CANCELLED | OUTPATIENT
Start: 2021-06-24 | End: 2021-06-24

## 2021-06-24 RX ORDER — LIDOCAINE HYDROCHLORIDE 20 MG/ML
JELLY TOPICAL ONCE
Status: CANCELLED | OUTPATIENT
Start: 2021-06-24 | End: 2021-06-24

## 2021-06-24 RX ORDER — LIDOCAINE HYDROCHLORIDE 40 MG/ML
SOLUTION TOPICAL ONCE
Status: CANCELLED | OUTPATIENT
Start: 2021-06-24 | End: 2021-06-24

## 2021-06-24 RX ORDER — LIDOCAINE HYDROCHLORIDE 40 MG/ML
SOLUTION TOPICAL ONCE
Status: COMPLETED | OUTPATIENT
Start: 2021-06-24 | End: 2021-06-24

## 2021-06-24 RX ADMIN — LIDOCAINE HYDROCHLORIDE 2.5 ML: 40 SOLUTION TOPICAL at 10:30

## 2021-06-24 ASSESSMENT — PAIN SCALES - GENERAL
PAINLEVEL_OUTOF10: 0
PAINLEVEL_OUTOF10: 0

## 2021-06-24 NOTE — PROGRESS NOTES
SURGICAL HISTORY    Past Surgical History:   Procedure Laterality Date    APPENDECTOMY      CHOLECYSTECTOMY         FAMILY HISTORY    Family History   Problem Relation Age of Onset    Migraines Mother        SOCIAL HISTORY    Social History     Tobacco Use    Smoking status: Never Smoker    Smokeless tobacco: Never Used   Substance Use Topics    Alcohol use: Not on file    Drug use: Never       ALLERGIES    Allergies   Allergen Reactions    Iodides Other (See Comments)     IVP dye, pt does not remember he reaction, just that the nurses stated to not let anyone give to her ever again    Furosemide      Pt. States it makes her \"ditzy\", dizzy, and gives her muscle ridgity and nausea. MEDICATIONS    Current Outpatient Medications on File Prior to Encounter   Medication Sig Dispense Refill    metOLazone (ZAROXOLYN) 5 MG tablet TAKE ONE TABLET BY MOUTH DAILY AS NEEDED FOR SWELLING 30 tablet 1    triamterene-hydroCHLOROthiazide (MAXZIDE-25) 37.5-25 MG per tablet Take 1 tablet by mouth daily 90 tablet 1    mupirocin (BACTROBAN) 2 % ointment APPLY TO AFFECTED AREA THREE TIMES A DAY 22 g 0    albuterol sulfate HFA (PROAIR HFA) 108 (90 Base) MCG/ACT inhaler Inhale 2 puffs into the lungs every 6 hours as needed for Wheezing 1 Inhaler 0    Nebulizers (AIRIAL COMPACT MINI NEBULIZER) MISC 1 nebule by Does not apply route 4 times daily 1 each 0     No current facility-administered medications on file prior to encounter. REVIEW OF SYSTEMS    Pertinent items are noted in HPI.       Objective:      BP (!) 175/84   Pulse 77   Temp 97.8 °F (36.6 °C) (Temporal)   Resp 18     Wt Readings from Last 3 Encounters:   06/07/21 219 lb 9.3 oz (99.6 kg)   06/01/21 218 lb (98.9 kg)   05/12/21 222 lb 3.2 oz (100.8 kg)       PHYSICAL EXAM      General Appearance: alert and oriented to person, place and time, well developed and well- nourished, in no acute distress  Skin: warm and dry  Patient's nails x10 are thick, yellow, crumbly, and elongated with subungual debris. The right hallux toenail is growing up and curving back on itself causing an indentation on the dorsal aspect of the digit. Upon debridement of the nail the skin is intact however slightly macerated. The previously noted ulceration over the right third digit has epithelialized. There is an ulceration noted on the right lateral lower extremity. Ulceration has fibrotic and nonviable tissue that extends down through and includes the subcutaneous layer. Upon debridement of this the wound does not probe or track to bone and has a granular base. The ulceration is surrounded by a large scar from a previous ulceration. There is 2+ pitting edema noted on the bilateral lower extremities that patient relates is her baseline. Assessment:        Problem List Items Addressed This Visit     Skin ulcer of right lower leg with fat layer exposed ([address]    Relevant Orders    Initiate Outpatient Wound Care Protocol    Peripheral venous insufficiency    Relevant Orders    Initiate Outpatient Wound Care Protocol    Leg swelling - Primary    Relevant Orders    Initiate Outpatient Wound Care Protocol           Procedure Note  Indications:  Based on my examination of this patient's wound(s)/ulcer(s) today, debridement is required to promote healing and evaluate the wound base. Performed by: Jose F Adams DPM    Consent obtained:  Yes    Time out taken:  Yes    Pain Control: Anesthetic  Anesthetic: 4% Lidocaine Liquid Topical (2.5 mls)       Debridement: Excisional Debridement    Using curette the wound(s)/ulcer(s) was/were debrided down through and including the removal of epidermis, dermis and subcutaneous tissue.         Devitalized Tissue Debrided:  biofilm and slough    Pre Debridement Measurements:  Are located in the Wound/Ulcer Documentation Flow Sheet    Diabetic/Pressure/Non Pressure Ulcers only:  Ulcer: Non-Pressure ulcer, fat layer exposed     Wound/Ulcer #: 1    Post Debridement Measurements:  Wound/Ulcer Descriptions are Pre Debridement except measurements:    Wound 04/12/21 Leg Right; Lower; Lateral #1 (4/7/21) (Active)   Wound Image   06/07/21 1044   Wound Etiology Venous 04/12/21 0927   Dressing Status Old drainage noted 06/14/21 1407   Wound Cleansed Cleansed with saline 06/07/21 1210   Dressing/Treatment Alginate with Ag;Gauze dressing/dressing sponge;Roll gauze 06/24/21 1129   Wound Length (cm) 2 cm 06/24/21 1024   Wound Width (cm) 0.8 cm 06/24/21 1024   Wound Depth (cm) 0.1 cm 06/24/21 1024   Wound Surface Area (cm^2) 1.6 cm^2 06/24/21 1024   Change in Wound Size % (l*w) 44.44 06/24/21 1024   Wound Volume (cm^3) 0.16 cm^3 06/24/21 1024   Wound Healing % 45 06/24/21 1024   Post-Procedure Length (cm) 2.2 cm 06/24/21 1117   Post-Procedure Width (cm) 1 cm 06/24/21 1117   Post-Procedure Depth (cm) 0.1 cm 06/24/21 1117   Post-Procedure Surface Area (cm^2) 2.2 cm^2 06/24/21 1117   Post-Procedure Volume (cm^3) 0.22 cm^3 06/24/21 1117   Wound Assessment Granulation tissue;Slough 06/24/21 1024   Drainage Amount Small 06/24/21 1024   Drainage Description Serosanguinous 06/24/21 1024   Odor None 06/24/21 1024   Consuelo-wound Assessment Dry/flaky 06/24/21 1024   Margins Attached edges 06/24/21 1024   Wound Thickness Description not for Pressure Injury Full thickness 06/24/21 1024   Number of days: 73          Total Surface Area Debrided:  2.2 sq cm     Estimated Blood Loss:  Minimal    Hemostasis Achieved:  by pressure    Procedural Pain:  0  / 10     Post Procedural Pain:  0 / 10     Response to treatment:  Well tolerated by patient.     Fiordaliza Shanks focused on the need for some compression since she has +2 pitting edema in her RLE. We are applying a Spandagrip light with the lowest compression possible. It improves blood flow in the leg, prevents blood clotting and inhibits the progression of a variety of venous disorders.   The Spandagrip helps squeeze the venous blood back up toward the heart, to enhance circulation. Prolonged discussion with patient that ulcerations are not likely to heal and remain healed her edema is not better managed. Patient again reiterates that she does not tolerate compression dressings. Plan:   E/M x30 minutes of a new problem of onychomycosis. Patient's nails x10 were debrided in length and thickness. Discussed with patient the need to follow-up with podiatry as an outpatient for routine diabetic foot care to prevent complications such as ulcerations and/or infections from poor nail care. Patient's right lower extremity leg ulceration was excisionally debrided. Treatment Note please see attached Discharge Instructions    Written patient dismissal instructions given to patient and signed by patient or POA. Reappoint 1 week for follow-up or sooner if problems develop        Discharge Instructions       [address] and Hyperbaric Oxygen Therapy   Physician Orders and Discharge [place] [address]  [address] [place] [address], [place] [address]  Telephone: [phone]      FAX [phone]     NAME: Machelle Anderson OF BIRTH:  1945  MEDICAL RECORD NUMBER:  [id number]  DATE:  6/24/2021     Wound Cleansing:   Do not scrub or use excessive force.   Cleanse wound prior to applying a clean dressing with:  [x]????????? Normal Saline  [x]????????? Keep Wound Dry in Shower    []????????? Wound Cleanser   []????????? Cleanse wound with Mild Soap & Water  []????????? May Shower at Discharge   []????????? Other:        Topical Treatments:  Do not apply lotions, creams, or ointments to wound bed unless directed.   []????????? Apply moisturizing lotion to skin surrounding the wound prior to dressing change.  []????????? Apply antifungal ointment to skin surrounding the wound prior to dressing change.  []????????? Apply thin film of moisture barrier ointment to skin immediately around wound.  []????????? Other:                  Dressings:      Wound Location : RIGHT LATERAL LEG WOUND                [x]????????? Apply Primary Dressing:                                             [x]????????? SILVER ALGINATE                    []?????????                                        []????????? Other:       [x]????????? Cover and Secure with:                   [x]????????? Gauze         []????????? Filiberto           [x]????????? Roll gauze              []????????? Ace Wrap   []????????? Cover Roll Tape     []????????? ABD                                      []????????? Other:               Avoid contact of tape with skin. [x]????????? Change dressing:   []????????? Daily           []????????? Every Other Day    [x]????????? Three times per week              []????????? Once a week          []????????? Do Not Change Dressing              []????????? Other:                   Edema Control:  Apply:  []????????? Compression Stocking       []????????? Right Leg     []??????? ?? Left Leg              []????????? Tubigrip      []????????? Right Leg Double Layer      []??????? ?? Left Leg Double Layer                                                  []????????? Right Leg Single Layer       []??????? ?? Left Leg Single Layer              [x]????????? SpandaGrip            [x]????????? Right Leg     [x]??????? ?? Left Leg                                      [x]??????? ??Low compression 5-10 mm/Hg                                             []??????? ? ? Medium compression 10-20 mm/Hg                                      []??????? ? ? High compression  20-30 mm/Hg              every morning immediately when getting up should be applied to affected leg(s) from mid foot to knee making sure to cover the heel.  Remove every night before going to bed.              [x]????????? Elevate leg(s) above the level of the heart when sitting.                 [x]????????? Avoid prolonged standing in one place.                   Compression:  Apply:  []????????? Multilayer Compression Wrap Applied in Clinic    []????????? RightLeg      []??????? ?? Left Leg              []????????? Multi-layer compression.  Do not get leg(s) with wrap wet.  If wraps become too tight call the center or completely remove the wrap.                      []????????? Elevate leg(s) above the level of the heart when sitting.                 []????????? Avoid prolonged standing in one place.     Off-Loading:   []????????? Off-loading when    []????????? walking       []????????? in bed         []????????? sitting  []????????? Total non-weight bearing  []????????? Right Leg  []????????? Left Leg          []????????? Assistive Device     []????????? Walker        []????????? Cane           []????????? Wheelchair  []????????? Crutches              []????????? Surgical shoe    []????????? Podus Boot(s)   []????????? Foam Boot(s)  []????????? Roll About              []????????? Cast Boot   []????????? CROW Boot  []????????? Other:                   Dietary:  [x]????????? Diet as tolerated:     []????????? Calorie Diabetic Diet:           []????????? No Added Salt:  []????????? Increase Protein:     []????????? Other:                Activity:  [x]????????? Activity as tolerated:  []????????? Patient has no activity restrictions     []????????? Strict Bedrest: []????????? Remain off Work:     []????????? May return to full duty work:                                    []????????? Return to work with [address]     If you are still having pain after you go home:  [x]????????? Elevate the affected limb.    [x]????????? Use over-the-counter medications you would normally use for pain as permitted by your family doctor.   [x]????????? For persistent pain not relieved by the above interventions, please call your family doctor.             Return Appointment:  [x]????????? Wound and dressing supply provider: [facility]  []????????? ECF or Home Healthcare:  []????????? Wound Assessment:          []????????? Physician or NP scheduled for Wound Assessment:   [x]????????? Return Appointment: With  Ranjan Dubois CNP ON 7/2/2021 THEN Ranjan Dubois CNP OR DR GALLEGOS ON MONDAYS   []????????? Ordered tests:        **PLEASE BRING YOUR COMPRESSION HOSE WITH YOU TO EACH VISIT**     **4/12/2021 : SCRIPT GIVEN FOR CIRC-AIDS**     Nurse Case Manger: JOSE MANUEL     Electronically signed by Janes Ko RN on 6/24/2021 at 11:21 AM      [address] [place] [address] Information: Should you experience any significant changes in your wound(s) or have questions about your wound care, please contact the [address] [place] [address] [phone] [address] [place] 8:00 am - 4:30 pm and Friday 8:00 am - 12:30 pm.  If you need help with your wound outside these hours and cannot wait until we are again available, contact your PCP or go to the hospital emergency room.      PLEASE NOTE: IF YOU ARE UNABLE TO OBTAIN WOUND SUPPLIES, CONTINUE TO USE THE SUPPLIES YOU HAVE AVAILABLE UNTIL YOU ARE ABLE TO [facility].  IT IS MOST IMPORTANT TO KEEP THE WOUND COVERED AT ALL TIMES.     Physician Signature:_______________________     Date: ___________ Time:  ____________                                [ ] Eliana Chase CNP  [ ] DR Alli Coelho.[place] ] DR Williams Rodas        Electronically signed by Wade Jaime DPM on 6/24/2021 at 4:04 PM

## 2021-06-25 ENCOUNTER — TELEPHONE (OUTPATIENT)
Dept: FAMILY MEDICINE CLINIC | Age: 76
End: 2021-06-25

## 2021-06-25 NOTE — TELEPHONE ENCOUNTER
Please let the patient know that she needs to use ice and tylenol. If she has increased headache, vision change, dizziness etc.. she needs evaluated as soon as possible.

## 2021-06-25 NOTE — TELEPHONE ENCOUNTER
Patient states she had a fall yesterday and she fell and hit her head. She did not go to the ER. States her head is sore where she hit and she has a little eye swelling. Please advise.

## 2021-07-02 ENCOUNTER — HOSPITAL ENCOUNTER (OUTPATIENT)
Dept: WOUND CARE | Age: 76
Discharge: HOME | End: 2021-07-02
Payer: MEDICARE

## 2021-07-02 VITALS
TEMPERATURE: 98.1 F | HEART RATE: 81 BPM | SYSTOLIC BLOOD PRESSURE: 154 MMHG | DIASTOLIC BLOOD PRESSURE: 77 MMHG | RESPIRATION RATE: 18 BRPM

## 2021-07-02 DIAGNOSIS — M79.89 LEG SWELLING: Primary | ICD-10-CM

## 2021-07-02 DIAGNOSIS — L97.912 SKIN ULCER OF RIGHT LOWER LEG WITH FAT LAYER EXPOSED (HCC): ICD-10-CM

## 2021-07-02 DIAGNOSIS — I87.2 PERIPHERAL VENOUS INSUFFICIENCY: ICD-10-CM

## 2021-07-02 PROCEDURE — 11042 DBRDMT SUBQ TIS 1ST 20SQCM/<: CPT | Performed by: NURSE PRACTITIONER

## 2021-07-02 PROCEDURE — 11042 DBRDMT SUBQ TIS 1ST 20SQCM/<: CPT

## 2021-07-02 RX ORDER — GINSENG 100 MG
CAPSULE ORAL ONCE
Status: CANCELLED | OUTPATIENT
Start: 2021-07-02 | End: 2021-07-02

## 2021-07-02 RX ORDER — LIDOCAINE HYDROCHLORIDE 20 MG/ML
JELLY TOPICAL ONCE
Status: CANCELLED | OUTPATIENT
Start: 2021-07-02 | End: 2021-07-02

## 2021-07-02 RX ORDER — BACITRACIN ZINC AND POLYMYXIN B SULFATE 500; 1000 [USP'U]/G; [USP'U]/G
OINTMENT TOPICAL ONCE
Status: CANCELLED | OUTPATIENT
Start: 2021-07-02 | End: 2021-07-02

## 2021-07-02 RX ORDER — CLOBETASOL PROPIONATE 0.5 MG/G
OINTMENT TOPICAL ONCE
Status: CANCELLED | OUTPATIENT
Start: 2021-07-02 | End: 2021-07-02

## 2021-07-02 RX ORDER — BACITRACIN, NEOMYCIN, POLYMYXIN B 400; 3.5; 5 [USP'U]/G; MG/G; [USP'U]/G
OINTMENT TOPICAL ONCE
Status: CANCELLED | OUTPATIENT
Start: 2021-07-02 | End: 2021-07-02

## 2021-07-02 RX ORDER — GENTAMICIN SULFATE 1 MG/G
OINTMENT TOPICAL ONCE
Status: CANCELLED | OUTPATIENT
Start: 2021-07-02 | End: 2021-07-02

## 2021-07-02 RX ORDER — LIDOCAINE HYDROCHLORIDE 40 MG/ML
SOLUTION TOPICAL ONCE
Status: COMPLETED | OUTPATIENT
Start: 2021-07-02 | End: 2021-07-02

## 2021-07-02 RX ORDER — LIDOCAINE 40 MG/G
CREAM TOPICAL ONCE
Status: CANCELLED | OUTPATIENT
Start: 2021-07-02 | End: 2021-07-02

## 2021-07-02 RX ORDER — LIDOCAINE HYDROCHLORIDE 40 MG/ML
SOLUTION TOPICAL ONCE
Status: CANCELLED | OUTPATIENT
Start: 2021-07-02 | End: 2021-07-02

## 2021-07-02 RX ORDER — BETAMETHASONE DIPROPIONATE 0.05 %
OINTMENT (GRAM) TOPICAL ONCE
Status: CANCELLED | OUTPATIENT
Start: 2021-07-02 | End: 2021-07-02

## 2021-07-02 RX ORDER — LIDOCAINE 50 MG/G
OINTMENT TOPICAL ONCE
Status: CANCELLED | OUTPATIENT
Start: 2021-07-02 | End: 2021-07-02

## 2021-07-02 RX ADMIN — LIDOCAINE HYDROCHLORIDE: 40 SOLUTION TOPICAL at 10:16

## 2021-07-02 ASSESSMENT — PAIN SCALES - GENERAL: PAINLEVEL_OUTOF10: 0

## 2021-07-02 NOTE — PROGRESS NOTES
[address]   Progress Note and Procedure Note      [address] RECORD NUMBER:  [id number]  AGE: 70 y.o. GENDER: female  : 1945  EPISODE DATE:  2021    Subjective:     Chief Complaint   Patient presents with    Wound Check     Follow up for right ankle wound          HISTORY of PRESENT ILLNESS HPI    Lindy Pretty a 70 y. o. female who presents today for wound/ulcer evaluation. History of Wound Context: recurrent right lower lateral and posterior leg ulcers.  Patient reports that on 21 she noticed her bedding was wet and discovered the wound.  She had not been wearing any compression, relying solely on diuretics.  We tried using bilateral multi-layered compression wraps and Coban Lite wraps.  In both instances, the patient complained that they were too tight.  She has been tolerating Spandagrips, but sis unwilling to try any other method of compression today.   She tries to sit with her legs elevated; her bilateral LE's have +3 pitting edema today.  The wound on her right lateral calf appears to be healing and she is happy with its progress.    Wound/Ulcer Pain Timing/Severity: none  Quality of pain: N/A  Severity:  0 / 10   Modifying Factors: Pain worsens with an increase in the amount of swelling in her lower leg  Associated Signs/Symptoms: edema and drainage     Ulcer Identification:  Ulcer Type: venous     Contributing Factors: edema, venous stasis and obesity     Acute Wound: N/A not an acute wound    PAST MEDICAL HISTORY        Diagnosis Date    Asthma     Localized edema 2021    Non-pressure chronic ulcer of right lower leg with fat layer exposed ([address] 2021    Peripheral venous insufficiency 2021    Right foot ulcer, limited to breakdown of skin ([address] 2021       PAST SURGICAL HISTORY    Past Surgical History:   Procedure Laterality Date    APPENDECTOMY      CHOLECYSTECTOMY         FAMILY HISTORY    Family History Problem Relation Age of Onset    Migraines Mother        SOCIAL HISTORY    Social History     Tobacco Use    Smoking status: Never Smoker    Smokeless tobacco: Never Used   Substance Use Topics    Alcohol use: Not on file    Drug use: Never       ALLERGIES    Allergies   Allergen Reactions    Iodides Other (See Comments)     IVP dye, pt does not remember he reaction, just that the nurses stated to not let anyone give to her ever again    Furosemide      Pt. States it makes her \"ditzy\", dizzy, and gives her muscle ridgity and nausea. MEDICATIONS    Current Outpatient Medications on File Prior to Encounter   Medication Sig Dispense Refill    metOLazone (ZAROXOLYN) 5 MG tablet TAKE ONE TABLET BY MOUTH DAILY AS NEEDED FOR SWELLING 30 tablet 1    triamterene-hydroCHLOROthiazide (MAXZIDE-25) 37.5-25 MG per tablet Take 1 tablet by mouth daily 90 tablet 1    mupirocin (BACTROBAN) 2 % ointment APPLY TO AFFECTED AREA THREE TIMES A DAY 22 g 0    albuterol sulfate HFA (PROAIR HFA) 108 (90 Base) MCG/ACT inhaler Inhale 2 puffs into the lungs every 6 hours as needed for Wheezing 1 Inhaler 0    Nebulizers (AIRIAL COMPACT MINI NEBULIZER) MISC 1 nebule by Does not apply route 4 times daily 1 each 0     No current facility-administered medications on file prior to encounter. REVIEW OF SYSTEMS    Pertinent items are noted in HPI.       Objective:      BP (!) 154/77   Pulse 81   Temp 98.1 °F (36.7 °C) (Infrared)   Resp 18     Wt Readings from Last 3 Encounters:   06/07/21 219 lb 9.3 oz (99.6 kg)   06/01/21 218 lb (98.9 kg)   05/12/21 222 lb 3.2 oz (100.8 kg)       PHYSICAL EXAM    General Appearance: alert and oriented to person, place and time, well developed and well- nourished, in no acute distress  Skin: warm and dry  Head: normocephalic and atraumatic  Eyes: pupils equal, round, and reactive to light, extraocular eye movements intact, conjunctivae normal  Neck: supple and non-tender without mass  Pulmonary/Chest: normal air movement, no respiratory distress  Cardiovascular: Right DP +1  Extremities: no cyanosis or clubbing, bilateral LE's +3 pitting edema  Musculoskeletal: normal range of motion, no joint swelling, deformity or tenderness  Neurologic: reflexes normal and symmetric, no cranial nerve deficit, gait, coordination and speech normal      Assessment:        Problem List Items Addressed This Visit     Skin ulcer of right lower leg with fat layer exposed ([address]    Relevant Orders    Initiate Outpatient Wound Care Protocol    Peripheral venous insufficiency    Relevant Orders    Initiate Outpatient Wound Care Protocol    Leg swelling - Primary    Relevant Orders    Initiate Outpatient Wound Care Protocol           Procedure Note  Indications:  Based on my examination of this patient's wound(s)/ulcer(s) today, debridement is required to promote healing and evaluate the wound base. Performed by: VIDA Joseph CNP    Consent obtained:  Yes    Time out taken:  Yes    Pain Control: Anesthetic  Anesthetic: 4% Lidocaine Liquid Topical       Debridement: Excisional Debridement    Using curette the wound(s)/ulcer(s) was/were debrided down through and including the removal of epidermis, dermis and subcutaneous tissue. Devitalized Tissue Debrided:  biofilm and slough    Pre Debridement Measurements:  Are located in the [place]  Documentation Flow Sheet    Diabetic/Pressure/Non Pressure Ulcers only:  Ulcer: Non-Pressure ulcer, fat layer exposed     Wound/Ulcer #: 1    Post Debridement Measurements:  Wound/Ulcer Descriptions are Pre Debridement except measurements:    Wound 04/12/21 Leg Right; Lower; Lateral #1 (4/7/21) (Active)   Wound Image   07/02/21 1007   Wound Etiology Venous 04/12/21 0927   Dressing Status New dressing applied 07/02/21 1109   Wound Cleansed Cleansed with saline 06/07/21 1210   Dressing/Treatment Alginate with Ag;Gauze dressing/dressing sponge;Roll gauze 07/02/21 1109   Wound Length (cm) 2.3 cm 07/02/21 1007   Wound Width (cm) 0.8 cm 07/02/21 1007   Wound Depth (cm) 0.1 cm 07/02/21 1007   Wound Surface Area (cm^2) 1.84 cm^2 07/02/21 1007   Change in Wound Size % (l*w) 36.11 07/02/21 1007   Wound Volume (cm^3) 0.184 cm^3 07/02/21 1007   Wound Healing % 37 07/02/21 1007   Post-Procedure Length (cm) 2.4 cm 07/02/21 1040   Post-Procedure Width (cm) 0.9 cm 07/02/21 1040   Post-Procedure Depth (cm) 0.1 cm 07/02/21 1040   Post-Procedure Surface Area (cm^2) 2.16 cm^2 07/02/21 1040   Post-Procedure Volume (cm^3) 0.216 cm^3 07/02/21 1040   Wound Assessment Granulation tissue;Slough 07/02/21 1007   Drainage Amount Small 07/02/21 1007   Drainage Description Serosanguinous 07/02/21 1007   Odor None 07/02/21 1007   Consuelo-wound Assessment Dry/flaky 07/02/21 1007   Margins Attached edges 07/02/21 1007   Wound Thickness Description not for Pressure Injury Full thickness 07/02/21 1007   Number of days: 81          Total Surface Area Debrided:  2.16 sq cm     Estimated Blood Loss:  Minimal    Hemostasis Achieved:  by pressure    Procedural Pain:  0  / 10     Post Procedural Pain:  0 / 10     Response to treatment:  Well tolerated by patient. We reviewed methods to control LE swelling. Patient plans to continue use of Spandagrips. The compression helps improve blood flow in the legs, prevents blood clotting and inhibits the progression of a variety of venous disorders. Compression is designed to be tighter around the ankles with gradually less pressure moving up the lower limbs toward the knees. Working with the calf muscle, compression helps squeeze the venous blood back up toward the heart, to enhance circulation. Plan:     Treatment Note please see attached Discharge Instructions    Written patient dismissal instructions given to patient and signed by patient or POA.          Discharge Instructions         Discharge Instructions        [facility]   Physician Orders and Discharge Instructions  [facility]  [address]   [address] [place] [address] Rd, [facility]  Telephone: [phone]      FAX [phone]     NAME: Reba Gowers  DATE OF BIRTH:  1945  MEDICAL RECORD NUMBER:  [id number]  DATE:  7/2/2021     Wound Cleansing:   Do not scrub or use excessive force. Cleanse wound prior to applying a clean dressing with:  [x]?????????? Normal Saline  [x]?????????? Keep Wound Dry in Shower    []?????????? Wound Cleanser   []?????????? Cleanse wound with Mild Soap & Water  []?????????? May Shower at Discharge   []?????????? Other:        Topical Treatments:  Do not apply lotions, creams, or ointments to wound bed unless directed.   []?????????? Apply moisturizing lotion to skin surrounding the wound prior to dressing change.  []?????????? Apply antifungal ointment to skin surrounding the wound prior to dressing change.  []?????????? Apply thin film of moisture barrier ointment to skin immediately around wound.  []?????????? Other:                  Dressings:      Wound Location : RIGHT LATERAL LEG WOUND                [x]?????????? Apply Primary Dressing:                                             [x]?????????? SILVER ALGINATE                          [x]?????????? Cover and Secure with:                   [x]?????????? Gauze         []?????????? Filiberto           [x]?????????? Roll gauze              []?????????? Ace Wrap   []?????????? Cover Roll Tape     []?????????? ABD                                      []?????????? Other:               Avoid contact of tape with skin.     [x]?????????? Change dressing:   []?????????? Daily           []?????????? Every Other Day    [x]?????????? Three times per week              []?????????? Once a week          []?????????? Do Not Change Dressing              []?????????? Other:                   Edema Control:  Apply:  []?????????? Compression Stocking       []?????????? Right Leg     []???????? ?? Left Leg              []?????????? Tubigrip      []?????????? Right Leg Double Layer      []???????? ?? Left Leg Double Layer                                                  []?????????? Right Leg Single Layer       []???????? ?? Left Leg Single Layer              [x]?????????? SpandaGrip            [x]?????????? Right Leg     [x]???????? ?? Left Leg                                      [x]???????? ??Low compression 5-10 mm/Hg                                             []???????? ? ? Medium compression 10-20 mm/Hg                                      []???????? ? ? High compression  20-30 mm/Hg              every morning immediately when getting up should be applied to affected leg(s) from mid foot to knee making sure to cover the heel.  Remove every night before going to bed.              [x]?????????? Elevate leg(s) above the level of the heart when sitting.                 [x]?????????? Avoid prolonged standing in one place.                   Compression:  Apply:  []?????????? Multilayer Compression Wrap Applied in Clinic    []?????????? RightLeg      []???????? ?? Left Leg              []?????????? Multi-layer compression.  Do not get leg(s) with wrap wet.  If wraps become too tight call the center or completely remove the wrap.                      []?????????? Elevate leg(s) above the level of the heart when sitting.                 []?????????? Avoid prolonged standing in one place.     Off-Loading:   []?????????? Off-loading when    []?????????? walking       []?????????? in bed         []?????????? sitting  []?????????? Total non-weight bearing  []?????????? Right Leg  []?????????? Left Leg          []?????????? Assistive Device     []?????????? Walker        []?????????? Cane           []?????????? Wheelchair  []?????????? Crutches              []?????????? Surgical shoe    []?????????? Podus Boot(s)   []?????????? Foam Boot(s)  []?????????? Roll About              []?????????? Cast Boot   []?????????? CROW Boot  []?????????? Other:                   Dietary:  [x]?????????? Diet as tolerated:     []?????????? Calorie Diabetic Diet:           []?????????? No Added Salt:  [x]?????????? Increase Protein:     []?????????? Other:                Activity:  [x]?????????? Activity as tolerated:  []?????????? Patient has no activity restrictions     []?????????? Strict Bedrest: []?????????? Remain off Work:     []?????????? May return to full duty work:                                    []?????????? Return to work with [address]     If you are still having pain after you go home:  [x]?????????? Elevate the affected limb.    [x]?????????? Use over-the-counter medications you would normally use for pain as permitted by your family doctor.   [x]?????????? For persistent pain not relieved by the above interventions, please call your family doctor.             Return Appointment:  [x]?????????? Wound and dressing supply provider: [facility]  []?????????? ECF or Home Healthcare:  []?????????? Wound Assessment:          []?????????? Physician or NP scheduled for Wound Assessment:   [x]?????????? Return Appointment: With  Kiesha Robins CNP ON 1 Week  []?????????? Ordered tests:        **PLEASE BRING YOUR COMPRESSION HOSE WITH YOU TO EACH VISIT**     **4/12/2021 : SCRIPT GIVEN FOR CIRC-AIDS**     Nurse Case Manger: JOSE MANUEL  Electronically signed by Cabrera Arenas RN on 7/2/2021 at 10:47 AM         [address] Information: Should you experience any significant changes in your wound(s) or have questions about your wound care, please contact the [address] [place] [address] [phone] [address] 8:00 am - 4:30 pm and Friday 8:00 am - 12:30 pm.  If you need help with your wound outside these hours and cannot wait until we are again available, contact your PCP or go to the hospital emergency room.      PLEASE NOTE: IF YOU ARE UNABLE TO [address], CONTINUE TO USE THE SUPPLIES YOU HAVE AVAILABLE UNTIL YOU ARE ABLE TO REACH US.  IT IS MOST IMPORTANT TO KEEP THE WOUND COVERED AT ALL TIMES.     Physician Signature:_______________________     Date: ___________ Time:  ____________                                [X ] Juliann Sanchez CNP  [ ] DR Vinita Hopkins  [ ] DR Hunter Daily                Electronically signed by VIDA Wolf CNP on 7/2/2021 at 12:01 PM

## 2021-07-09 ENCOUNTER — HOSPITAL ENCOUNTER (OUTPATIENT)
Dept: WOUND CARE | Age: 76
Discharge: HOME | End: 2021-07-09
Payer: MEDICARE

## 2021-07-09 VITALS — TEMPERATURE: 97.5 F | RESPIRATION RATE: 18 BRPM | SYSTOLIC BLOOD PRESSURE: 166 MMHG | DIASTOLIC BLOOD PRESSURE: 88 MMHG

## 2021-07-09 DIAGNOSIS — L97.912 SKIN ULCER OF RIGHT LOWER LEG WITH FAT LAYER EXPOSED (HCC): ICD-10-CM

## 2021-07-09 DIAGNOSIS — M79.89 LEG SWELLING: Primary | ICD-10-CM

## 2021-07-09 DIAGNOSIS — I87.2 PERIPHERAL VENOUS INSUFFICIENCY: ICD-10-CM

## 2021-07-09 PROCEDURE — 11042 DBRDMT SUBQ TIS 1ST 20SQCM/<: CPT

## 2021-07-09 PROCEDURE — 11042 DBRDMT SUBQ TIS 1ST 20SQCM/<: CPT | Performed by: NURSE PRACTITIONER

## 2021-07-09 RX ORDER — BACITRACIN ZINC AND POLYMYXIN B SULFATE 500; 1000 [USP'U]/G; [USP'U]/G
OINTMENT TOPICAL ONCE
Status: CANCELLED | OUTPATIENT
Start: 2021-07-09 | End: 2021-07-09

## 2021-07-09 RX ORDER — LIDOCAINE HYDROCHLORIDE 40 MG/ML
SOLUTION TOPICAL ONCE
Status: COMPLETED | OUTPATIENT
Start: 2021-07-09 | End: 2021-07-09

## 2021-07-09 RX ORDER — CLOBETASOL PROPIONATE 0.5 MG/G
OINTMENT TOPICAL ONCE
Status: CANCELLED | OUTPATIENT
Start: 2021-07-09 | End: 2021-07-09

## 2021-07-09 RX ORDER — BACITRACIN, NEOMYCIN, POLYMYXIN B 400; 3.5; 5 [USP'U]/G; MG/G; [USP'U]/G
OINTMENT TOPICAL ONCE
Status: CANCELLED | OUTPATIENT
Start: 2021-07-09 | End: 2021-07-09

## 2021-07-09 RX ORDER — GENTAMICIN SULFATE 1 MG/G
OINTMENT TOPICAL ONCE
Status: CANCELLED | OUTPATIENT
Start: 2021-07-09 | End: 2021-07-09

## 2021-07-09 RX ORDER — GINSENG 100 MG
CAPSULE ORAL ONCE
Status: CANCELLED | OUTPATIENT
Start: 2021-07-09 | End: 2021-07-09

## 2021-07-09 RX ORDER — LIDOCAINE 40 MG/G
CREAM TOPICAL ONCE
Status: CANCELLED | OUTPATIENT
Start: 2021-07-09 | End: 2021-07-09

## 2021-07-09 RX ORDER — LIDOCAINE HYDROCHLORIDE 40 MG/ML
SOLUTION TOPICAL ONCE
Status: CANCELLED | OUTPATIENT
Start: 2021-07-09 | End: 2021-07-09

## 2021-07-09 RX ORDER — BETAMETHASONE DIPROPIONATE 0.05 %
OINTMENT (GRAM) TOPICAL ONCE
Status: CANCELLED | OUTPATIENT
Start: 2021-07-09 | End: 2021-07-09

## 2021-07-09 RX ORDER — LIDOCAINE HYDROCHLORIDE 20 MG/ML
JELLY TOPICAL ONCE
Status: CANCELLED | OUTPATIENT
Start: 2021-07-09 | End: 2021-07-09

## 2021-07-09 RX ORDER — LIDOCAINE 50 MG/G
OINTMENT TOPICAL ONCE
Status: CANCELLED | OUTPATIENT
Start: 2021-07-09 | End: 2021-07-09

## 2021-07-09 RX ADMIN — LIDOCAINE HYDROCHLORIDE: 40 SOLUTION TOPICAL at 10:27

## 2021-07-09 ASSESSMENT — PAIN SCALES - GENERAL: PAINLEVEL_OUTOF10: 0

## 2021-07-09 NOTE — PROGRESS NOTES
[address]   Progress Note and Procedure Note      [address] RECORD NUMBER:  [id number]  AGE: 70 y.o. GENDER: female  : 1945  EPISODE DATE:  2021    Subjective:     Chief Complaint   Patient presents with    Wound Check     Follow up for right lower leg          HISTORY of PRESENT ILLNESS HPI    Nate Zazueta a 70 y. o. female who presents today for wound/ulcer evaluation. History of Wound Context: recurrent right lower lateral and posterior leg ulcers.  Patient reports that on 21 she noticed her bedding was wet and discovered the wound.  She had not been wearing any compression, relying solely on diuretics.  We tried using bilateral multi-layered compression wraps and Coban Lite wraps.  In both instances, the patient complained that they were too tight.  She has been tolerating Spandagrips, but sis unwilling to try any other method of compression today.   She tries to sit with her legs elevated; her bilateral LE's have +3 pitting edema today. Wound/Ulcer Pain Timing/Severity: none  Quality of pain: N/A  Severity:  0 / 10   Modifying Factors: Pain worsens with an increase in the amount of swelling in her lower leg  Associated Signs/Symptoms: edema and drainage     Ulcer Identification:  Ulcer Type: venous     Contributing Factors: edema, venous stasis and obesity     Acute Wound: N/A not an acute wound    PAST MEDICAL HISTORY        Diagnosis Date    Asthma     Localized edema 2021    Non-pressure chronic ulcer of right lower leg with fat layer exposed ([address] 2021    Peripheral venous insufficiency 2021    Right foot ulcer, limited to breakdown of skin ([address] 2021       PAST SURGICAL HISTORY    Past Surgical History:   Procedure Laterality Date    APPENDECTOMY      CHOLECYSTECTOMY         FAMILY HISTORY    Family History   Problem Relation Age of Onset    Migraines Mother        SOCIAL HISTORY    Social History     Tobacco Use    Smoking status: Never Smoker    Smokeless tobacco: Never Used   Substance Use Topics    Alcohol use: Not on file    Drug use: Never       ALLERGIES    Allergies   Allergen Reactions    Iodides Other (See Comments)     IVP dye, pt does not remember he reaction, just that the nurses stated to not let anyone give to her ever again    Furosemide      Pt. States it makes her \"ditzy\", dizzy, and gives her muscle ridgity and nausea. MEDICATIONS    Current Outpatient Medications on File Prior to Encounter   Medication Sig Dispense Refill    metOLazone (ZAROXOLYN) 5 MG tablet TAKE ONE TABLET BY MOUTH DAILY AS NEEDED FOR SWELLING 30 tablet 1    triamterene-hydroCHLOROthiazide (MAXZIDE-25) 37.5-25 MG per tablet Take 1 tablet by mouth daily 90 tablet 1    mupirocin (BACTROBAN) 2 % ointment APPLY TO AFFECTED AREA THREE TIMES A DAY 22 g 0    albuterol sulfate HFA (PROAIR HFA) 108 (90 Base) MCG/ACT inhaler Inhale 2 puffs into the lungs every 6 hours as needed for Wheezing 1 Inhaler 0    Nebulizers (AIRIAL COMPACT MINI NEBULIZER) MISC 1 nebule by Does not apply route 4 times daily 1 each 0     No current facility-administered medications on file prior to encounter. REVIEW OF SYSTEMS    Pertinent items are noted in HPI.       Objective:      BP (!) 166/88   Temp 97.5 °F (36.4 °C) (Infrared)   Resp 18     Wt Readings from Last 3 Encounters:   06/07/21 219 lb 9.3 oz (99.6 kg)   06/01/21 218 lb (98.9 kg)   05/12/21 222 lb 3.2 oz (100.8 kg)       PHYSICAL EXAM      General Appearance: alert and oriented to person, place and time, well developed and well- nourished, in no acute distress  Skin: warm and dry  Head: normocephalic and atraumatic  Eyes: pupils equal, round, and reactive to light, extraocular eye movements intact, conjunctivae normal  Neck: supple and non-tender without mass  Pulmonary/Chest: normal air movement, no respiratory distress  Cardiovascular: Bilateral DP's +2  Extremities: no cyanosis or clubbing, bilateral LE's +3 pitting edema  Musculoskeletal: normal range of motion, no joint swelling, deformity or tenderness  Neurologic: reflexes normal and symmetric, no cranial nerve deficit, gait, coordination and speech normal      Assessment:        Problem List Items Addressed This Visit     Skin ulcer of right lower leg with fat layer exposed ([address]    Relevant Orders    Initiate Outpatient Wound Care Protocol    Peripheral venous insufficiency    Relevant Orders    Initiate Outpatient Wound Care Protocol    Leg swelling - Primary    Relevant Orders    Initiate Outpatient Wound Care Protocol           Procedure Note  Indications:  Based on my examination of this patient's wound(s)/ulcer(s) today, debridement is required to promote healing and evaluate the wound base. Performed by: Merrick Collet, APRN - CNP    Consent obtained:  Yes    Time out taken:  Yes    Pain Control: Anesthetic  Anesthetic: 4% Lidocaine Liquid Topical (2.5 ml )       Debridement: Excisional Debridement    Using curette the wound(s)/ulcer(s) was/were debrided down through and including the removal of epidermis, dermis and subcutaneous tissue. Devitalized Tissue Debrided:  biofilm and slough    Pre Debridement Measurements:  Are located in the [place]  Documentation Flow Sheet    Diabetic/Pressure/Non Pressure Ulcers only:  Ulcer: Non-Pressure ulcer, fat layer exposed     Wound/Ulcer #: 1    Post Debridement Measurements:  Wound/Ulcer Descriptions are Pre Debridement except measurements:    Wound 04/12/21 Leg Right; Lower; Lateral #1 (4/7/21) (Active)   Wound Image   07/02/21 1007   Wound Etiology Venous 04/12/21 0927   Dressing Status Old drainage noted 07/09/21 1024   Wound Cleansed Cleansed with saline 07/09/21 1127   Dressing/Treatment Collagen with Ag;Dry dressing 07/09/21 1127   Wound Length (cm) 2.2 cm 07/09/21 1024   Wound Width (cm) 1 cm 07/09/21 1024   Wound Depth (cm) 0.1 cm 07/09/21 1024   Wound Surface Area (cm^2) 2.2 cm^2 07/09/21 1024   Change in Wound Size % (l*w) 23.61 07/09/21 1024   Wound Volume (cm^3) 0.22 cm^3 07/09/21 1024   Wound Healing % 24 07/09/21 1024   Post-Procedure Length (cm) 2.3 cm 07/09/21 1056   Post-Procedure Width (cm) 1.1 cm 07/09/21 1056   Post-Procedure Depth (cm) 0.1 cm 07/09/21 1056   Post-Procedure Surface Area (cm^2) 2.53 cm^2 07/09/21 1056   Post-Procedure Volume (cm^3) 0.253 cm^3 07/09/21 1056   Wound Assessment Granulation tissue;Slough 07/09/21 1024   Drainage Amount Scant 07/09/21 1024   Drainage Description Serosanguinous 07/09/21 1024   Odor None 07/09/21 1024   Consuelo-wound Assessment Dry/flaky 07/09/21 1024   Margins Attached edges 07/09/21 1024   Wound Thickness Description not for Pressure Injury Full thickness 07/09/21 1024   Number of days: 88          Total Surface Area Debrided:  2.53 sq cm     Estimated Blood Loss:  Minimal    Hemostasis Achieved:  by pressure    Procedural Pain:  0  / 10     Post Procedural Pain:  0 / 10     Response to treatment:  Well tolerated by patient. We reviewed methods to control LE swelling. Patient plans to continue use of Spandagrips. The compression helps improve blood flow in the legs, prevents blood clotting and inhibits the progression of a variety of venous disorders. Compression is designed to be tighter around the ankles with gradually less pressure moving up the lower limbs toward the knees. Working with the calf muscle, compression helps squeeze the venous blood back up toward the heart, to enhance circulation. Plan:     Treatment Note please see attached Discharge Instructions    Written patient dismissal instructions given to patient and signed by patient or POA.          Discharge Instructions         Discharge [address] and Hyperbaric Oxygen Therapy   Physician Orders and Discharge Instructions  [facility]  [facility] [address], [facility]  Telephone: [phone]      FAX [phone]     NAME: Kat Siegel  DATE OF BIRTH:  1945  MEDICAL RECORD NUMBER:  [id number]  DATE:  7/9/2021     Wound Cleansing:   Do not scrub or use excessive force. Cleanse wound prior to applying a clean dressing with:  [x]??????????? Normal Saline  [x]??????????? Keep Wound Dry in Shower    []??????????? Wound Cleanser   []??????????? Cleanse wound with Mild Soap & Water  []??????????? May Shower at Discharge   []??????????? Other:        Topical Treatments:  Do not apply lotions, creams, or ointments to wound bed unless directed.   []??????????? Apply moisturizing lotion to skin surrounding the wound prior to dressing change.  []??????????? Apply antifungal ointment to skin surrounding the wound prior to dressing change.  []??????????? Apply thin film of moisture barrier ointment to skin immediately around wound.  []??????????? Other:                  Dressings:      Wound Location : RIGHT LATERAL LEG WOUND                [x]??????????? Apply Primary Dressing:                                             [x]??????????? Moistened Collagen Ag                         [x]??????????? Cover and Secure with:                   [x]??????????? Gauze         []??????????? Filiberto           [x]??????????? Roll gauze              []??????????? Ace Wrap   []??????????? Cover Roll Tape     []??????????? ABD                                      []??????????? Other:               Avoid contact of tape with skin.     [x]??????????? Change dressing:      [x]??????????? Three times per week                                 Edema Control:  Apply:  []??????????? Compression Stocking       []??????????? Right Leg     []????????? ?? Left Leg              []??????????? Tubigrip      []??????????? Right Leg Double Layer      []????????? ?? Left Leg Double Layer                                                  []??????????? Right Leg Single Layer       []????????? ?? Left Leg Protein:     []??????????? Other:                Activity:  [x]??????????? Activity as tolerated:  []??????????? Patient has no activity restrictions     []??????????? Strict Bedrest: []??????????? Remain off Work:     []??????????? May return to full duty work:                                    []??????????? Return to work with [address]     If you are still having pain after you go home:  [x]??????????? Elevate the affected limb.    [x]??????????? Use over-the-counter medications you would normally use for pain as permitted by your family doctor. [x]??????????? For persistent pain not relieved by the above interventions, please call your family doctor.             Return Appointment:  [x]??????????? Wound and dressing supply provider: [facility]  []??????????? ECF or Home Healthcare:  []??????????? Wound Assessment:          []??????????? Physician or NP scheduled for Wound Assessment:   [x]??????????? Return Appointment: With  Madison Irving CNP ON 1 Week  []??????????? Ordered tests:        **PLEASE BRING YOUR COMPRESSION HOSE WITH YOU TO EACH VISIT**     **4/12/2021 : SCRIPT GIVEN FOR CIRC-AIDS**       Nurse Case Manger: JOSE MANUEL  Electronically signed by Nandini Fraser RN on 7/9/2021 at 10:58 AM          2 Formerly named [facility] Information: Should you experience any significant changes in your wound(s) or have questions about your wound care, please contact the [facility] [phone] [address] 8:00 am - 4:30 pm and Friday 8:00 am - 12:30 pm.  If you need help with your wound outside these hours and cannot wait until we are again available, contact your PCP or go to the hospital emergency room.      PLEASE NOTE: IF YOU ARE UNABLE TO OBTAIN WOUND SUPPLIES, CONTINUE TO USE THE SUPPLIES YOU HAVE AVAILABLE UNTIL YOU ARE ABLE TO [address].  IT IS MOST IMPORTANT TO KEEP THE WOUND COVERED AT ALL TIMES.     Physician Signature:_______________________     Date: ___________ Time:  ____________                                [X ] Jolly Montgomery CNP  [ ] DR Tomás Kulkarni  [ ] DR Etienne Reason                   Electronically signed by VIDA Hernandez CNP on 7/9/2021 at 12:25 PM

## 2021-07-13 ENCOUNTER — OFFICE VISIT (OUTPATIENT)
Dept: FAMILY MEDICINE CLINIC | Age: 76
End: 2021-07-13
Payer: MEDICARE

## 2021-07-13 VITALS — SYSTOLIC BLOOD PRESSURE: 132 MMHG | WEIGHT: 226 LBS | BODY MASS INDEX: 36.48 KG/M2 | DIASTOLIC BLOOD PRESSURE: 80 MMHG

## 2021-07-13 DIAGNOSIS — J45.40 MODERATE PERSISTENT ASTHMA WITHOUT COMPLICATION: ICD-10-CM

## 2021-07-13 DIAGNOSIS — L97.912 SKIN ULCER OF RIGHT LOWER LEG WITH FAT LAYER EXPOSED (HCC): ICD-10-CM

## 2021-07-13 DIAGNOSIS — M79.89 LEG SWELLING: Primary | ICD-10-CM

## 2021-07-13 PROCEDURE — G8417 CALC BMI ABV UP PARAM F/U: HCPCS | Performed by: FAMILY MEDICINE

## 2021-07-13 PROCEDURE — 99213 OFFICE O/P EST LOW 20 MIN: CPT | Performed by: FAMILY MEDICINE

## 2021-07-13 PROCEDURE — 4040F PNEUMOC VAC/ADMIN/RCVD: CPT | Performed by: FAMILY MEDICINE

## 2021-07-13 PROCEDURE — 1123F ACP DISCUSS/DSCN MKR DOCD: CPT | Performed by: FAMILY MEDICINE

## 2021-07-13 PROCEDURE — 1036F TOBACCO NON-USER: CPT | Performed by: FAMILY MEDICINE

## 2021-07-13 PROCEDURE — G9899 SCRN MAM PERF RSLTS DOC: HCPCS | Performed by: FAMILY MEDICINE

## 2021-07-13 PROCEDURE — G8400 PT W/DXA NO RESULTS DOC: HCPCS | Performed by: FAMILY MEDICINE

## 2021-07-13 PROCEDURE — 1090F PRES/ABSN URINE INCON ASSESS: CPT | Performed by: FAMILY MEDICINE

## 2021-07-13 PROCEDURE — 3017F COLORECTAL CA SCREEN DOC REV: CPT | Performed by: FAMILY MEDICINE

## 2021-07-13 PROCEDURE — G8427 DOCREV CUR MEDS BY ELIG CLIN: HCPCS | Performed by: FAMILY MEDICINE

## 2021-07-13 ASSESSMENT — ENCOUNTER SYMPTOMS
COUGH: 0
SHORTNESS OF BREATH: 0
DIARRHEA: 0
NAUSEA: 0
VOMITING: 0
ABDOMINAL PAIN: 0

## 2021-07-13 NOTE — PROGRESS NOTES
2021     Carolina Martinez (:  1945) is a 70 y.o. female, here for evaluation of the following medical concerns:    HPI     Asthma-  patient feels well today,  patient feels she is stable on Proair as needed and has nebulizer if needed, chest to help breathing, patient is not using nebulizer at this time jut her inhaler, mild wheezing today, difficult at times with deep inspiration, denied fever or chills, Denied rhinorrhea or nasal congestion.      Right leg cellulitis/edema- right leg is much improved but has a wrap on it today patient is going to the wound clinic, lateral aspect, ankle, patient has swelling in her leg, erythematous,has possible nodule secondary to cellulitis, patient is not  having pain or discomfort at this time.  Very erythematous and edematous today, particularly above right ankle,  medial and lateral aspect, pain with palpation.     Today, denied chest pain, sob, n,, v, or diarrhea    Review of Systems   Constitutional: Negative for activity change, fatigue, fever and unexpected weight change. Respiratory: Negative for cough and shortness of breath. Cardiovascular: Positive for leg swelling. Negative for chest pain. Gastrointestinal: Negative for abdominal pain, diarrhea, nausea and vomiting. Endocrine: Negative for cold intolerance, heat intolerance, polydipsia and polyphagia. Musculoskeletal: Negative for arthralgias. Skin: Positive for wound. Negative for rash. Neurological: Negative for dizziness, tremors, syncope, numbness and headaches. Psychiatric/Behavioral: Negative for dysphoric mood. The patient is not nervous/anxious. Prior to Visit Medications    Medication Sig Taking?  Authorizing Provider   metOLazone (ZAROXOLYN) 5 MG tablet TAKE ONE TABLET BY MOUTH DAILY AS NEEDED FOR SWELLING  Orlando Munguia,    triamterene-hydroCHLOROthiazide (MAXZIDE-25) 37.5-25 MG per tablet Take 1 tablet by mouth daily  Orlando Munguia,    mupirocin (BACTROBAN) 2 % ointment APPLY TO AFFECTED AREA THREE TIMES A DAY  Orlando Munguia,    albuterol sulfate HFA (PROAIR HFA) 108 (90 Base) MCG/ACT inhaler Inhale 2 puffs into the lungs every 6 hours as needed for Wheezing  Petey Swenson, DO   Nebulizers (AIRIAL COMPACT MINI NEBULIZER) MISC 1 nebule by Does not apply route 4 times daily  Petey Arts, DO        Social History     Tobacco Use    Smoking status: Never Smoker    Smokeless tobacco: Never Used   Substance Use Topics    Alcohol use: Not on file        Vitals:    07/13/21 1010   BP: 132/80   Weight: 226 lb (102.5 kg)     Estimated body mass index is 36.48 kg/m² as calculated from the following:    Height as of 6/7/21: 5' 6\" (1.676 m). Weight as of this encounter: 226 lb (102.5 kg). Physical Exam  Constitutional:       Appearance: She is well-developed. HENT:      Head: Normocephalic and atraumatic. Right Ear: External ear normal.      Left Ear: External ear normal.   Cardiovascular:      Rate and Rhythm: Normal rate and regular rhythm. Heart sounds: Normal heart sounds. No murmur heard. Pulmonary:      Effort: Pulmonary effort is normal.      Breath sounds: Normal breath sounds. No wheezing. Skin:     General: Skin is warm and dry. Findings: Erythema present. Neurological:      Mental Status: She is alert and oriented to person, place, and time. Psychiatric:         Behavior: Behavior normal.         ASSESSMENT/PLAN:  1. Leg swelling  Stable  Continue with medication  Keep appointments with specialist.   Briseyda Tennille questions    2. Moderate persistent asthma without complication  Stable  Continue with medication  Keep appointments with specialist.   Briseyda Tennille questions    3. Skin ulcer of right lower leg with fat layer exposed ([address]  Stable  Continue with medication  Keep appointments with specialist.   Briseyda Tennille questions      Return in about 3 months (around 10/13/2021).

## 2021-07-16 ENCOUNTER — HOSPITAL ENCOUNTER (OUTPATIENT)
Dept: WOUND CARE | Age: 76
Discharge: HOME | End: 2021-07-16
Payer: MEDICARE

## 2021-07-16 VITALS
DIASTOLIC BLOOD PRESSURE: 70 MMHG | RESPIRATION RATE: 18 BRPM | TEMPERATURE: 97.3 F | HEART RATE: 84 BPM | SYSTOLIC BLOOD PRESSURE: 148 MMHG

## 2021-07-16 DIAGNOSIS — L97.912 SKIN ULCER OF RIGHT LOWER LEG WITH FAT LAYER EXPOSED (HCC): ICD-10-CM

## 2021-07-16 DIAGNOSIS — I87.2 PERIPHERAL VENOUS INSUFFICIENCY: ICD-10-CM

## 2021-07-16 DIAGNOSIS — M79.89 LEG SWELLING: Primary | ICD-10-CM

## 2021-07-16 PROCEDURE — 11042 DBRDMT SUBQ TIS 1ST 20SQCM/<: CPT

## 2021-07-16 PROCEDURE — 11042 DBRDMT SUBQ TIS 1ST 20SQCM/<: CPT | Performed by: NURSE PRACTITIONER

## 2021-07-16 RX ORDER — CLOBETASOL PROPIONATE 0.5 MG/G
OINTMENT TOPICAL ONCE
Status: CANCELLED | OUTPATIENT
Start: 2021-07-16 | End: 2021-07-16

## 2021-07-16 RX ORDER — GENTAMICIN SULFATE 1 MG/G
OINTMENT TOPICAL ONCE
Status: CANCELLED | OUTPATIENT
Start: 2021-07-16 | End: 2021-07-16

## 2021-07-16 RX ORDER — LIDOCAINE HYDROCHLORIDE 20 MG/ML
JELLY TOPICAL ONCE
Status: CANCELLED | OUTPATIENT
Start: 2021-07-16 | End: 2021-07-16

## 2021-07-16 RX ORDER — LIDOCAINE HYDROCHLORIDE 40 MG/ML
SOLUTION TOPICAL ONCE
Status: CANCELLED | OUTPATIENT
Start: 2021-07-16 | End: 2021-07-16

## 2021-07-16 RX ORDER — LIDOCAINE 50 MG/G
OINTMENT TOPICAL ONCE
Status: CANCELLED | OUTPATIENT
Start: 2021-07-16 | End: 2021-07-16

## 2021-07-16 RX ORDER — BETAMETHASONE DIPROPIONATE 0.05 %
OINTMENT (GRAM) TOPICAL ONCE
Status: CANCELLED | OUTPATIENT
Start: 2021-07-16 | End: 2021-07-16

## 2021-07-16 RX ORDER — GINSENG 100 MG
CAPSULE ORAL ONCE
Status: CANCELLED | OUTPATIENT
Start: 2021-07-16 | End: 2021-07-16

## 2021-07-16 RX ORDER — LIDOCAINE HYDROCHLORIDE 40 MG/ML
SOLUTION TOPICAL ONCE
Status: COMPLETED | OUTPATIENT
Start: 2021-07-16 | End: 2021-07-16

## 2021-07-16 RX ORDER — BACITRACIN, NEOMYCIN, POLYMYXIN B 400; 3.5; 5 [USP'U]/G; MG/G; [USP'U]/G
OINTMENT TOPICAL ONCE
Status: CANCELLED | OUTPATIENT
Start: 2021-07-16 | End: 2021-07-16

## 2021-07-16 RX ORDER — BACITRACIN ZINC AND POLYMYXIN B SULFATE 500; 1000 [USP'U]/G; [USP'U]/G
OINTMENT TOPICAL ONCE
Status: CANCELLED | OUTPATIENT
Start: 2021-07-16 | End: 2021-07-16

## 2021-07-16 RX ORDER — LIDOCAINE 40 MG/G
CREAM TOPICAL ONCE
Status: CANCELLED | OUTPATIENT
Start: 2021-07-16 | End: 2021-07-16

## 2021-07-16 RX ADMIN — LIDOCAINE HYDROCHLORIDE: 40 SOLUTION TOPICAL at 11:23

## 2021-07-16 ASSESSMENT — PAIN SCALES - GENERAL: PAINLEVEL_OUTOF10: 0

## 2021-07-16 NOTE — PROGRESS NOTES
[address]   Progress Note and Procedure Note      [address] RECORD NUMBER:  [id number]  AGE: 70 y.o. GENDER: female  : 1945  EPISODE DATE:  2021    Subjective:     Chief Complaint   Patient presents with    Wound Check     follow up right lower leg wound         HISTORY of PRESENT ILLNESS HPI    Nate Zazueta a 70 y. o. female who presents today for wound/ulcer evaluation.    History of Wound Context: recurrent right lower lateral and posterior leg ulcers.  Patient reports that on 21 she noticed her bedding was wet and discovered the wound.  She had not been wearing any compression, relying solely on diuretics.  We tried using bilateral multi-layered compression wraps and Coban Lite wraps.  In both instances, the patient complained that they were too tight.  She has been tolerating Spandagrips, but is unwilling to try any other method of compression.  She tries to sit with her legs elevated; her bilateral LE's have +2 pitting edema today. Wound/Ulcer Pain Timing/Severity: none  Quality of pain: N/A  Severity:  0 / 10   Modifying Factors: Pain worsens with an increase in the amount of swelling in her lower leg  Associated Signs/Symptoms: edema and drainage     Ulcer Identification:  Ulcer Type: venous     Contributing Factors: edema, venous stasis and obesity     Acute Wound: N/A not an acute wound    PAST MEDICAL HISTORY        Diagnosis Date    Asthma     Localized edema 2021    Non-pressure chronic ulcer of right lower leg with fat layer exposed ([address] 2021    Peripheral venous insufficiency 2021    Right foot ulcer, limited to breakdown of skin ([address] 2021       PAST SURGICAL HISTORY    Past Surgical History:   Procedure Laterality Date    APPENDECTOMY      CHOLECYSTECTOMY         FAMILY HISTORY    Family History   Problem Relation Age of Onset    Migraines Mother        SOCIAL HISTORY    Social History     Tobacco Use  Smoking status: Never Smoker    Smokeless tobacco: Never Used   Substance Use Topics    Alcohol use: Not on file    Drug use: Never       ALLERGIES    Allergies   Allergen Reactions    Iodides Other (See Comments)     IVP dye, pt does not remember he reaction, just that the nurses stated to not let anyone give to her ever again    Furosemide      Pt. States it makes her \"ditzy\", dizzy, and gives her muscle ridgity and nausea. MEDICATIONS    Current Outpatient Medications on File Prior to Encounter   Medication Sig Dispense Refill    metOLazone (ZAROXOLYN) 5 MG tablet TAKE ONE TABLET BY MOUTH DAILY AS NEEDED FOR SWELLING 30 tablet 1    triamterene-hydroCHLOROthiazide (MAXZIDE-25) 37.5-25 MG per tablet Take 1 tablet by mouth daily 90 tablet 1    mupirocin (BACTROBAN) 2 % ointment APPLY TO AFFECTED AREA THREE TIMES A DAY 22 g 0    albuterol sulfate HFA (PROAIR HFA) 108 (90 Base) MCG/ACT inhaler Inhale 2 puffs into the lungs every 6 hours as needed for Wheezing 1 Inhaler 0    Nebulizers (AIRIAL COMPACT MINI NEBULIZER) MISC 1 nebule by Does not apply route 4 times daily 1 each 0     No current facility-administered medications on file prior to encounter. REVIEW OF SYSTEMS    Pertinent items are noted in HPI.       Objective:      BP (!) 148/70   Pulse 84   Temp 97.3 °F (36.3 °C) (Temporal)   Resp 18     Wt Readings from Last 3 Encounters:   07/13/21 226 lb (102.5 kg)   06/07/21 219 lb 9.3 oz (99.6 kg)   06/01/21 218 lb (98.9 kg)       PHYSICAL EXAM    General Appearance: alert and oriented to person, place and time, well developed and well- nourished, in no acute distress  Skin: warm and dry  Head: normocephalic and atraumatic  Eyes: pupils equal, round, and reactive to light, extraocular eye movements intact, conjunctivae normal  Neck: supple and non-tender without mass  Pulmonary/Chest: normal air movement, no respiratory distress  Cardiovascular: Bilateral DP's +2  Extremities: no cyanosis or clubbing, bilateral LE's +2 pitting edema  Musculoskeletal: normal range of motion, no joint swelling, deformity or tenderness  Neurologic: reflexes normal and symmetric, no cranial nerve deficit, gait, coordination and speech normal        Assessment:        Problem List Items Addressed This Visit     Skin ulcer of right lower leg with fat layer exposed ([address]    Relevant Orders    Initiate Outpatient Wound Care Protocol    Peripheral venous insufficiency    Relevant Orders    Initiate Outpatient Wound Care Protocol    Leg swelling - Primary    Relevant Orders    Initiate Outpatient Wound Care Protocol           Procedure Note  Indications:  Based on my examination of this patient's wound(s)/ulcer(s) today, debridement is required to promote healing and evaluate the wound base. Performed by: Billy Rea, APRN - CNP    Consent obtained:  Yes    Time out taken:  Yes    Pain Control: Anesthetic  Anesthetic: 4% Lidocaine Liquid Topical (2.5 ml )       Debridement: Excisional Debridement    Using curette the wound(s)/ulcer(s) was/were debrided down through and including the removal of epidermis, dermis and subcutaneous tissue. Devitalized Tissue Debrided:  biofilm and slough    Pre Debridement Measurements:  Are located in the [place]  Documentation Flow Sheet    Diabetic/Pressure/Non Pressure Ulcers only:  Ulcer: Non-Pressure ulcer, fat layer exposed     Wound/Ulcer #: 1    Post Debridement Measurements:  Wound/Ulcer Descriptions are Pre Debridement except measurements:    Wound 04/12/21 Leg Right; Lower; Lateral #1 (4/7/21) (Active)   Wound Image   07/02/21 1007   Wound Etiology Venous 04/12/21 0927   Dressing Status Old drainage noted 07/16/21 0940   Wound Cleansed Cleansed with saline 07/09/21 1127   Dressing/Treatment Collagen with Ag;Dry dressing;Roll gauze 07/16/21 1122   Wound Length (cm) 2.1 cm 07/16/21 0940   Wound Width (cm) 0.7 cm 07/16/21 0940   Wound Depth (cm) 0.1 cm 07/16/21 0940   Wound Surface Area (cm^2) 1.47 cm^2 07/16/21 0940   Change in Wound Size % (l*w) 48.96 07/16/21 0940   Wound Volume (cm^3) 0.147 cm^3 07/16/21 0940   Wound Healing % 49 07/16/21 0940   Post-Procedure Length (cm) 2.2 cm 07/16/21 1032   Post-Procedure Width (cm) 0.8 cm 07/16/21 1032   Post-Procedure Depth (cm) 0.1 cm 07/16/21 1032   Post-Procedure Surface Area (cm^2) 1.76 cm^2 07/16/21 1032   Post-Procedure Volume (cm^3) 0.176 cm^3 07/16/21 1032   Wound Assessment Granulation tissue;Slough 07/16/21 0940   Drainage Amount Scant 07/16/21 0940   Drainage Description Serous 07/16/21 0940   Odor None 07/16/21 0940   Consuelo-wound Assessment Dry/flaky 07/16/21 0940   Margins Attached edges 07/16/21 0940   Wound Thickness Description not for Pressure Injury Full thickness 07/16/21 0940   Number of days: 95          Total Surface Area Debrided:  1.76 sq cm     Estimated Blood Loss:  Minimal    Hemostasis Achieved:  by pressure    Procedural Pain:  0  / 10     Post Procedural Pain:  0 / 10     Response to treatment:  Well tolerated by patient. Patient plans to continue use of Spandagrips.  The compression helps improve blood flow in the legs, prevents blood clotting and inhibits the progression of a variety of venous disorders.  Compression is designed to be tighter around the ankles with gradually less pressure moving up the lower limbs toward the knees.  Working with the calf muscle, compression helps squeeze the venous blood back up toward the heart, to enhance circulation.        Plan:     Treatment Note please see attached Discharge Instructions    Written patient dismissal instructions given to patient and signed by patient or POA.          Discharge Instructions         Discharge [facility]   Physician Orders and Discharge Instructions  [address], [address]  Telephone: [phone]      FAX [phone]     NAME: Genevieve Armstrong  DATE OF BIRTH:  1945  MEDICAL RECORD NUMBER:  [id number]  DATE:  7/16/2021     Wound Cleansing:   Do not scrub or use excessive force. Cleanse wound prior to applying a clean dressing with:  [x]???????????? Normal Saline  [x]???????????? Keep Wound Dry in Shower    []???????????? Wound Cleanser   []???????????? Cleanse wound with Mild Soap & Water  []???????????? May Shower at Discharge   []???????????? Other:        Topical Treatments:  Do not apply lotions, creams, or ointments to wound bed unless directed.   []???????????? Apply moisturizing lotion to skin surrounding the wound prior to dressing change.  []???????????? Apply antifungal ointment to skin surrounding the wound prior to dressing change.  []???????????? Apply thin film of moisture barrier ointment to skin immediately around wound.  []???????????? Other:                  Dressings:      Wound Location : RIGHT LATERAL LEG WOUND                [x]???????????? Apply Primary Dressing:                                             [x]???????????? Moistened Collagen Ag                         [x]???????????? Cover and Secure with:                   [x]???????????? Gauze         []???????????? Filiberto           [x]???????????? Roll gauze              []???????????? Ace Wrap   []???????????? Cover Roll Tape     []???????????? ABD                                      []???????????? Other:               Avoid contact of tape with skin.     [x]???????????? Change dressing:      [x]???????????? Three times per week                                 Edema Control:  Apply:  []???????????? Compression Stocking       []???????????? Right Leg     []?????????? ?? Left Leg              []???????????? Tubigrip      []???????????? Right Leg Double Layer      []?????????? ?? Left Leg Double Layer                                                  []???????????? Right Leg Single Layer       []?????????? ?? Left Leg Single Layer              [x]???????????? SpandaGrip            [x]???????????? Right Leg     [x]?????????? ?? Left Leg                                      []?????????? ??Low compression 5-10 mm/Hg                                             [x]?????????? ? ? Medium compression 10-20 mm/Hg                                      []?????????? ? ? High compression  20-30 mm/Hg              every morning immediately when getting up should be applied to affected leg(s) from mid foot to knee making sure to cover the heel.  Remove every night before going to bed.              [x]???????????? Elevate leg(s) above the level of the heart when sitting.                 [x]???????????? Avoid prolonged standing in one place.                   Compression:  Apply:  []???????????? Multilayer Compression Wrap Applied in Clinic    []???????????? RightLeg      []?????????? ?? Left Leg              []???????????? Multi-layer compression.  Do not get leg(s) with wrap wet.  If wraps become too tight call the center or completely remove the wrap.                      []???????????? Elevate leg(s) above the level of the heart when sitting.                 []???????????? Avoid prolonged standing in one place.     Off-Loading:   []???????????? Off-loading when    []???????????? walking       []???????????? in bed         []???????????? sitting  []???????????? Total non-weight bearing  []???????????? Right Leg  []???????????? Left Leg          []???????????? Assistive Device     []???????????? Walker        []???????????? Cane           []???????????? Wheelchair  []???????????? Crutches              []???????????? Surgical shoe    []???????????? Podus Boot(s)   []???????????? Foam Boot(s)  []???????????? Roll About              []???????????? Cast Boot   []???????????? CROW Boot  []???????????? Other:                   Dietary:  [x]???????????? Diet as tolerated:     []???????????? Calorie Diabetic Diet:           []???????????? No Added Salt:  [x]???????????? Increase Protein:     []???????????? Other:                Activity:  [x]???????????? Activity as tolerated:  []???????????? Patient has no activity restrictions     []???????????? Strict Bedrest: []???????????? Remain off Work:     []???????????? May return to full duty work:                                    []???????????? Return to work with [address]     If you are still having pain after you go home:  [x]???????????? Elevate the affected limb.    [x]???????????? Use over-the-counter medications you would normally use for pain as permitted by your family doctor. [x]???????????? For persistent pain not relieved by the above interventions, please call your family doctor.             Return Appointment:  [x]???????????? Wound and dressing supply provider: [facility]  []???????????? ECF or Home Healthcare:  []???????????? Wound Assessment:          []???????????? Physician or NP scheduled for Wound Assessment:   [x]???????????? Return Appointment: With  Caleb Gerber CNP ON 1 Week  []???????????? Ordered tests:        **PLEASE BRING YOUR COMPRESSION HOSE WITH YOU TO EACH VISIT**     **4/12/2021 : SCRIPT GIVEN FOR CIRC-AIDS**        Nurse Case Manger: JOSE MANUEL  Electronically signed by Corrinne Kennedy, RN on 7/16/2021 at 10:33 AM          [address] [place] [address] Information: Should you experience any significant changes in your wound(s) or have questions about your wound care, please [facility]  [phone] [address] 8:00 am - 4:30 pm and Friday 8:00 am - 12:30 pm.  If you need help with your wound outside these hours and cannot wait until we are again available, contact your PCP or go to the hospital emergency room.      PLEASE NOTE: IF YOU ARE UNABLE TO OBTAIN WOUND SUPPLIES, CONTINUE TO USE THE SUPPLIES YOU HAVE AVAILABLE UNTIL YOU ARE ABLE TO [facility].  IT IS MOST IMPORTANT TO KEEP THE WOUND COVERED AT ALL TIMES.     Physician Signature:_______________________     Date: ___________ Time:  ____________                                [X ] Billy Rae CNP  [ ] DR Polly Benjamin  [ ] DR Pushpa Reveles                      Electronically signed by VIDA Lindsay CNP on 7/16/2021 at 11:52 AM

## 2021-07-23 ENCOUNTER — HOSPITAL ENCOUNTER (OUTPATIENT)
Dept: WOUND CARE | Age: 76
Discharge: HOME | End: 2021-07-23
Payer: MEDICARE

## 2021-07-23 VITALS
TEMPERATURE: 97.4 F | SYSTOLIC BLOOD PRESSURE: 144 MMHG | DIASTOLIC BLOOD PRESSURE: 82 MMHG | HEART RATE: 87 BPM | RESPIRATION RATE: 18 BRPM

## 2021-07-23 DIAGNOSIS — L97.912 SKIN ULCER OF RIGHT LOWER LEG WITH FAT LAYER EXPOSED (HCC): ICD-10-CM

## 2021-07-23 DIAGNOSIS — R60.0 LOCALIZED EDEMA: ICD-10-CM

## 2021-07-23 DIAGNOSIS — I87.2 PERIPHERAL VENOUS INSUFFICIENCY: ICD-10-CM

## 2021-07-23 DIAGNOSIS — M79.89 LEG SWELLING: Primary | ICD-10-CM

## 2021-07-23 PROCEDURE — 11042 DBRDMT SUBQ TIS 1ST 20SQCM/<: CPT

## 2021-07-23 PROCEDURE — 11042 DBRDMT SUBQ TIS 1ST 20SQCM/<: CPT | Performed by: NURSE PRACTITIONER

## 2021-07-23 RX ORDER — LIDOCAINE 40 MG/G
CREAM TOPICAL ONCE
Status: CANCELLED | OUTPATIENT
Start: 2021-07-23 | End: 2021-07-23

## 2021-07-23 RX ORDER — GINSENG 100 MG
CAPSULE ORAL ONCE
Status: CANCELLED | OUTPATIENT
Start: 2021-07-23 | End: 2021-07-23

## 2021-07-23 RX ORDER — LIDOCAINE 50 MG/G
OINTMENT TOPICAL ONCE
Status: CANCELLED | OUTPATIENT
Start: 2021-07-23 | End: 2021-07-23

## 2021-07-23 RX ORDER — GENTAMICIN SULFATE 1 MG/G
OINTMENT TOPICAL ONCE
Status: CANCELLED | OUTPATIENT
Start: 2021-07-23 | End: 2021-07-23

## 2021-07-23 RX ORDER — LIDOCAINE 40 MG/G
CREAM TOPICAL ONCE
Status: COMPLETED | OUTPATIENT
Start: 2021-07-23 | End: 2021-07-23

## 2021-07-23 RX ORDER — BACITRACIN, NEOMYCIN, POLYMYXIN B 400; 3.5; 5 [USP'U]/G; MG/G; [USP'U]/G
OINTMENT TOPICAL ONCE
Status: CANCELLED | OUTPATIENT
Start: 2021-07-23 | End: 2021-07-23

## 2021-07-23 RX ORDER — BACITRACIN ZINC AND POLYMYXIN B SULFATE 500; 1000 [USP'U]/G; [USP'U]/G
OINTMENT TOPICAL ONCE
Status: CANCELLED | OUTPATIENT
Start: 2021-07-23 | End: 2021-07-23

## 2021-07-23 RX ORDER — LIDOCAINE HYDROCHLORIDE 40 MG/ML
SOLUTION TOPICAL ONCE
Status: CANCELLED | OUTPATIENT
Start: 2021-07-23 | End: 2021-07-23

## 2021-07-23 RX ORDER — LIDOCAINE HYDROCHLORIDE 20 MG/ML
JELLY TOPICAL ONCE
Status: CANCELLED | OUTPATIENT
Start: 2021-07-23 | End: 2021-07-23

## 2021-07-23 RX ORDER — CLOBETASOL PROPIONATE 0.5 MG/G
OINTMENT TOPICAL ONCE
Status: CANCELLED | OUTPATIENT
Start: 2021-07-23 | End: 2021-07-23

## 2021-07-23 RX ORDER — BETAMETHASONE DIPROPIONATE 0.05 %
OINTMENT (GRAM) TOPICAL ONCE
Status: CANCELLED | OUTPATIENT
Start: 2021-07-23 | End: 2021-07-23

## 2021-07-23 RX ADMIN — LIDOCAINE: 40 CREAM TOPICAL at 09:19

## 2021-07-23 ASSESSMENT — PAIN SCALES - GENERAL: PAINLEVEL_OUTOF10: 0

## 2021-07-23 NOTE — PROGRESS NOTES
[address]   Progress Note and Procedure Note      [address] RECORD NUMBER:  [id number]  AGE: 70 y.o. GENDER: female  : 1945  EPISODE DATE:  2021    Subjective:     Chief Complaint   Patient presents with    Wound Check     Follow up for right leg wound. HISTORY of PRESENT ILLNESS HPI  Lobo Russell a 70 y. o. female who presents today for wound/ulcer evaluation. Today pt staes wound getting much better and denies constitutional issues.    History of Wound Context: recurrent right lower lateral and posterior leg ulcers.  Patient reports that on 21 she noticed her bedding was wet and discovered the wound.  She had not been wearing any compression, relying solely on diuretics.  We tried using bilateral multi-layered compression wraps and Coban Lite wraps.  In both instances, the patient complained that they were too tight.  She has been tolerating Spandagrips, but is unwilling to try any other method of compression.  She tries to sit with her legs elevated; her bilateral LE's have +2 pitting edema today. 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 layer exposed ([address] 2021    Peripheral venous insufficiency 2021    Right foot ulcer, limited to breakdown of skin ([address] 2021    Skin ulcer of right lower leg with fat layer exposed ([address] 2021       PAST SURGICAL HISTORY    Past Surgical History:   Procedure Laterality Date    APPENDECTOMY      CHOLECYSTECTOMY FAMILY HISTORY    Family History   Problem Relation Age of Onset    Migraines Mother        SOCIAL HISTORY    Social History     Tobacco Use    Smoking status: Never Smoker    Smokeless tobacco: Never Used   Substance Use Topics    Alcohol use: Not on file    Drug use: Never       ALLERGIES    Allergies   Allergen Reactions    Iodides Other (See Comments)     IVP dye, pt does not remember he reaction, just that the nurses stated to not let anyone give to her ever again    Furosemide      Pt. States it makes her \"ditzy\", dizzy, and gives her muscle ridgity and nausea. MEDICATIONS    Current Outpatient Medications on File Prior to Encounter   Medication Sig Dispense Refill    metOLazone (ZAROXOLYN) 5 MG tablet TAKE ONE TABLET BY MOUTH DAILY AS NEEDED FOR SWELLING 30 tablet 1    triamterene-hydroCHLOROthiazide (MAXZIDE-25) 37.5-25 MG per tablet Take 1 tablet by mouth daily 90 tablet 1    mupirocin (BACTROBAN) 2 % ointment APPLY TO AFFECTED AREA THREE TIMES A DAY 22 g 0    albuterol sulfate HFA (PROAIR HFA) 108 (90 Base) MCG/ACT inhaler Inhale 2 puffs into the lungs every 6 hours as needed for Wheezing 1 Inhaler 0    Nebulizers (AIRIAL COMPACT MINI NEBULIZER) MISC 1 nebule by Does not apply route 4 times daily 1 each 0     No current facility-administered medications on file prior to encounter. REVIEW OF SYSTEMS  Review of Systems    Pertinent items are noted in HPI.     Objective:      BP (!) 144/82   Pulse 87   Temp 97.4 °F (36.3 °C) (Infrared)   Resp 18     Wt Readings from Last 3 Encounters:   07/13/21 226 lb (102.5 kg)   06/07/21 219 lb 9.3 oz (99.6 kg)   06/01/21 218 lb (98.9 kg)       PHYSICAL EXAM  Physical Exam    General Appearance: alert and oriented to person, place and time, well-developed and well-nourished, in no acute distress and obese  Skin: warm and dry  Head: normocephalic and atraumatic  Eyes: pupils equal, round, and reactive to light  Pulmonary/Chest:  normal air movement, no respiratory distress  Cardiovascular: normal rate, regular rhythm and distal pulses diminished  Extremities: no cyanosis, no clubbing and 2 + edema-  right lower leg and 1+ left lower leg. Wound:  Decreasing in size, no overt signs of infection. Assessment:        Problem List Items Addressed This Visit     Skin ulcer of right lower leg with fat layer exposed ([address]    Relevant Orders    Initiate Outpatient Wound Care Protocol    Peripheral venous insufficiency    Relevant Orders    Initiate Outpatient Wound Care Protocol    Localized edema    Leg swelling - Primary    Relevant Orders    Initiate Outpatient Wound Care Protocol           Procedure Note  Indications:  Based on my examination of this patient's wound(s)/ulcer(s) today, debridement is required to promote healing and evaluate the wound base. Performed by: VIDA Leigh - CNP    Consent obtained:  Yes    Time out taken:  Yes    Pain Control: Anesthetic  Anesthetic: 4% Lidocaine Cream       Debridement: Excisional Debridement    Using curette the wound(s)/ulcer(s) was/were debrided down through and including the removal of epidermis, dermis and subcutaneous tissue. Devitalized Tissue Debrided:  fibrin, biofilm and slough    Pre Debridement Measurements:  Are located in the [facility]  Documentation Flow Sheet    Diabetic/Pressure/Non Pressure Ulcers only:  Ulcer: Non-Pressure ulcer, fat layer exposed     Wound/Ulcer #: 1    Post Debridement Measurements:  Wound/Ulcer Descriptions are Pre Debridement except measurements:    Wound 04/12/21 Leg Right; Lower; Lateral #1 (4/7/21) (Active)   Wound Image   07/02/21 1007   Wound Etiology Venous 04/12/21 0927   Dressing Status Old drainage noted 07/23/21 0917   Wound Cleansed Cleansed with saline 07/23/21 1005   Dressing/Treatment Collagen with Ag;Dry dressing;Roll gauze 07/23/21 1005   Wound Length (cm) 0.7 cm 07/23/21 0917   Wound Width (cm) 0.4 cm 07/23/21 0917   Wound Depth (cm) 0.1 cm 07/23/21 0917   Wound Surface Area (cm^2) 0.28 cm^2 07/23/21 0917   Change in Wound Size % (l*w) 90.28 07/23/21 0917   Wound Volume (cm^3) 0.028 cm^3 07/23/21 0917   Wound Healing % 90 07/23/21 0917   Post-Procedure Length (cm) 0.8 cm 07/23/21 0945   Post-Procedure Width (cm) 0.5 cm 07/23/21 0945   Post-Procedure Depth (cm) 0.1 cm 07/23/21 0945   Post-Procedure Surface Area (cm^2) 0.4 cm^2 07/23/21 0945   Post-Procedure Volume (cm^3) 0.04 cm^3 07/23/21 0945   Wound Assessment Granulation tissue;Slough 07/23/21 0917   Drainage Amount Scant 07/23/21 0917   Drainage Description Serosanguinous 07/23/21 0917   Odor None 07/23/21 0917   Consuelo-wound Assessment Dry/flaky 07/23/21 0917   Margins Attached edges 07/23/21 0917   Wound Thickness Description not for Pressure Injury Full thickness 07/23/21 0917   Number of days: 102          Total Surface Area Debrided:  0.4 sq cm     Estimated Blood Loss:  Minimal    Hemostasis Achieved:  not needed    Procedural Pain:  1  / 10     Post Procedural Pain:  0 / 10     Response to treatment:  Well tolerated by patient. Plan:   Pt education per provider related to plan of care and is agreeable to continuing same plan and questions answered. Treatment Note please see attached Discharge Instructions    Written patient dismissal instructions given to patient and signed by patient or POA. Discharge Instructions         Discharge [address] [place] [address] and [facility] O[facility] Therapy   Physician Orders and Discharge [facility]  [address] [facility] [address], [facility] [address]  Telephone: [phone]      FAX [phone]     NAME: Tori Henley OF BIRTH:  1945  MEDICAL RECORD NUMBER:  [id number]  DATE:  7/23/2021     Wound Cleansing:   Do not scrub or use excessive force.   Cleanse wound prior to applying a clean dressing with:  [x]????????????? Normal Saline  [x]????????????? Keep Wound Dry in Shower    []????????????? Wound Cleanser   []????????????? Cleanse wound with Mild Soap & Water  []????????????? May Shower at Discharge   []????????????? Other:        Topical Treatments:  Do not apply lotions, creams, or ointments to wound bed unless directed.   []????????????? Apply moisturizing lotion to skin surrounding the wound prior to dressing change.  []????????????? Apply antifungal ointment to skin surrounding the wound prior to dressing change.  []????????????? Apply thin film of moisture barrier ointment to skin immediately around wound.  []????????????? Other:                  Dressings:      Wound Location : RIGHT LATERAL LEG WOUND                [x]????????????? Apply Primary Dressing:                                             [x]????????????? Moistened Collagen Ag                         [x]????????????? Cover and Secure with:                   [x]????????????? Gauze         []????????????? Filiberto           [x]????????????? Roll gauze              []????????????? Ace Wrap   []????????????? Cover Roll Tape     []????????????? ABD                                      []????????????? Other:               Avoid contact of tape with skin.     [x]????????????? Change dressing:      [x]????????????? Three times per week                                 Edema Control:  Apply:  []????????????? Compression Stocking       []????????????? Right Leg     []??????????? ?? Left Leg              []????????????? Tubigrip      []????????????? Right Leg Double Layer      []??????????? ?? Left Leg Double Layer                                                  []????????????? Right Leg Single Layer       []??????????? ?? Left Leg Single Layer              [x]????????????? SpandaGrip            [x]????????????? Right Leg     [x]??????????? ?? Left Leg                                      []??????????? ??Low compression 5-10 mm/Hg                                             [x]??????????? ? ? Medium compression 10-20 mm/Hg                                      []??????????? ? ? High compression  20-30 mm/Hg              every morning immediately when getting up should be applied to affected leg(s) from mid foot to knee making sure to cover the heel.  Remove every night before going to bed.              [x]????????????? Elevate leg(s) above the level of the heart when sitting.                 [x]????????????? Avoid prolonged standing in one place.                   Compression:  Apply:  []????????????? Multilayer Compression Wrap Applied in Clinic    []????????????? RightLeg      []??????????? ?? Left Leg              []????????????? Multi-layer compression.  Do not get leg(s) with wrap wet.  If wraps become too tight call the center or completely remove the wrap.                      []????????????? Elevate leg(s) above the level of the heart when sitting.                 []????????????? Avoid prolonged standing in one place.     Off-Loading:   []????????????? Off-loading when    []????????????? walking       []????????????? in bed         []????????????? sitting  []????????????? Total non-weight bearing  []????????????? Right Leg  []????????????? Left Leg          []????????????? Assistive Device     []????????????? Walker        []????????????? Cane           []????????????? Wheelchair  []????????????? Crutches              []????????????? Surgical shoe    []????????????? Podus Boot(s)   []????????????? Foam Boot(s)  []????????????? Roll About              []????????????? Cast Boot   []????????????? CROW Boot  []????????????? Other:                   Dietary:  [x]????????????? Diet as tolerated:     []????????????? Calorie Diabetic Diet:           []????????????? No Added Salt:  [x]????????????? Increase Protein:     []????????????? Other:                Activity:  [x]????????????? Activity as tolerated:  []????????????? Patient has no activity restrictions     []????????????? Strict Bedrest: []????????????? Remain off Work:

## 2021-07-30 ENCOUNTER — HOSPITAL ENCOUNTER (OUTPATIENT)
Dept: WOUND CARE | Age: 76
Discharge: HOME | End: 2021-07-30
Payer: MEDICARE

## 2021-07-30 VITALS
SYSTOLIC BLOOD PRESSURE: 166 MMHG | TEMPERATURE: 97.5 F | HEART RATE: 98 BPM | RESPIRATION RATE: 18 BRPM | DIASTOLIC BLOOD PRESSURE: 86 MMHG

## 2021-07-30 DIAGNOSIS — M79.89 LEG SWELLING: Primary | ICD-10-CM

## 2021-07-30 DIAGNOSIS — L97.912 SKIN ULCER OF RIGHT LOWER LEG WITH FAT LAYER EXPOSED (HCC): ICD-10-CM

## 2021-07-30 DIAGNOSIS — I87.2 PERIPHERAL VENOUS INSUFFICIENCY: ICD-10-CM

## 2021-07-30 PROCEDURE — 99212 OFFICE O/P EST SF 10 MIN: CPT

## 2021-07-30 PROCEDURE — 99212 OFFICE O/P EST SF 10 MIN: CPT | Performed by: NURSE PRACTITIONER

## 2021-07-30 RX ORDER — LIDOCAINE 40 MG/G
CREAM TOPICAL ONCE
Status: CANCELLED | OUTPATIENT
Start: 2021-07-30 | End: 2021-07-30

## 2021-07-30 RX ORDER — LIDOCAINE 50 MG/G
OINTMENT TOPICAL ONCE
Status: CANCELLED | OUTPATIENT
Start: 2021-07-30 | End: 2021-07-30

## 2021-07-30 RX ORDER — GENTAMICIN SULFATE 1 MG/G
OINTMENT TOPICAL ONCE
Status: CANCELLED | OUTPATIENT
Start: 2021-07-30 | End: 2021-07-30

## 2021-07-30 RX ORDER — LIDOCAINE HYDROCHLORIDE 20 MG/ML
JELLY TOPICAL ONCE
Status: CANCELLED | OUTPATIENT
Start: 2021-07-30 | End: 2021-07-30

## 2021-07-30 RX ORDER — LIDOCAINE HYDROCHLORIDE 40 MG/ML
SOLUTION TOPICAL ONCE
Status: CANCELLED | OUTPATIENT
Start: 2021-07-30 | End: 2021-07-30

## 2021-07-30 RX ORDER — BACITRACIN, NEOMYCIN, POLYMYXIN B 400; 3.5; 5 [USP'U]/G; MG/G; [USP'U]/G
OINTMENT TOPICAL ONCE
Status: CANCELLED | OUTPATIENT
Start: 2021-07-30 | End: 2021-07-30

## 2021-07-30 RX ORDER — GINSENG 100 MG
CAPSULE ORAL ONCE
Status: CANCELLED | OUTPATIENT
Start: 2021-07-30 | End: 2021-07-30

## 2021-07-30 RX ORDER — BETAMETHASONE DIPROPIONATE 0.05 %
OINTMENT (GRAM) TOPICAL ONCE
Status: CANCELLED | OUTPATIENT
Start: 2021-07-30 | End: 2021-07-30

## 2021-07-30 RX ORDER — BACITRACIN ZINC AND POLYMYXIN B SULFATE 500; 1000 [USP'U]/G; [USP'U]/G
OINTMENT TOPICAL ONCE
Status: CANCELLED | OUTPATIENT
Start: 2021-07-30 | End: 2021-07-30

## 2021-07-30 RX ORDER — CLOBETASOL PROPIONATE 0.5 MG/G
OINTMENT TOPICAL ONCE
Status: CANCELLED | OUTPATIENT
Start: 2021-07-30 | End: 2021-07-30

## 2021-07-30 ASSESSMENT — PAIN SCALES - GENERAL: PAINLEVEL_OUTOF10: 0

## 2021-07-30 NOTE — PROGRESS NOTES
[address]   Progress Note and Procedure Note      [address] RECORD NUMBER:  [id number]  AGE: 70 y.o. GENDER: female  : 1945  EPISODE DATE:  2021    Subjective:     Chief Complaint   Patient presents with    Wound Check     Follow up Wound Right Lower Leg         HISTORY of PRESENT ILLNESS HPI  Mike Munguia a 70 y. o. female who presents today for wound/ulcer evaluation. Today pt's wound now closed. Still has some slight periwound redness, but no drainage or open areas.     History of Wound Context: recurrent right lower lateral and posterior leg ulcers.  Patient reports that on 21 she noticed her bedding was wet and discovered the wound.  She had not been wearing any compression, relying solely on diuretics.  We tried using bilateral multi-layered compression wraps and Coban Lite wraps.  In both instances, the patient complained that they were too tight.  She has been tolerating Spandagrips, but is unwilling to try any other method of compression.    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 layer exposed ([address] 2021    Peripheral venous insufficiency 2021    Right foot ulcer, limited to breakdown of skin ([address] 2021    Skin ulcer of right lower leg with fat layer exposed ([address] 2021       PAST SURGICAL HISTORY    Past Surgical History:   Procedure Laterality Date    APPENDECTOMY      CHOLECYSTECTOMY         FAMILY HISTORY    Family History   Problem Relation Age of Onset    Migraines Mother        SOCIAL HISTORY    Social History     Tobacco Use    Smoking status: Never Smoker    Smokeless tobacco: Never Used   Substance Use Topics    Alcohol use: Not on file    Drug use: Never       ALLERGIES    Allergies   Allergen Reactions    Iodides Other (See Comments)     IVP dye, pt does not remember he reaction, just that the nurses stated to not let anyone give to her ever again    Furosemide      Pt. States it makes her \"ditzy\", dizzy, and gives her muscle ridgity and nausea. MEDICATIONS    Current Outpatient Medications on File Prior to Encounter   Medication Sig Dispense Refill    metOLazone (ZAROXOLYN) 5 MG tablet TAKE ONE TABLET BY MOUTH DAILY AS NEEDED FOR SWELLING 30 tablet 1    triamterene-hydroCHLOROthiazide (MAXZIDE-25) 37.5-25 MG per tablet Take 1 tablet by mouth daily 90 tablet 1    mupirocin (BACTROBAN) 2 % ointment APPLY TO AFFECTED AREA THREE TIMES A DAY 22 g 0    albuterol sulfate HFA (PROAIR HFA) 108 (90 Base) MCG/ACT inhaler Inhale 2 puffs into the lungs every 6 hours as needed for Wheezing 1 Inhaler 0    Nebulizers (AIRIAL COMPACT MINI NEBULIZER) MISC 1 nebule by Does not apply route 4 times daily 1 each 0     No current facility-administered medications on file prior to encounter. REVIEW OF SYSTEMS  Review of Systems    Pertinent items are noted in HPI.     Objective:      BP (!) 166/86   Pulse 98   Temp 97.5 °F (36.4 °C) (Temporal)   Resp 18     Wt Readings from Last 3 Encounters:   07/13/21 226 lb (102.5 kg)   06/07/21 219 lb 9.3 oz (99.6 kg)   06/01/21 218 lb (98.9 kg)       PHYSICAL EXAM  Physical Exam    General Appearance: alert and oriented to person, place and time, well-developed and well-nourished, in no acute distress, obese and pale  Skin: warm and dry  Head: normocephalic and atraumatic  Eyes: pupils equal, round, and reactive to light  Pulmonary/Chest:  normal air movement, no respiratory distress  Cardiovascular: normal rate, regular rhythm and intact distal pulses      Assessment:        Problem List Items Addressed This Visit     Skin ulcer of right lower leg with fat layer exposed ([address]    Relevant Orders    Initiate Outpatient Wound Care Protocol    Peripheral venous insufficiency    Relevant Orders    Initiate Outpatient Wound Care Protocol    Leg swelling - Primary    Relevant Orders    Initiate Outpatient Wound Care Protocol           Procedure Note  Indications:  Based on my examination of this patient's wound(s)/ulcer(s) today, debridement is not required to promote healing and evaluate the wound base. Wound/Ulcer Descriptions are Pre Debridement except measurements:    Wound 04/12/21 Leg Right; Lower; Lateral #1 (4/7/21) (Active)   Wound Image   07/30/21 0926   Wound Etiology Venous 04/12/21 0927   Dressing Status Intact;Dry;Clean 07/30/21 0926   Wound Cleansed Cleansed with saline 07/23/21 1005   Dressing/Treatment Silicone border 40/89/07 0938   Wound Length (cm) 0 cm 07/30/21 0926   Wound Width (cm) 0 cm 07/30/21 0926   Wound Depth (cm) 0 cm 07/30/21 0926   Wound Surface Area (cm^2) 0 cm^2 07/30/21 0926   Change in Wound Size % (l*w) 100 07/30/21 0926   Wound Volume (cm^3) 0 cm^3 07/30/21 0926   Wound Healing % 100 07/30/21 0926   Post-Procedure Length (cm) 0 cm 07/30/21 0931   Post-Procedure Width (cm) 0 cm 07/30/21 0931   Post-Procedure Depth (cm) 0 cm 07/30/21 0931   Post-Procedure Surface Area (cm^2) 0 cm^2 07/30/21 0931   Post-Procedure Volume (cm^3) 0 cm^3 07/30/21 0931   Distance Tunneling (cm) 0 cm 07/30/21 0931   Wound Assessment Epithelialization 07/30/21 0926   Drainage Amount Scant 07/23/21 0917   Drainage Description Serosanguinous 07/23/21 0917   Odor None 07/23/21 0917   Consuelo-wound Assessment Dry/flaky 07/23/21 0917   Margins Attached edges 07/23/21 0917   Wound Thickness Description not for Pressure Injury Full thickness 07/23/21 0917   Number of days: 109          Plan:   Pt education per provider related to newly closed wound, prevention and need to continue compression. Pt agreeable and questions answered. Treatment Note please see attached Discharge Instructions    Written patient dismissal instructions given to patient and signed by patient or POA. Discharge Instructions         [address] Physician Orders   [facility] AT [address] [place], [place]  Telephone: [phone]    NAME:  Charbel Gather OF BIRTH:  1945  MEDICAL RECORD NUMBER:  [id number]  DATE:  7/30/2021    Congratulations! You have completed your treatment. Return to your Primary Care Physician for all your health issues. Resume your ordinary activities as tolerated. Take your medications as prescribed by your primary care physician. Check your skin daily for cracks, bruises, sores, or dryness. Use a moisturizer as needed. Clean and dry your skin, using mild soap and warm water (not hot). Avoid alcohol and caffeine and do not smoke. Maintain a nutritious diet. Avoid pressure on your wound site. Keep your legs elevated above the level of the heart whenever possible. Continue to use wraps/stockings/compression as prescribed. Replace compression stockings every four to six months as needed to ensure proper fit. Wear well-fitting shoes and leg garments.       Apply a Mepilex Border to Right Lower Leg- Remove on Tuesday 8/3/21    Apply Low Spandagrip to bilateral lower legs    Physician Signature:______________________    Date: ___________ Time:  ____________    Karina Moise CNP            Electronically signed by Nandini Fraser RN on 7/30/2021 at 9:32 AM                                   Electronically signed by VIDA Castro CNP on 7/30/2021 at 1:58 PM

## 2021-08-02 RX ORDER — METOLAZONE 5 MG/1
TABLET ORAL
Qty: 30 TABLET | Refills: 1 | Status: SHIPPED | OUTPATIENT
Start: 2021-08-02 | End: 2021-10-04

## 2021-08-02 NOTE — PLAN OF CARE
Problem: Wound:  Goal: Will show signs of wound healing; wound closure and no evidence of infection  Description: Will show signs of wound healing; wound closure and no evidence of infection  Outcome: Completed     Problem: Venous:  Goal: Signs of wound healing will improve  Description: Signs of wound healing will improve  Outcome: Completed     Problem: Weight control:  Goal: Ability to maintain an optimal weight for height and age will be supported  Description: Ability to maintain an optimal weight for height and age will be supported  Outcome: Completed     Problem: Falls - Risk of:  Goal: Will remain free from falls  Description: Will remain free from falls  Outcome: Completed

## 2021-09-14 ENCOUNTER — OFFICE VISIT (OUTPATIENT)
Dept: FAMILY MEDICINE CLINIC | Age: 76
End: 2021-09-14
Payer: MEDICARE

## 2021-09-14 ENCOUNTER — HOSPITAL ENCOUNTER (OUTPATIENT)
Dept: GENERAL RADIOLOGY | Age: 76
Discharge: HOME | End: 2021-09-14
Payer: MEDICARE

## 2021-09-14 ENCOUNTER — HOSPITAL ENCOUNTER (OUTPATIENT)
Age: 76
Discharge: HOME | End: 2021-09-14
Payer: MEDICARE

## 2021-09-14 VITALS
DIASTOLIC BLOOD PRESSURE: 78 MMHG | WEIGHT: 208.6 LBS | HEART RATE: 84 BPM | BODY MASS INDEX: 33.67 KG/M2 | OXYGEN SATURATION: 95 % | SYSTOLIC BLOOD PRESSURE: 138 MMHG

## 2021-09-14 DIAGNOSIS — R63.4 WEIGHT LOSS: ICD-10-CM

## 2021-09-14 DIAGNOSIS — E66.01 CLASS 3 SEVERE OBESITY DUE TO EXCESS CALORIES WITHOUT SERIOUS COMORBIDITY WITH BODY MASS INDEX (BMI) OF 45.0 TO 49.9 IN ADULT (HCC): ICD-10-CM

## 2021-09-14 DIAGNOSIS — J45.40 MODERATE PERSISTENT ASTHMA WITHOUT COMPLICATION: ICD-10-CM

## 2021-09-14 DIAGNOSIS — J45.40 MODERATE PERSISTENT ASTHMA WITHOUT COMPLICATION: Primary | ICD-10-CM

## 2021-09-14 DIAGNOSIS — I87.2 PERIPHERAL VENOUS INSUFFICIENCY: ICD-10-CM

## 2021-09-14 LAB
ANION GAP SERPL CALCULATED.3IONS-SCNC: 16 MMOL/L (ref 3–16)
BASOPHILS ABSOLUTE: 0 K/UL (ref 0–0.2)
BASOPHILS RELATIVE PERCENT: 0.6 %
BUN BLDV-MCNC: 18 MG/DL (ref 7–20)
C-REACTIVE PROTEIN: 5 MG/L (ref 0–5.1)
CALCIUM SERPL-MCNC: 8.8 MG/DL (ref 8.3–10.6)
CHLORIDE BLD-SCNC: 96 MMOL/L (ref 99–110)
CO2: 30 MMOL/L (ref 21–32)
CREAT SERPL-MCNC: 0.6 MG/DL (ref 0.6–1.2)
EOSINOPHILS ABSOLUTE: 0.1 K/UL (ref 0–0.6)
EOSINOPHILS RELATIVE PERCENT: 3.2 %
GFR AFRICAN AMERICAN: >60
GFR NON-AFRICAN AMERICAN: >60
GLUCOSE BLD-MCNC: 105 MG/DL (ref 70–99)
HCT VFR BLD CALC: 41.7 % (ref 36–48)
HEMOGLOBIN: 14.5 G/DL (ref 12–16)
LYMPHOCYTES ABSOLUTE: 1.1 K/UL (ref 1–5.1)
LYMPHOCYTES RELATIVE PERCENT: 29.2 %
MCH RBC QN AUTO: 30 PG (ref 26–34)
MCHC RBC AUTO-ENTMCNC: 34.7 G/DL (ref 31–36)
MCV RBC AUTO: 86.3 FL (ref 80–100)
MONOCYTES ABSOLUTE: 0.4 K/UL (ref 0–1.3)
MONOCYTES RELATIVE PERCENT: 11.2 %
NEUTROPHILS ABSOLUTE: 2.1 K/UL (ref 1.7–7.7)
NEUTROPHILS RELATIVE PERCENT: 55.8 %
PDW BLD-RTO: 12.8 % (ref 12.4–15.4)
PLATELET # BLD: 137 K/UL (ref 135–450)
PMV BLD AUTO: 9.5 FL (ref 5–10.5)
POTASSIUM SERPL-SCNC: 2.8 MMOL/L (ref 3.5–5.1)
RBC # BLD: 4.83 M/UL (ref 4–5.2)
SEDIMENTATION RATE, ERYTHROCYTE: 48 MM/HR (ref 0–30)
SODIUM BLD-SCNC: 142 MMOL/L (ref 136–145)
TSH REFLEX FT4: 1.51 UIU/ML (ref 0.27–4.2)
WBC # BLD: 3.8 K/UL (ref 4–11)

## 2021-09-14 PROCEDURE — G8400 PT W/DXA NO RESULTS DOC: HCPCS | Performed by: FAMILY MEDICINE

## 2021-09-14 PROCEDURE — 3017F COLORECTAL CA SCREEN DOC REV: CPT | Performed by: FAMILY MEDICINE

## 2021-09-14 PROCEDURE — 71046 X-RAY EXAM CHEST 2 VIEWS: CPT

## 2021-09-14 PROCEDURE — 4040F PNEUMOC VAC/ADMIN/RCVD: CPT | Performed by: FAMILY MEDICINE

## 2021-09-14 PROCEDURE — 1036F TOBACCO NON-USER: CPT | Performed by: FAMILY MEDICINE

## 2021-09-14 PROCEDURE — G9899 SCRN MAM PERF RSLTS DOC: HCPCS | Performed by: FAMILY MEDICINE

## 2021-09-14 PROCEDURE — 36415 COLL VENOUS BLD VENIPUNCTURE: CPT | Performed by: FAMILY MEDICINE

## 2021-09-14 PROCEDURE — G8417 CALC BMI ABV UP PARAM F/U: HCPCS | Performed by: FAMILY MEDICINE

## 2021-09-14 PROCEDURE — 99214 OFFICE O/P EST MOD 30 MIN: CPT | Performed by: FAMILY MEDICINE

## 2021-09-14 PROCEDURE — 1090F PRES/ABSN URINE INCON ASSESS: CPT | Performed by: FAMILY MEDICINE

## 2021-09-14 PROCEDURE — 1123F ACP DISCUSS/DSCN MKR DOCD: CPT | Performed by: FAMILY MEDICINE

## 2021-09-14 PROCEDURE — G8427 DOCREV CUR MEDS BY ELIG CLIN: HCPCS | Performed by: FAMILY MEDICINE

## 2021-09-14 SDOH — ECONOMIC STABILITY: FOOD INSECURITY: WITHIN THE PAST 12 MONTHS, THE FOOD YOU BOUGHT JUST DIDN'T LAST AND YOU DIDN'T HAVE MONEY TO GET MORE.: NEVER TRUE

## 2021-09-14 SDOH — ECONOMIC STABILITY: FOOD INSECURITY: WITHIN THE PAST 12 MONTHS, YOU WORRIED THAT YOUR FOOD WOULD RUN OUT BEFORE YOU GOT MONEY TO BUY MORE.: NEVER TRUE

## 2021-09-14 ASSESSMENT — SOCIAL DETERMINANTS OF HEALTH (SDOH): HOW HARD IS IT FOR YOU TO PAY FOR THE VERY BASICS LIKE FOOD, HOUSING, MEDICAL CARE, AND HEATING?: NOT HARD AT ALL

## 2021-09-14 NOTE — PROGRESS NOTES
2021     Carolina Martinez (:  1945) is a 78 y.o. female, here for evaluation of the following medical concerns:    HPI   Feels tired, weak, has been going on for several months, feels as if her [employer]" is causing her problem, dina. 20 pounds of weight lose over the past 6-7 months, patient was 205 in 2019 so not a new weight for her, she does admit to not wanting to eat as much and having no appetite. Mammogram in  [facility], cologuard in  was negative, will obtain labs today    Asthma-  patient feels well today,  patient feels she is struggling at times, on Proair as needed and has nebulizer if needed, chest to help breathing, patient is not using nebulizer at this time jut her inhaler, mild wheezing today, difficult at times with deep inspiration, denied fever or chills, Denied rhinorrhea or nasal congestion.      Right leg cellulitis/edema- right leg is much improved no wrap today, patient was going to the wound clinic, lateral aspect, ankle, patient has swelling that has improved in her leg, no longer erythematous, nor painful today.      Today, denied chest pain, sob, n,, v, or diarrhea  Review of Systems   Constitutional: Positive for fatigue and unexpected weight change. Negative for activity change and fever. HENT: Negative for congestion, ear pain, facial swelling, rhinorrhea, sinus pressure and sore throat. Respiratory: Positive for shortness of breath and wheezing. Negative for cough and chest tightness. Cardiovascular: Negative for chest pain and leg swelling. Gastrointestinal: Negative for abdominal pain, diarrhea, nausea and vomiting. Endocrine: Negative for cold intolerance, heat intolerance, polydipsia and polyphagia. Genitourinary: Negative for vaginal bleeding. Musculoskeletal: Positive for arthralgias and back pain. Skin: Negative for rash. Neurological: Negative for dizziness, tremors, syncope, numbness and headaches.    Psychiatric/Behavioral: Negative for dysphoric mood. The patient is not nervous/anxious. Prior to Visit Medications    Medication Sig Taking? Authorizing Provider   metOLazone (ZAROXOLYN) 5 MG tablet TAKE ONE TABLET BY MOUTH DAILY AS NEEDED FOR SWELLING Yes Orlando Munguia, DO   albuterol sulfate HFA (PROAIR HFA) 108 (90 Base) MCG/ACT inhaler Inhale 2 puffs into the lungs every 6 hours as needed for Wheezing Yes Chika Meals, DO   Nebulizers (AIRIAL COMPACT MINI NEBULIZER) MISC 1 nebule by Does not apply route 4 times daily Yes Orlando Munguia, DO   potassium chloride (KLOR-CON M) 10 MEQ extended release tablet Take 1 tablet by mouth 2 times daily for 10 days  Chika Meals, DO   triamterene-hydroCHLOROthiazide (MAXZIDE-25) 37.5-25 MG per tablet Take 1 tablet by mouth daily  Orlando Munguia, DO   mupirocin (BACTROBAN) 2 % ointment APPLY TO AFFECTED AREA THREE TIMES A DAY  Patient not taking: Reported on 9/14/2021  Chika Meals, DO        Social History     Tobacco Use    Smoking status: Never Smoker    Smokeless tobacco: Never Used   Substance Use Topics    Alcohol use: Not on file        Vitals:    09/14/21 1053   BP: 138/78   Pulse: 84   SpO2: 95%   Weight: 208 lb 9.6 oz (94.6 kg)     Estimated body mass index is 33.67 kg/m² as calculated from the following:    Height as of 6/7/21: 5' 6\" (1.676 m). Weight as of this encounter: 208 lb 9.6 oz (94.6 kg). Physical Exam  Vitals and nursing note reviewed. Constitutional:       Appearance: She is well-developed. HENT:      Head: Normocephalic and atraumatic. Right Ear: Tympanic membrane, ear canal and external ear normal.      Left Ear: Tympanic membrane, ear canal and external ear normal.      Nose: Nose normal.   Cardiovascular:      Rate and Rhythm: Normal rate and regular rhythm. Heart sounds: Normal heart sounds. No murmur heard. Pulmonary:      Effort: No respiratory distress. Breath sounds: Normal breath sounds. No stridor. No wheezing, rhonchi or rales. Comments: Shallow breathing, always the case  Chest:      Chest wall: No tenderness. Abdominal:      General: Bowel sounds are normal.      Palpations: Abdomen is soft. Tenderness: There is no abdominal tenderness. Musculoskeletal:         General: Normal range of motion. Skin:     General: Skin is warm and dry. Neurological:      Mental Status: She is alert and oriented to person, place, and time. Psychiatric:         Behavior: Behavior normal.         ASSESSMENT/PLAN:  1. Moderate persistent asthma without complication  Take medication as prescribed. Push fluids and rest  Discussed conservative treatment  Discussed signs and symptoms for immediate evaluation in the ER  RTC if no improved. Tylenol/Ibuprofen for pain  - Basic Metabolic Panel  - Sedimentation Rate  - CBC Auto Differential  - C-REACTIVE PROTEIN  - TSH WITH REFLEX TO FT4  - CT CHEST ABDOMEN PELVIS WO CONTRAST; Future    2. Peripheral venous insufficiency  Stable today  Continue to monitor symptoms  - Basic Metabolic Panel  - Sedimentation Rate  - CBC Auto Differential  - C-REACTIVE PROTEIN  - TSH WITH REFLEX TO FT4    3. Weight loss  Labs obtained  Not necessarily a new weight, is admitting to monitoring diet more  Needs. CT of chest/abdomen/pelvis set this up out patient  Questions answered  Close follow up needed. - Basic Metabolic Panel  - Sedimentation Rate  - CBC Auto Differential  - C-REACTIVE PROTEIN  - TSH WITH REFLEX TO FT4  - CT CHEST ABDOMEN PELVIS WO CONTRAST; Future      Return in about 4 weeks (around 10/12/2021).

## 2021-09-15 RX ORDER — POTASSIUM CHLORIDE 750 MG/1
10 TABLET, EXTENDED RELEASE ORAL 2 TIMES DAILY
Qty: 20 TABLET | Refills: 0 | Status: SHIPPED | OUTPATIENT
Start: 2021-09-15 | End: 2021-09-28 | Stop reason: SDUPTHER

## 2021-09-16 ENCOUNTER — TELEPHONE (OUTPATIENT)
Dept: FAMILY MEDICINE CLINIC | Age: 76
End: 2021-09-16

## 2021-09-16 NOTE — TELEPHONE ENCOUNTER
Patient states she is having SOB and difficulty taking a deep breath. States her nose feels stopped up and she has a runny nose. Patient states this started yesterday.  States her rescue inhaler gives her immediate relief

## 2021-09-17 NOTE — TELEPHONE ENCOUNTER
Talked to Pt, advised her to hit her Neb. Every 4-6hrs, & apologized for the no call back. I am calling to set up her CT now. ...

## 2021-09-20 ENCOUNTER — TELEPHONE (OUTPATIENT)
Dept: FAMILY MEDICINE CLINIC | Age: 76
End: 2021-09-20

## 2021-09-20 NOTE — TELEPHONE ENCOUNTER
Please let the patient know that I'm out of the office and to contact her concerning her CT scan. I understand she doesn't want this.

## 2021-09-20 NOTE — TELEPHONE ENCOUNTER
Pt called to let Dr Maritza Shine know that the CT was canceled on Saturday. She is feeling better and does not want to do it. She also states that she could not lay on her back that long.  If any questions give pta call [phone]

## 2021-09-22 ASSESSMENT — ENCOUNTER SYMPTOMS
BACK PAIN: 1
DIARRHEA: 0
RHINORRHEA: 0
FACIAL SWELLING: 0
ABDOMINAL PAIN: 0
COUGH: 0
VOMITING: 0
NAUSEA: 0
SINUS PRESSURE: 0
CHEST TIGHTNESS: 0
SORE THROAT: 0

## 2021-09-23 ASSESSMENT — ENCOUNTER SYMPTOMS
SHORTNESS OF BREATH: 1
WHEEZING: 1

## 2021-09-28 DIAGNOSIS — E87.6 HYPOKALEMIA: Primary | ICD-10-CM

## 2021-09-28 RX ORDER — POTASSIUM CHLORIDE 750 MG/1
10 TABLET, EXTENDED RELEASE ORAL 2 TIMES DAILY
Qty: 20 TABLET | Refills: 0 | Status: SHIPPED | OUTPATIENT
Start: 2021-09-28 | End: 2022-05-19 | Stop reason: SDUPTHER

## 2021-09-29 ENCOUNTER — TELEPHONE (OUTPATIENT)
Dept: FAMILY MEDICINE CLINIC | Age: 76
End: 2021-09-29

## 2021-09-29 RX ORDER — MULTIVITAMIN WITH IRON
250 TABLET ORAL DAILY
Qty: 10 TABLET | Refills: 0 | Status: SHIPPED | OUTPATIENT
Start: 2021-09-29 | End: 2021-11-02

## 2021-09-29 NOTE — TELEPHONE ENCOUNTER
Patient states she went to the [facility] yesterday and had labs drawn but the results haven't uploaded into her Mychart. Requesting to speak directly to [place].

## 2021-09-30 NOTE — TELEPHONE ENCOUNTER
Pt called, advised her that labs were posted yesterday afternoon, as soon as Raphael Adkins gets a chance to review, ill call her back

## 2021-10-04 RX ORDER — METOLAZONE 5 MG/1
TABLET ORAL
Qty: 30 TABLET | Refills: 1 | Status: SHIPPED | OUTPATIENT
Start: 2021-10-04

## 2021-10-06 ENCOUNTER — OFFICE VISIT (OUTPATIENT)
Dept: FAMILY MEDICINE CLINIC | Age: 76
End: 2021-10-06
Payer: MEDICARE

## 2021-10-06 VITALS
WEIGHT: 213 LBS | DIASTOLIC BLOOD PRESSURE: 70 MMHG | HEART RATE: 70 BPM | SYSTOLIC BLOOD PRESSURE: 138 MMHG | BODY MASS INDEX: 34.38 KG/M2 | OXYGEN SATURATION: 95 %

## 2021-10-06 DIAGNOSIS — D69.6 THROMBOCYTOPENIA, UNSPECIFIED (HCC): ICD-10-CM

## 2021-10-06 DIAGNOSIS — E87.6 HYPOKALEMIA: ICD-10-CM

## 2021-10-06 DIAGNOSIS — R63.4 WEIGHT LOSS: Primary | ICD-10-CM

## 2021-10-06 DIAGNOSIS — R70.0 ELEVATED SED RATE: ICD-10-CM

## 2021-10-06 PROCEDURE — G8417 CALC BMI ABV UP PARAM F/U: HCPCS | Performed by: FAMILY MEDICINE

## 2021-10-06 PROCEDURE — G8427 DOCREV CUR MEDS BY ELIG CLIN: HCPCS | Performed by: FAMILY MEDICINE

## 2021-10-06 PROCEDURE — 1036F TOBACCO NON-USER: CPT | Performed by: FAMILY MEDICINE

## 2021-10-06 PROCEDURE — G8400 PT W/DXA NO RESULTS DOC: HCPCS | Performed by: FAMILY MEDICINE

## 2021-10-06 PROCEDURE — G8484 FLU IMMUNIZE NO ADMIN: HCPCS | Performed by: FAMILY MEDICINE

## 2021-10-06 PROCEDURE — 3017F COLORECTAL CA SCREEN DOC REV: CPT | Performed by: FAMILY MEDICINE

## 2021-10-06 PROCEDURE — 99214 OFFICE O/P EST MOD 30 MIN: CPT | Performed by: FAMILY MEDICINE

## 2021-10-06 PROCEDURE — 4040F PNEUMOC VAC/ADMIN/RCVD: CPT | Performed by: FAMILY MEDICINE

## 2021-10-06 PROCEDURE — 1090F PRES/ABSN URINE INCON ASSESS: CPT | Performed by: FAMILY MEDICINE

## 2021-10-06 PROCEDURE — 1123F ACP DISCUSS/DSCN MKR DOCD: CPT | Performed by: FAMILY MEDICINE

## 2021-10-06 PROCEDURE — G9899 SCRN MAM PERF RSLTS DOC: HCPCS | Performed by: FAMILY MEDICINE

## 2021-10-06 RX ORDER — TRIAMTERENE AND HYDROCHLOROTHIAZIDE 37.5; 25 MG/1; MG/1
1 TABLET ORAL DAILY
Qty: 90 TABLET | Refills: 1 | Status: SHIPPED | OUTPATIENT
Start: 2021-10-06 | End: 2022-01-03

## 2021-10-06 ASSESSMENT — ENCOUNTER SYMPTOMS
SINUS PRESSURE: 0
TROUBLE SWALLOWING: 0
FACIAL SWELLING: 0
RHINORRHEA: 0
DIARRHEA: 0
NAUSEA: 0
VOMITING: 0
ABDOMINAL PAIN: 0
COUGH: 0
SHORTNESS OF BREATH: 0

## 2021-10-06 NOTE — PROGRESS NOTES
10/6/2021     Abilio Brito (:  1945) is a 78 y.o. female, here for evaluation of the following medical concerns:    HPI     Patient presented today for follow up on several ongoing issues. Patient is feeling much improved today. Hypokalemia- patient is much improved, she Felt tired, weak, had been going on for several months, feels as if her [employer]" is causing her problem, dina. 20 pounds of weight lose over the past 6-7 months, patient was 205 in 2019 so not a new weight for her, she does admit to not wanting to eat as much and having no appetite. Mammogram in  [facility], cologuard in  was negative, will obtain labs today     Asthma-  patient feels well today,  patient feels much improved, decided not to have imaging studies, understands the need but is not wanting these. She understands her decision. h denied fever or chills, Denied rhinorrhea or nasal congestion.       Right leg cellulitis/edema- right leg is much improved no wrap today, patient was going to the wound clinic, lateral aspect, ankle, patient has swelling that has improved in her leg, no longer erythematous, nor painful today.          Today, denied chest pain, sob, n,, v, or diarrhea    Review of Systems   Constitutional: Positive for fatigue. Negative for activity change, fever and unexpected weight change. HENT: Negative for congestion, ear pain, facial swelling, rhinorrhea, sinus pressure and trouble swallowing. Respiratory: Negative for cough and shortness of breath. Cardiovascular: Negative for chest pain and leg swelling. Gastrointestinal: Negative for abdominal pain, diarrhea, nausea and vomiting. Endocrine: Negative for cold intolerance, heat intolerance, polydipsia and polyphagia. Musculoskeletal: Positive for arthralgias. Skin: Negative for rash. Neurological: Negative for dizziness, numbness and headaches. Psychiatric/Behavioral: Negative for dysphoric mood.  The patient is not nervous/anxious. Prior to Visit Medications    Medication Sig Taking? Authorizing Provider   mupirocin (BACTROBAN) 2 % ointment APPLY TO AFFECTED AREA THREE TIMES A DAY Yes Orlando Munguia, DO   triamterene-hydroCHLOROthiazide (MAXZIDE-25) 37.5-25 MG per tablet Take 1 tablet by mouth daily Yes Orlando Munguia, DO   metOLazone (ZAROXOLYN) 5 MG tablet TAKE ONE TABLET BY MOUTH DAILY AS NEEDED FOR SWELLING Yes Orlando Munguia, DO   magnesium (MAGNESIUM-OXIDE) 250 MG TABS tablet Take 1 tablet by mouth daily Yes Orlando Munguia, DO   potassium chloride (KLOR-CON M) 10 MEQ extended release tablet Take 1 tablet by mouth 2 times daily for 10 days Yes Orlando Munguia, DO   albuterol sulfate HFA (PROAIR HFA) 108 (90 Base) MCG/ACT inhaler Inhale 2 puffs into the lungs every 6 hours as needed for Wheezing Yes Virginie Guerra, DO   Nebulizers (AIRIAL COMPACT MINI NEBULIZER) MISC 1 nebule by Does not apply route 4 times daily Yes Orlando Munguia, DO   mupirocin (BACTROBAN) 2 % cream Apply 3 times daily. Russell Gallegos MD        Social History     Tobacco Use    Smoking status: Never Smoker    Smokeless tobacco: Never Used   Substance Use Topics    Alcohol use: Not on file        Vitals:    10/06/21 1040   BP: 138/70   Pulse: 70   SpO2: 95%   Weight: 213 lb (96.6 kg)     Estimated body mass index is 34.38 kg/m² as calculated from the following:    Height as of 6/7/21: 5' 6\" (1.676 m). Weight as of this encounter: 213 lb (96.6 kg). Physical Exam  Vitals and nursing note reviewed. Constitutional:       Appearance: She is well-developed. HENT:      Head: Normocephalic and atraumatic. Right Ear: Tympanic membrane, ear canal and external ear normal.      Left Ear: Tympanic membrane, ear canal and external ear normal.      Nose: Nose normal.   Eyes:      Conjunctiva/sclera: Conjunctivae normal.      Pupils: Pupils are equal, round, and reactive to light.    Cardiovascular:      Rate and Rhythm: Normal rate and regular rhythm. Heart sounds: Normal heart sounds. No murmur heard. Pulmonary:      Effort: Pulmonary effort is normal.      Breath sounds: Normal breath sounds. Abdominal:      General: Bowel sounds are normal.      Palpations: Abdomen is soft. Tenderness: There is no abdominal tenderness. Musculoskeletal:         General: Normal range of motion. Skin:     General: Skin is warm and dry. Neurological:      Mental Status: She is alert and oriented to person, place, and time. Psychiatric:         Behavior: Behavior normal.         ASSESSMENT/PLAN:  1. Weight loss  Weight has improved  Will recheck next month  - CBC; Future  - Comprehensive Metabolic Panel; Future  - TSH WITH REFLEX TO FT4; Future  - Sedimentation Rate; Future    2. Hypokalemia  Labs obtained  Educated on the importance of having this done. Answered questions  - CBC; Future  - Comprehensive Metabolic Panel; Future  - TSH WITH REFLEX TO FT4; Future  - Sedimentation Rate; Future    3. Elevated sed rate  Will recheck this  - CBC; Future  - Comprehensive Metabolic Panel; Future  - TSH WITH REFLEX TO FT4; Future  - Sedimentation Rate; Future    4. Thrombocytopenia, unspecified  Labs ordered  Questions answered  She has decided not to have imaging  Her decision understands the need. Return in about 4 weeks (around 11/3/2021).

## 2021-10-11 ENCOUNTER — OFFICE VISIT (OUTPATIENT)
Dept: GYNECOLOGY | Age: 76
End: 2021-10-11
Payer: MEDICARE

## 2021-10-11 VITALS
WEIGHT: 211 LBS | TEMPERATURE: 97.7 F | BODY MASS INDEX: 34.06 KG/M2 | DIASTOLIC BLOOD PRESSURE: 72 MMHG | SYSTOLIC BLOOD PRESSURE: 137 MMHG

## 2021-10-11 DIAGNOSIS — N60.02 BENIGN BREAST CYST IN FEMALE, LEFT: Primary | ICD-10-CM

## 2021-10-11 PROCEDURE — 99213 OFFICE O/P EST LOW 20 MIN: CPT | Performed by: OBSTETRICS & GYNECOLOGY

## 2021-10-11 PROCEDURE — 1123F ACP DISCUSS/DSCN MKR DOCD: CPT | Performed by: OBSTETRICS & GYNECOLOGY

## 2021-10-11 PROCEDURE — G8427 DOCREV CUR MEDS BY ELIG CLIN: HCPCS | Performed by: OBSTETRICS & GYNECOLOGY

## 2021-10-11 PROCEDURE — G8400 PT W/DXA NO RESULTS DOC: HCPCS | Performed by: OBSTETRICS & GYNECOLOGY

## 2021-10-11 PROCEDURE — 3017F COLORECTAL CA SCREEN DOC REV: CPT | Performed by: OBSTETRICS & GYNECOLOGY

## 2021-10-11 PROCEDURE — G9899 SCRN MAM PERF RSLTS DOC: HCPCS | Performed by: OBSTETRICS & GYNECOLOGY

## 2021-10-11 PROCEDURE — 1036F TOBACCO NON-USER: CPT | Performed by: OBSTETRICS & GYNECOLOGY

## 2021-10-11 PROCEDURE — G8417 CALC BMI ABV UP PARAM F/U: HCPCS | Performed by: OBSTETRICS & GYNECOLOGY

## 2021-10-11 PROCEDURE — 1090F PRES/ABSN URINE INCON ASSESS: CPT | Performed by: OBSTETRICS & GYNECOLOGY

## 2021-10-11 PROCEDURE — G8484 FLU IMMUNIZE NO ADMIN: HCPCS | Performed by: OBSTETRICS & GYNECOLOGY

## 2021-10-11 PROCEDURE — 4040F PNEUMOC VAC/ADMIN/RCVD: CPT | Performed by: OBSTETRICS & GYNECOLOGY

## 2021-10-11 RX ORDER — MUPIROCIN CALCIUM 20 MG/G
CREAM TOPICAL
Qty: 1 EACH | Refills: 2 | Status: SHIPPED | OUTPATIENT
Start: 2021-10-11 | End: 2021-11-10

## 2021-10-11 NOTE — PROGRESS NOTES
Pt notes a one week h/o left breast bump. Neg mammogram this summer. She denies complaints. Af, vss  wn wh f in nad  A and O x 3  br- sym, small sebaceous cyst under left breast with min erythema measuring 5 mm, no nodes, no d/c  abd- soft, nt, no masses  Assess:  Left breast cyst  Plan:  rx bactroban, warm compresses. Call/follow up in 2 weeks if not improved, would incise in office.   F/u annual gyn exam.

## 2021-11-02 ENCOUNTER — TELEPHONE (OUTPATIENT)
Dept: FAMILY MEDICINE CLINIC | Age: 76
End: 2021-11-02

## 2021-11-02 ENCOUNTER — OFFICE VISIT (OUTPATIENT)
Dept: FAMILY MEDICINE CLINIC | Age: 76
End: 2021-11-02
Payer: MEDICARE

## 2021-11-02 VITALS
BODY MASS INDEX: 33.91 KG/M2 | HEIGHT: 66 IN | DIASTOLIC BLOOD PRESSURE: 76 MMHG | SYSTOLIC BLOOD PRESSURE: 130 MMHG | WEIGHT: 211 LBS

## 2021-11-02 DIAGNOSIS — R60.0 EDEMA OF LEFT UPPER ARM: Primary | ICD-10-CM

## 2021-11-02 PROCEDURE — 1036F TOBACCO NON-USER: CPT | Performed by: FAMILY MEDICINE

## 2021-11-02 PROCEDURE — G8400 PT W/DXA NO RESULTS DOC: HCPCS | Performed by: FAMILY MEDICINE

## 2021-11-02 PROCEDURE — G9899 SCRN MAM PERF RSLTS DOC: HCPCS | Performed by: FAMILY MEDICINE

## 2021-11-02 PROCEDURE — G8484 FLU IMMUNIZE NO ADMIN: HCPCS | Performed by: FAMILY MEDICINE

## 2021-11-02 PROCEDURE — G8417 CALC BMI ABV UP PARAM F/U: HCPCS | Performed by: FAMILY MEDICINE

## 2021-11-02 PROCEDURE — G8427 DOCREV CUR MEDS BY ELIG CLIN: HCPCS | Performed by: FAMILY MEDICINE

## 2021-11-02 PROCEDURE — 1123F ACP DISCUSS/DSCN MKR DOCD: CPT | Performed by: FAMILY MEDICINE

## 2021-11-02 PROCEDURE — 3017F COLORECTAL CA SCREEN DOC REV: CPT | Performed by: FAMILY MEDICINE

## 2021-11-02 PROCEDURE — 1090F PRES/ABSN URINE INCON ASSESS: CPT | Performed by: FAMILY MEDICINE

## 2021-11-02 PROCEDURE — 4040F PNEUMOC VAC/ADMIN/RCVD: CPT | Performed by: FAMILY MEDICINE

## 2021-11-02 PROCEDURE — 99213 OFFICE O/P EST LOW 20 MIN: CPT | Performed by: FAMILY MEDICINE

## 2021-11-02 ASSESSMENT — ENCOUNTER SYMPTOMS
ABDOMINAL PAIN: 0
COUGH: 0
VOMITING: 0
NAUSEA: 0
DIARRHEA: 0
SHORTNESS OF BREATH: 0

## 2021-11-02 NOTE — TELEPHONE ENCOUNTER
Sandi Short from [address] [place] [address] said the STAT order for a venous doppler need to be rescheduled d/t the tech not there at the time of scheduled. Number to call is [phone] or [phone] to reschedule.

## 2021-11-02 NOTE — PROGRESS NOTES
2021     Jonas Apple (:  1945) is a 78 y.o. female, here for evaluation of the following medical concerns:    HPI     Patient presented to the clinic due to edematous and red left arm. She stated this occurred after the COVID booster. Left arm reaction- swelling, pain, erythema, has improved but the patient is in pain today, concerned about a \"blood clot\", edematous, erythematous, occurred after covid booster. Has used conservative treatment. Today, chest pain sob, n  Review of Systems   Constitutional: Negative for activity change, fatigue, fever and unexpected weight change. Respiratory: Negative for cough and shortness of breath. Cardiovascular: Negative for chest pain and leg swelling. Gastrointestinal: Negative for abdominal pain, diarrhea, nausea and vomiting. Musculoskeletal: Positive for arthralgias. Skin: Positive for color change and rash. Neurological: Negative for dizziness and headaches. Psychiatric/Behavioral: Negative for dysphoric mood. The patient is not nervous/anxious. Prior to Visit Medications    Medication Sig Taking? Authorizing Provider   mupirocin (BACTROBAN) 2 % cream Apply 3 times daily.  Yes Bharat Christopher MD   mupirocin (BACTROBAN) 2 % ointment APPLY TO AFFECTED AREA THREE TIMES A DAY Yes Orlando Munguia, DO   triamterene-hydroCHLOROthiazide (MAXZIDE-25) 37.5-25 MG per tablet Take 1 tablet by mouth daily Yes Orlando Munguia DO   metOLazone (ZAROXOLYN) 5 MG tablet TAKE ONE TABLET BY MOUTH DAILY AS NEEDED FOR SWELLING Yes Orlando Munguia DO   potassium chloride (KLOR-CON M) 10 MEQ extended release tablet Take 1 tablet by mouth 2 times daily for 10 days Yes Orlando Munguia DO   albuterol sulfate HFA (PROAIR HFA) 108 (90 Base) MCG/ACT inhaler Inhale 2 puffs into the lungs every 6 hours as needed for Wheezing Yes Oscar Molina,    Nebulizers (AIRIAL COMPACT MINI NEBULIZER) MISC 1 nebule by Does not apply route 4 times daily Yes Salma Todd Kylie Aagrwal DO        Social History     Tobacco Use    Smoking status: Never Smoker    Smokeless tobacco: Never Used   Substance Use Topics    Alcohol use: Not on file        Vitals:    11/02/21 1124   BP: 130/76   Weight: 211 lb (95.7 kg)   Height: 5' 6\" (1.676 m)     Estimated body mass index is 34.06 kg/m² as calculated from the following:    Height as of this encounter: 5' 6\" (1.676 m). Weight as of this encounter: 211 lb (95.7 kg). Physical Exam  Vitals and nursing note reviewed. Constitutional:       Appearance: She is well-developed. HENT:      Head: Normocephalic and atraumatic. Right Ear: Tympanic membrane, ear canal and external ear normal.      Left Ear: Tympanic membrane, ear canal and external ear normal.      Nose: Nose normal.   Cardiovascular:      Rate and Rhythm: Normal rate and regular rhythm. Heart sounds: Normal heart sounds. Pulmonary:      Effort: Pulmonary effort is normal.      Breath sounds: Normal breath sounds. Abdominal:      General: Bowel sounds are normal.      Palpations: Abdomen is soft. Tenderness: There is no abdominal tenderness. Musculoskeletal:         General: Swelling and tenderness present. Cervical back: Normal range of motion. Skin:     Findings: Rash present. Neurological:      Mental Status: She is alert and oriented to person, place, and time. Psychiatric:         Behavior: Behavior normal.         ASSESSMENT/PLAN:  1. Edema of left upper arm  Referred for procedure  Educated on the importance of having this done. Answered questions  Continue to use conservative treatment. Educated on signs and symptoms for immediate evaluation in the ER. Ultrasound neg. For DVT  - VL DUP UPPER EXTREMITY VENOUS LEFT; Future      Return if symptoms worsen or fail to improve.

## 2021-11-03 ENCOUNTER — HOSPITAL ENCOUNTER (OUTPATIENT)
Dept: VASCULAR LAB | Age: 76
Discharge: HOME | End: 2021-11-03
Payer: MEDICARE

## 2021-11-03 DIAGNOSIS — R60.0 EDEMA OF LEFT UPPER ARM: ICD-10-CM

## 2021-11-03 PROCEDURE — 93971 EXTREMITY STUDY: CPT

## 2021-11-08 ASSESSMENT — ENCOUNTER SYMPTOMS: COLOR CHANGE: 1

## 2021-11-15 ENCOUNTER — TELEPHONE (OUTPATIENT)
Dept: FAMILY MEDICINE CLINIC | Age: 76
End: 2021-11-15

## 2021-11-15 NOTE — TELEPHONE ENCOUNTER
Pt states someone called her from the stress lab stating she need to schedule an appt. I don't see anything, please recheck and call her back at [phone].

## 2021-11-24 ENCOUNTER — TELEPHONE (OUTPATIENT)
Dept: FAMILY MEDICINE CLINIC | Age: 76
End: 2021-11-24

## 2021-11-24 NOTE — TELEPHONE ENCOUNTER
----- Message from Celsoseng Alec sent at 11/24/2021  8:36 AM EST -----  Subject: Message to Provider    QUESTIONS  Information for Provider? PT called in wanting to speak with Dr. Maritza Shine   due to a question she has in the hopes of getting some clarification. about her existing condition. Please try home phone first and then cell   phone.   ---------------------------------------------------------------------------  --------------  [address]  What is the best way for the office to contact you? Do not leave any   message, patient will call back for answer  Preferred Call Back Phone Number? [phone]  ---------------------------------------------------------------------------  --------------  SCRIPT ANSWERS  Relationship to Patient?  Self

## 2021-11-26 NOTE — TELEPHONE ENCOUNTER
I called Pt on 11/24/21. Pt keeps receiving calls from scheduling wanting her to schule for appts that she does not have. I went through her chart for everything I can see, and Pt has no open referrals, labs, or procedures that I could find. P\t will try and get more info if & when skylar calls back.

## 2021-12-06 ENCOUNTER — OFFICE VISIT (OUTPATIENT)
Dept: GYNECOLOGY | Age: 76
End: 2021-12-06
Payer: MEDICARE

## 2021-12-06 VITALS
SYSTOLIC BLOOD PRESSURE: 165 MMHG | DIASTOLIC BLOOD PRESSURE: 90 MMHG | WEIGHT: 214 LBS | TEMPERATURE: 97.7 F | BODY MASS INDEX: 34.54 KG/M2 | HEART RATE: 101 BPM

## 2021-12-06 DIAGNOSIS — N90.89 VULVAR LESION: Primary | ICD-10-CM

## 2021-12-06 PROCEDURE — G8417 CALC BMI ABV UP PARAM F/U: HCPCS | Performed by: OBSTETRICS & GYNECOLOGY

## 2021-12-06 PROCEDURE — 99213 OFFICE O/P EST LOW 20 MIN: CPT | Performed by: OBSTETRICS & GYNECOLOGY

## 2021-12-06 PROCEDURE — G8484 FLU IMMUNIZE NO ADMIN: HCPCS | Performed by: OBSTETRICS & GYNECOLOGY

## 2021-12-06 PROCEDURE — 1036F TOBACCO NON-USER: CPT | Performed by: OBSTETRICS & GYNECOLOGY

## 2021-12-06 PROCEDURE — G8427 DOCREV CUR MEDS BY ELIG CLIN: HCPCS | Performed by: OBSTETRICS & GYNECOLOGY

## 2021-12-06 PROCEDURE — 3017F COLORECTAL CA SCREEN DOC REV: CPT | Performed by: OBSTETRICS & GYNECOLOGY

## 2021-12-06 PROCEDURE — 4040F PNEUMOC VAC/ADMIN/RCVD: CPT | Performed by: OBSTETRICS & GYNECOLOGY

## 2021-12-06 PROCEDURE — G9899 SCRN MAM PERF RSLTS DOC: HCPCS | Performed by: OBSTETRICS & GYNECOLOGY

## 2021-12-06 PROCEDURE — 1090F PRES/ABSN URINE INCON ASSESS: CPT | Performed by: OBSTETRICS & GYNECOLOGY

## 2021-12-06 PROCEDURE — 1123F ACP DISCUSS/DSCN MKR DOCD: CPT | Performed by: OBSTETRICS & GYNECOLOGY

## 2021-12-06 PROCEDURE — G8400 PT W/DXA NO RESULTS DOC: HCPCS | Performed by: OBSTETRICS & GYNECOLOGY

## 2021-12-06 NOTE — PROGRESS NOTES
Subjective:      Patient ID: Edmund Cervantes is a 78 y.o. female. HPI  pts here with vulvar lesion a few days ago. She noted this am that the lesion has resolved. Denies complaints. Review of Systems Pertinent review of systems items discussed above. All others systems items not discussed above were negative.       Objective:   Physical Exam    Af, vss  Ext- no lesions  Meatus- no lesions  Bladder- good support  Vag- no d/c, good support  cx- absent    Assessment:   Vulvar lesion, resolved     Plan:   F/u annual gyn exam.  Reassured about resolution of lesion       Eli FERRER MD

## 2022-01-03 RX ORDER — TRIAMTERENE AND HYDROCHLOROTHIAZIDE 37.5; 25 MG/1; MG/1
1 TABLET ORAL DAILY
Qty: 90 TABLET | Refills: 1 | Status: SHIPPED | OUTPATIENT
Start: 2022-01-03 | End: 2022-05-31

## 2022-04-06 ENCOUNTER — OFFICE VISIT (OUTPATIENT)
Dept: FAMILY MEDICINE CLINIC | Age: 77
End: 2022-04-06
Payer: MEDICARE

## 2022-04-06 VITALS
DIASTOLIC BLOOD PRESSURE: 90 MMHG | SYSTOLIC BLOOD PRESSURE: 135 MMHG | WEIGHT: 222 LBS | BODY MASS INDEX: 35.68 KG/M2 | HEIGHT: 66 IN | TEMPERATURE: 98 F

## 2022-04-06 DIAGNOSIS — J45.40 MODERATE PERSISTENT ASTHMA WITHOUT COMPLICATION: ICD-10-CM

## 2022-04-06 DIAGNOSIS — R60.0 LOCALIZED EDEMA: Primary | ICD-10-CM

## 2022-04-06 PROCEDURE — 1090F PRES/ABSN URINE INCON ASSESS: CPT | Performed by: FAMILY MEDICINE

## 2022-04-06 PROCEDURE — G8427 DOCREV CUR MEDS BY ELIG CLIN: HCPCS | Performed by: FAMILY MEDICINE

## 2022-04-06 PROCEDURE — 4040F PNEUMOC VAC/ADMIN/RCVD: CPT | Performed by: FAMILY MEDICINE

## 2022-04-06 PROCEDURE — G9899 SCRN MAM PERF RSLTS DOC: HCPCS | Performed by: FAMILY MEDICINE

## 2022-04-06 PROCEDURE — 99213 OFFICE O/P EST LOW 20 MIN: CPT | Performed by: FAMILY MEDICINE

## 2022-04-06 PROCEDURE — 1123F ACP DISCUSS/DSCN MKR DOCD: CPT | Performed by: FAMILY MEDICINE

## 2022-04-06 PROCEDURE — G8417 CALC BMI ABV UP PARAM F/U: HCPCS | Performed by: FAMILY MEDICINE

## 2022-04-06 PROCEDURE — 3017F COLORECTAL CA SCREEN DOC REV: CPT | Performed by: FAMILY MEDICINE

## 2022-04-06 PROCEDURE — G8400 PT W/DXA NO RESULTS DOC: HCPCS | Performed by: FAMILY MEDICINE

## 2022-04-06 PROCEDURE — 1036F TOBACCO NON-USER: CPT | Performed by: FAMILY MEDICINE

## 2022-04-06 ASSESSMENT — PATIENT HEALTH QUESTIONNAIRE - PHQ9
SUM OF ALL RESPONSES TO PHQ QUESTIONS 1-9: 0
2. FEELING DOWN, DEPRESSED OR HOPELESS: 0
SUM OF ALL RESPONSES TO PHQ9 QUESTIONS 1 & 2: 0
SUM OF ALL RESPONSES TO PHQ QUESTIONS 1-9: 0
1. LITTLE INTEREST OR PLEASURE IN DOING THINGS: 0

## 2022-04-06 NOTE — PROGRESS NOTES
2022     Carolina Martinez (:  1945) is a 78 y.o. female, here for evaluation of the following medical concerns:    HPI  Patient presented to the clinic for follow up on chronic medical conditions. She stated that again she developed mild erythema and edema in left arm that occurred after the COVID booster.      Left arm reaction- swelling, pain, erythema, has improved but the patient is in pain today, concerned about a \"blood clot\", edematous, erythematous, occurred after covid booster. Has used conservative treatment. Asthma-  patient feels well today,  patient feels much improved, decided not to have imaging studies, understands the need but is not wanting these. She understands her decision. h denied fever or chills, Denied rhinorrhea or nasal congestion.       Today, chest pain sob, n  Review of Systems   Constitutional: Positive for fatigue. Negative for activity change, fever and unexpected weight change. HENT: Negative for congestion. Respiratory: Negative for cough and shortness of breath. Cardiovascular: Negative for chest pain and leg swelling. Gastrointestinal: Negative for abdominal pain, diarrhea, nausea and vomiting. Musculoskeletal: Positive for arthralgias and back pain. Skin: Positive for color change. Negative for rash. Neurological: Negative for dizziness, tremors, syncope, numbness and headaches. Psychiatric/Behavioral: Negative for dysphoric mood. The patient is not nervous/anxious. Prior to Visit Medications    Medication Sig Taking?  Authorizing Provider   mupirocin (BACTROBAN) 2 % ointment APPLY TO AFFECTED AREA THREE TIMES A DAY Yes Orlando Munguia DO   metOLazone (ZAROXOLYN) 5 MG tablet TAKE ONE TABLET BY MOUTH DAILY AS NEEDED FOR SWELLING Yes Orlando Munguia, DO   albuterol sulfate HFA (PROAIR HFA) 108 (90 Base) MCG/ACT inhaler Inhale 2 puffs into the lungs every 6 hours as needed for Wheezing Yes Harish Polo, DO   Nebulizers (AIRIAL COMPACT MINI NEBULIZER) MISC 1 nebule by Does not apply route 4 times daily Yes Orlando Munguia DO   triamterene-hydroCHLOROthiazide (MAXZIDE-25) 37.5-25 MG per tablet TAKE 1 TABLET BY MOUTH DAILY  Orlando Munguia DO   potassium chloride (KLOR-CON M) 10 MEQ extended release tablet Take 1 tablet by mouth 2 times daily for 10 days  Tayler Velarde DO        Social History     Tobacco Use    Smoking status: Never Smoker    Smokeless tobacco: Never Used   Substance Use Topics    Alcohol use: Not on file        Vitals:    04/06/22 1125   BP: (!) 135/90   Temp: 98 °F (36.7 °C)   Weight: 222 lb (100.7 kg)   Height: 5' 6\" (1.676 m)     Estimated body mass index is 35.83 kg/m² as calculated from the following:    Height as of this encounter: 5' 6\" (1.676 m). Weight as of this encounter: 222 lb (100.7 kg). Physical Exam  Vitals and nursing note reviewed. Constitutional:       Appearance: She is well-developed. HENT:      Head: Normocephalic and atraumatic. Right Ear: External ear normal.      Left Ear: External ear normal.   Cardiovascular:      Rate and Rhythm: Normal rate and regular rhythm. Heart sounds: Normal heart sounds. No murmur heard. Pulmonary:      Effort: Pulmonary effort is normal.      Breath sounds: Normal breath sounds. No wheezing. Abdominal:      General: Bowel sounds are normal.      Palpations: Abdomen is soft. Tenderness: There is no abdominal tenderness. Musculoskeletal:      Cervical back: Normal range of motion and neck supple. Skin:     General: Skin is warm and dry. Neurological:      Mental Status: She is alert and oriented to person, place, and time. Psychiatric:         Behavior: Behavior normal.         Thought Content: Thought content normal.         Judgment: Judgment normal.         ASSESSMENT/PLAN:  1. Localized edema  Ice,   Raised  rest    2.  Moderate persistent asthma without complication  Stable  Continue with medication  Answered questions      Return

## 2022-04-10 ASSESSMENT — ENCOUNTER SYMPTOMS
BACK PAIN: 1
VOMITING: 0
NAUSEA: 0
COLOR CHANGE: 1
SHORTNESS OF BREATH: 0
ABDOMINAL PAIN: 0
DIARRHEA: 0
COUGH: 0

## 2022-04-12 ENCOUNTER — OFFICE VISIT (OUTPATIENT)
Dept: GYNECOLOGY | Age: 77
End: 2022-04-12
Payer: MEDICARE

## 2022-04-12 VITALS
WEIGHT: 224 LBS | BODY MASS INDEX: 36.15 KG/M2 | TEMPERATURE: 97.3 F | DIASTOLIC BLOOD PRESSURE: 94 MMHG | SYSTOLIC BLOOD PRESSURE: 163 MMHG | HEART RATE: 53 BPM

## 2022-04-12 DIAGNOSIS — N95.0 POSTMENOPAUSAL BLEEDING: Primary | ICD-10-CM

## 2022-04-12 PROCEDURE — G9899 SCRN MAM PERF RSLTS DOC: HCPCS | Performed by: OBSTETRICS & GYNECOLOGY

## 2022-04-12 PROCEDURE — G8417 CALC BMI ABV UP PARAM F/U: HCPCS | Performed by: OBSTETRICS & GYNECOLOGY

## 2022-04-12 PROCEDURE — 3017F COLORECTAL CA SCREEN DOC REV: CPT | Performed by: OBSTETRICS & GYNECOLOGY

## 2022-04-12 PROCEDURE — 1123F ACP DISCUSS/DSCN MKR DOCD: CPT | Performed by: OBSTETRICS & GYNECOLOGY

## 2022-04-12 PROCEDURE — G8400 PT W/DXA NO RESULTS DOC: HCPCS | Performed by: OBSTETRICS & GYNECOLOGY

## 2022-04-12 PROCEDURE — 1036F TOBACCO NON-USER: CPT | Performed by: OBSTETRICS & GYNECOLOGY

## 2022-04-12 PROCEDURE — 1090F PRES/ABSN URINE INCON ASSESS: CPT | Performed by: OBSTETRICS & GYNECOLOGY

## 2022-04-12 PROCEDURE — 4040F PNEUMOC VAC/ADMIN/RCVD: CPT | Performed by: OBSTETRICS & GYNECOLOGY

## 2022-04-12 PROCEDURE — 99213 OFFICE O/P EST LOW 20 MIN: CPT | Performed by: OBSTETRICS & GYNECOLOGY

## 2022-04-12 PROCEDURE — G8428 CUR MEDS NOT DOCUMENT: HCPCS | Performed by: OBSTETRICS & GYNECOLOGY

## 2022-04-12 NOTE — PROGRESS NOTES
Pt presents with a one day h/o spotting when wiping. The bleeding resolved one week ago, none since. She is due for pap in 1-2 months. Af, vss  Ext- no lesions, 3 small hemangiomas  Meatus- no lesions  Bladder- good support  Vag- good support, no lesions, no d/c  cx- absent  Assess :  Postmen bleeding, resolved  Plan:  F/u annual gyn exam in 1-2 months.

## 2022-05-18 ENCOUNTER — OFFICE VISIT (OUTPATIENT)
Dept: FAMILY MEDICINE CLINIC | Age: 77
End: 2022-05-18
Payer: MEDICARE

## 2022-05-18 VITALS
SYSTOLIC BLOOD PRESSURE: 132 MMHG | WEIGHT: 224.4 LBS | OXYGEN SATURATION: 97 % | BODY MASS INDEX: 36.22 KG/M2 | DIASTOLIC BLOOD PRESSURE: 72 MMHG | HEART RATE: 86 BPM

## 2022-05-18 DIAGNOSIS — D69.6 THROMBOCYTOPENIA, UNSPECIFIED (HCC): ICD-10-CM

## 2022-05-18 DIAGNOSIS — L97.912 SKIN ULCER OF RIGHT LOWER LEG WITH FAT LAYER EXPOSED (HCC): ICD-10-CM

## 2022-05-18 DIAGNOSIS — M25.561 CHRONIC PAIN OF RIGHT KNEE: ICD-10-CM

## 2022-05-18 DIAGNOSIS — G89.29 CHRONIC PAIN OF RIGHT KNEE: ICD-10-CM

## 2022-05-18 DIAGNOSIS — J45.40 MODERATE PERSISTENT ASTHMA WITHOUT COMPLICATION: Primary | ICD-10-CM

## 2022-05-18 LAB
A/G RATIO: 1.4 (ref 1.1–2.2)
ALBUMIN SERPL-MCNC: 4.3 G/DL (ref 3.4–5)
ALP BLD-CCNC: 92 U/L (ref 40–129)
ALT SERPL-CCNC: 15 U/L (ref 10–40)
ANION GAP SERPL CALCULATED.3IONS-SCNC: 12 MMOL/L (ref 3–16)
AST SERPL-CCNC: 24 U/L (ref 15–37)
BILIRUB SERPL-MCNC: 1 MG/DL (ref 0–1)
BUN BLDV-MCNC: 18 MG/DL (ref 7–20)
CALCIUM SERPL-MCNC: 9.6 MG/DL (ref 8.3–10.6)
CHLORIDE BLD-SCNC: 99 MMOL/L (ref 99–110)
CO2: 29 MMOL/L (ref 21–32)
CREAT SERPL-MCNC: 0.7 MG/DL (ref 0.6–1.2)
GFR AFRICAN AMERICAN: >60
GFR NON-AFRICAN AMERICAN: >60
GLUCOSE BLD-MCNC: 100 MG/DL (ref 70–99)
HCT VFR BLD CALC: 42.2 % (ref 36–48)
HEMOGLOBIN: 14.5 G/DL (ref 12–16)
MCH RBC QN AUTO: 30.2 PG (ref 26–34)
MCHC RBC AUTO-ENTMCNC: 34.5 G/DL (ref 31–36)
MCV RBC AUTO: 87.8 FL (ref 80–100)
PDW BLD-RTO: 13.1 % (ref 12.4–15.4)
PLATELET # BLD: 156 K/UL (ref 135–450)
PMV BLD AUTO: 8.7 FL (ref 5–10.5)
POTASSIUM SERPL-SCNC: 3.3 MMOL/L (ref 3.5–5.1)
RBC # BLD: 4.81 M/UL (ref 4–5.2)
SODIUM BLD-SCNC: 140 MMOL/L (ref 136–145)
TOTAL PROTEIN: 7.3 G/DL (ref 6.4–8.2)
WBC # BLD: 5 K/UL (ref 4–11)

## 2022-05-18 PROCEDURE — 1123F ACP DISCUSS/DSCN MKR DOCD: CPT | Performed by: FAMILY MEDICINE

## 2022-05-18 PROCEDURE — 1036F TOBACCO NON-USER: CPT | Performed by: FAMILY MEDICINE

## 2022-05-18 PROCEDURE — G8417 CALC BMI ABV UP PARAM F/U: HCPCS | Performed by: FAMILY MEDICINE

## 2022-05-18 PROCEDURE — 36415 COLL VENOUS BLD VENIPUNCTURE: CPT | Performed by: FAMILY MEDICINE

## 2022-05-18 PROCEDURE — 99214 OFFICE O/P EST MOD 30 MIN: CPT | Performed by: FAMILY MEDICINE

## 2022-05-18 PROCEDURE — G9899 SCRN MAM PERF RSLTS DOC: HCPCS | Performed by: FAMILY MEDICINE

## 2022-05-18 PROCEDURE — G8427 DOCREV CUR MEDS BY ELIG CLIN: HCPCS | Performed by: FAMILY MEDICINE

## 2022-05-18 PROCEDURE — G8400 PT W/DXA NO RESULTS DOC: HCPCS | Performed by: FAMILY MEDICINE

## 2022-05-18 PROCEDURE — 1090F PRES/ABSN URINE INCON ASSESS: CPT | Performed by: FAMILY MEDICINE

## 2022-05-18 PROCEDURE — 3017F COLORECTAL CA SCREEN DOC REV: CPT | Performed by: FAMILY MEDICINE

## 2022-05-18 ASSESSMENT — ENCOUNTER SYMPTOMS
VOMITING: 0
DIARRHEA: 0
ABDOMINAL PAIN: 0
SHORTNESS OF BREATH: 0
NAUSEA: 0
COUGH: 0
RHINORRHEA: 0
SORE THROAT: 0
SINUS PRESSURE: 0

## 2022-05-18 NOTE — PROGRESS NOTES
2022     Jese Pacheco (:  1945) is a 78 y.o. female, here for evaluation of the following medical concerns:    HPI     Patient presented to the clinic for follow up on chronic medical conditions.  She stated that again she developed mild erythema and edema in left arm that occurred after the COVID booster.      Right knee pain- edema, osteoarthritis, has had this for years, believes this is worse, having more problems with ambulation. Would like to see an orthopedist for follow up.      Asthma-  patient feels well today,  patient feels much improved, decided not to have imaging studies, understands the need but is not wanting these.  She understands her decision. h denied fever or chills, Denied rhinorrhea or nasal congestion.       Today, chest pain sob, n, v, or diarrhea. Review of Systems   Constitutional: Negative for activity change, fatigue, fever and unexpected weight change. HENT: Negative for congestion, ear pain, rhinorrhea, sinus pressure and sore throat. Respiratory: Negative for cough and shortness of breath. Cardiovascular: Negative for chest pain and leg swelling. Gastrointestinal: Negative for abdominal pain, diarrhea, nausea and vomiting. Endocrine: Negative for cold intolerance, heat intolerance, polydipsia and polyphagia. Musculoskeletal: Positive for arthralgias, back pain and gait problem. Skin: Negative for rash. Neurological: Negative for dizziness, numbness and headaches. Psychiatric/Behavioral: Negative for dysphoric mood. The patient is not nervous/anxious. Prior to Visit Medications    Medication Sig Taking?  Authorizing Provider   mupirocin (BACTROBAN) 2 % ointment APPLY TO AFFECTED AREA THREE TIMES A DAY Yes Orlando Munguia DO   metOLazone (ZAROXOLYN) 5 MG tablet TAKE ONE TABLET BY MOUTH DAILY AS NEEDED FOR SWELLING Yes Orlando Munguia, DO   albuterol sulfate HFA (PROAIR HFA) 108 (90 Base) MCG/ACT inhaler Inhale 2 puffs into the lungs every 6 hours as needed for Wheezing Yes Sherice Mendoza DO   Nebulizers (AIRIAL COMPACT MINI NEBULIZER) MISC 1 nebule by Does not apply route 4 times daily Yes Sherice Mendoza DO   potassium chloride (KLOR-CON M) 10 MEQ extended release tablet Take 1 tablet by mouth daily for 10 days  Sherice Mendoza DO   triamterene-hydroCHLOROthiazide (MAXZIDE-25) 37.5-25 MG per tablet TAKE 1 TABLET BY MOUTH DAILY  Sherice Mendoza DO        Social History     Tobacco Use    Smoking status: Never Smoker    Smokeless tobacco: Never Used   Substance Use Topics    Alcohol use: Not on file        Vitals:    05/18/22 1109   BP: 132/72   Pulse: 86   SpO2: 97%   Weight: 224 lb 6.4 oz (101.8 kg)     Estimated body mass index is 36.22 kg/m² as calculated from the following:    Height as of 4/6/22: 5' 6\" (1.676 m). Weight as of this encounter: 224 lb 6.4 oz (101.8 kg). Physical Exam  Vitals and nursing note reviewed. Constitutional:       Appearance: She is well-developed. HENT:      Head: Normocephalic and atraumatic. Right Ear: Tympanic membrane, ear canal and external ear normal.      Left Ear: Tympanic membrane, ear canal and external ear normal.      Nose: Nose normal.   Cardiovascular:      Rate and Rhythm: Normal rate and regular rhythm. Heart sounds: Normal heart sounds. Pulmonary:      Effort: Pulmonary effort is normal.      Breath sounds: Normal breath sounds. No wheezing. Abdominal:      General: Bowel sounds are normal.      Palpations: Abdomen is soft. Tenderness: There is no abdominal tenderness. Musculoskeletal:         General: Swelling and tenderness present. Skin:     General: Skin is warm and dry. Neurological:      Mental Status: She is alert and oriented to person, place, and time. Psychiatric:         Behavior: Behavior normal.         ASSESSMENT/PLAN:  1.  Moderate persistent asthma without complication  Stable  Continue with medication  Keep appointments with specialist.   Shelley Chacko questions  - CBC  - Comprehensive Metabolic Panel    2. Skin ulcer of right lower leg with fat layer exposed ([address]  Resolved problem  - CBC  - Comprehensive Metabolic Panel    3. Thrombocytopenia, unspecified ([address]  Lab work will be checked. - CBC  - Comprehensive Metabolic Panel      Return in about 3 months (around 8/18/2022).

## 2022-05-19 RX ORDER — POTASSIUM CHLORIDE 750 MG/1
10 TABLET, EXTENDED RELEASE ORAL DAILY
Qty: 10 TABLET | Refills: 0 | Status: SHIPPED | OUTPATIENT
Start: 2022-05-19 | End: 2022-05-27 | Stop reason: SDUPTHER

## 2022-05-22 ASSESSMENT — ENCOUNTER SYMPTOMS: BACK PAIN: 1

## 2022-05-27 ENCOUNTER — OFFICE VISIT (OUTPATIENT)
Dept: FAMILY MEDICINE CLINIC | Age: 77
End: 2022-05-27
Payer: MEDICARE

## 2022-05-27 ENCOUNTER — TELEPHONE (OUTPATIENT)
Dept: FAMILY MEDICINE CLINIC | Age: 77
End: 2022-05-27

## 2022-05-27 VITALS
BODY MASS INDEX: 35.9 KG/M2 | SYSTOLIC BLOOD PRESSURE: 138 MMHG | WEIGHT: 223.4 LBS | HEIGHT: 66 IN | DIASTOLIC BLOOD PRESSURE: 78 MMHG

## 2022-05-27 DIAGNOSIS — M79.89 LEG SWELLING: ICD-10-CM

## 2022-05-27 DIAGNOSIS — E87.6 HYPOKALEMIA: Primary | ICD-10-CM

## 2022-05-27 DIAGNOSIS — J45.40 MODERATE PERSISTENT ASTHMA WITHOUT COMPLICATION: ICD-10-CM

## 2022-05-27 LAB
ANION GAP SERPL CALCULATED.3IONS-SCNC: 15 MMOL/L (ref 3–16)
BUN BLDV-MCNC: 19 MG/DL (ref 7–20)
CALCIUM SERPL-MCNC: 9.5 MG/DL (ref 8.3–10.6)
CHLORIDE BLD-SCNC: 102 MMOL/L (ref 99–110)
CO2: 26 MMOL/L (ref 21–32)
CREAT SERPL-MCNC: 0.8 MG/DL (ref 0.6–1.2)
GFR AFRICAN AMERICAN: >60
GFR NON-AFRICAN AMERICAN: >60
GLUCOSE BLD-MCNC: 104 MG/DL (ref 70–99)
POTASSIUM SERPL-SCNC: 3.9 MMOL/L (ref 3.5–5.1)
SODIUM BLD-SCNC: 143 MMOL/L (ref 136–145)

## 2022-05-27 PROCEDURE — 3017F COLORECTAL CA SCREEN DOC REV: CPT | Performed by: FAMILY MEDICINE

## 2022-05-27 PROCEDURE — 1036F TOBACCO NON-USER: CPT | Performed by: FAMILY MEDICINE

## 2022-05-27 PROCEDURE — 1123F ACP DISCUSS/DSCN MKR DOCD: CPT | Performed by: FAMILY MEDICINE

## 2022-05-27 PROCEDURE — 36415 COLL VENOUS BLD VENIPUNCTURE: CPT | Performed by: FAMILY MEDICINE

## 2022-05-27 PROCEDURE — G8400 PT W/DXA NO RESULTS DOC: HCPCS | Performed by: FAMILY MEDICINE

## 2022-05-27 PROCEDURE — 99214 OFFICE O/P EST MOD 30 MIN: CPT | Performed by: FAMILY MEDICINE

## 2022-05-27 PROCEDURE — 1090F PRES/ABSN URINE INCON ASSESS: CPT | Performed by: FAMILY MEDICINE

## 2022-05-27 PROCEDURE — G8427 DOCREV CUR MEDS BY ELIG CLIN: HCPCS | Performed by: FAMILY MEDICINE

## 2022-05-27 PROCEDURE — G8417 CALC BMI ABV UP PARAM F/U: HCPCS | Performed by: FAMILY MEDICINE

## 2022-05-27 PROCEDURE — G9899 SCRN MAM PERF RSLTS DOC: HCPCS | Performed by: FAMILY MEDICINE

## 2022-05-27 RX ORDER — POTASSIUM CHLORIDE 750 MG/1
10 TABLET, EXTENDED RELEASE ORAL DAILY
Qty: 90 TABLET | Refills: 0 | Status: SHIPPED | OUTPATIENT
Start: 2022-05-27 | End: 2022-08-18

## 2022-05-27 NOTE — PROGRESS NOTES
2022     Johana Stiles (:  1945) is a 78 y.o. female, here for evaluation of the following medical concerns:    HPI     Patient presented today to follow up for hypokalemia    Hypokalemia-takes Metolazone, needs to add potassium. 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 Also, believes she was exposed to what she termed \"Black\" substance that was on surfaces daily from a nearby plant. Continues to have right knee pain and has appointment with Dr. Arin Escalante. Today, denied chest pain, sob, n, v ,or diarrhea. Review of Systems   Constitutional: Negative for activity change and fatigue. Respiratory: Negative for shortness of breath. Cardiovascular: Positive for leg swelling. Negative for chest pain. Gastrointestinal: Negative for abdominal pain, diarrhea, nausea and vomiting. Musculoskeletal: Positive for arthralgias and gait problem. Psychiatric/Behavioral: Negative for dysphoric mood. The patient is not nervous/anxious. Prior to Visit Medications    Medication Sig Taking?  Authorizing Provider   potassium chloride (KLOR-CON M) 10 MEQ extended release tablet Take 1 tablet by mouth daily for 10 days Yes Orlando Munguia, DO   mupirocin (BACTROBAN) 2 % ointment APPLY TO AFFECTED AREA THREE TIMES A DAY Yes Orlando Munguia DO   metOLazone (ZAROXOLYN) 5 MG tablet TAKE ONE TABLET BY MOUTH DAILY AS NEEDED FOR SWELLING Yes Orlando Munguia DO   albuterol sulfate HFA (PROAIR HFA) 108 (90 Base) MCG/ACT inhaler Inhale 2 puffs into the lungs every 6 hours as needed for Wheezing Yes Lianna Moreira, DO   Nebulizers (AIRIAL COMPACT MINI NEBULIZER) MISC 1 nebule by Does not apply route 4 times daily Yes Orlando Munguia DO   triamterene-hydroCHLOROthiazide (MAXZIDE-25) 37.5-25 MG per tablet TAKE 1 TABLET BY MOUTH DAILY  Lianna Moreira, DO        Social History Tobacco Use    Smoking status: Never Smoker    Smokeless tobacco: Never Used   Substance Use Topics    Alcohol use: Not on file        Vitals:    05/27/22 0911   BP: 138/78   Weight: 223 lb 6.4 oz (101.3 kg)   Height: 5' 6\" (1.676 m)     Estimated body mass index is 36.06 kg/m² as calculated from the following:    Height as of this encounter: 5' 6\" (1.676 m). Weight as of this encounter: 223 lb 6.4 oz (101.3 kg). Physical Exam  Vitals and nursing note reviewed. Constitutional:       Appearance: She is well-developed. HENT:      Head: Normocephalic and atraumatic. Right Ear: External ear normal.      Left Ear: External ear normal.   Cardiovascular:      Rate and Rhythm: Normal rate and regular rhythm. Heart sounds: Normal heart sounds. Pulmonary:      Effort: Pulmonary effort is normal.      Breath sounds: No wheezing. Abdominal:      General: Bowel sounds are normal.      Palpations: Abdomen is soft. Tenderness: There is no abdominal tenderness. Musculoskeletal:         General: Swelling and tenderness present. Skin:     General: Skin is warm and dry. Neurological:      Mental Status: She is alert and oriented to person, place, and time. Psychiatric:         Behavior: Behavior normal.         ASSESSMENT/PLAN:  1. Hypokalemia  Lab obtained  Educated on the importance of having this done. Answered questions  - Basic Metabolic Panel    2. Leg swelling  Stable  Continue with medication  Keep appointments with specialist.   Tyrone Capellan questions    3. Moderate persistent asthma without complication  Stable  Continue with medication  Keep appointments with specialist.   Tyrone Capellan questions      No follow-ups on file.

## 2022-05-30 PROBLEM — R06.02 SHORTNESS OF BREATH: Status: RESOLVED | Noted: 2020-01-30 | Resolved: 2022-05-30

## 2022-05-30 ASSESSMENT — ENCOUNTER SYMPTOMS
ABDOMINAL PAIN: 0
NAUSEA: 0
DIARRHEA: 0
VOMITING: 0
SHORTNESS OF BREATH: 0

## 2022-05-31 RX ORDER — TRIAMTERENE AND HYDROCHLOROTHIAZIDE 37.5; 25 MG/1; MG/1
1 TABLET ORAL DAILY
Qty: 90 TABLET | Refills: 1 | Status: SHIPPED | OUTPATIENT
Start: 2022-05-31 | End: 2022-08-29

## 2022-06-01 NOTE — TELEPHONE ENCOUNTER
Called pt to relay results, pt home number would not connect, pt mobile number the mailbox is full. .. unable to leave msg

## 2022-06-06 ENCOUNTER — OFFICE VISIT (OUTPATIENT)
Dept: ORTHOPEDIC SURGERY | Age: 77
End: 2022-06-06
Payer: MEDICARE

## 2022-06-06 VITALS — BODY MASS INDEX: 35.84 KG/M2 | HEIGHT: 66 IN | WEIGHT: 223 LBS | RESPIRATION RATE: 16 BRPM

## 2022-06-06 DIAGNOSIS — M17.31 POST-TRAUMATIC OSTEOARTHRITIS OF RIGHT KNEE: Primary | ICD-10-CM

## 2022-06-06 PROCEDURE — 3017F COLORECTAL CA SCREEN DOC REV: CPT | Performed by: ORTHOPAEDIC SURGERY

## 2022-06-06 PROCEDURE — G8400 PT W/DXA NO RESULTS DOC: HCPCS | Performed by: ORTHOPAEDIC SURGERY

## 2022-06-06 PROCEDURE — 1090F PRES/ABSN URINE INCON ASSESS: CPT | Performed by: ORTHOPAEDIC SURGERY

## 2022-06-06 PROCEDURE — G8427 DOCREV CUR MEDS BY ELIG CLIN: HCPCS | Performed by: ORTHOPAEDIC SURGERY

## 2022-06-06 PROCEDURE — G8417 CALC BMI ABV UP PARAM F/U: HCPCS | Performed by: ORTHOPAEDIC SURGERY

## 2022-06-06 PROCEDURE — 99213 OFFICE O/P EST LOW 20 MIN: CPT | Performed by: ORTHOPAEDIC SURGERY

## 2022-06-06 PROCEDURE — G9899 SCRN MAM PERF RSLTS DOC: HCPCS | Performed by: ORTHOPAEDIC SURGERY

## 2022-06-06 PROCEDURE — 1036F TOBACCO NON-USER: CPT | Performed by: ORTHOPAEDIC SURGERY

## 2022-06-06 PROCEDURE — 1123F ACP DISCUSS/DSCN MKR DOCD: CPT | Performed by: ORTHOPAEDIC SURGERY

## 2022-06-06 NOTE — PROGRESS NOTES
[facility]  Office Visit    Chief Complaint: Right knee pain    HPI:  Marco A Lou is a 78 y. o. who is here for initial evaluation of right knee pain. She has a prolonged history of right knee pain and stiffness. Her issue is more stiffness than it is pain. She has had 3 prior knee surgeries with her most recent one being about 25 years ago. She cannot tell me the specifics of what happened to her needle or what was done but she fell and injured it and he did multiple surgeries. She reports she was left in a cast for prolonged amount of time and has never regained motion and since that time. She walks without assistive device. She has trouble walking and using steps because of knee stiffness. She denies right hip pain. She takes NSAIDs as needed for pain, but has more issues with the stiffness then with the pain. Patient Active Problem List   Diagnosis    Moderate persistent asthma without complication    Chest pain    Class 3 severe obesity due to excess calories without serious comorbidity with body mass index (BMI) of 45.0 to 49.9 in adult [facility])    Skin ulcer of right lower leg with fat layer exposed ([address]    Peripheral venous insufficiency    Localized edema    Skin ulcer of third toe of right foot, limited to breakdown of skin (HCC)    Leg swelling    Thrombocytopenia, unspecified       ROS:  Constitutional: denies fever, chills, weight loss  MSK: denies pain in other joints, muscle aches  Neurological: denies numbness, tingling, weakness    Exam:  Resp. rate 16, height 5' 6\" (1.676 m), weight 223 lb (101.2 kg). Appearance: sitting in exam room chair, appears to be in no acute distress, awake and alert  Resp: unlabored breathing on room air  Skin: warm, dry and intact with out erythema or significant increased temperature  Neuro: grossly intact both lower extremities. Intact sensation to light touch. Motor exam 4+ to 5/5 in all major motor groups.   Right knee: Healed anterior incision. No knee effusion. Examination reveals that knee range of motion is 20 to 60 degrees. There is varus deformity, positive crepitus, positive joint line tenderness, positive antalgic gait. Neurologically, plantar flexion and dorsiflexion is intact. 5/5 strength. Imaging:  3 views of the right knee were performed and interpreted today. She has severe tricompartmental degenerative changes with large periarticular osteophytes. There are staples in place in the proximal tibia. Bone is osteopenic. Assessment:  Posttraumatic right knee osteoarthritis    Plan:  We discussed the diagnosis and treatment options. Her biggest issue is lack of motion and not so much pain. She will continue NSAIDs as needed for pain control. We discussed steroid and viscosupplementation injections if needed for pain. Finally, we discussed total knee arthroplasty. We discussed that she may have some improvement in range of motion with total knee arthroplasty but there are no guarantees given how long she has been stiff. She will consider her options and follow-up as needed. Total time spent on today's encounter was at least 24 minutes. This time included reviewing prior notes, radiographs, and lab results when available, reviewing history obtained by medical assistant, performing history and physical exam, reviewing tests/radiographs with the patient, counseling the patient, ordering medications or tests, documentation in the electronic health record, and coordination of care. This dictation was done with Dragon dictation and may contain mechanical errors related to translation.

## 2022-06-07 ENCOUNTER — TELEMEDICINE (OUTPATIENT)
Dept: FAMILY MEDICINE CLINIC | Age: 77
End: 2022-06-07
Payer: MEDICARE

## 2022-06-07 DIAGNOSIS — M17.11 PRIMARY OSTEOARTHRITIS OF RIGHT KNEE: Primary | ICD-10-CM

## 2022-06-07 PROCEDURE — 99442 PR PHYS/QHP TELEPHONE EVALUATION 11-20 MIN: CPT | Performed by: FAMILY MEDICINE

## 2022-06-07 NOTE — PROGRESS NOTES
Susan Rowland is a 78 y.o. female evaluated via telephone on 6/7/2022 for No chief complaint on file. .        Documentation:  I communicated with the patient and/or health care decision maker about     osteoarthritis  Right knee  Had imaging that showed severe arthritis  Saw Dr. BROWN [facility]  Would like a second opinion from Dr. Taylor Welsh. Details of this discussion including any medical advice provided:     Osteoarthritis  Referred for evaluation and treatment. Educated on the importance of having this done. Answered questions    Total Time: minutes: 11-20 minutes    Susan Rowland was evaluated through a synchronous (real-time) audio encounter. Patient identification was verified at the start of the visit. She (or guardian if applicable) is aware that this is a billable service, which includes applicable co-pays. This visit was conducted with the patient's (and/or legal guardian's) verbal consent. She has not had a related appointment within my department in the past 7 days or scheduled within the next 24 hours. The patient was located at Home: [address] [place] [address]. The provider was located at Home ([address]: [place].     Note: not billable if this call serves to triage the patient into an appointment for the relevant concern    Neno Gr DO

## 2022-06-08 ENCOUNTER — TELEPHONE (OUTPATIENT)
Dept: FAMILY MEDICINE CLINIC | Age: 77
End: 2022-06-08

## 2022-06-08 NOTE — TELEPHONE ENCOUNTER
----- Message from Roula Schmidt sent at 6/8/2022 10:09 AM EDT -----  Subject: Message to Provider    QUESTIONS  Information for Provider? pt would like a call back from office due appt   for Ortho . .. pt stated she has been waiting for a response of this   matter. ---------------------------------------------------------------------------  --------------  Yannick Hinders INFO  What is the best way for the office to contact you? OK to leave message on   voicemail  Preferred Call Back Phone Number? [phone]  ---------------------------------------------------------------------------  --------------  SCRIPT ANSWERS  Relationship to Patient?  Self

## 2022-06-13 ENCOUNTER — OFFICE VISIT (OUTPATIENT)
Dept: ORTHOPEDIC SURGERY | Age: 77
End: 2022-06-13
Payer: MEDICARE

## 2022-06-13 VITALS — BODY MASS INDEX: 35.23 KG/M2 | HEIGHT: 66 IN | RESPIRATION RATE: 16 BRPM | WEIGHT: 219.2 LBS

## 2022-06-13 DIAGNOSIS — M17.31 POST-TRAUMATIC OSTEOARTHRITIS OF RIGHT KNEE: Primary | ICD-10-CM

## 2022-06-13 PROCEDURE — 1090F PRES/ABSN URINE INCON ASSESS: CPT | Performed by: ORTHOPAEDIC SURGERY

## 2022-06-13 PROCEDURE — G9899 SCRN MAM PERF RSLTS DOC: HCPCS | Performed by: ORTHOPAEDIC SURGERY

## 2022-06-13 PROCEDURE — G8417 CALC BMI ABV UP PARAM F/U: HCPCS | Performed by: ORTHOPAEDIC SURGERY

## 2022-06-13 PROCEDURE — 99213 OFFICE O/P EST LOW 20 MIN: CPT | Performed by: ORTHOPAEDIC SURGERY

## 2022-06-13 PROCEDURE — G8400 PT W/DXA NO RESULTS DOC: HCPCS | Performed by: ORTHOPAEDIC SURGERY

## 2022-06-13 PROCEDURE — 1036F TOBACCO NON-USER: CPT | Performed by: ORTHOPAEDIC SURGERY

## 2022-06-13 PROCEDURE — 1123F ACP DISCUSS/DSCN MKR DOCD: CPT | Performed by: ORTHOPAEDIC SURGERY

## 2022-06-13 PROCEDURE — 3017F COLORECTAL CA SCREEN DOC REV: CPT | Performed by: ORTHOPAEDIC SURGERY

## 2022-06-13 PROCEDURE — G8427 DOCREV CUR MEDS BY ELIG CLIN: HCPCS | Performed by: ORTHOPAEDIC SURGERY

## 2022-06-13 NOTE — PROGRESS NOTES
History:   Procedure Laterality Date    APPENDECTOMY      CHOLECYSTECTOMY         Family History   Problem Relation Age of Onset   M[facility] Migraines Mother        Social History     Socioeconomic History    Marital status: Single     Spouse name: None    Number of children: None    Years of education: None    Highest education level: None   Occupational History    None   Tobacco Use    Smoking status: Never Smoker    Smokeless tobacco: Never Used   Substance and Sexual Activity    Alcohol use: None    Drug use: Never    Sexual activity: None   Other Topics Concern    None   Social History Narrative    None     Social Determinants of Health     Financial Resource Strain: Low Risk     Difficulty of Paying Living Expenses: Not hard at all   Food Insecurity: No Food Insecurity    Worried About Running Out of Food in the Last Year: Never true    [address] in the Last Year: Never true   Transportation Needs:     Lack of Transportation (Medical): Not on file    Lack of Transportation (Non-Medical):  Not on file   Physical Activity:     Days of Exercise per Week: Not on file    Minutes of Exercise per Session: Not on file   Stress:     Feeling of Stress : Not on file   Social Connections:     Frequency of Communication with Friends and Family: Not on file    Frequency of Social Gatherings with Friends and Family: Not on file    Attends Buddhist Services: Not on file    Active Member of [address] [place] [address] or Organizations: Not on file    Attends Club or Organization Meetings: Not on file    Marital Status: Not on file   Intimate Partner Violence:     Fear of Current or Ex-Partner: Not on file    Emotionally Abused: Not on file    Physically Abused: Not on file    Sexually Abused: Not on file   Housing Stability:     Unable to Pay for Housing in the Last Year: Not on file    Number of [place] in the Last Year: Not on file    Unstable Housing in the Last Year: Not on file       Current Outpatient Medications   Medication Sig Dispense Refill    triamterene-hydroCHLOROthiazide (MAXZIDE-25) 37.5-25 MG per tablet TAKE 1 TABLET BY MOUTH DAILY 90 tablet 1    metOLazone (ZAROXOLYN) 5 MG tablet TAKE ONE TABLET BY MOUTH DAILY AS NEEDED FOR SWELLING 30 tablet 1    albuterol sulfate HFA (PROAIR HFA) 108 (90 Base) MCG/ACT inhaler Inhale 2 puffs into the lungs every 6 hours as needed for Wheezing 1 Inhaler 0    Nebulizers (AIRIAL COMPACT MINI NEBULIZER) MISC 1 nebule by Does not apply route 4 times daily 1 each 0    potassium chloride (KLOR-CON M) 10 MEQ extended release tablet Take 1 tablet by mouth daily for 10 days 90 tablet 0    mupirocin (BACTROBAN) 2 % ointment APPLY TO AFFECTED AREA THREE TIMES A DAY (Patient not taking: Reported on 6/13/2022) 22 g 0     No current facility-administered medications for this visit. Allergies   Allergen Reactions    Iodides Other (See Comments)     IVP dye, pt does not remember he reaction, just that the nurses stated to not let anyone give to her ever again    Furosemide      Pt. States it makes her \"ditzy\", dizzy, and gives her muscle ridgity and nausea. Review of Systems:  I have reviewed the clinically relevant past medical history, medications, allergies, family history, social history, and 13 point Review of Systems from the patient's recent history form & documented any details relevant to today's presenting complaints in the history above. The patient's self-reported past medical history, medications, allergies, family history, social history, and Review of Systems form from 6/6/22 have been scanned into the chart under the \"Media\" tab.       Physical Examination:     Vital signs:   Resp 16   Ht 5' 6\" (1.676 m)   Wt 219 lb 3.2 oz (99.4 kg)   BMI 35.38 kg/m²     General:  alert, appears stated age, cooperative and no distress   Gait:  Abnormal.     Right Knee  ROM:  45 degrees extension to 70 degrees flexion   Left knee: 45 degrees extension, 90 flexion Crepitus:  no   Joint Tenderness:  minimal tibiofemoral   Effusion:   0 cc   Patellar facet tenderness:  negative medial   negative lateral   Anterior drawer test:  not tested   Left knee: negative   Posterior drawer:   not tested   Left knee: negative   Varus laxity at 30 degrees:  negative   Left knee: negative   Valgus laxity at 30 degrees:   negative   Left knee: negative     There is not any cellulitis, lymphedema or cutaneous lesions noted in the lower extremities. Motor exam of the lower extremities show quadriceps, hamstrings, foot dorsiflexion and plantarflexion grossly intact. Sensation to both feet is grossly intact to light touch. The bilateral lower extremities are warm and well-perfused with brisk capillary refill. Imaging:  Right Knee X-Ray 6/6/22: reviewed. No fracture, dislocation, lytic lesion. She has essentially tricompartmental bone-on-bone arthritis. Metallic staple seen in the proximal tibia. Assessment:     Right knee severe osteoarthritis  Class II obesity      Plan:     Natural history and expected course discussed. Questions answered. I discussed with the patient the disease process of knee osteoarthritis. She has very severe knee stiffness, which could be secondary to her arthritis or from her remote history of surgery. She does not have any significant pain in her knee. I do not think there is any acute change in her knee, which she agrees with. We discussed treatment options, which would essentially be knee replacement, as previously discussed by Dr. Erven Jeans. This likely would be the most likely option to provide some improvement in her function. However, I also discussed with her that there would be no guarantees that she could restore complete normal range of motion given the chronicity of her knee stiffness. Did offer her follow-up with Dr. Erven Jeans at [facility], or with one of our other arthroplasty surgeons if she would prefer. Kiya Lemos.  Deangelo Hobbs, MD  Orthopaedic Surgery and Sports Medicine     Disclaimer: This note was generated with use of a verbal recognition program and an attempt was made to check for errors. It is possible that there are still dictated errors within this office note. If so, please bring any significant errors to my attention for an addendum. All efforts were made to ensure that this office note is accurate.

## 2022-06-15 ENCOUNTER — OFFICE VISIT (OUTPATIENT)
Dept: FAMILY MEDICINE CLINIC | Age: 77
End: 2022-06-15
Payer: MEDICARE

## 2022-06-15 VITALS — SYSTOLIC BLOOD PRESSURE: 134 MMHG | HEART RATE: 83 BPM | DIASTOLIC BLOOD PRESSURE: 70 MMHG | OXYGEN SATURATION: 98 %

## 2022-06-15 DIAGNOSIS — M17.11 PRIMARY OSTEOARTHRITIS OF RIGHT KNEE: Primary | ICD-10-CM

## 2022-06-15 PROCEDURE — G9899 SCRN MAM PERF RSLTS DOC: HCPCS | Performed by: FAMILY MEDICINE

## 2022-06-15 PROCEDURE — 1036F TOBACCO NON-USER: CPT | Performed by: FAMILY MEDICINE

## 2022-06-15 PROCEDURE — G8400 PT W/DXA NO RESULTS DOC: HCPCS | Performed by: FAMILY MEDICINE

## 2022-06-15 PROCEDURE — G8427 DOCREV CUR MEDS BY ELIG CLIN: HCPCS | Performed by: FAMILY MEDICINE

## 2022-06-15 PROCEDURE — G8417 CALC BMI ABV UP PARAM F/U: HCPCS | Performed by: FAMILY MEDICINE

## 2022-06-15 PROCEDURE — 1090F PRES/ABSN URINE INCON ASSESS: CPT | Performed by: FAMILY MEDICINE

## 2022-06-15 PROCEDURE — 3017F COLORECTAL CA SCREEN DOC REV: CPT | Performed by: FAMILY MEDICINE

## 2022-06-15 PROCEDURE — 1123F ACP DISCUSS/DSCN MKR DOCD: CPT | Performed by: FAMILY MEDICINE

## 2022-06-15 PROCEDURE — 99213 OFFICE O/P EST LOW 20 MIN: CPT | Performed by: FAMILY MEDICINE

## 2022-06-15 NOTE — PROGRESS NOTES
6/15/2022     Nydia Patino (:  1945) is a 78 y.o. female, here for evaluation of the following medical concerns:    HPI  Patient followed up due to right knee pain.      Continues to have right knee pain and had appointment with Dr. Aga Hunter, wanted second opinion with Dr. Suzanne Willis . Patient has pain with ambulation, wanted to discuss possible surgery options.       Today, denied chest pain, sob, n, v ,or diarrhea.      Review of Systems   Constitutional: Positive for activity change. Negative for fatigue. Respiratory: Negative for shortness of breath. Cardiovascular: Negative for chest pain. Gastrointestinal: Negative for abdominal pain, diarrhea, nausea and vomiting. Musculoskeletal: Positive for arthralgias, gait problem and joint swelling. Prior to Visit Medications    Medication Sig Taking? Authorizing Provider   triamterene-hydroCHLOROthiazide (MAXZIDE-25) 37.5-25 MG per tablet TAKE 1 TABLET BY MOUTH DAILY Yes Orlando Munguia, DO   mupirocin (BACTROBAN) 2 % ointment APPLY TO AFFECTED AREA THREE TIMES A DAY Yes Orlando Munguia DO   metOLazone (ZAROXOLYN) 5 MG tablet TAKE ONE TABLET BY MOUTH DAILY AS NEEDED FOR SWELLING Yes Orlando Munguia, DO   albuterol sulfate HFA (PROAIR HFA) 108 (90 Base) MCG/ACT inhaler Inhale 2 puffs into the lungs every 6 hours as needed for Wheezing Yes Mary Egan, DO   Nebulizers (AIRIAL COMPACT MINI NEBULIZER) MISC 1 nebule by Does not apply route 4 times daily Yes Mary Egan, DO   potassium chloride (KLOR-CON M) 10 MEQ extended release tablet Take 1 tablet by mouth daily for 10 days  Mary Egan, DO        Social History     Tobacco Use    Smoking status: Never Smoker    Smokeless tobacco: Never Used   Substance Use Topics    Alcohol use: Not on file        Vitals:    06/15/22 1118   BP: 134/70   Pulse: 83   SpO2: 98%     Estimated body mass index is 35.38 kg/m² as calculated from the following:    Height as of 22: 5' 6\" (1.676 m).     Weight as of 6/13/22: 219 lb 3.2 oz (99.4 kg). Physical Exam  Vitals and nursing note reviewed. Constitutional:       Appearance: She is well-developed. HENT:      Head: Normocephalic and atraumatic. Cardiovascular:      Rate and Rhythm: Normal rate and regular rhythm. Heart sounds: Normal heart sounds. No murmur heard. Pulmonary:      Effort: Pulmonary effort is normal.      Breath sounds: Normal breath sounds. No wheezing. Musculoskeletal:         General: Swelling and tenderness present. Skin:     General: Skin is warm. Neurological:      Mental Status: She is alert and oriented to person, place, and time. Psychiatric:         Behavior: Behavior normal.         ASSESSMENT/PLAN:  1. Primary osteoarthritis of right knee  Patient will use otc medication. She was educated on the medication and its use. She will  Use ice, heat, and stretching. She will follow up with Ortho      Return in about 3 months (around 9/15/2022).

## 2022-06-18 ASSESSMENT — ENCOUNTER SYMPTOMS
VOMITING: 0
DIARRHEA: 0
NAUSEA: 0
ABDOMINAL PAIN: 0
SHORTNESS OF BREATH: 0

## 2022-06-23 NOTE — PROGRESS NOTES
[address]   Progress Note       [address] RECORD NUMBER:  [id number]  AGE: 70 y.o. GENDER: female  : 1945  EPISODE DATE:  2021    Subjective:     Chief Complaint   Patient presents with    Wound Check     f/u visit - right leg and right foot wounds        Patient without any new wound care issues. Symptoms or pertinent wound history since last visit: none Denies any other constitutional symptoms otherwise. Has been tolerating wound care dressings without problems. Patient feels that wound is improving. Assessment:     81-year-old female with a nonhealing nonpressure ulcer to right lateral lower leg. Severity of fascia muscle involvement without necrosis. 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 Wound evaluation:   Right plantar surface wound is healed today. Right lateral lower leg wound still with scarred down tissue, fibrotic but it is less. Little bit more moist overall. Nonviable tissue noted. Plan:     1.  Wound care: Debridement done, excisional biopsy done (see note below). Please see attached Discharge Instructions for specific wound treatment plan. Prescribed Santyl ointment for wound. Added Adaptic over Santyl to increase moisture to the wound bed. 2. Offloading and edema control: Elevation. Since we need to change to Santyl to the wound daily, will use Ace for compression. 3. Infection: No signs of acute infection. Continue to monitor. 4. Circulation: Strongly palpable pulses. No ischemic vascular changes. We will continue to monitor. I think the patient probably has adequate outflow for healing but may need additional studies depending on progress at next visit. 5. Nutrition: Encourage protein emphasis with meals. Continue with adding collagen supplement to her diet. Patient was provided samples of liquigel protein shots. 6. Biopsy results reviewed with patient. The findings are those of an ulcer bed with adjacent reactive epidermal hyperplasia.  The findings are not those of a vasculitis or   pyogenic granuloma.         Procedure Note  Indications:  Based on my examination of this patient's wound(s)/ulcer(s) today, debridement is required to promote healing and evaluate the wound base. Performed by: Rosendo Patel MD  Consent obtained:  Yes  Time out taken:  Yes  Pain Control: Anesthetic  Anesthetic: 4% Lidocaine Liquid Topical(2.5ml)     Debridement: Excisional Debridement  Using curette the wound(s)/ulcer(s) was/were debrided down through and including the removal of muscle/fascia.         Devitalized Tissue Debrided:  fibrin, biofilm, slough and exudate    Pre Debridement Measurements:  Are located in the [place]  Documentation Flow Sheet    Wound/Ulcer #: 1    Post Debridement Measurements:  Wound/Ulcer Descriptions are Pre Debridement except measurements:    Wound 01/12/21 Leg Right;Lateral;Lower #1 Noted 1-1-21 (Active)   Wound Image   01/12/21 8954   Dressing Status Other (Comment) 01/12/21 7021 Dressing/Treatment Dry dressing; Pharmaceutical agent (see MAR); Roll gauze; Other (comment) 01/12/21 0936   Wound Length (cm) 2.5 cm 01/19/21 1045   Wound Width (cm) 1.2 cm 01/19/21 1045   Wound Depth (cm) 0.2 cm 01/19/21 1045   Wound Surface Area (cm^2) 3 cm^2 01/19/21 1045   Change in Wound Size % (l*w) -7.14 01/19/21 1045   Wound Volume (cm^3) 0.6 cm^3 01/19/21 1045   Wound Healing % -7 01/19/21 1045   Post-Procedure Length (cm) 2.5 cm 01/19/21 1106   Post-Procedure Width (cm) 1.2 cm 01/19/21 1106   Post-Procedure Depth (cm) 0.3 cm 01/19/21 1106   Post-Procedure Surface Area (cm^2) 3 cm^2 01/19/21 1106   Post-Procedure Volume (cm^3) 0.9 cm^3 01/19/21 1106   Wound Assessment Granulation tissue;Slough 01/19/21 1045   Drainage Amount Small 01/19/21 1045   Drainage Description Serosanguinous 01/19/21 1045   Odor None 01/19/21 1045   Consuelo-wound Assessment Dry/flaky 01/19/21 1045   Margins Attached edges 01/19/21 1045   Wound Thickness Description not for Pressure Injury Full thickness 01/19/21 1045   Number of days: 7       Wound 01/12/21 Foot Right;Plantar #2 Noted 1/12/21 (Active)   Wound Image   01/19/21 1045   Dressing Status Other (Comment) 01/12/21 0835   Dressing/Treatment Alginate with Ag;Dry dressing;Roll gauze 01/12/21 0936   Wound Length (cm) 0 cm 01/19/21 1045   Wound Width (cm) 0 cm 01/19/21 1045   Wound Depth (cm) 0 cm 01/19/21 1045   Wound Surface Area (cm^2) 0 cm^2 01/19/21 1045   Change in Wound Size % (l*w) 100 01/19/21 1045   Wound Volume (cm^3) 0 cm^3 01/19/21 1045   Wound Healing % 100 01/19/21 1045   Post-Procedure Length (cm) 0 cm 01/19/21 1106   Post-Procedure Width (cm) 0 cm 01/19/21 1106   Post-Procedure Depth (cm) 0 cm 01/19/21 1106   Post-Procedure Surface Area (cm^2) 0 cm^2 01/19/21 1106   Post-Procedure Volume (cm^3) 0 cm^3 01/19/21 1106   Wound Assessment Epithelialization 01/19/21 1045   Drainage Amount Scant 01/12/21 0835   Drainage Description Sanguinous 01/12/21 0835   Odor None 01/12/21 0835   Consuelo-wound Assessment Dry/flaky 01/12/21 0835   Margins Attached edges 01/12/21 0835   Wound Thickness Description not for Pressure Injury Full thickness 01/12/21 0835   Number of days: 7          Percent of Wound(s)/Ulcer(s) Debrided: 100%  Total Surface Area Debrided:  3 sq cm   Diabetic/Pressure/Non Pressure Ulcers only:  Ulcer: Non-Pressure ulcer, Muscle fascia involved without necrosis   Estimated Blood Loss:  Minimal  Hemostasis Achieved:  by pressure  Procedural Pain:  1  / 10   Post Procedural Pain:  1 / 10   Response to treatment:  Well tolerated by patient. HISTORY of PRESENT ILLNESS HPI     Anita uHnt is a 70 y.o. female who presents today for wound/ulcer evaluation. History of Wound Context: Patient presenting as follow-up visit regarding a nonhealing wound to right lateral leg. Per initial evaluation history:70year-old female with a history of asthma, hypertension who has a nonhealing wound to the right lower extremity with associated edema, erythema since around 12/8/2020. The patient has been using betamethasone cream without significant improvement. She was evaluated by her PCP recently and subsequently referred here for wound care recommendations. Does have some associated pain with the wound. Otherwise no other complaints. Denies any fever, chills, chest pain, shortness of breath. Patient has been treated for cellulitis recently and was initially placed on Keflex as well as Clindamycin but did have some loose watery stools after taking the antibiotics. Clindamycin has since been discontinued. She is currently still on Keflex. Patient states she has a history of right knee surgeries, multiple, and unfortunately has remaining right lower extremity edema with right knee deformity, immobilization.   Patient has a history of a large ulcer to the right lateral leg previously that went on to heal.     Wound/Ulcer Pain Timing/Severity: intermittent  Quality of pain: aching, burning  Severity:  2 / 10   Modifying Factors: Pain worsens with walking and Pain is relieved/improved with rest  Associated Signs/Symptoms: edema, erythema and pain     Ulcer Identification:  Ulcer Type: venous    Objective:    BP (!) 152/84   Pulse 91   Temp 97.1 °F (36.2 °C) (Temporal)   Resp 16   Wt Readings from Last 3 Encounters:   01/12/21 229 lb 15 oz (104.3 kg)   01/08/21 229 lb 12.8 oz (104.2 kg)   11/03/20 223 lb 9.6 oz (101.4 kg)       PHYSICAL EXAM  General Appearance: alert and oriented to person, place and time, well-developed and well nourished, and in no acute distress  Skin: warm and dry, no rash  Head: normocephalic and atraumatic  Eyes: extraocular eye movements intact, conjunctivae normal, and sclera anicteric  ENT: hearing grossly normal bilaterally. normal  Pulmonary/Chest: no chest wall tenderness and clear anteriorly  Cardiovascular: normal rate and regular rhythm  Abdomen: soft, non-tender and non-distended  Extremities: no cyanosis and no clubbing  Musculoskeletal: normal range of motion of joints. Nontender calves. No cyanosis. Edema 1+ BLE  Neurologic: no cranial nerve deficit and speech normal. No focal deficits.  Mental status normal    PAST MEDICAL HISTORY        Diagnosis Date    Asthma        PAST SURGICAL HISTORY    Past Surgical History:   Procedure Laterality Date    APPENDECTOMY      CHOLECYSTECTOMY         FAMILY HISTORY    Family History   Problem Relation Age of Onset    Migraines Mother        SOCIAL HISTORY    Social History     Tobacco Use    Smoking status: Never Smoker    Smokeless tobacco: Never Used   Substance Use Topics    Alcohol Use     Frequency: Never    Drug use: Never       ALLERGIES    Allergies   Allergen Reactions    Dye [Iodides] Other (See Comments)     IVP dye, pt does not remember he reaction, just that the nurses stated to not let anyone give to her ever again       MEDICATIONS    Current Outpatient Medications on File Prior to Encounter   Medication Sig Dispense Refill    collagenase (SANTYL) 250 UNIT/GM ointment Apply topically daily to wound on right leg as directed. 90 g 0    triamterene-hydroCHLOROthiazide (MAXZIDE-25) 37.5-25 MG per tablet Take 1 tablet by mouth as needed      cephALEXin (KEFLEX) 500 MG capsule Take 1 capsule by mouth 3 times daily 30 capsule 0    mupirocin (BACTROBAN) 2 % ointment APPLY TO AFFECTED AREA THREE TIMES A DAY 22 g 0    metOLazone (ZAROXOLYN) 5 MG tablet TAKE ONE TABLET DAILY AS NEEDED FOR SWELLING 30 tablet 2    albuterol sulfate HFA (PROAIR HFA) 108 (90 Base) MCG/ACT inhaler Inhale 2 puffs into the lungs every 6 hours as needed for Wheezing 1 Inhaler 0    Nebulizers (AIRIAL COMPACT MINI NEBULIZER) MISC 1 nebule by Does not apply route 4 times daily 1 each 0     No current facility-administered medications on file prior to encounter. REVIEW OF SYSTEMS  A comprehensive review of systems was negative except for: wound, mild edema    Written patient dismissal instructions given to patient and signed by patient or POA. Discharge Instructions       [address] and [facility]   Physician Orders and Discharge Instructions  [place] [address], [place] [address]  Telephone: [phone]    NAME:  Juliocesar Latif OF BIRTH:  1945  MEDICAL RECORD NUMBER:  [id number]  DATE:  1/19/2021    Wound Cleansing:   Do not scrub or use excessive force. Cleanse wound prior to applying a clean dressing with:  [x] Normal Saline  [x] Keep Wound Dry in Shower    [] Wound Cleanser   [] Cleanse wound with Mild Soap & Water  [] May Shower at Discharge   [] Other:       Topical Treatments:  Do not apply lotions, creams, or ointments to wound bed unless directed.    [] Apply moisturizing lotion to skin surrounding the wound prior to dressing change.  [] Apply antifungal ointment to skin surrounding the wound prior to dressing change.  [] Apply thin film of moisture barrier ointment to skin immediately around wound. [] Other:       Dressings:           Wound Location Right Lower Leg   [x] Apply Primary Dressing:         [x] Santyl, ADAPTIC, NORMAL SALINE DAMPENED GAUZE    [x] Cover and Secure with:     [x] Gauze [x] Filiberto [] Roll gauze   [x] Ace Wrap Toes to Knee [] Cover Roll Tape [] ABD     [] Other:    Avoid contact of tape with skin. [x] Change dressing: [x] Daily    [] Every Other Day [] Three times per week   [] Once a week [] Do Not Change Dressing   [] Other:     Edema Control:  Apply: [] Compression Stocking []Right Leg []Left Leg   [] Tubigrip []Right Leg Double Layer []Left Leg Double Layer       []Right Leg Single Layer []Left Leg Single Layer   [] SpandaGrip []Right Leg  []Left Leg      []Low compression 5-10 mm/Hg      []Medium compression 10-20 mm/Hg     []High compression  20-30 mm/Hg   every morning immediately when getting up should be applied to affected leg(s) from mid foot to knee making sure to cover the heel. Remove every night before going to bed. [] Elevate leg(s) above the level of the heart when sitting. [] Avoid prolonged standing in one place. [] Elevate arm/hand above the level of the heart []RightArm []LeftArm     Compression:  Apply: [] Multilayer Compression Wrap Applied in Clinic []RightLeg []Left Leg   [] Multi-layer compression. Do not get leg(s) with wrap wet. If wraps become too tight call the center or completely remove the wrap. [] Elevate leg(s) above the level of the heart when sitting. [] Avoid prolonged standing in one place.     Off-Loading:   [] Off-loading when [] walking  [] in bed [] sitting  [] Total non-weight bearing  [] Right Leg  [] Left Leg   [] Assistive Device [] Walker [] Cane  [] Wheelchair  [] Crutches   [] Surgical shoe    [] Podus Boot(s)   [] Foam Boot(s)  [] Roll About    [] Cast Boot [] CROW Boot  [] Other:     Dietary:  [x] Diet as tolerated: Activity:  [x] Activity as tolerated: If you are still having pain after you go home:  [x] Elevate the affected limb. [x] Use over-the-counter medications you would normally use for pain as permitted by your family doctor. [x] For persistent pain not relieved by the above interventions, please call your family doctor. Return Appointment:  [] Wound and dressing supply provider:   [] ECF or Home Healthcare:  [] Wound Assessment: [] Physician or NP scheduled for Wound Assessment:   [x] Return Appointment: With Dr. Willy Blanco  in  [facility])  [] Ordered tests:     Nurse Case Manger:  Petra Le   Electronically signed by Mauri Chapa RN on 1/19/2021 at [address] [facility] Information: Should you experience any significant changes in your wound(s) or have questions about your wound care, please contact the [address] [place] [address] at [address] 8:00 am - 4:30 pm and Friday 8:00 am - 12:30 pm.  If you need help with your wound outside these hours and cannot wait until we are again available, contact your PCP or go to the hospital emergency room. PLEASE NOTE: IF YOU ARE UNABLE TO OBTAIN WOUND SUPPLIES, CONTINUE TO USE THE SUPPLIES YOU HAVE AVAILABLE UNTIL YOU ARE ABLE TO REACH US. IT IS MOST IMPORTANT TO KEEP THE WOUND COVERED AT ALL TIMES.      Physician Signature:_______________________    Date: ___________ Time:  ____________        Dr. Raina Reyez                   Electronically signed by Yunior Pittman MD on 1/19/2021 at 11:20 AM no chest pain, no cough, and no shortness of breath.

## 2022-08-02 ENCOUNTER — OFFICE VISIT (OUTPATIENT)
Dept: GYNECOLOGY | Age: 77
End: 2022-08-02
Payer: MEDICARE

## 2022-08-02 VITALS
BODY MASS INDEX: 36.48 KG/M2 | DIASTOLIC BLOOD PRESSURE: 80 MMHG | SYSTOLIC BLOOD PRESSURE: 140 MMHG | WEIGHT: 226 LBS | HEART RATE: 91 BPM

## 2022-08-02 DIAGNOSIS — Z01.419 WELL WOMAN EXAM WITH ROUTINE GYNECOLOGICAL EXAM: Primary | ICD-10-CM

## 2022-08-02 DIAGNOSIS — N90.5 ATROPHIC VULVA: ICD-10-CM

## 2022-08-02 PROCEDURE — G8417 CALC BMI ABV UP PARAM F/U: HCPCS | Performed by: OBSTETRICS & GYNECOLOGY

## 2022-08-02 PROCEDURE — G8427 DOCREV CUR MEDS BY ELIG CLIN: HCPCS | Performed by: OBSTETRICS & GYNECOLOGY

## 2022-08-02 PROCEDURE — G0101 CA SCREEN;PELVIC/BREAST EXAM: HCPCS | Performed by: OBSTETRICS & GYNECOLOGY

## 2022-08-02 RX ORDER — CLOTRIMAZOLE AND BETAMETHASONE DIPROPIONATE 10; .64 MG/G; MG/G
CREAM TOPICAL
Qty: 45 G | Refills: 3 | Status: SHIPPED | OUTPATIENT
Start: 2022-08-02

## 2022-08-02 NOTE — PROGRESS NOTES
Subjective:      Patient ID: Teresita aY is a 78 y.o. female. HPI  pt presents for annual gyn exam.  She notes some tenderness on the left side of her vulva and some spotting when wiping the left side of her vulva. Denies other complaints. Review of Systems Pertinent review of systems items discussed above. All others systems items not discussed above were negative. Objective:   Physical Exam  Constitutional:       Appearance: She is well-developed. HENT:      Head: Normocephalic and atraumatic. Neck:      Thyroid: No thyromegaly. Trachea: No tracheal deviation. Cardiovascular:      Rate and Rhythm: Normal rate and regular rhythm. Heart sounds: Normal heart sounds. No murmur heard. Pulmonary:      Effort: Pulmonary effort is normal. No respiratory distress. Breath sounds: Normal breath sounds. No wheezing or rales. Chest:   Breasts:     Right: No mass, nipple discharge or skin change. Left: No mass, nipple discharge or skin change. Abdominal:      General: There is no distension. Palpations: Abdomen is soft. There is no mass. Tenderness: There is no abdominal tenderness. There is no rebound. Genitourinary:     Labia:         Right: No lesion. Left: No lesion. Vagina: Normal. No foreign body. No vaginal discharge. Adnexa:         Right: No mass or tenderness. Left: No mass or tenderness. Rectum: Normal. Guaiac result negative. No mass or external hemorrhoid. Comments: Pap performed. Slightly tender in the area as illustrated but no lesions, only thinned mucosa. Uterus and cx absent  Musculoskeletal:         General: Normal range of motion. Lymphadenopathy:      Cervical: No cervical adenopathy. Neurological:      Mental Status: She is alert and oriented to person, place, and time. Assessment:   Normal gyn exam, vulvar thinning     Plan:   Rx lotrisone, call with results.   F/u annual gyn exam.       Areli Jeffries Chelsie Stevenson MD

## 2022-08-18 ENCOUNTER — OFFICE VISIT (OUTPATIENT)
Dept: FAMILY MEDICINE CLINIC | Age: 77
End: 2022-08-18
Payer: MEDICARE

## 2022-08-18 VITALS
BODY MASS INDEX: 35.84 KG/M2 | WEIGHT: 223 LBS | DIASTOLIC BLOOD PRESSURE: 80 MMHG | HEIGHT: 66 IN | SYSTOLIC BLOOD PRESSURE: 130 MMHG

## 2022-08-18 DIAGNOSIS — J45.40 MODERATE PERSISTENT ASTHMA WITHOUT COMPLICATION: Primary | ICD-10-CM

## 2022-08-18 DIAGNOSIS — G89.29 CHRONIC PAIN OF RIGHT KNEE: ICD-10-CM

## 2022-08-18 DIAGNOSIS — E66.01 CLASS 3 SEVERE OBESITY DUE TO EXCESS CALORIES WITHOUT SERIOUS COMORBIDITY WITH BODY MASS INDEX (BMI) OF 45.0 TO 49.9 IN ADULT (HCC): ICD-10-CM

## 2022-08-18 DIAGNOSIS — M25.561 CHRONIC PAIN OF RIGHT KNEE: ICD-10-CM

## 2022-08-18 PROCEDURE — 1123F ACP DISCUSS/DSCN MKR DOCD: CPT | Performed by: FAMILY MEDICINE

## 2022-08-18 PROCEDURE — G8400 PT W/DXA NO RESULTS DOC: HCPCS | Performed by: FAMILY MEDICINE

## 2022-08-18 PROCEDURE — G8417 CALC BMI ABV UP PARAM F/U: HCPCS | Performed by: FAMILY MEDICINE

## 2022-08-18 PROCEDURE — 3017F COLORECTAL CA SCREEN DOC REV: CPT | Performed by: FAMILY MEDICINE

## 2022-08-18 PROCEDURE — G8427 DOCREV CUR MEDS BY ELIG CLIN: HCPCS | Performed by: FAMILY MEDICINE

## 2022-08-18 PROCEDURE — 1090F PRES/ABSN URINE INCON ASSESS: CPT | Performed by: FAMILY MEDICINE

## 2022-08-18 PROCEDURE — 1036F TOBACCO NON-USER: CPT | Performed by: FAMILY MEDICINE

## 2022-08-18 PROCEDURE — 99214 OFFICE O/P EST MOD 30 MIN: CPT | Performed by: FAMILY MEDICINE

## 2022-08-18 ASSESSMENT — ENCOUNTER SYMPTOMS
SINUS PRESSURE: 0
DIARRHEA: 0
ABDOMINAL PAIN: 0
RHINORRHEA: 0
NAUSEA: 0
COUGH: 0
CHEST TIGHTNESS: 0
VOMITING: 0
WHEEZING: 0
SHORTNESS OF BREATH: 0
SORE THROAT: 0

## 2022-08-18 NOTE — PROGRESS NOTES
ALEXSANDRA Munguia DO   albuterol sulfate HFA (PROAIR HFA) 108 (90 Base) MCG/ACT inhaler Inhale 2 puffs into the lungs every 6 hours as needed for Wheezing Yes Boone Severs, DO   Nebulizers (AIRIAL COMPACT MINI NEBULIZER) MISC 1 nebule by Does not apply route 4 times daily Yes Boone Severs, DO        Social History     Tobacco Use    Smoking status: Never    Smokeless tobacco: Never   Substance Use Topics    Alcohol use: Not on file        Vitals:    08/18/22 1025   BP: 130/80   Weight: 223 lb (101.2 kg)   Height: 5' 6\" (1.676 m)     Estimated body mass index is 35.99 kg/m² as calculated from the following:    Height as of this encounter: 5' 6\" (1.676 m). Weight as of this encounter: 223 lb (101.2 kg). Physical Exam  Vitals and nursing note reviewed. Constitutional:       Appearance: She is well-developed. HENT:      Head: Normocephalic and atraumatic. Right Ear: External ear normal.      Left Ear: External ear normal.   Cardiovascular:      Rate and Rhythm: Normal rate and regular rhythm. Heart sounds: Normal heart sounds. Pulmonary:      Effort: Pulmonary effort is normal.      Breath sounds: Normal breath sounds. No wheezing. Abdominal:      General: Bowel sounds are normal.      Palpations: Abdomen is soft. Tenderness: There is no abdominal tenderness. Musculoskeletal:         General: Swelling and tenderness present. Skin:     General: Skin is warm and dry. Neurological:      Mental Status: She is alert and oriented to person, place, and time. Psychiatric:         Behavior: Behavior normal.       ASSESSMENT/PLAN:  1. Moderate persistent asthma without complication  Stable  Continue with medication  Answered questions     2. Class 3 severe obesity due to excess calories without serious comorbidity with body mass index (BMI) of 45.0 to 49.9 in adult [facility])  Discussed the need to continue to diet and exercise  Patient is trying     3.  Chronic pain of right knee  Stable  Continue with medication  Keep appointments with specialist.   To [place] questions     Return in about 4 weeks (around 9/15/2022), or AWV in office.

## 2022-09-28 ENCOUNTER — OFFICE VISIT (OUTPATIENT)
Dept: FAMILY MEDICINE CLINIC | Age: 77
End: 2022-09-28
Payer: MEDICARE

## 2022-09-28 VITALS
DIASTOLIC BLOOD PRESSURE: 78 MMHG | BODY MASS INDEX: 34.87 KG/M2 | SYSTOLIC BLOOD PRESSURE: 130 MMHG | HEIGHT: 66 IN | WEIGHT: 217 LBS

## 2022-09-28 DIAGNOSIS — Z00.00 INITIAL MEDICARE ANNUAL WELLNESS VISIT: Primary | ICD-10-CM

## 2022-09-28 PROCEDURE — 3017F COLORECTAL CA SCREEN DOC REV: CPT | Performed by: FAMILY MEDICINE

## 2022-09-28 PROCEDURE — G0438 PPPS, INITIAL VISIT: HCPCS | Performed by: FAMILY MEDICINE

## 2022-09-28 PROCEDURE — 1123F ACP DISCUSS/DSCN MKR DOCD: CPT | Performed by: FAMILY MEDICINE

## 2022-09-28 ASSESSMENT — PATIENT HEALTH QUESTIONNAIRE - PHQ9
2. FEELING DOWN, DEPRESSED OR HOPELESS: 0
1. LITTLE INTEREST OR PLEASURE IN DOING THINGS: 0
SUM OF ALL RESPONSES TO PHQ9 QUESTIONS 1 & 2: 0
SUM OF ALL RESPONSES TO PHQ QUESTIONS 1-9: 0

## 2022-09-28 ASSESSMENT — LIFESTYLE VARIABLES
HOW OFTEN DO YOU HAVE A DRINK CONTAINING ALCOHOL: NEVER
HOW MANY STANDARD DRINKS CONTAINING ALCOHOL DO YOU HAVE ON A TYPICAL DAY: PATIENT DOES NOT DRINK

## 2022-09-28 NOTE — PROGRESS NOTES
Medicare Annual Wellness Visit    Abhinav Cho is here for Medicare AWV    Assessment & Plan    Recommendations for Preventive Services Due: see orders and patient instructions/AVS.  Recommended screening schedule for the next 5-10 years is provided to the patient in written form: see Patient Instructions/AVS.     Return for Medicare Annual Wellness Visit in 1 year. Subjective   The following acute and/or chronic problems were also addressed today:  Right knee pain is improving. Patient's complete Health Risk Assessment and screening values have been reviewed and are found in Flowsheets. The following problems were reviewed today and where indicated follow up appointments were made and/or referrals ordered.     Positive Risk Factor Screenings with Interventions:             General Health and ACP:  General  In general, how would you say your health is?: Good  In the past 7 days, have you experienced any of the following: New or Increased Pain, New or Increased Fatigue, Loneliness, Social Isolation, Stress or Anger?: No  Do you get the social and emotional support that you need?: Yes  Do you have a Living Will?: Yes    Advance Directives       Power of  Living Will ACP-Advance Directive ACP-Power of     Not on File Not on File Not on File Not on File        General Health Risk Interventions:  Has living will     Health Habits/Nutrition:  Physical Activity: Inactive    Days of Exercise per Week: 0 days    Minutes of Exercise per Session: 0 min     Have you lost any weight without trying in the past 3 months?: No  Body mass index: (!) 35.02  Have you seen the dentist within the past year?: (!) No  Health Habits/Nutrition Interventions:  Inadequate physical activity:  patient agrees to increase physical activity as follows: working out her leg, walking more    Hearing/Vision:  Do you or your family notice any trouble with your hearing that hasn't been managed with hearing aids?: No  Do you have Anger?: No  Do you get the social and emotional support that you need?: Yes  Do you have a Living Will?: Yes    Advance Directives       Power of Millie Lofton Will ACP-Advance Directive ACP-Power of     Not on File Not on File Not on File Not on File          General Health Risk Interventions:  Has living will    Health Habits/Nutrition:  Physical Activity: Inactive    Days of Exercise per Week: 0 days    Minutes of Exercise per Session: 0 min     Have you lost any weight without trying in the past 3 months?: No  Body mass index: (!) 35.02  Have you seen the dentist within the past year?: (!) No  Health Habits/Nutrition Interventions:  Inadequate physical activity:  patient agrees to increase physical activity as follows: walk more    Hearing/Vision:  Do you or your family notice any trouble with your hearing that hasn't been managed with hearing aids?: No  Do you have difficulty driving, watching TV, or doing any of your daily activities because of your eyesight?: No  Have you had an eye exam within the past year?: (!) No  No results found. Hearing/Vision Interventions:  No problems today            Objective   Vitals:    09/28/22 1432   BP: 130/78   Weight: 217 lb (98.4 kg)   Height: 5' 6\" (1.676 m)      Body mass index is 35.02 kg/m².       General Appearance: alert and oriented to person, place and time, well developed and well- nourished, in no acute distress  Skin: warm and dry, no rash or erythema  Head: normocephalic and atraumatic  Eyes: pupils equal, round, and reactive to light, extraocular eye movements intact, conjunctivae normal  ENT: tympanic membrane, external ear and ear canal normal bilaterally, nose without deformity, nasal mucosa and turbinates normal without polyps  Neck: supple and non-tender without mass, no thyromegaly or thyroid nodules, no cervical lymphadenopathy  Pulmonary/Chest: clear to auscultation bilaterally- no wheezes, rales or rhonchi, normal air movement, no respiratory distress  Cardiovascular: normal rate, regular rhythm, normal S1 and S2, no murmurs, rubs, clicks, or gallops, distal pulses intact, no carotid bruits  Abdomen: soft, non-tender, non-distended, normal bowel sounds, no masses or organomegaly  Extremities: no cyanosis, clubbing or edema  Musculoskeletal:  bent at waist  Neurologic: reflexes normal and symmetric, no cranial nerve deficit, gait, coordination and speech normal       Allergies   Allergen Reactions    Iodides Other (See Comments)     IVP dye, pt does not remember he reaction, just that the nurses stated to not let anyone give to her ever again    Furosemide      Pt. States it makes her \"ditzy\", dizzy, and gives her muscle ridgity and nausea. Prior to Visit Medications    Medication Sig Taking? Authorizing Provider   clotrimazole-betamethasone (LOTRISONE) 1-0.05 % cream Apply topically 2 times daily.  Yes Jg Kim MD   metOLazone (ZAROXOLYN) 5 MG tablet TAKE ONE TABLET BY MOUTH DAILY AS NEEDED FOR SWELLING Yes Orlando Munguia DO   albuterol sulfate HFA (PROAIR HFA) 108 (90 Base) MCG/ACT inhaler Inhale 2 puffs into the lungs every 6 hours as needed for Wheezing Yes Deborah Portillo DO   Nebulizers (AIRIAL COMPACT MINI NEBULIZER) MISC 1 nebule by Does not apply route 4 times daily Yes Orlando Munguia DO   triamterene-hydroCHLOROthiazide (MAXZIDE-25) 37.5-25 MG per tablet TAKE 1 TABLET BY MOUTH DAILY  Deborah Portillo DO       CareTeam (Including outside providers/suppliers regularly involved in providing care):   Patient Care Team:  Deborah Portillo DO as PCP - General (Family Medicine)  Deborah Portillo DO as PCP - [facility] Empaneled Provider     Reviewed and updated this visit:  Tobacco  Allergies  Meds  Problems  Med Hx  Surg Hx  Soc Hx  Fam Hx

## 2022-09-29 NOTE — PATIENT INSTRUCTIONS
Personalized Preventive Plan for Stephanie Gasca - 9/28/2022  Medicare offers a range of preventive health benefits. Some of the tests and screenings are paid in full while other may be subject to a deductible, co-insurance, and/or copay. Some of these benefits include a comprehensive review of your medical history including lifestyle, illnesses that may run in your family, and various assessments and screenings as appropriate. After reviewing your medical record and screening and assessments performed today your provider may have ordered immunizations, labs, imaging, and/or referrals for you. A list of these orders (if applicable) as well as your Preventive Care list are included within your After Visit Summary for your review. Other Preventive Recommendations:    A preventive eye exam performed by an eye specialist is recommended every 1-2 years to screen for glaucoma; cataracts, macular degeneration, and other eye disorders. A preventive dental visit is recommended every 6 months. Try to get at least 150 minutes of exercise per week or 10,000 steps per day on a pedometer . Order or download the FREE \"Exercise & Physical Activity: Your Everyday Guide\" from [employer]. Call 0-973.558.7777 or search [employer] online. You need 1114-3962 mg of calcium and 5616-7552 IU of vitamin D per day. It is possible to meet your calcium requirement with diet alone, but a vitamin D supplement is usually necessary to meet this goal.  When exposed to the sun, use a sunscreen that protects against both UVA and UVB radiation with an SPF of 30 or greater. Reapply every 2 to 3 hours or after sweating, drying off with a towel, or swimming. Always wear a seat belt when traveling in a car. Always wear a helmet when riding a bicycle or motorcycle.

## 2022-10-05 ENCOUNTER — HOSPITAL ENCOUNTER (OUTPATIENT)
Dept: WOUND CARE | Age: 77
Discharge: HOME | End: 2022-10-05
Payer: MEDICARE

## 2022-10-05 VITALS
WEIGHT: 216.71 LBS | HEIGHT: 66 IN | BODY MASS INDEX: 34.83 KG/M2 | DIASTOLIC BLOOD PRESSURE: 83 MMHG | TEMPERATURE: 97.2 F | HEART RATE: 92 BPM | RESPIRATION RATE: 18 BRPM | SYSTOLIC BLOOD PRESSURE: 177 MMHG

## 2022-10-05 DIAGNOSIS — M79.89 LEG SWELLING: ICD-10-CM

## 2022-10-05 DIAGNOSIS — L97.912 SKIN ULCER OF RIGHT LOWER LEG WITH FAT LAYER EXPOSED (HCC): Primary | ICD-10-CM

## 2022-10-05 DIAGNOSIS — I87.2 PERIPHERAL VENOUS INSUFFICIENCY: ICD-10-CM

## 2022-10-05 PROCEDURE — 99213 OFFICE O/P EST LOW 20 MIN: CPT

## 2022-10-05 PROCEDURE — 11042 DBRDMT SUBQ TIS 1ST 20SQCM/<: CPT

## 2022-10-05 PROCEDURE — 99214 OFFICE O/P EST MOD 30 MIN: CPT

## 2022-10-05 PROCEDURE — 11042 DBRDMT SUBQ TIS 1ST 20SQCM/<: CPT | Performed by: NURSE PRACTITIONER

## 2022-10-05 RX ORDER — LIDOCAINE 50 MG/G
OINTMENT TOPICAL ONCE
Status: CANCELLED | OUTPATIENT
Start: 2022-10-05 | End: 2022-10-05

## 2022-10-05 RX ORDER — LIDOCAINE HYDROCHLORIDE 40 MG/ML
SOLUTION TOPICAL ONCE
Status: CANCELLED | OUTPATIENT
Start: 2022-10-05 | End: 2022-10-05

## 2022-10-05 RX ORDER — CLOBETASOL PROPIONATE 0.5 MG/G
OINTMENT TOPICAL ONCE
Status: CANCELLED | OUTPATIENT
Start: 2022-10-05 | End: 2022-10-05

## 2022-10-05 RX ORDER — GENTAMICIN SULFATE 1 MG/G
OINTMENT TOPICAL ONCE
Status: CANCELLED | OUTPATIENT
Start: 2022-10-05 | End: 2022-10-05

## 2022-10-05 RX ORDER — BETAMETHASONE DIPROPIONATE 0.05 %
OINTMENT (GRAM) TOPICAL ONCE
Status: CANCELLED | OUTPATIENT
Start: 2022-10-05 | End: 2022-10-05

## 2022-10-05 RX ORDER — BACITRACIN ZINC AND POLYMYXIN B SULFATE 500; 1000 [USP'U]/G; [USP'U]/G
OINTMENT TOPICAL ONCE
Status: CANCELLED | OUTPATIENT
Start: 2022-10-05 | End: 2022-10-05

## 2022-10-05 RX ORDER — LIDOCAINE HYDROCHLORIDE 40 MG/ML
SOLUTION TOPICAL ONCE
Status: COMPLETED | OUTPATIENT
Start: 2022-10-05 | End: 2022-10-05

## 2022-10-05 RX ORDER — LIDOCAINE 40 MG/G
CREAM TOPICAL ONCE
Status: CANCELLED | OUTPATIENT
Start: 2022-10-05 | End: 2022-10-05

## 2022-10-05 RX ORDER — LIDOCAINE HYDROCHLORIDE 20 MG/ML
JELLY TOPICAL ONCE
Status: CANCELLED | OUTPATIENT
Start: 2022-10-05 | End: 2022-10-05

## 2022-10-05 RX ORDER — GINSENG 100 MG
CAPSULE ORAL ONCE
Status: CANCELLED | OUTPATIENT
Start: 2022-10-05 | End: 2022-10-05

## 2022-10-05 RX ORDER — BACITRACIN, NEOMYCIN, POLYMYXIN B 400; 3.5; 5 [USP'U]/G; MG/G; [USP'U]/G
OINTMENT TOPICAL ONCE
Status: CANCELLED | OUTPATIENT
Start: 2022-10-05 | End: 2022-10-05

## 2022-10-05 RX ADMIN — LIDOCAINE HYDROCHLORIDE: 40 SOLUTION TOPICAL at 10:05

## 2022-10-05 NOTE — DISCHARGE INSTRUCTIONS
[address] and [facility] Therapy   Physician Orders and Discharge Instructions  [address]   [address] [place] [address]  Telephone: [phone]    NAME:  Kelvin Hope OF BIRTH:  1945  MEDICAL RECORD NUMBER:  [id number]  DATE:  10/5/2022    Wound Cleansing:   Do not scrub or use excessive force. Cleanse wound prior to applying a clean dressing with:  [x] Normal Saline  [] Keep Wound Dry in Shower    [] Wound Cleanser   [] Cleanse wound with Mild Soap & Water  [] May Shower at Discharge   [x] Other:   [address]    Topical Treatments:  Do not apply lotions, creams, or ointments to wound bed unless directed. [x] Apply moisturizing lotion to skin surrounding the wound prior to dressing change.  [] Apply antifungal ointment to skin surrounding the wound prior to dressing change.  [] Apply thin film of moisture barrier ointment to skin immediately around wound. [] Other:       Dressings:           Wound Location RIGHT LOWER LEG    [x] Apply Primary Dressing:       [] MediHoney Gel [] Alginate with Silver [] Alginate   [] Collagen [x] Collagen with Silver   [] Santyl with Moisten saline gauze     [] Hydrocolloid   [] MediHoney Alginate [] Foam with Silver   [] Foam   [] Hydrofera Blue    [] Mepilex Border    [x] Moisten with Saline [] Hydrogel [] Mepitel     [] Bactroban/Mupirocin [] Polysporin  [] Other:      [x] Cover and Secure with:     [x] Gauze [] Filiberto [x] Roll gauze   [] Ace Wrap [] Cover Roll Tape [] ABD     [] Other:    Avoid contact of tape with skin.   [x] Change dressing: [] Daily    [x] Every Other Day [] Three times per week   [] Once a week [] Do Not Change Dressing   [] Other:      Edema Control:  Apply: [] Compression Stocking []Right Leg []Left Leg   [] Tubigrip []Right Leg Double Layer []Left Leg Double Layer       []Right Leg Single Layer []Left Leg Single Layer   [x] SpandaGrip [x]Right Leg  []Left Leg      []Low compression 5-10 mm/Hg      [x]Medium compression 10-20 mm/Hg     []High compression  20-30 mm/Hg   every morning immediately when getting up should be applied to affected leg(s) from mid foot to knee making sure to cover the heel. Remove every night before going to bed. [x] Elevate leg(s) above the level of the heart when sitting. [] Avoid prolonged standing in one place. [] Elevate arm/hand above the level of the heart []RightArm []LeftArm     Compression:  Apply: [] Multilayer Compression Wrap Applied in Clinic []RightLeg []Left Leg   [] Multi-layer compression. Do not get leg(s) with wrap wet. If wraps become too tight call the center or completely remove the wrap. [] Elevate leg(s) above the level of the heart when sitting. [] Avoid prolonged standing in one place. [x] Diet as tolerated:   [x] Calorie Diabetic Diet:   [] No Added Salt:  [] Increase Protein:   [] Other:     Activity:  [x] Activity as tolerated:  [] Patient has no activity restrictions     [] Strict Bedrest: [] Remain off Work:     [] May return to full duty work: If you are still having pain after you go home:  [] Elevate the affected limb. [] Use over-the-counter medications you would normally use for pain as permitted by your family doctor. [] For persistent pain not relieved by the above interventions, please call your family doctor.              Return Appointment:  [] Wound and dressing supply provider:   [] ECF or Home Healthcare:  [] Wound Assessment: [] Physician or NP scheduled for Wound Assessment:   [x] Return Appointment: With Dexter Padilla  in  1 Week(s)  [] Ordered tests:     Nurse Case Taqueriaer:  Therese     Electronically signed by Martin Mcgregor RN on 10/5/2022 at 9:17 AM     [address] Information: Should you experience any significant changes in your wound(s) or have questions about your wound care, please contact the [facility] at [address] 8:00 am - 4:30 pm and Friday 8:00 am - 12:30 pm.  If you need help with your wound outside these hours and cannot wait until we are again available, contact your PCP or go to the hospital emergency room. PLEASE NOTE: IF YOU ARE UNABLE TO OBTAIN WOUND SUPPLIES, CONTINUE TO USE THE SUPPLIES YOU HAVE AVAILABLE UNTIL YOU ARE ABLE TO REACH US. IT IS MOST IMPORTANT TO KEEP THE WOUND COVERED AT ALL TIMES.      Physician Signature:_______________________    Date: ___________ Time:  ____________        Albert Esquivel CNP

## 2022-10-05 NOTE — LETTER
[address]  [address] OFFICE [place] [address] [facility]  [phone]  Dept: [phone]   TODAYS DATE: 10/3/2022    [facility]   Appointment Treatment Guidelines  Welcome Ms. Whitney to the [address] [place] [address] Pkwy. We appreciate the confidence you have shown in choosing us as your wound care provider. Our goal is to heal your wound(s) as quickly as possible. Please read the items below regarding the nature of your appointments. 1. We will make every effort to schedule appointments that are convenient for you. Certain days and times may not be available, depending on your providers office hours and details of your care. 2. Patients will not necessarily be brought to an exam room in the order in which they arrive. Many providers work out of this office and patients are here for different procedures. Our goal is to serve you as quickly as possible. 3. We acknowledge that your time is valuable. Please remember that wound healing takes time and we appreciate your understanding that the length of each patients appointment will vary depending upon their need. 4. It is for your protection that we ask for insurance cards, photo ID, and new consent forms on your first visit and periodically throughout your treatment at all our facilities. 5. Wound Care treatment is known to be most effective when provided on a regular basis. Missed appointments, and not following the recommended plan of care can result in ineffective treatment and a poor outcome. If you find it difficult to keep appointments or to follow the recommended plan of care, it is your responsibility to let the staff know, so that we can work with you toward a solution. 6. If you need to miss an appointment, please call to let us know. We expect 24 hours notice for all cancellations.  We also expect missed visits to be rescheduled as soon as possible, preferably within the same week to promote the most effective healing time for your wound(s). 7. If you will be late for an appointment, please call our center to be sure that the provider can still see you when you arrive. If you are more than 15 minutes late your appointment may need to be rescheduled. 8. If two (2) appointments are missed without notifying us, your care plan may be discontinued. The same may happen if multiple visits are cancelled or rescheduled, even with notice. A missed visit is time when another patient, who also needs care, could have been seen. Thank you for your understanding and consideration.

## 2022-10-05 NOTE — LETTER
[address]  [address] [facility]  [phone]  Dept: [phone]        Thank you MsTracie Devonte for choosing [facility] for your Wound Care needs. We hope you found your first visit both encouraging and educational.  We look forward to serving you throughout the healing process. Before your next visit please review the information you received in your [employer]. The first visit can be overwhelming and this is a useful tool to refresh what information you may have been given. If you find yourself with any questions prior to your upcoming appointment, please feel free to contact us. Often wounds can be challenging and it is our goal to see you through the healing process with as much support possible. Again, thank you for choosing [address] [facility]!     Sincerely,      The Staff of [address] [place] [address] Pkwy and [facility] Therapy

## 2022-10-05 NOTE — PROGRESS NOTES
ALLERGIES    Allergies   Allergen Reactions    Iodides Other (See Comments)     IVP dye, pt does not remember he reaction, just that the nurses stated to not let anyone give to her ever again    Furosemide      Pt. States it makes her \"ditzy\", dizzy, and gives her muscle ridgity and nausea. MEDICATIONS    Current Outpatient Medications on File Prior to Encounter   Medication Sig Dispense Refill    clotrimazole-betamethasone (LOTRISONE) 1-0.05 % cream Apply topically 2 times daily. 45 g 3    triamterene-hydroCHLOROthiazide (MAXZIDE-25) 37.5-25 MG per tablet TAKE 1 TABLET BY MOUTH DAILY 90 tablet 1    metOLazone (ZAROXOLYN) 5 MG tablet TAKE ONE TABLET BY MOUTH DAILY AS NEEDED FOR SWELLING 30 tablet 1    albuterol sulfate HFA (PROAIR HFA) 108 (90 Base) MCG/ACT inhaler Inhale 2 puffs into the lungs every 6 hours as needed for Wheezing 1 Inhaler 0    Nebulizers (AIRIAL COMPACT MINI NEBULIZER) MISC 1 nebule by Does not apply route 4 times daily 1 each 0     No current facility-administered medications on file prior to encounter. REVIEW OF SYSTEMS  Review of Systems    Pertinent items are noted in HPI.     Objective:      BP (!) 177/83   Pulse 92   Temp 97.2 °F (36.2 °C) (Temporal)   Resp 18   Ht 5' 6\" (1.676 m)   Wt 216 lb 11.4 oz (98.3 kg)   BMI 34.98 kg/m²     Wt Readings from Last 3 Encounters:   10/05/22 216 lb 11.4 oz (98.3 kg)   09/28/22 217 lb (98.4 kg)   08/18/22 223 lb (101.2 kg)       PHYSICAL EXAM  Physical Exam    General Appearance: alert and oriented to person, place and time, well-developed and well-nourished, in no acute distress, obese, and pale  Skin: warm and dry, no rash or erythema  Head: normocephalic and atraumatic  Eyes: pupils equal, round, and reactive to light  Pulmonary/Chest: normal air movement, no respiratory distress  Cardiovascular: normal rate, regular rhythm, and distal pulses diminished  Extremities: no cyanosis, no clubbing, and localized edema, non-pitting edema-  right lower leg      Assessment:        Problem List Items Addressed This Visit       Skin ulcer of right lower leg with fat layer exposed ([address] - Primary    Peripheral venous insufficiency    Leg swelling        Procedure Note  Indications:  Based on my examination of this patient's wound(s)/ulcer(s) today, debridement is required to promote healing and evaluate the wound base. Performed by: VIDA Reina - CNP    Consent obtained:  Yes    Time out taken:  Yes    Pain Control: Anesthetic  Anesthetic: 4% Lidocaine Liquid Topical       Debridement: Excisional Debridement    Using curette the wound(s)/ulcer(s) was/were debrided down through and including the removal of epidermis, dermis, and subcutaneous tissue. Devitalized Tissue Debrided:  fibrin, biofilm, and slough    Pre Debridement Measurements:  Are located in the [place]  Documentation Flow Sheet    Diabetic/Pressure/Non Pressure Ulcers only:  Ulcer: Non-Pressure ulcer, fat layer exposed     Wound/Ulcer #: 1 and 2    Post Debridement Measurements:  Wound/Ulcer Descriptions are Pre Debridement except measurements:    Wound 10/05/22 Leg Right;Lateral;Proximal;Lower #1 Noted 9/28/22 (Active)   Wound Image   10/05/22 0913   Wound Etiology Venous 10/05/22 0913   Dressing Status Dry 10/05/22 0913   Dressing/Treatment Collagen with Ag;Moisten with saline; Roll gauze;Gauze dressing/dressing sponge 10/05/22 0952   Wound Length (cm) 0.9 cm 10/05/22 0913   Wound Width (cm) 0.5 cm 10/05/22 0913   Wound Depth (cm) 0.1 cm 10/05/22 0913   Wound Surface Area (cm^2) 0.45 cm^2 10/05/22 0913   Wound Volume (cm^3) 0.045 cm^3 10/05/22 0913   Post-Procedure Length (cm) 1 cm 10/05/22 0938   Post-Procedure Width (cm) 0.6 cm 10/05/22 0938   Post-Procedure Depth (cm) 0.2 cm 10/05/22 0938   Post-Procedure Surface Area (cm^2) 0.6 cm^2 10/05/22 0938   Post-Procedure Volume (cm^3) 0.12 cm^3 10/05/22 0938   Wound Assessment Granulation tissue;Slough 10/05/22 0913 Drainage Amount None 10/05/22 0913   Odor None 10/05/22 0913   Consuelo-wound Assessment Dry/flaky 10/05/22 0913   Margins Attached edges 10/05/22 0913   Wound Thickness Description not for Pressure Injury Full thickness 10/05/22 0913   Number of days: 0       Wound 10/05/22 Leg Right;Distal;Lower #2 Noted 9/28/22 (Active)   Wound Image   10/05/22 0913   Wound Etiology Venous 10/05/22 0913   Dressing Status Dry 10/05/22 0913   Dressing/Treatment Moisten with saline;Collagen with Ag;Gauze dressing/dressing sponge;Roll gauze 10/05/22 0952   Wound Length (cm) 3.5 cm 10/05/22 0913   Wound Width (cm) 4.5 cm 10/05/22 0913   Wound Depth (cm) 0.2 cm 10/05/22 0913   Wound Surface Area (cm^2) 15.75 cm^2 10/05/22 0913   Wound Volume (cm^3) 3.15 cm^3 10/05/22 0913   Post-Procedure Length (cm) 3.6 cm 10/05/22 0938   Post-Procedure Width (cm) 4.6 cm 10/05/22 0938   Post-Procedure Depth (cm) 0.3 cm 10/05/22 0938   Post-Procedure Surface Area (cm^2) 16.56 cm^2 10/05/22 0938   Post-Procedure Volume (cm^3) 4.968 cm^3 10/05/22 0938   Wound Assessment Granulation tissue;Slough 10/05/22 0913   Drainage Amount None 10/05/22 0913   Odor None 10/05/22 0913   Consuelo-wound Assessment Dry/flaky 10/05/22 0913   Margins Undefined edges 10/05/22 0913   Wound Thickness Description not for Pressure Injury Full thickness 10/05/22 0913   Number of days: 0          Total Surface Area Debrided:  17.16 sq cm     Estimated Blood Loss:  Minimal    Hemostasis Achieved:  not needed    Procedural Pain:  0  / 10     Post Procedural Pain:  0 / 10     Response to treatment:  Well tolerated by patient. Plan:   Pt education per provider related to current status of wound and proposed plan of care. Pt  is in agreement with plan of care and questions answered. Treatment Note please see attached Discharge Instructions    Written patient dismissal instructions given to patient and signed by patient or POA.          Discharge [address] [facility]ound [facility] and Hyperbaric Oxygen Therapy   Physician Orders and Discharge Instructions  [address]   [address], [place]  Telephone: [phone]    NAME:  Maegan Mcdonald OF BIRTH:  1945  MEDICAL RECORD NUMBER:  [id number]  DATE:  10/5/2022    Wound Cleansing:   Do not scrub or use excessive force. Cleanse wound prior to applying a clean dressing with:  [x] Normal Saline  [] Keep Wound Dry in Shower    [] Wound Cleanser   [] Cleanse wound with Mild Soap & Water  [] May Shower at Discharge   [x] Other:   [address]    Topical Treatments:  Do not apply lotions, creams, or ointments to wound bed unless directed. [x] Apply moisturizing lotion to skin surrounding the wound prior to dressing change.  [] Apply antifungal ointment to skin surrounding the wound prior to dressing change.  [] Apply thin film of moisture barrier ointment to skin immediately around wound. [] Other:       Dressings:           Wound Location RIGHT LOWER LEG    [x] Apply Primary Dressing:       [] MediHoney Gel [] Alginate with Silver [] Alginate   [] Collagen [x] Collagen with Silver   [] Santyl with Moisten saline gauze     [] Hydrocolloid   [] MediHoney Alginate [] Foam with Silver   [] Foam   [] Hydrofera Blue    [] Mepilex Border    [x] Moisten with Saline [] Hydrogel [] Mepitel     [] Bactroban/Mupirocin [] Polysporin  [] Other:      [x] Cover and Secure with:     [x] Gauze [] Filiberto [x] Roll gauze   [] Ace Wrap [] Cover Roll Tape [] ABD     [] Other:    Avoid contact of tape with skin.   [x] Change dressing: [] Daily    [x] Every Other Day [] Three times per week   [] Once a week [] Do Not Change Dressing   [] Other:      Edema Control:  Apply: [] Compression Stocking []Right Leg []Left Leg   [] Tubigrip []Right Leg Double Layer []Left Leg Double Layer       []Right Leg Single Layer []Left Leg Single Layer   [x] SpandaGrip [x]Right Leg  []Left Leg      []Low compression 5-10 mm/Hg      [x]Medium compression 10-20 mm/Hg     []High compression  20-30 mm/Hg   every morning immediately when getting up should be applied to affected leg(s) from mid foot to knee making sure to cover the heel. Remove every night before going to bed. [x] Elevate leg(s) above the level of the heart when sitting. [] Avoid prolonged standing in one place. [] Elevate arm/hand above the level of the heart []RightArm []LeftArm     Compression:  Apply: [] Multilayer Compression Wrap Applied in Clinic []RightLeg []Left Leg   [] Multi-layer compression. Do not get leg(s) with wrap wet. If wraps become too tight call the center or completely remove the wrap. [] Elevate leg(s) above the level of the heart when sitting. [] Avoid prolonged standing in one place. [x] Diet as tolerated:   [x] Calorie Diabetic Diet:   [] No Added Salt:  [] Increase Protein:   [] Other:     Activity:  [x] Activity as tolerated:  [] Patient has no activity restrictions     [] Strict Bedrest: [] Remain off Work:     [] May return to full duty work: If you are still having pain after you go home:  [] Elevate the affected limb. [] Use over-the-counter medications you would normally use for pain as permitted by your family doctor. [] For persistent pain not relieved by the above interventions, please call your family doctor.              Return Appointment:  [] Wound and dressing supply provider:   [] ECF or Home Healthcare:  [] Wound Assessment: [] Physician or NP scheduled for Wound Assessment:   [x] Return Appointment: With Светлана Marrero  in  1 Week(s)  [] Ordered tests:     Nurse Case Manger:  Therese     Electronically signed by Ally Jansen RN on 10/5/2022 at 9:17 AM     [address] Information: Should you experience any significant changes in your wound(s) or have questions about your wound care, please contact the [facility] at [address] 8:00 am - 4:30 pm and Friday 8:00 am - 12:30 pm.  If you need help with your wound outside these hours and cannot wait until we are again available, contact your PCP or go to the hospital emergency room. PLEASE NOTE: IF YOU ARE UNABLE TO OBTAIN WOUND SUPPLIES, CONTINUE TO USE THE SUPPLIES YOU HAVE AVAILABLE UNTIL YOU ARE ABLE TO REACH US. IT IS MOST IMPORTANT TO KEEP THE WOUND COVERED AT ALL TIMES.      Physician Signature:_______________________    Date: ___________ Time:  ____________        Dahlia Light CNP                      Electronically signed by VIDA Arreaga CNP on 10/5/2022 at 10:01 AM

## 2022-10-05 NOTE — PLAN OF CARE
Problem: Discharge Planning  Goal: Discharge to home or other facility with appropriate resources  Outcome: Progressing     Problem: Wound:  Goal: Will show signs of wound healing; wound closure and no evidence of infection  Description: Will show signs of wound healing; wound closure and no evidence of infection  Outcome: Progressing     Problem: Venous:  Goal: Signs of wound healing will improve  Description: Signs of wound healing will improve  Outcome: Progressing     Problem: Compression therapy:  Goal: Will be free from complications associated with compression therapy  Description: Will be free from complications associated with compression therapy  Outcome: Progressing     Problem: Falls - Risk of:  Goal: Will remain free from falls  Description: Will remain free from falls  Outcome: Progressing

## 2022-10-12 ENCOUNTER — HOSPITAL ENCOUNTER (OUTPATIENT)
Dept: WOUND CARE | Age: 77
Discharge: HOME | End: 2022-10-12
Payer: MEDICARE

## 2022-10-12 VITALS
TEMPERATURE: 97 F | RESPIRATION RATE: 18 BRPM | SYSTOLIC BLOOD PRESSURE: 150 MMHG | DIASTOLIC BLOOD PRESSURE: 91 MMHG | HEART RATE: 89 BPM

## 2022-10-12 DIAGNOSIS — M79.89 LEG SWELLING: Primary | ICD-10-CM

## 2022-10-12 DIAGNOSIS — I87.2 PERIPHERAL VENOUS INSUFFICIENCY: ICD-10-CM

## 2022-10-12 DIAGNOSIS — L97.912 SKIN ULCER OF RIGHT LOWER LEG WITH FAT LAYER EXPOSED (HCC): ICD-10-CM

## 2022-10-12 PROCEDURE — 11042 DBRDMT SUBQ TIS 1ST 20SQCM/<: CPT | Performed by: NURSE PRACTITIONER

## 2022-10-12 PROCEDURE — 11042 DBRDMT SUBQ TIS 1ST 20SQCM/<: CPT

## 2022-10-12 RX ORDER — LIDOCAINE HYDROCHLORIDE 20 MG/ML
JELLY TOPICAL ONCE
Status: CANCELLED | OUTPATIENT
Start: 2022-10-12 | End: 2022-10-12

## 2022-10-12 RX ORDER — BETAMETHASONE DIPROPIONATE 0.05 %
OINTMENT (GRAM) TOPICAL ONCE
Status: CANCELLED | OUTPATIENT
Start: 2022-10-12 | End: 2022-10-12

## 2022-10-12 RX ORDER — GINSENG 100 MG
CAPSULE ORAL ONCE
Status: CANCELLED | OUTPATIENT
Start: 2022-10-12 | End: 2022-10-12

## 2022-10-12 RX ORDER — GENTAMICIN SULFATE 1 MG/G
OINTMENT TOPICAL ONCE
Status: CANCELLED | OUTPATIENT
Start: 2022-10-12 | End: 2022-10-12

## 2022-10-12 RX ORDER — LIDOCAINE HYDROCHLORIDE 40 MG/ML
SOLUTION TOPICAL ONCE
Status: COMPLETED | OUTPATIENT
Start: 2022-10-12 | End: 2022-10-12

## 2022-10-12 RX ORDER — LIDOCAINE HYDROCHLORIDE 40 MG/ML
SOLUTION TOPICAL ONCE
Status: CANCELLED | OUTPATIENT
Start: 2022-10-12 | End: 2022-10-12

## 2022-10-12 RX ORDER — BACITRACIN ZINC AND POLYMYXIN B SULFATE 500; 1000 [USP'U]/G; [USP'U]/G
OINTMENT TOPICAL ONCE
Status: CANCELLED | OUTPATIENT
Start: 2022-10-12 | End: 2022-10-12

## 2022-10-12 RX ORDER — LIDOCAINE 50 MG/G
OINTMENT TOPICAL ONCE
Status: CANCELLED | OUTPATIENT
Start: 2022-10-12 | End: 2022-10-12

## 2022-10-12 RX ORDER — CLOBETASOL PROPIONATE 0.5 MG/G
OINTMENT TOPICAL ONCE
Status: CANCELLED | OUTPATIENT
Start: 2022-10-12 | End: 2022-10-12

## 2022-10-12 RX ORDER — BACITRACIN, NEOMYCIN, POLYMYXIN B 400; 3.5; 5 [USP'U]/G; MG/G; [USP'U]/G
OINTMENT TOPICAL ONCE
Status: CANCELLED | OUTPATIENT
Start: 2022-10-12 | End: 2022-10-12

## 2022-10-12 RX ORDER — LIDOCAINE 40 MG/G
CREAM TOPICAL ONCE
Status: CANCELLED | OUTPATIENT
Start: 2022-10-12 | End: 2022-10-12

## 2022-10-12 RX ADMIN — LIDOCAINE HYDROCHLORIDE: 40 SOLUTION TOPICAL at 10:09

## 2022-10-12 NOTE — DISCHARGE INSTRUCTIONS
[address] and Hyperbaric Oxygen Therapy   Physician Orders and Discharge Instructions  [address]   [address], [place]  Telephone: [phone]     NAME:  Margarita Sanchez OF BIRTH:  1945  MEDICAL RECORD NUMBER:  [id number]  DATE:  10/12/2022     Wound Cleansing:   Do not scrub or use excessive force. Cleanse wound prior to applying a clean dressing with:  [x] Normal Saline  [] Keep Wound Dry in Shower    [] Wound Cleanser   [] Cleanse wound with Mild Soap & Water  [] May Shower at Discharge   [] Other:   [address]     Topical Treatments:  Do not apply lotions, creams, or ointments to wound bed unless directed. [x] Apply moisturizing lotion to skin surrounding the wound prior to dressing change.  [] Apply antifungal ointment to skin surrounding the wound prior to dressing change.  [] Apply thin film of moisture barrier ointment to skin immediately around wound. [] Other:                 Dressings:                Wound Location RIGHT LOWER LEG       [x] Apply Primary Dressing:                                             [] MediHoney Gel      [] Alginate with Silver [] Alginate             [] Collagen     [x] Collagen with Silver   [] Santyl with Moisten saline gauze               [] Hydrocolloid            [] MediHoney Alginate        [] Foam with Silver             [] Foam   [] Hydrofera Blue            [] Mepilex Border                    [x] Moisten with Saline      [] Hydrogel     [] Mepitel                     [] Bactroban/Mupirocin         [] Polysporin              [] Other:       [x] Cover and Secure with:                        [x] Gauze       [] Filiberto          [x] Roll gauze             [] Ace Wrap    [] Cover Roll Tape     [] ABD                         [] Other:              Avoid contact of tape with skin.   [x] Change dressing:          [] Daily          [x] Every Other Day [] Three times per week             [] Once a week         [] Do Not Change Dressing     [] Other:        Edema Control:  Apply:  [] Compression Stocking       []Right Leg     []Left Leg             [] Tubigrip      []Right Leg Double Layer      []Left Leg Double Layer                                              []Right Leg Single Layer       []Left Leg Single Layer             [x] SpandaGrip         [x]Right Leg   []Left Leg                                   []Low compression 5-10 mm/Hg                                            [x]Medium compression 10-20 mm/Hg                                   []High compression  20-30 mm/Hg             every morning immediately when getting up should be applied to affected leg(s) from mid foot to knee making sure to cover the heel. Remove every night before going to bed. [x] Elevate leg(s) above the level of the heart when sitting. [] Avoid prolonged standing in one place. [] Elevate arm/hand above the level of the heart    []RightArm     []LeftArm            Compression:  Apply:  [] Multilayer Compression Wrap Applied in Clinic     []RightLeg      []Left Leg             [] Multi-layer compression. Do not get leg(s) with wrap wet. If wraps become too tight call the center or completely remove the wrap. [] Elevate leg(s) above the level of the heart when sitting. [] Avoid prolonged standing in one place. [x] Diet as tolerated:     [x] Calorie Diabetic Diet:          [] No Added Salt:  [] Increase Protein:     [] Other:            Activity:  [x] Activity as tolerated:  [] Patient has no activity restrictions     [] Strict Bedrest:         [] Remain off Work:     [] May return to full duty work: If you are still having pain after you go home:  [] Elevate the affected limb.     [] Use over-the-counter medications you would normally use for pain as permitted by your family doctor. [] For persistent pain not relieved by the above interventions, please call your family doctor. Return Appointment:  [] Wound and dressing supply provider:   [] ECF or Home Healthcare:  [] Wound Assessment:         [] Physician or NP scheduled for Wound Assessment:   [x] Return Appointment: With Edyta Boswell  in  1 Week(s)  [] Ordered tests:      Nurse Case Manger:  [address] [place] [address] Information: Should you experience any significant changes in your wound(s) or have questions about your wound care, please contact the [address] [place] [address] at [address] 8:00 am - 4:30 pm and Friday 8:00 am - 12:30 pm.  If you need help with your wound outside these hours and cannot wait until we are again available, contact your PCP or go to the hospital emergency room. PLEASE NOTE: IF YOU ARE UNABLE TO OBTAIN WOUND SUPPLIES, CONTINUE TO USE THE SUPPLIES YOU HAVE AVAILABLE UNTIL YOU ARE ABLE TO REACH US. IT IS MOST IMPORTANT TO KEEP THE WOUND COVERED AT ALL TIMES.      Physician Signature:_______________________     Date: ___________ Time:  ____________                              Rizwan Sanchez CNP

## 2022-10-12 NOTE — PROGRESS NOTES
[address]   Progress Note and Procedure Note      [address] RECORD NUMBER:  [id number]  AGE: 78 y.o. GENDER: female  : 1945  EPISODE DATE:  10/12/2022    Subjective:     Chief Complaint   Patient presents with    Wound Check     Follow Up on Right Lower Leg         HISTORY of PRESENT ILLNESS HPI   Maegan Kelly is a 78 y.o. female who presents today for wound/ulcer evaluation. History of Wound Context: 2 weeks ago right lateral leg wounds noted by pt, no excessive drainage, pain or redness noted. Pt has been a pt here in past and wanted to have her wounds seen soon. She denies constitutional issues, and has been covering wounds at home. Denies itching, or pain. States not using compression but \"once in [place], when needs them\". Does not have any difficulty getting compression on. Wound/Ulcer Pain Timing/Severity:  Quality of pain: dull, tender  Severity:  1 / 10   Modifying Factors: None  Associated Signs/Symptoms: edema     Ulcer Identification:  Ulcer Type: venous     Contributing Factors: edema, venous stasis, and obesity     Acute Wound: Other: venous wounds.     PAST MEDICAL HISTORY        Diagnosis Date    Asthma     Localized edema 2021    Non-pressure chronic ulcer of right lower leg with fat layer exposed ([address] 2021    Peripheral venous insufficiency 2021    Right foot ulcer, limited to breakdown of skin ([address] 2021    Skin ulcer of right lower leg with fat layer exposed ([address] 2021       PAST SURGICAL HISTORY    Past Surgical History:   Procedure Laterality Date    APPENDECTOMY      CHOLECYSTECTOMY         FAMILY HISTORY    Family History   Problem Relation Age of Onset    Migraines Mother        SOCIAL HISTORY    Social History     Tobacco Use    Smoking status: Never    Smokeless tobacco: Never   Substance Use Topics    Drug use: Never       ALLERGIES    Allergies   Allergen Reactions    Iodides Other (See Comments) IVP dye, pt does not remember he reaction, just that the nurses stated to not let anyone give to her ever again    Furosemide      Pt. States it makes her \"ditzy\", dizzy, and gives her muscle ridgity and nausea. MEDICATIONS    Current Outpatient Medications on File Prior to Encounter   Medication Sig Dispense Refill    clotrimazole-betamethasone (LOTRISONE) 1-0.05 % cream Apply topically 2 times daily. 45 g 3    triamterene-hydroCHLOROthiazide (MAXZIDE-25) 37.5-25 MG per tablet TAKE 1 TABLET BY MOUTH DAILY 90 tablet 1    metOLazone (ZAROXOLYN) 5 MG tablet TAKE ONE TABLET BY MOUTH DAILY AS NEEDED FOR SWELLING 30 tablet 1    albuterol sulfate HFA (PROAIR HFA) 108 (90 Base) MCG/ACT inhaler Inhale 2 puffs into the lungs every 6 hours as needed for Wheezing 1 Inhaler 0    Nebulizers (AIRIAL COMPACT MINI NEBULIZER) MISC 1 nebule by Does not apply route 4 times daily 1 each 0     No current facility-administered medications on file prior to encounter. REVIEW OF SYSTEMS  Review of Systems    Pertinent items are noted in HPI.     Objective:      BP (!) 150/91   Pulse 89   Temp 97 °F (36.1 °C) (Temporal)   Resp 18     Wt Readings from Last 3 Encounters:   10/05/22 216 lb 11.4 oz (98.3 kg)   09/28/22 217 lb (98.4 kg)   08/18/22 223 lb (101.2 kg)       PHYSICAL EXAM  Physical Exam    General Appearance: alert and oriented to person, place and time, well-developed and well-nourished, in no acute distress and obese  Skin: warm and dry, no rash or erythema  Head: normocephalic and atraumatic  Eyes: pupils equal, round, and reactive to light  Pulmonary/Chest: normal air movement, no respiratory distress  Extremities: no cyanosis, no clubbing, and no edema      Assessment:        Problem List Items Addressed This Visit       Skin ulcer of right lower leg with fat layer exposed [facility])    Relevant Orders    Initiate Outpatient Wound Care Protocol    Peripheral venous insufficiency    Relevant Orders    Initiate Outpatient Wound Care Protocol    Leg swelling - Primary    Relevant Orders    Initiate Outpatient Wound Care Protocol        Procedure Note  Indications:  Based on my examination of this patient's wound(s)/ulcer(s) today, debridement is required to promote healing and evaluate the wound base. Performed by: VIDA Alanis CNP    Consent obtained:  Yes    Time out taken:  Yes    Pain Control: Anesthetic  Anesthetic: 4% Lidocaine Liquid Topical       Debridement: Excisional Debridement    Using curette the wound(s)/ulcer(s) was/were debrided down through and including the removal of epidermis, dermis, and subcutaneous tissue. Devitalized Tissue Debrided:  fibrin, biofilm, and slough    Pre Debridement Measurements:  Are located in the [facility]  Documentation Flow Sheet    Diabetic/Pressure/Non Pressure Ulcers only:  Ulcer: Non-Pressure ulcer, fat layer exposed     Wound/Ulcer #: 1 and 2    Post Debridement Measurements:  Wound/Ulcer Descriptions are Pre Debridement except measurements:    Wound 10/05/22 Leg Right;Lateral;Proximal;Lower #1 Noted 9/28/22 (Active)   Wound Image   10/05/22 0913   Wound Etiology Venous 10/05/22 0913   Dressing Status Old drainage noted 10/12/22 1006   Dressing/Treatment Collagen with Ag;Moisten with saline; Roll gauze;Gauze dressing/dressing sponge 10/12/22 1234   Wound Length (cm) 1 cm 10/12/22 1006   Wound Width (cm) 0.5 cm 10/12/22 1006   Wound Depth (cm) 0.2 cm 10/12/22 1006   Wound Surface Area (cm^2) 0.5 cm^2 10/12/22 1006   Change in Wound Size % (l*w) -11.11 10/12/22 1006   Wound Volume (cm^3) 0.1 cm^3 10/12/22 1006   Wound Healing % -122 10/12/22 1006   Post-Procedure Length (cm) 1.1 cm 10/12/22 1015   Post-Procedure Width (cm) 0.6 cm 10/12/22 1015   Post-Procedure Depth (cm) 0.3 cm 10/12/22 1015   Post-Procedure Surface Area (cm^2) 0.66 cm^2 10/12/22 1015   Post-Procedure Volume (cm^3) 0.198 cm^3 10/12/22 1015   Wound Assessment Granulation tissue;Slough 10/12/22 1006   Drainage Amount Scant 10/12/22 1006   Drainage Description Serosanguinous 10/12/22 1006   Odor None 10/12/22 1006   Consuelo-wound Assessment Dry/flaky 10/12/22 1006   Margins Attached edges 10/12/22 1006   Wound Thickness Description not for Pressure Injury Full thickness 10/12/22 1006   Number of days: 7       Wound 10/05/22 Leg Right;Distal;Lower #2 Noted 9/28/22 (Active)   Wound Image   10/05/22 0913   Wound Etiology Venous 10/05/22 0913   Dressing Status Dry 10/05/22 0913   Dressing/Treatment Moisten with saline;Collagen with Ag;Gauze dressing/dressing sponge;Roll gauze 10/12/22 1234   Wound Length (cm) 0.7 cm 10/12/22 1006   Wound Width (cm) 0.3 cm 10/12/22 1006   Wound Depth (cm) 0.1 cm 10/12/22 1006   Wound Surface Area (cm^2) 0.21 cm^2 10/12/22 1006   Change in Wound Size % (l*w) 98.67 10/12/22 1006   Wound Volume (cm^3) 0.021 cm^3 10/12/22 1006   Wound Healing % 99 10/12/22 1006   Post-Procedure Length (cm) 0.8 cm 10/12/22 1015   Post-Procedure Width (cm) 0.4 cm 10/12/22 1015   Post-Procedure Depth (cm) 0.2 cm 10/12/22 1015   Post-Procedure Surface Area (cm^2) 0.32 cm^2 10/12/22 1015   Post-Procedure Volume (cm^3) 0.064 cm^3 10/12/22 1015   Wound Assessment Dry;Eschar dry 10/12/22 1006   Drainage Amount None 10/12/22 1006   Odor None 10/12/22 1006   Consuelo-wound Assessment Dry/flaky 10/12/22 1006   Margins Undefined edges 10/12/22 1006   Wound Thickness Description not for Pressure Injury Full thickness 10/12/22 1006   Number of days: 7          Total Surface Area Debrided:  0.98 sq cm     Estimated Blood Loss:  Minimal    Hemostasis Achieved:  not needed    Procedural Pain:  3  / 10     Post Procedural Pain:  2 / 10     Response to treatment:  Well tolerated by patient. Plan:   Pt education per provider related to improvement in wounds since last week. Pt is agreeable to continue same plan of care and questions answered.   Treatment Note please see attached Discharge Instructions    Written patient dismissal instructions given to patient and signed by patient or POA. Discharge Instructions         [address] and [facility] Oxygen Therapy   Physician Orders and Discharge Instructions  [facility]   [address], [address]  Telephone: [phone]     NAME:  Lucrecia Hamman OF BIRTH:  1945  MEDICAL RECORD NUMBER:  [id number]  DATE:  10/12/2022     Wound Cleansing:   Do not scrub or use excessive force. Cleanse wound prior to applying a clean dressing with:  [x] Normal Saline  [] Keep Wound Dry in Shower    [] Wound Cleanser   [] Cleanse wound with Mild Soap & Water  [] May Shower at Discharge   [] Other:   [address]     Topical Treatments:  Do not apply lotions, creams, or ointments to wound bed unless directed. [x] Apply moisturizing lotion to skin surrounding the wound prior to dressing change.  [] Apply antifungal ointment to skin surrounding the wound prior to dressing change.  [] Apply thin film of moisture barrier ointment to skin immediately around wound.   [] Other:                 Dressings:                Wound Location RIGHT LOWER LEG       [x] Apply Primary Dressing:                                             [] MediHoney Gel      [] Alginate with Silver [] Alginate             [] Collagen     [x] Collagen with Silver   [] Santyl with Moisten saline gauze               [] Hydrocolloid            [] MediHoney Alginate        [] Foam with Silver             [] Foam   [] Hydrofera Blue            [] Mepilex Border                    [x] Moisten with Saline      [] Hydrogel     [] Mepitel                     [] Bactroban/Mupirocin         [] Polysporin              [] Other:       [x] Cover and Secure with:                        [x] Gauze       [] Filiberto          [x] Roll gauze             [] Ace Wrap    [] Cover Roll Tape     [] ABD [] Other:              Avoid contact of tape with skin. [x] Change dressing:          [] Daily          [x] Every Other Day [] Three times per week             [] Once a week         [] Do Not Change Dressing     [] Other:        Edema Control:  Apply:  [] Compression Stocking       []Right Leg     []Left Leg             [] Tubigrip      []Right Leg Double Layer      []Left Leg Double Layer                                              []Right Leg Single Layer       []Left Leg Single Layer             [x] SpandaGrip         [x]Right Leg   []Left Leg                                   []Low compression 5-10 mm/Hg                                            [x]Medium compression 10-20 mm/Hg                                   []High compression  20-30 mm/Hg             every morning immediately when getting up should be applied to affected leg(s) from mid foot to knee making sure to cover the heel. Remove every night before going to bed. [x] Elevate leg(s) above the level of the heart when sitting. [] Avoid prolonged standing in one place. [] Elevate arm/hand above the level of the heart    []RightArm     []LeftArm            Compression:  Apply:  [] Multilayer Compression Wrap Applied in Clinic     []RightLeg      []Left Leg             [] Multi-layer compression. Do not get leg(s) with wrap wet. If wraps become too tight call the center or completely remove the wrap. [] Elevate leg(s) above the level of the heart when sitting. [] Avoid prolonged standing in one place. [x] Diet as tolerated:     [x] Calorie Diabetic Diet:          [] No Added Salt:  [] Increase Protein:     [] Other:            Activity:  [x] Activity as tolerated:  [] Patient has no activity restrictions     [] Strict Bedrest:         [] Remain off Work:     [] May return to full duty work:                                         If you are still having pain after you go home:  [] Elevate the affected limb. [] Use over-the-counter medications you would normally use for pain as permitted by your family doctor. [] For persistent pain not relieved by the above interventions, please call your family doctor. Return Appointment:  [] Wound and dressing supply provider:   [] ECF or Home Healthcare:  [] Wound Assessment:         [] Physician or NP scheduled for Wound Assessment:   [x] Return Appointment: With Pepito Munguia  in  1 Week(s)  [] Ordered tests:      Nurse Case Manger:  [address] [place] [address] Information: Should you experience any significant changes in your wound(s) or have questions about your wound care, please contact the [address] [place] [address] at [address] 8:00 am - 4:30 pm and Friday 8:00 am - 12:30 pm.  If you need help with your wound outside these hours and cannot wait until we are again available, contact your PCP or go to the hospital emergency room. PLEASE NOTE: IF YOU ARE UNABLE TO OBTAIN WOUND SUPPLIES, CONTINUE TO USE THE SUPPLIES YOU HAVE AVAILABLE UNTIL YOU ARE ABLE TO REACH US. IT IS MOST IMPORTANT TO KEEP THE WOUND COVERED AT ALL TIMES.      Physician Signature:_______________________     Date: ___________ Time:  ____________                              Henri Mancilla CNP                                 Electronically signed by VIDA Peoples CNP on 10/12/2022 at 3:12 PM

## 2022-10-17 NOTE — DISCHARGE INSTRUCTIONS
[address] and [facility] Therapy   Physician Orders and Discharge Instructions  [address] [address]  Telephone: [phone]     NAME:  Devonte Smith OF BIRTH:  1945  MEDICAL RECORD NUMBER:  [id number]  DATE:  10/19/2022     Wound Cleansing:   Do not scrub or use excessive force. Cleanse wound prior to applying a clean dressing with:  [x] Normal Saline  [] Keep Wound Dry in Shower    [] Wound Cleanser   [] Cleanse wound with Mild Soap & Water  [] May Shower at Discharge   [] Other:   [address]     Topical Treatments:  Do not apply lotions, creams, or ointments to wound bed unless directed. [x] Apply moisturizing lotion to skin surrounding the wound prior to dressing change.  [] Apply antifungal ointment to skin surrounding the wound prior to dressing change.  [] Apply thin film of moisture barrier ointment to skin immediately around wound. [] Other:                 Dressings:                Wound Location RIGHT LOWER LEG     X2  [x] Apply Primary Dressing:                                             [] MediHoney Gel      [] Alginate with Silver [] Alginate             [] Collagen     [x] Collagen with Silver   [] Santyl with Moisten saline gauze               [] Hydrocolloid            [] MediHoney Alginate        [] Foam with Silver             [] Foam   [] Hydrofera Blue            [] Mepilex Border                    [x] Moisten with Saline      [] Hydrogel     [] Mepitel                     [] Bactroban/Mupirocin         [] Polysporin              [] Other:       [x] Cover and Secure with:                        [x] Gauze       [] Filiberto          [x] Roll gauze             [] Ace Wrap    [] Cover Roll Tape     [] ABD                         [] Other:              Avoid contact of tape with skin.   [x] Change dressing:          [] Daily          [x] Every Other Day [] Three times per week             [] Once a week         [] Do Not Change Dressing     [] Other:        Edema Control:  Apply:  [] Compression Stocking       []Right Leg     []Left Leg             [] Tubigrip      []Right Leg Double Layer      []Left Leg Double Layer                                              []Right Leg Single Layer       []Left Leg Single Layer             [x] SpandaGrip         [x]Right Leg   []Left Leg                                   []Low compression 5-10 mm/Hg                                            [x]Medium compression 10-20 mm/Hg                                   []High compression  20-30 mm/Hg             every morning immediately when getting up should be applied to affected leg(s) from mid foot to knee making sure to cover the heel. Remove every night before going to bed. [x] Elevate leg(s) above the level of the heart when sitting. [] Avoid prolonged standing in one place. [] Elevate arm/hand above the level of the heart    []RightArm     []LeftArm            Compression:  Apply:  [] Multilayer Compression Wrap Applied in Clinic     []RightLeg      []Left Leg             [] Multi-layer compression. Do not get leg(s) with wrap wet. If wraps become too tight call the center or completely remove the wrap. [] Elevate leg(s) above the level of the heart when sitting. [] Avoid prolonged standing in one place. [x] Diet as tolerated:     [x] Calorie Diabetic Diet:          [] No Added Salt:  [] Increase Protein:     [] Other:            Activity:  [x] Activity as tolerated:  [] Patient has no activity restrictions     [] Strict Bedrest:         [] Remain off Work:     [] May return to full duty work: If you are still having pain after you go home:  [] Elevate the affected limb.     [] Use over-the-counter medications you would normally use for pain as permitted by your family doctor. [] For persistent pain not relieved by the above interventions, please call your family doctor. Return Appointment:  [] Wound and dressing supply provider:   [] ECF or Home Healthcare:  [] Wound Assessment:         [] Physician or NP scheduled for Wound Assessment:   [x] Return Appointment: With Fabrice Vazquez  in  1 Week(s)  [] Ordered tests:      Nurse Case Manger:  Therese      Electronically signed by Shoshana Small RN on 10/19/2022 at 10:36 AM     [address]: Should you experience any significant changes in your wound(s) or have questions about your wound care, please contact the [address] [place] [address] at [address] 8:00 am - 4:30 pm and Friday 8:00 am - 12:30 pm.  If you need help with your wound outside these hours and cannot wait until we are again available, contact your PCP or go to the hospital emergency room. PLEASE NOTE: IF YOU ARE UNABLE TO OBTAIN WOUND SUPPLIES, CONTINUE TO USE THE SUPPLIES YOU HAVE AVAILABLE UNTIL YOU ARE ABLE TO REACH US. IT IS MOST IMPORTANT TO KEEP THE WOUND COVERED AT ALL TIMES.      Physician Signature:_______________________     Date: ___________ Time:  ____________                              Dieter Palencia CNP

## 2022-10-19 ENCOUNTER — HOSPITAL ENCOUNTER (OUTPATIENT)
Dept: WOUND CARE | Age: 77
Discharge: HOME | End: 2022-10-19
Payer: MEDICARE

## 2022-10-19 VITALS
SYSTOLIC BLOOD PRESSURE: 177 MMHG | HEART RATE: 84 BPM | TEMPERATURE: 96.8 F | DIASTOLIC BLOOD PRESSURE: 89 MMHG | RESPIRATION RATE: 20 BRPM

## 2022-10-19 DIAGNOSIS — M79.89 LEG SWELLING: Primary | ICD-10-CM

## 2022-10-19 DIAGNOSIS — I87.2 PERIPHERAL VENOUS INSUFFICIENCY: ICD-10-CM

## 2022-10-19 DIAGNOSIS — L97.912 SKIN ULCER OF RIGHT LOWER LEG WITH FAT LAYER EXPOSED (HCC): ICD-10-CM

## 2022-10-19 PROCEDURE — 11042 DBRDMT SUBQ TIS 1ST 20SQCM/<: CPT

## 2022-10-19 PROCEDURE — 11042 DBRDMT SUBQ TIS 1ST 20SQCM/<: CPT | Performed by: NURSE PRACTITIONER

## 2022-10-19 RX ORDER — LIDOCAINE 50 MG/G
OINTMENT TOPICAL ONCE
Status: CANCELLED | OUTPATIENT
Start: 2022-10-19 | End: 2022-10-19

## 2022-10-19 RX ORDER — LIDOCAINE HYDROCHLORIDE 40 MG/ML
SOLUTION TOPICAL ONCE
Status: CANCELLED | OUTPATIENT
Start: 2022-10-19 | End: 2022-10-19

## 2022-10-19 RX ORDER — LIDOCAINE HYDROCHLORIDE 20 MG/ML
JELLY TOPICAL ONCE
Status: CANCELLED | OUTPATIENT
Start: 2022-10-19 | End: 2022-10-19

## 2022-10-19 RX ORDER — BACITRACIN ZINC AND POLYMYXIN B SULFATE 500; 1000 [USP'U]/G; [USP'U]/G
OINTMENT TOPICAL ONCE
Status: CANCELLED | OUTPATIENT
Start: 2022-10-19 | End: 2022-10-19

## 2022-10-19 RX ORDER — LIDOCAINE HYDROCHLORIDE 40 MG/ML
SOLUTION TOPICAL ONCE
Status: COMPLETED | OUTPATIENT
Start: 2022-10-19 | End: 2022-10-19

## 2022-10-19 RX ORDER — CLOBETASOL PROPIONATE 0.5 MG/G
OINTMENT TOPICAL ONCE
Status: CANCELLED | OUTPATIENT
Start: 2022-10-19 | End: 2022-10-19

## 2022-10-19 RX ORDER — GENTAMICIN SULFATE 1 MG/G
OINTMENT TOPICAL ONCE
Status: CANCELLED | OUTPATIENT
Start: 2022-10-19 | End: 2022-10-19

## 2022-10-19 RX ORDER — BETAMETHASONE DIPROPIONATE 0.05 %
OINTMENT (GRAM) TOPICAL ONCE
Status: CANCELLED | OUTPATIENT
Start: 2022-10-19 | End: 2022-10-19

## 2022-10-19 RX ORDER — BACITRACIN, NEOMYCIN, POLYMYXIN B 400; 3.5; 5 [USP'U]/G; MG/G; [USP'U]/G
OINTMENT TOPICAL ONCE
Status: CANCELLED | OUTPATIENT
Start: 2022-10-19 | End: 2022-10-19

## 2022-10-19 RX ORDER — LIDOCAINE 40 MG/G
CREAM TOPICAL ONCE
Status: CANCELLED | OUTPATIENT
Start: 2022-10-19 | End: 2022-10-19

## 2022-10-19 RX ORDER — GINSENG 100 MG
CAPSULE ORAL ONCE
Status: CANCELLED | OUTPATIENT
Start: 2022-10-19 | End: 2022-10-19

## 2022-10-19 RX ADMIN — LIDOCAINE HYDROCHLORIDE 2.5 ML: 40 SOLUTION TOPICAL at 09:57

## 2022-10-19 ASSESSMENT — PAIN SCALES - GENERAL
PAINLEVEL_OUTOF10: 0
PAINLEVEL_OUTOF10: 0

## 2022-10-20 NOTE — PROGRESS NOTES
[address]   Progress Note and Procedure Note      [address] RECORD NUMBER:  [id number]  AGE: 78 y.o. GENDER: female  : 1945  EPISODE DATE:  10/19/2022    Subjective:     Chief Complaint   Patient presents with    Wound Check     F/u visit - right leg wounds         HISTORY of PRESENT ILLNESS RAQUEL Lai is a 78 y.o. female who presents today for wound/ulcer evaluation. History of Wound Context: 3 weeks ago right lateral leg wounds noted by pt, no excessive drainage, pain or redness noted. She denies constitutional issues, and has been covering wounds at home. Denies itching, or pain. States not using compression but \"once in [employer], when needs them\". Does not have any difficulty getting compression on or tolerating them. Wound/Ulcer Pain Timing/Severity:  Quality of pain: dull, tender  Severity:   10   Modifying Factors: None  Associated Signs/Symptoms: edema     Ulcer Identification:  Ulcer Type: venous     Contributing Factors: edema, venous stasis, and obesity     Acute Wound: Other: venous wounds.     PAST MEDICAL HISTORY        Diagnosis Date    Asthma     Localized edema 2021    Non-pressure chronic ulcer of right lower leg with fat layer exposed ([address] 2021    Peripheral venous insufficiency 2021    Right foot ulcer, limited to breakdown of skin ([address] 2021    Skin ulcer of right lower leg with fat layer exposed ([address] 2021       PAST SURGICAL HISTORY    Past Surgical History:   Procedure Laterality Date    APPENDECTOMY      CHOLECYSTECTOMY         FAMILY HISTORY    Family History   Problem Relation Age of Onset    Migraines Mother        SOCIAL HISTORY    Social History     Tobacco Use    Smoking status: Never    Smokeless tobacco: Never   Substance Use Topics    Drug use: Never       ALLERGIES    Allergies   Allergen Reactions    Iodides Other (See Comments)     IVP dye, pt does not remember he reaction, just that the nurses stated to not let anyone give to her ever again    Furosemide      Pt. States it makes her \"ditzy\", dizzy, and gives her muscle ridgity and nausea. MEDICATIONS    Current Outpatient Medications on File Prior to Encounter   Medication Sig Dispense Refill    clotrimazole-betamethasone (LOTRISONE) 1-0.05 % cream Apply topically 2 times daily. 45 g 3    triamterene-hydroCHLOROthiazide (MAXZIDE-25) 37.5-25 MG per tablet TAKE 1 TABLET BY MOUTH DAILY 90 tablet 1    metOLazone (ZAROXOLYN) 5 MG tablet TAKE ONE TABLET BY MOUTH DAILY AS NEEDED FOR SWELLING 30 tablet 1    albuterol sulfate HFA (PROAIR HFA) 108 (90 Base) MCG/ACT inhaler Inhale 2 puffs into the lungs every 6 hours as needed for Wheezing 1 Inhaler 0    Nebulizers (AIRIAL COMPACT MINI NEBULIZER) MISC 1 nebule by Does not apply route 4 times daily 1 each 0     No current facility-administered medications on file prior to encounter. REVIEW OF SYSTEMS  Review of Systems    Pertinent items are noted in HPI.     Objective:      BP (!) 177/89   Pulse 84   Temp 96.8 °F (36 °C) (Temporal)   Resp 20     Wt Readings from Last 3 Encounters:   10/05/22 216 lb 11.4 oz (98.3 kg)   09/28/22 217 lb (98.4 kg)   08/18/22 223 lb (101.2 kg)       PHYSICAL EXAM  Physical Exam    General Appearance: alert and oriented to person, place and time, well-developed and well-nourished, in no acute distress, obese, and pale  Skin: warm and dry  Head: normocephalic and atraumatic  Eyes: pupils equal, round, and reactive to light  Pulmonary/Chest:  normal air movement, no respiratory distress  Cardiovascular: normal rate and regular rhythm  Extremities: no cyanosis, no clubbing, and 1 + edema-  bilateral lower legs      Assessment:        Problem List Items Addressed This Visit       Skin ulcer of right lower leg with fat layer exposed ([address]    Peripheral venous insufficiency    Leg swelling - Primary        Procedure Note  Indications:  Based on my examination of this patient's wound(s)/ulcer(s) today, debridement is required to promote healing and evaluate the wound base. Performed by: VIDA Booth CNP    Consent obtained:  Yes    Time out taken:  Yes    Pain Control: Anesthetic  Anesthetic: 4% Lidocaine Liquid Topical (2.5ml)       Debridement: Excisional Debridement    Using curette the wound(s)/ulcer(s) was/were debrided down through and including the removal of epidermis, dermis, and subcutaneous tissue. Devitalized Tissue Debrided:  fibrin, biofilm, and slough    Pre Debridement Measurements:  Are located in the [place]  Documentation Flow Sheet    Diabetic/Pressure/Non Pressure Ulcers only:  Ulcer: Non-Pressure ulcer, fat layer exposed     Wound/Ulcer #: 1 and 2    Post Debridement Measurements:  Wound/Ulcer Descriptions are Pre Debridement except measurements:    Wound 10/05/22 Leg Right;Lateral;Proximal;Lower #1 Noted 9/28/22 (Active)   Wound Image   10/05/22 0913   Wound Etiology Venous 10/05/22 0913   Dressing Status Old drainage noted 10/12/22 1006   Dressing/Treatment Collagen with Ag;Moisten with saline; Roll gauze;Gauze dressing/dressing sponge 10/19/22 1501   Wound Length (cm) 1.2 cm 10/19/22 0949   Wound Width (cm) 0.5 cm 10/19/22 0949   Wound Depth (cm) 0.1 cm 10/19/22 0949   Wound Surface Area (cm^2) 0.6 cm^2 10/19/22 0949   Change in Wound Size % (l*w) -33.33 10/19/22 0949   Wound Volume (cm^3) 0.06 cm^3 10/19/22 0949   Wound Healing % -33 10/19/22 0949   Post-Procedure Length (cm) 1.3 cm 10/19/22 1040   Post-Procedure Width (cm) 0.6 cm 10/19/22 1040   Post-Procedure Depth (cm) 0.2 cm 10/19/22 1040   Post-Procedure Surface Area (cm^2) 0.78 cm^2 10/19/22 1040   Post-Procedure Volume (cm^3) 0.156 cm^3 10/19/22 1040   Wound Assessment Dry;Granulation tissue;Slough 10/19/22 0949   Drainage Amount Small 10/19/22 0949   Drainage Description Serosanguinous 10/19/22 0949   Odor None 10/19/22 0949   Consuelo-wound Assessment Dry/flaky 10/19/22 0949 Margins Attached edges 10/19/22 0949   Wound Thickness Description not for Pressure Injury Full thickness 10/19/22 0949   Number of days: 15       Wound 10/05/22 Leg Right;Distal;Lower #2 Noted 9/28/22 (Active)   Wound Image   10/05/22 0913   Wound Etiology Venous 10/05/22 0913   Dressing Status Dry 10/05/22 0913   Dressing/Treatment Moisten with saline;Collagen with Ag;Gauze dressing/dressing sponge;Roll gauze 10/19/22 1501   Wound Length (cm) 0.4 cm 10/19/22 0949   Wound Width (cm) 0.3 cm 10/19/22 0949   Wound Depth (cm) 0.1 cm 10/19/22 0949   Wound Surface Area (cm^2) 0.12 cm^2 10/19/22 0949   Change in Wound Size % (l*w) 99.24 10/19/22 0949   Wound Volume (cm^3) 0.012 cm^3 10/19/22 0949   Wound Healing % 100 10/19/22 0949   Post-Procedure Length (cm) 0.5 cm 10/19/22 1040   Post-Procedure Width (cm) 0.4 cm 10/19/22 1040   Post-Procedure Depth (cm) 0.2 cm 10/19/22 1040   Post-Procedure Surface Area (cm^2) 0.2 cm^2 10/19/22 1040   Post-Procedure Volume (cm^3) 0.04 cm^3 10/19/22 1040   Wound Assessment Dry;Eschar dry 10/19/22 0949   Drainage Amount None 10/19/22 0949   Odor None 10/19/22 0949   Consuelo-wound Assessment Dry/flaky 10/19/22 0949   Margins Undefined edges 10/19/22 0949   Wound Thickness Description not for Pressure Injury Full thickness 10/19/22 0949   Number of days: 15          Total Surface Area Debrided:  0.98 sq cm     Estimated Blood Loss:  Minimal    Hemostasis Achieved:  not needed    Procedural Pain:  1  / 10     Post Procedural Pain:  0 / 10     Response to treatment:  Well tolerated by patient. Plan:   Pt education per provider related to progress of wounds toward healing and plan of care. She is agreeable to continue same plan of care and questions answered. Treatment Note please see attached Discharge Instructions    Written patient dismissal instructions given to patient and signed by patient or POA.          Discharge Instructions         [address] and [facility] Oxygen Therapy   Physician Orders and Discharge Instructions  [place] [address] [address] [facility] [address] [place] [address], [address]  Telephone: [phone]     NAME:  Kalli Shown OF BIRTH:  1945  MEDICAL RECORD NUMBER:  [id number]  DATE:  10/19/2022     Wound Cleansing:   Do not scrub or use excessive force. Cleanse wound prior to applying a clean dressing with:  [x] Normal Saline  [] Keep Wound Dry in Shower    [] Wound Cleanser   [] Cleanse wound with Mild Soap & Water  [] May Shower at Discharge   [] Other:   [address]     Topical Treatments:  Do not apply lotions, creams, or ointments to wound bed unless directed. [x] Apply moisturizing lotion to skin surrounding the wound prior to dressing change.  [] Apply antifungal ointment to skin surrounding the wound prior to dressing change.  [] Apply thin film of moisture barrier ointment to skin immediately around wound. [] Other:                 Dressings:                Wound Location RIGHT LOWER LEG     X2  [x] Apply Primary Dressing:                                             [] MediHoney Gel      [] Alginate with Silver [] Alginate             [] Collagen     [x] Collagen with Silver   [] Santyl with Moisten saline gauze               [] Hydrocolloid            [] MediHoney Alginate        [] Foam with Silver             [] Foam   [] Hydrofera Blue            [] Mepilex Border                    [x] Moisten with Saline      [] Hydrogel     [] Mepitel                     [] Bactroban/Mupirocin         [] Polysporin              [] Other:       [x] Cover and Secure with:                        [x] Gauze       [] Filiberto          [x] Roll gauze             [] Ace Wrap    [] Cover Roll Tape     [] ABD                         [] Other:              Avoid contact of tape with skin.   [x] Change dressing:          [] Daily          [x] Every Other Day [] Three times per week             [] Once a week         [] Do Not Change Dressing     [] Other:        Edema Control:  Apply:  [] Compression Stocking       []Right Leg     []Left Leg             [] Tubigrip      []Right Leg Double Layer      []Left Leg Double Layer                                              []Right Leg Single Layer       []Left Leg Single Layer             [x] SpandaGrip         [x]Right Leg   []Left Leg                                   []Low compression 5-10 mm/Hg                                            [x]Medium compression 10-20 mm/Hg                                   []High compression  20-30 mm/Hg             every morning immediately when getting up should be applied to affected leg(s) from mid foot to knee making sure to cover the heel. Remove every night before going to bed. [x] Elevate leg(s) above the level of the heart when sitting. [] Avoid prolonged standing in one place. [] Elevate arm/hand above the level of the heart    []RightArm     []LeftArm            Compression:  Apply:  [] Multilayer Compression Wrap Applied in Clinic     []RightLeg      []Left Leg             [] Multi-layer compression. Do not get leg(s) with wrap wet. If wraps become too tight call the center or completely remove the wrap. [] Elevate leg(s) above the level of the heart when sitting. [] Avoid prolonged standing in one place. [x] Diet as tolerated:     [x] Calorie Diabetic Diet:          [] No Added Salt:  [] Increase Protein:     [] Other:            Activity:  [x] Activity as tolerated:  [] Patient has no activity restrictions     [] Strict Bedrest:         [] Remain off Work:     [] May return to full duty work: If you are still having pain after you go home:  [] Elevate the affected limb. [] Use over-the-counter medications you would normally use for pain as permitted by your family doctor.   [] For persistent pain not relieved by the above interventions, please call your family doctor. Return Appointment:  [] Wound and dressing supply provider:   [] ECF or Home Healthcare:  [] Wound Assessment:         [] Physician or NP scheduled for Wound Assessment:   [x] Return Appointment: With Latoya Griffin  in  1 Week(s)  [] Ordered tests:      Nurse Case Manger:  Therese      Electronically signed by Sandra Farah RN on 10/19/2022 at 10:36 AM     [address]: Should you experience any significant changes in your wound(s) or have questions about your wound care, please contact the [address] [place] [address] at [address] 8:00 am - 4:30 pm and Friday 8:00 am - 12:30 pm.  If you need help with your wound outside these hours and cannot wait until we are again available, contact your PCP or go to the hospital emergency room. PLEASE NOTE: IF YOU ARE UNABLE TO OBTAIN WOUND SUPPLIES, CONTINUE TO USE THE SUPPLIES YOU HAVE AVAILABLE UNTIL YOU ARE ABLE TO REACH US. IT IS MOST IMPORTANT TO KEEP THE WOUND COVERED AT ALL TIMES.      Physician Signature:_______________________     Date: ___________ Time:  ____________                              Maximiliano Wang CNP           Electronically signed by VIDA Silva CNP on 10/20/2022 at 10:46 AM

## 2022-10-26 ENCOUNTER — HOSPITAL ENCOUNTER (OUTPATIENT)
Dept: WOUND CARE | Age: 77
Discharge: HOME | End: 2022-10-26
Payer: MEDICARE

## 2022-10-26 VITALS
DIASTOLIC BLOOD PRESSURE: 79 MMHG | RESPIRATION RATE: 18 BRPM | HEART RATE: 82 BPM | TEMPERATURE: 97.2 F | SYSTOLIC BLOOD PRESSURE: 160 MMHG

## 2022-10-26 DIAGNOSIS — I87.2 PERIPHERAL VENOUS INSUFFICIENCY: ICD-10-CM

## 2022-10-26 DIAGNOSIS — M79.89 LEG SWELLING: Primary | ICD-10-CM

## 2022-10-26 DIAGNOSIS — L97.912 SKIN ULCER OF RIGHT LOWER LEG WITH FAT LAYER EXPOSED (HCC): ICD-10-CM

## 2022-10-26 PROCEDURE — 11042 DBRDMT SUBQ TIS 1ST 20SQCM/<: CPT | Performed by: NURSE PRACTITIONER

## 2022-10-26 PROCEDURE — 11042 DBRDMT SUBQ TIS 1ST 20SQCM/<: CPT

## 2022-10-26 RX ORDER — BACITRACIN ZINC AND POLYMYXIN B SULFATE 500; 1000 [USP'U]/G; [USP'U]/G
OINTMENT TOPICAL ONCE
Status: CANCELLED | OUTPATIENT
Start: 2022-10-26 | End: 2022-10-26

## 2022-10-26 RX ORDER — LIDOCAINE 40 MG/G
CREAM TOPICAL ONCE
Status: CANCELLED | OUTPATIENT
Start: 2022-10-26 | End: 2022-10-26

## 2022-10-26 RX ORDER — BETAMETHASONE DIPROPIONATE 0.05 %
OINTMENT (GRAM) TOPICAL ONCE
Status: CANCELLED | OUTPATIENT
Start: 2022-10-26 | End: 2022-10-26

## 2022-10-26 RX ORDER — GENTAMICIN SULFATE 1 MG/G
OINTMENT TOPICAL ONCE
Status: CANCELLED | OUTPATIENT
Start: 2022-10-26 | End: 2022-10-26

## 2022-10-26 RX ORDER — LIDOCAINE HYDROCHLORIDE 20 MG/ML
JELLY TOPICAL ONCE
Status: CANCELLED | OUTPATIENT
Start: 2022-10-26 | End: 2022-10-26

## 2022-10-26 RX ORDER — BACITRACIN, NEOMYCIN, POLYMYXIN B 400; 3.5; 5 [USP'U]/G; MG/G; [USP'U]/G
OINTMENT TOPICAL ONCE
Status: CANCELLED | OUTPATIENT
Start: 2022-10-26 | End: 2022-10-26

## 2022-10-26 RX ORDER — LIDOCAINE 50 MG/G
OINTMENT TOPICAL ONCE
Status: COMPLETED | OUTPATIENT
Start: 2022-10-26 | End: 2022-10-26

## 2022-10-26 RX ORDER — GINSENG 100 MG
CAPSULE ORAL ONCE
Status: CANCELLED | OUTPATIENT
Start: 2022-10-26 | End: 2022-10-26

## 2022-10-26 RX ORDER — LIDOCAINE HYDROCHLORIDE 40 MG/ML
SOLUTION TOPICAL ONCE
Status: CANCELLED | OUTPATIENT
Start: 2022-10-26 | End: 2022-10-26

## 2022-10-26 RX ORDER — CLOBETASOL PROPIONATE 0.5 MG/G
OINTMENT TOPICAL ONCE
Status: CANCELLED | OUTPATIENT
Start: 2022-10-26 | End: 2022-10-26

## 2022-10-26 RX ORDER — LIDOCAINE 50 MG/G
OINTMENT TOPICAL ONCE
Status: CANCELLED | OUTPATIENT
Start: 2022-10-26 | End: 2022-10-26

## 2022-10-26 RX ADMIN — LIDOCAINE: 50 OINTMENT TOPICAL at 09:50

## 2022-10-26 ASSESSMENT — PAIN SCALES - GENERAL: PAINLEVEL_OUTOF10: 0

## 2022-10-26 NOTE — DISCHARGE INSTRUCTIONS
[address] and [facility] O[facility] Therapy   Physician Orders and Discharge Instructions  [place] [address] [facility]   [address], [address]  Telephone: [phone]     NAME:  Kylie Brood OF BIRTH:  1945  MEDICAL RECORD NUMBER:  [id number]  DATE:  10/26/2022     Wound Cleansing:   Do not scrub or use excessive force. Cleanse wound prior to applying a clean dressing with:  [x] Normal Saline  [] Keep Wound Dry in Shower    [] Wound Cleanser   [] Cleanse wound with Mild Soap & Water  [] May Shower at Discharge   [] Other:   [address]     Topical Treatments:  Do not apply lotions, creams, or ointments to wound bed unless directed. [x] Apply moisturizing lotion to skin surrounding the wound prior to dressing change.  [] Apply antifungal ointment to skin surrounding the wound prior to dressing change.  [] Apply thin film of moisture barrier ointment to skin immediately around wound. [] Other:                 Dressings:                Wound Location RIGHT LOWER LEG     X2  [x] Apply Primary Dressing:                                             [] MediHoney Gel      [] Alginate with Silver [] Alginate             [] Collagen     [x] Collagen with Silver   [] Santyl with Moisten saline gauze               [] Hydrocolloid            [] MediHoney Alginate        [] Foam with Silver             [] Foam   [] Hydrofera Blue            [] Mepilex Border                    [x] Moisten with Saline      [] Hydrogel     [] Mepitel                     [] Bactroban/Mupirocin         [] Polysporin              [] Other:       [x] Cover and Secure with:                        [x] Gauze       [] Filiberto          [x] Roll gauze             [] Ace Wrap    [] Cover Roll Tape     [] ABD                         [] Other:              Avoid contact of tape with skin.   [x] Change dressing:          [] Daily          [x] Every Other Day [] Three times per week             [] Once a week         [] Do Not Change Dressing     [] Other:        Edema Control:  Apply:  [] Compression Stocking       []Right Leg     []Left Leg             [] Tubigrip      []Right Leg Double Layer      []Left Leg Double Layer                                              []Right Leg Single Layer       []Left Leg Single Layer             [x] SpandaGrip         [x]Right Leg   []Left Leg                                   []Low compression 5-10 mm/Hg                                            [x]Medium compression 10-20 mm/Hg                                   []High compression  20-30 mm/Hg             every morning immediately when getting up should be applied to affected leg(s) from mid foot to knee making sure to cover the heel. Remove every night before going to bed. [x] Elevate leg(s) above the level of the heart when sitting. [] Avoid prolonged standing in one place. [] Elevate arm/hand above the level of the heart    []RightArm     []LeftArm            Compression:  Apply:  [] Multilayer Compression Wrap Applied in Clinic     []RightLeg      []Left Leg             [] Multi-layer compression. Do not get leg(s) with wrap wet. If wraps become too tight call the center or completely remove the wrap. [] Elevate leg(s) above the level of the heart when sitting. [] Avoid prolonged standing in one place. [x] Diet as tolerated:     [x] Calorie Diabetic Diet:          [] No Added Salt:  [] Increase Protein:     [] Other:            Activity:  [x] Activity as tolerated:  [] Patient has no activity restrictions     [] Strict Bedrest:         [] Remain off Work:     [] May return to full duty work: If you are still having pain after you go home:  [] Elevate the affected limb.     [] Use over-the-counter medications you would normally use for pain as permitted by your family doctor. [] For persistent pain not relieved by the above interventions, please call your family doctor. Return Appointment:  [] Wound and dressing supply provider:   [] ECF or Home Healthcare:  [] Wound Assessment:         [] Physician or NP scheduled for Wound Assessment:   [x] Return Appointment: With Palmer Dominguezr  in  1 Week(s)  [] Ordered tests:      Nurse Case Manger:  Therese      Electronically signed by Felicia Aguillon RN on 10/26/2022 at 10:15 [facility]: Should you experience any significant changes in your wound(s) or have questions about your wound care, please contact the [address] [place] [address] at [address] 8:00 am - 4:30 pm and Friday 8:00 am - 12:30 pm.  If you need help with your wound outside these hours and cannot wait until we are again available, contact your PCP or go to the hospital emergency room. PLEASE NOTE: IF YOU ARE UNABLE TO OBTAIN WOUND SUPPLIES, CONTINUE TO USE THE SUPPLIES YOU HAVE AVAILABLE UNTIL YOU ARE ABLE TO REACH US. IT IS MOST IMPORTANT TO KEEP THE WOUND COVERED AT ALL TIMES.      Physician Signature:_______________________     Date: ___________ Time:  ____________                              Socrates Chilel CNP/  Jody [place], CNP

## 2022-10-26 NOTE — PROGRESS NOTES
[address]   Progress Note and Procedure Note      [address] RECORD NUMBER:  [id number]  AGE: 78 y.o. GENDER: female  : 1945  EPISODE DATE:  10/26/2022    Subjective:     Chief Complaint   Patient presents with    Wound Check     Follow Up on RIght Lower Leg         HISTORY of PRESENT ILLNESS HPI    Dashawn Santillan is a 78 y.o. female who presents today for wound/ulcer evaluation. History of Wound Context: 3 weeks ago right lateral leg wounds noted by pt, no excessive drainage, pain or redness noted. She denies constitutional issues, and has been covering wounds at home. Denies itching, or pain. States not using compression but \"once in [place], when needs them\". Does not have any difficulty getting compression on or tolerating them. Wound/Ulcer Pain Timing/Severity:  Quality of pain: dull, tender  Severity:  0 / 10 at present  Modifying Factors: None  Associated Signs/Symptoms: edema     Ulcer Identification:  Ulcer Type: venous     Contributing Factors: edema, venous stasis, and obesity     Acute Wound: Other: venous wounds.       PAST MEDICAL HISTORY        Diagnosis Date    Asthma     Localized edema 2021    Non-pressure chronic ulcer of right lower leg with fat layer exposed ([address] 2021    Peripheral venous insufficiency 2021    Right foot ulcer, limited to breakdown of skin ([address] 2021    Skin ulcer of right lower leg with fat layer exposed ([address] 2021       PAST SURGICAL HISTORY    Past Surgical History:   Procedure Laterality Date    APPENDECTOMY      CHOLECYSTECTOMY         FAMILY HISTORY    Family History   Problem Relation Age of Onset    Migraines Mother        SOCIAL HISTORY    Social History     Tobacco Use    Smoking status: Never    Smokeless tobacco: Never   Substance Use Topics    Drug use: Never       ALLERGIES    Allergies   Allergen Reactions    Iodides Other (See Comments)     IVP dye, pt does not remember he reaction, just that the nurses stated to not let anyone give to her ever again    Furosemide      Pt. States it makes her \"ditzy\", dizzy, and gives her muscle ridgity and nausea. MEDICATIONS    Current Outpatient Medications on File Prior to Encounter   Medication Sig Dispense Refill    clotrimazole-betamethasone (LOTRISONE) 1-0.05 % cream Apply topically 2 times daily. 45 g 3    triamterene-hydroCHLOROthiazide (MAXZIDE-25) 37.5-25 MG per tablet TAKE 1 TABLET BY MOUTH DAILY 90 tablet 1    metOLazone (ZAROXOLYN) 5 MG tablet TAKE ONE TABLET BY MOUTH DAILY AS NEEDED FOR SWELLING 30 tablet 1    albuterol sulfate HFA (PROAIR HFA) 108 (90 Base) MCG/ACT inhaler Inhale 2 puffs into the lungs every 6 hours as needed for Wheezing 1 Inhaler 0    Nebulizers (AIRIAL COMPACT MINI NEBULIZER) MISC 1 nebule by Does not apply route 4 times daily 1 each 0     No current facility-administered medications on file prior to encounter. REVIEW OF SYSTEMS  Review of Systems    Pertinent items are noted in HPI.     Objective:      BP (!) 160/79   Pulse 82   Temp 97.2 °F (36.2 °C) (Temporal)   Resp 18     Wt Readings from Last 3 Encounters:   10/05/22 216 lb 11.4 oz (98.3 kg)   09/28/22 217 lb (98.4 kg)   08/18/22 223 lb (101.2 kg)       PHYSICAL EXAM    General Appearance: alert and oriented to person, place and time, well-developed and well-nourished, in no acute distress, obese, and pale  Skin: warm and dry  Head: normocephalic and atraumatic  Eyes: pupils equal, round, and reactive to light  Pulmonary/Chest:  normal air movement, no respiratory distress  Cardiovascular: bilateral DP's +1  Extremities: no cyanosis or clubbing; RLE +2 pitting edema, LLE nonpitting edema      Assessment:        Problem List Items Addressed This Visit       Skin ulcer of right lower leg with fat layer exposed [facility])    Relevant Orders    Initiate Outpatient Wound Care Protocol    Peripheral venous insufficiency    Relevant Orders    Initiate Outpatient Wound Care Protocol    Leg swelling - Primary    Relevant Orders    Initiate Outpatient Wound Care Protocol        Procedure Note  Indications:  Based on my examination of this patient's wound(s)/ulcer(s) today, debridement is required to promote healing and evaluate the wound base. Performed by: VIDA Schwab CNP    Consent obtained:  Yes    Time out taken:  Yes    Pain Control: Anesthetic  Anesthetic: 5% Lidocaine Ointment Topical       Debridement: Excisional Debridement    Using curette the wound(s)/ulcer(s) was/were debrided down through and including the removal of epidermis, dermis, and subcutaneous tissue.         Devitalized Tissue Debrided:  fibrin, biofilm, and slough    Pre Debridement Measurements:  Are located in the [place]  Documentation Flow Sheet    Diabetic/Pressure/Non Pressure Ulcers only:  Ulcer: Non-Pressure ulcer, fat layer exposed     Wound/Ulcer #: 1 and 2    Post Debridement Measurements:  Wound/Ulcer Descriptions are Pre Debridement except measurements:    Wound 10/05/22 Leg Right;Lateral;Proximal;Lower #1 Noted 9/28/22 (Active)   Wound Image   10/05/22 0913   Wound Etiology Venous 10/05/22 0913   Dressing Status Old drainage noted 10/26/22 0951   Dressing/Treatment Collagen with Ag;Moisten with saline;Gauze dressing/dressing sponge;Roll gauze 10/26/22 1015   Wound Length (cm) 1.3 cm 10/26/22 0951   Wound Width (cm) 0.5 cm 10/26/22 0951   Wound Depth (cm) 0.1 cm 10/26/22 0951   Wound Surface Area (cm^2) 0.65 cm^2 10/26/22 0951   Change in Wound Size % (l*w) -44.44 10/26/22 0951   Wound Volume (cm^3) 0.065 cm^3 10/26/22 0951   Wound Healing % -44 10/26/22 0951   Post-Procedure Length (cm) 1.4 cm 10/26/22 1014   Post-Procedure Width (cm) 0.6 cm 10/26/22 1014   Post-Procedure Depth (cm) 0.2 cm 10/26/22 1014   Post-Procedure Surface Area (cm^2) 0.84 cm^2 10/26/22 1014   Post-Procedure Volume (cm^3) 0.168 cm^3 10/26/22 1014   Wound Assessment Dry;Granulation tissue;Slough 10/26/22 0951   Drainage Amount Small 10/26/22 0951   Drainage Description Serous; Yellow 10/26/22 0951   Odor None 10/26/22 0951   Consuelo-wound Assessment Dry/flaky 10/26/22 0951   Margins Attached edges 10/26/22 0951   Wound Thickness Description not for Pressure Injury Full thickness 10/26/22 0951   Number of days: 21       Wound 10/05/22 Leg Right;Distal;Lower #2 Noted 9/28/22 (Active)   Wound Image   10/05/22 0913   Wound Etiology Venous 10/05/22 0913   Dressing Status Old drainage noted 10/26/22 0951   Dressing/Treatment Collagen with Ag;Moisten with saline;Gauze dressing/dressing sponge;Roll gauze 10/26/22 1015   Wound Length (cm) 0.6 cm 10/26/22 0951   Wound Width (cm) 0.3 cm 10/26/22 0951   Wound Depth (cm) 0.1 cm 10/26/22 0951   Wound Surface Area (cm^2) 0.18 cm^2 10/26/22 0951   Change in Wound Size % (l*w) 98.86 10/26/22 0951   Wound Volume (cm^3) 0.018 cm^3 10/26/22 0951   Wound Healing % 99 10/26/22 0951   Post-Procedure Length (cm) 0.6 cm 10/26/22 1014   Post-Procedure Width (cm) 0.5 cm 10/26/22 1014   Post-Procedure Depth (cm) 0.3 cm 10/26/22 1014   Post-Procedure Surface Area (cm^2) 0.3 cm^2 10/26/22 1014   Post-Procedure Volume (cm^3) 0.09 cm^3 10/26/22 1014   Wound Assessment Dry;Eschar dry 10/26/22 0951   Drainage Amount None 10/26/22 0951   Odor None 10/26/22 0951   Consuelo-wound Assessment Dry/flaky 10/26/22 0951   Margins Undefined edges 10/26/22 0951   Wound Thickness Description not for Pressure Injury Full thickness 10/26/22 0951   Number of days: 21          Total Surface Area Debrided:  1.14 sq cm     Estimated Blood Loss:  Minimal    Hemostasis Achieved:  by pressure    Procedural Pain:  1  / 10     Post Procedural Pain:  0 / 10     Response to treatment:  Well tolerated by patient. Plan:     Treatment Note please see attached Discharge Instructions    Written patient dismissal instructions given to patient and signed by patient or POA.          Discharge Instructions [address] and Hyperbaric Oxygen Therapy   Physician Orders and Discharge Instructions  [place] [id number] [facility]   [address], [address]  Telephone: [phone]     NAME:  Riya Del Real OF BIRTH:  1945  MEDICAL RECORD NUMBER:  [id number]  DATE:  10/26/2022     Wound Cleansing:   Do not scrub or use excessive force. Cleanse wound prior to applying a clean dressing with:  [x] Normal Saline  [] Keep Wound Dry in Shower    [] Wound Cleanser   [] Cleanse wound with Mild Soap & Water  [] May Shower at Discharge   [] Other:   [address]     Topical Treatments:  Do not apply lotions, creams, or ointments to wound bed unless directed. [x] Apply moisturizing lotion to skin surrounding the wound prior to dressing change.  [] Apply antifungal ointment to skin surrounding the wound prior to dressing change.  [] Apply thin film of moisture barrier ointment to skin immediately around wound. [] Other:                 Dressings:                Wound Location RIGHT LOWER LEG     X2  [x] Apply Primary Dressing:                                             [] MediHoney Gel      [] Alginate with Silver [] Alginate             [] Collagen     [x] Collagen with Silver   [] Santyl with Moisten saline gauze               [] Hydrocolloid            [] MediHoney Alginate        [] Foam with Silver             [] Foam   [] Hydrofera Blue            [] Mepilex Border                    [x] Moisten with Saline      [] Hydrogel     [] Mepitel                     [] Bactroban/Mupirocin         [] Polysporin              [] Other:       [x] Cover and Secure with:                        [x] Gauze       [] Filiberto          [x] Roll gauze             [] Ace Wrap    [] Cover Roll Tape     [] ABD                         [] Other:              Avoid contact of tape with skin.   [x] Change dressing:          [] Daily          [x] Every Other Day [] Three times per week             [] Once a week         [] Do Not Change Dressing     [] Other:        Edema Control:  Apply:  [] Compression Stocking       []Right Leg     []Left Leg             [] Tubigrip      []Right Leg Double Layer      []Left Leg Double Layer                                              []Right Leg Single Layer       []Left Leg Single Layer             [x] SpandaGrip         [x]Right Leg   []Left Leg                                   []Low compression 5-10 mm/Hg                                            [x]Medium compression 10-20 mm/Hg                                   []High compression  20-30 mm/Hg             every morning immediately when getting up should be applied to affected leg(s) from mid foot to knee making sure to cover the heel. Remove every night before going to bed. [x] Elevate leg(s) above the level of the heart when sitting. [] Avoid prolonged standing in one place. [] Elevate arm/hand above the level of the heart    []RightArm     []LeftArm            Compression:  Apply:  [] Multilayer Compression Wrap Applied in Clinic     []RightLeg      []Left Leg             [] Multi-layer compression. Do not get leg(s) with wrap wet. If wraps become too tight call the center or completely remove the wrap. [] Elevate leg(s) above the level of the heart when sitting. [] Avoid prolonged standing in one place. [x] Diet as tolerated:     [x] Calorie Diabetic Diet:          [] No Added Salt:  [] Increase Protein:     [] Other:            Activity:  [x] Activity as tolerated:  [] Patient has no activity restrictions     [] Strict Bedrest:         [] Remain off Work:     [] May return to full duty work: If you are still having pain after you go home:  [] Elevate the affected limb.     [] Use over-the-counter medications you would normally use for pain as permitted by your family doctor. [] For persistent pain not relieved by the above interventions, please call your family doctor. Return Appointment:  [] Wound and dressing supply provider:   [] ECF or Home Healthcare:  [] Wound Assessment:         [] Physician or NP scheduled for Wound Assessment:   [x] Return Appointment: With Fatuma Shipman  in  1 Week(s)  [] Ordered tests:      Nurse Case Manger:  Therese      Electronically signed by Allan Marley RN on 10/26/2022 at 10:15 [facility]: Should you experience any significant changes in your wound(s) or have questions about your wound care, please contact the [address] [place] [address] at [address] 8:00 am - 4:30 pm and Friday 8:00 am - 12:30 pm.  If you need help with your wound outside these hours and cannot wait until we are again available, contact your PCP or go to the hospital emergency room. PLEASE NOTE: IF YOU ARE UNABLE TO OBTAIN WOUND SUPPLIES, CONTINUE TO USE THE SUPPLIES YOU HAVE AVAILABLE UNTIL YOU ARE ABLE TO REACH US. IT IS MOST IMPORTANT TO KEEP THE WOUND COVERED AT ALL TIMES.      Physician Signature:_______________________     Date: ___________ Time:  ____________                              Albert Esquivel CNP/  Tyler Bernal CNP        Electronically signed by VIDA Gutiérrez CNP on 10/26/2022 at 1:54 PM

## 2022-11-01 NOTE — DISCHARGE INSTRUCTIONS
[address] and Hyperbaric Oxygen Therapy   Physician Orders and Discharge Instructions  [address]   [address]  Telephone: [phone]     NAME:  Noe Mcelroy OF BIRTH:  1945  MEDICAL RECORD NUMBER:  [id number]  DATE:  11/2/2022     Wound Cleansing:   Do not scrub or use excessive force. Cleanse wound prior to applying a clean dressing with:  [x] Normal Saline  [] Keep Wound Dry in Shower    [] Wound Cleanser   [] Cleanse wound with Mild Soap & Water  [] May Shower at Discharge   [] Other:   [address]     Topical Treatments:  Do not apply lotions, creams, or ointments to wound bed unless directed. [x] Apply moisturizing lotion to skin surrounding the wound prior to dressing change.  [] Apply antifungal ointment to skin surrounding the wound prior to dressing change.  [] Apply thin film of moisture barrier ointment to skin immediately around wound. [] Other:                 Dressings:                Wound Location RIGHT LOWER LEG       [x] Apply Primary Dressing:                                             [] MediHoney Gel      [] Alginate with Silver [] Alginate             [] Collagen     [x] Collagen with Silver   [] Santyl with Moisten saline gauze               [] Hydrocolloid            [] MediHoney Alginate        [] Foam with Silver             [] Foam   [] Hydrofera Blue            [] Mepilex Border                    [x] Moisten with Saline      [] Hydrogel     [] Mepitel                     [] Bactroban/Mupirocin         [] Polysporin              [] Other:       [x] Cover and Secure with:                        [x] Gauze       [] Filiberto          [x] Roll gauze             [] Ace Wrap    [] Cover Roll Tape     [] ABD                         [] Other:              Avoid contact of tape with skin.   [x] Change dressing:          [] Daily          [x] Every Other Day [] Three times per week             [] Once a week         [] Do Not Change Dressing     [] Other:        Edema Control:  Apply:  [] Compression Stocking       []Right Leg     []Left Leg             [] Tubigrip      []Right Leg Double Layer      []Left Leg Double Layer                                              []Right Leg Single Layer       []Left Leg Single Layer             [x] SpandaGrip         [x]Right Leg   []Left Leg                                   []Low compression 5-10 mm/Hg                                            [x]Medium compression 10-20 mm/Hg                                   []High compression  20-30 mm/Hg             every morning immediately when getting up should be applied to affected leg(s) from mid foot to knee making sure to cover the heel. Remove every night before going to bed. [x] Elevate leg(s) above the level of the heart when sitting. [] Avoid prolonged standing in one place. [] Elevate arm/hand above the level of the heart    []RightArm     []LeftArm            Compression:  Apply:  [] Multilayer Compression Wrap Applied in Clinic     []RightLeg      []Left Leg             [] Multi-layer compression. Do not get leg(s) with wrap wet. If wraps become too tight call the center or completely remove the wrap. [] Elevate leg(s) above the level of the heart when sitting. [] Avoid prolonged standing in one place. [x] Diet as tolerated:     [x] Calorie Diabetic Diet:          [] No Added Salt:  [] Increase Protein:     [] Other:            Activity:  [x] Activity as tolerated:  [] Patient has no activity restrictions     [] Strict Bedrest:         [] Remain off Work:     [] May return to full duty work: If you are still having pain after you go home:  [] Elevate the affected limb.     [] Use over-the-counter medications you would normally use for pain as permitted by your family doctor. [] For persistent pain not relieved by the above interventions, please call your family doctor. Return Appointment:  [x] Wound and dressing supply provider: Mario Cardozo  [] ECF or Home Healthcare:  [] Wound Assessment:         [] Physician or NP scheduled for Wound Assessment:   [x] Return Appointment: With Katherine Sender  in  1 Week(s)  [] Ordered tests:      Nurse Case Manger:  Therese      . [facility] Information: Should you experience any significant changes in your wound(s) or have questions about your wound care, please contact the [address] [place] [address] at [address] 8:00 am - 4:30 pm and Friday 8:00 am - 12:30 pm.  If you need help with your wound outside these hours and cannot wait until we are again available, contact your PCP or go to the hospital emergency room. PLEASE NOTE: IF YOU ARE UNABLE TO OBTAIN WOUND SUPPLIES, CONTINUE TO USE THE SUPPLIES YOU HAVE AVAILABLE UNTIL YOU ARE ABLE TO REACH US. IT IS MOST IMPORTANT TO KEEP THE WOUND COVERED AT ALL TIMES.      Physician Signature:_______________________     Date: ___________ Time:  ____________                              Socrates Chilel CNP/  Jody Speaks, CNP

## 2022-11-02 ENCOUNTER — HOSPITAL ENCOUNTER (OUTPATIENT)
Dept: WOUND CARE | Age: 77
Discharge: HOME | End: 2022-11-02
Payer: MEDICARE

## 2022-11-02 VITALS
DIASTOLIC BLOOD PRESSURE: 86 MMHG | RESPIRATION RATE: 18 BRPM | HEART RATE: 93 BPM | SYSTOLIC BLOOD PRESSURE: 158 MMHG | TEMPERATURE: 97.1 F

## 2022-11-02 DIAGNOSIS — I87.2 PERIPHERAL VENOUS INSUFFICIENCY: ICD-10-CM

## 2022-11-02 DIAGNOSIS — L97.912 SKIN ULCER OF RIGHT LOWER LEG WITH FAT LAYER EXPOSED (HCC): ICD-10-CM

## 2022-11-02 DIAGNOSIS — M79.89 LEG SWELLING: Primary | ICD-10-CM

## 2022-11-02 PROCEDURE — 11042 DBRDMT SUBQ TIS 1ST 20SQCM/<: CPT | Performed by: NURSE PRACTITIONER

## 2022-11-02 PROCEDURE — 11042 DBRDMT SUBQ TIS 1ST 20SQCM/<: CPT

## 2022-11-02 RX ORDER — GENTAMICIN SULFATE 1 MG/G
OINTMENT TOPICAL ONCE
Status: CANCELLED | OUTPATIENT
Start: 2022-11-02 | End: 2022-11-02

## 2022-11-02 RX ORDER — GINSENG 100 MG
CAPSULE ORAL ONCE
Status: CANCELLED | OUTPATIENT
Start: 2022-11-02 | End: 2022-11-02

## 2022-11-02 RX ORDER — LIDOCAINE HYDROCHLORIDE 40 MG/ML
SOLUTION TOPICAL ONCE
Status: CANCELLED | OUTPATIENT
Start: 2022-11-02 | End: 2022-11-02

## 2022-11-02 RX ORDER — BACITRACIN ZINC AND POLYMYXIN B SULFATE 500; 1000 [USP'U]/G; [USP'U]/G
OINTMENT TOPICAL ONCE
Status: CANCELLED | OUTPATIENT
Start: 2022-11-02 | End: 2022-11-02

## 2022-11-02 RX ORDER — LIDOCAINE 40 MG/G
CREAM TOPICAL ONCE
Status: CANCELLED | OUTPATIENT
Start: 2022-11-02 | End: 2022-11-02

## 2022-11-02 RX ORDER — LIDOCAINE 50 MG/G
OINTMENT TOPICAL ONCE
Status: CANCELLED | OUTPATIENT
Start: 2022-11-02 | End: 2022-11-02

## 2022-11-02 RX ORDER — CLOBETASOL PROPIONATE 0.5 MG/G
OINTMENT TOPICAL ONCE
Status: CANCELLED | OUTPATIENT
Start: 2022-11-02 | End: 2022-11-02

## 2022-11-02 RX ORDER — LIDOCAINE HYDROCHLORIDE 20 MG/ML
JELLY TOPICAL ONCE
Status: CANCELLED | OUTPATIENT
Start: 2022-11-02 | End: 2022-11-02

## 2022-11-02 RX ORDER — BACITRACIN, NEOMYCIN, POLYMYXIN B 400; 3.5; 5 [USP'U]/G; MG/G; [USP'U]/G
OINTMENT TOPICAL ONCE
Status: CANCELLED | OUTPATIENT
Start: 2022-11-02 | End: 2022-11-02

## 2022-11-02 RX ORDER — BETAMETHASONE DIPROPIONATE 0.05 %
OINTMENT (GRAM) TOPICAL ONCE
Status: CANCELLED | OUTPATIENT
Start: 2022-11-02 | End: 2022-11-02

## 2022-11-02 RX ORDER — LIDOCAINE 50 MG/G
OINTMENT TOPICAL ONCE
Status: COMPLETED | OUTPATIENT
Start: 2022-11-02 | End: 2022-11-02

## 2022-11-02 RX ADMIN — LIDOCAINE: 50 OINTMENT TOPICAL at 10:28

## 2022-11-02 NOTE — PLAN OF CARE
[address] [facility]:     [facility] [address] [facility] Harmony Mares  E:[phone] f: [phone]     [place]:      [address]  [address] OFFICE [facility] [address]  [phone]  WOUND CARE Dept: [phone]   FAX NUMBER [unfilled]    Patient Information:      Fa[address]is[id number]   [phone]   : 1945  AGE: 78 y.o.      GENDER: female   TODAYS DATE:  2022    Insurance:      PRIMARY INSURANCE:  Plan: MEDICARE PART A AND B  Coverage: MEDICARE  Effective Date: 2015  [id number] - (Medicare)    SECONDARY INSURANCE:  Plan: [facility]  Coverage: [place] [employer]  Effective Date: 2015  Milagros Bell  [id number]  [unfilled]   [unfilled]     Patient Wound Information:      Problem List Items Addressed This Visit          Circulatory    Peripheral venous insufficiency    Relevant Orders    Initiate Outpatient Wound Care Protocol       Other    Skin ulcer of right lower leg with fat layer exposed [facility])    Relevant Orders    Initiate Outpatient Wound Care Protocol    Leg swelling - Primary    Relevant Orders    Initiate Outpatient Wound Care Protocol     ICD-10 codes: I37.456    WOUNDS REQUIRING DRESSING SUPPLIES:     Wound 10/05/22 Leg Right;Lateral;Proximal;Lower #1 Noted 22 (Active)   Wound Image   22 1023   Wound Etiology Venous 10/05/22 0913   Dressing Status Old drainage noted 22 1023   Dressing/Treatment Collagen with Ag;Moisten with saline;Gauze dressing/dressing sponge;Roll gauze 10/26/22 1015   Wound Length (cm) 1.5 cm 22 1023   Wound Width (cm) 0.6 cm 22 1023   Wound Depth (cm) 0.1 cm 22 1023   Wound Surface Area (cm^2) 0.9 cm^2 22 1023   Change in Wound Size % (l*w) -100 22 1023   Wound Volume (cm^3) 0.09 cm^3 22 1023   Wound Healing % -100 22 1023   Post-Procedure Length (cm) 1.6 cm 11/02/22 1046   Post-Procedure Width (cm) 0.7 cm 11/02/22 1046   Post-Procedure Depth (cm) 0.2 cm 11/02/22 1046   Post-Procedure Surface Area (cm^2) 1.12 cm^2 11/02/22 1046   Post-Procedure Volume (cm^3) 0.224 cm^3 11/02/22 1046   Wound Assessment Dry;Granulation tissue;Slough 11/02/22 1023   Drainage Amount Small 11/02/22 1023   Drainage Description Serosanguinous 11/02/22 1023   Odor None 11/02/22 1023   Consuelo-wound Assessment Dry/flaky 11/02/22 1023   Margins Attached edges 11/02/22 1023   Wound Thickness Description not for Pressure Injury Full thickness 11/02/22 1023   Number of days: 28       Wound 10/05/22 Leg Right;Distal;Lower #2 Noted 9/28/22 (Active)   Wound Image   11/02/22 1023   Wound Etiology Venous 10/05/22 0913   Dressing Status Old drainage noted 10/26/22 0951   Dressing/Treatment Collagen with Ag;Moisten with saline;Gauze dressing/dressing sponge;Roll gauze 10/26/22 1015   Wound Length (cm) 0 cm 11/02/22 1023   Wound Width (cm) 0 cm 11/02/22 1023   Wound Depth (cm) 0 cm 11/02/22 1023   Wound Surface Area (cm^2) 0 cm^2 11/02/22 1023   Change in Wound Size % (l*w) 100 11/02/22 1023   Wound Volume (cm^3) 0 cm^3 11/02/22 1023   Wound Healing % 100 11/02/22 1023   Post-Procedure Length (cm) 0 cm 11/02/22 1051   Post-Procedure Width (cm) 0 cm 11/02/22 1051   Post-Procedure Depth (cm) 0 cm 11/02/22 1051   Post-Procedure Surface Area (cm^2) 0 cm^2 11/02/22 1051   Post-Procedure Volume (cm^3) 0 cm^3 11/02/22 1051   Wound Assessment Epithelialization 11/02/22 1023   Drainage Amount None 10/26/22 0951   Odor None 10/26/22 0951   Consuelo-wound Assessment Dry/flaky 10/26/22 0951   Margins Undefined edges 10/26/22 0951   Wound Thickness Description not for Pressure Injury Full thickness 10/26/22 0951   Number of days: 28          Supplies Requested :      WOUND #: 1   PRIMARY DRESSING:  Collagen with silver     Cover and Secure with: 4X4 gauze pad     FREQUENCY OF DRESSING CHANGES:  Every other day           ADDITIONAL ITEMS:  [] Gloves Small  [] Gloves Medium [] Gloves Large [] Gloves XLarge  [] Tape 1\" [] Tape 2\" [] Tape 3\"  [] Medipore Tape  [x] Saline  [] Skin Prep   [] Adhesive Remover   [] Cotton Tip Applicators   [] Other:    Patient Wound(s) Debrided: [x] Yes   [] No    Debribement Type: subcutaneous tissue    Debridement Date: 11/2/22    Patient currently being seen by Home Health: [] Yes   [x] No    Duration for needed supplies:  []15  []30  []60  [x]90 Days    Provider Information:      PROVIDER'S NAME: VIDA Rizvi CNP     NPI: LYNDA - MARIAELENA  [id number]

## 2022-11-03 NOTE — DISCHARGE INSTRUCTIONS
[address] and [facility] O[facility] Therapy   Physician Orders and Discharge Instructions  [place] [id number] [facility]   [address], [place] [address]  Telephone: [phone]     NAME:  Elena Daniels OF BIRTH:  1945  MEDICAL RECORD NUMBER:  [id number]  DATE:  11/9/2022     Wound Cleansing:   Do not scrub or use excessive force. Cleanse wound prior to applying a clean dressing with:  [x] Normal Saline  [] Keep Wound Dry in Shower    [] Wound Cleanser   [] Cleanse wound with Mild Soap & Water  [] May Shower at Discharge   [x] Other:   [address]     Topical Treatments:  Do not apply lotions, creams, or ointments to wound bed unless directed. [x] Apply moisturizing lotion to skin surrounding the wound prior to dressing change.  [] Apply antifungal ointment to skin surrounding the wound prior to dressing change.  [] Apply thin film of moisture barrier ointment to skin immediately around wound. [] Other:                 Dressings:                Wound Location RIGHT LOWER LEG       [x] Apply Primary Dressing:                                             [] MediHoney Gel      [] Alginate with Silver [] Alginate             [] Collagen     [x] Collagen with Silver   [] Santyl with Moisten saline gauze               [] Hydrocolloid            [] MediHoney Alginate        [] Foam with Silver             [] Foam   [] Hydrofera Blue            [] Mepilex Border                    [x] Moisten with Saline      [] Hydrogel     [] Mepitel                     [] Bactroban/Mupirocin         [] Polysporin              [] Other:       [x] Cover and Secure with:                        [x] Gauze       [] Filiberto          [x] Roll gauze             [] Ace Wrap    [] Cover Roll Tape     [] ABD                         [] Other:              Avoid contact of tape with skin.   [x] Change dressing:          [] Daily          [x] Every Other Day family doctor. [] For persistent pain not relieved by the above interventions, please call your family doctor. Return Appointment:  [x] Wound and dressing supply provider: Chelsie Bell  [] ECF or Home Healthcare:  [] Wound Assessment:         [] Physician or NP scheduled for Wound Assessment:   [x] Return Appointment: With Patrice Jeff CNP  in  1 Week(s)  [] Ordered tests:      Nurse Case Manger:  Therese     Electronically signed by Sergo Coon RN on 11/9/2022 at 10:49 AM          [facility] Information: Should you experience any significant changes in your wound(s) or have questions about your wound care, please contact the [address] [place] [address] at [address] 8:00 am - 4:30 pm and Friday 8:00 am - 12:30 pm.  If you need help with your wound outside these hours and cannot wait until we are again available, contact your PCP or go to the hospital emergency room. PLEASE NOTE: IF YOU ARE UNABLE TO OBTAIN WOUND SUPPLIES, CONTINUE TO USE THE SUPPLIES YOU HAVE AVAILABLE UNTIL YOU ARE ABLE TO REACH US. IT IS MOST IMPORTANT TO KEEP THE WOUND COVERED AT ALL TIMES.      Physician Signature:_______________________     Date: ___________ Time:  ____________                              Abel Leon CNP/  Adrián Patten CNP

## 2022-11-03 NOTE — PROGRESS NOTES
[address]   Progress Note and Procedure Note      [address] RECORD NUMBER:  [id number]  AGE: 78 y.o. GENDER: female  : 1945  EPISODE DATE:  2022    Subjective:     Chief Complaint   Patient presents with    Wound Check     Follow Up on Right Lower Leg         HISTORY of PRESENT ILLNESS HPI  History of Wound Context: Several weeks ago right lateral leg wounds noted by pt, no excessive drainage, pain or redness noted. She denies constitutional issues, and has been covering wounds at home. Denies itching, or pain. States not using compression but \"once in [place], when needs them\". Does not have any difficulty getting compression on or tolerating them. Wound/Ulcer Pain Timing/Severity:  Quality of pain: dull, tender  Severity:  0 / 10 at present  Modifying Factors: None  Associated Signs/Symptoms: edema     Ulcer Identification:  Ulcer Type: venous     Contributing Factors: edema, venous stasis, and obesity     Acute Wound: Other: venous wounds.     PAST MEDICAL HISTORY        Diagnosis Date    Asthma     Localized edema 2021    Non-pressure chronic ulcer of right lower leg with fat layer exposed ([address] 2021    Peripheral venous insufficiency 2021    Right foot ulcer, limited to breakdown of skin ([address] 2021    Skin ulcer of right lower leg with fat layer exposed ([address] 2021       PAST SURGICAL HISTORY    Past Surgical History:   Procedure Laterality Date    APPENDECTOMY      CHOLECYSTECTOMY         FAMILY HISTORY    Family History   Problem Relation Age of Onset    Migraines Mother        SOCIAL HISTORY    Social History     Tobacco Use    Smoking status: Never    Smokeless tobacco: Never   Substance Use Topics    Drug use: Never       ALLERGIES    Allergies   Allergen Reactions    Iodides Other (See Comments)     IVP dye, pt does not remember he reaction, just that the nurses stated to not let anyone give to her ever again Furosemide      Pt. States it makes her \"ditzy\", dizzy, and gives her muscle ridgity and nausea. MEDICATIONS    Current Outpatient Medications on File Prior to Encounter   Medication Sig Dispense Refill    clotrimazole-betamethasone (LOTRISONE) 1-0.05 % cream Apply topically 2 times daily. 45 g 3    triamterene-hydroCHLOROthiazide (MAXZIDE-25) 37.5-25 MG per tablet TAKE 1 TABLET BY MOUTH DAILY 90 tablet 1    metOLazone (ZAROXOLYN) 5 MG tablet TAKE ONE TABLET BY MOUTH DAILY AS NEEDED FOR SWELLING 30 tablet 1    albuterol sulfate HFA (PROAIR HFA) 108 (90 Base) MCG/ACT inhaler Inhale 2 puffs into the lungs every 6 hours as needed for Wheezing 1 Inhaler 0    Nebulizers (AIRIAL COMPACT MINI NEBULIZER) MISC 1 nebule by Does not apply route 4 times daily 1 each 0     No current facility-administered medications on file prior to encounter. REVIEW OF SYSTEMS  Review of Systems    Pertinent items are noted in HPI. Objective:      BP (!) 158/86   Pulse 93   Temp 97.1 °F (36.2 °C) (Temporal)   Resp 18     Wt Readings from Last 3 Encounters:   10/05/22 216 lb 11.4 oz (98.3 kg)   09/28/22 217 lb (98.4 kg)   08/18/22 223 lb (101.2 kg)       PHYSICAL EXAM  Physical Exam    General Appearance: alert and oriented to person, place and time  Skin: warm and dry, no rash or erythema  Head: normocephalic and atraumatic  Eyes: pupils equal, round, and reactive to light  Pulmonary/Chest:  normal air movement, no respiratory distress  Cardiovascular: normal rate, regular rhythm, and intact distal pulses  Extremities: no cyanosis and no clubbing      Assessment:        Problem List Items Addressed This Visit       Skin ulcer of right lower leg with fat layer exposed ([address]    Peripheral venous insufficiency    Leg swelling - Primary        Procedure Note  Indications:  Based on my examination of this patient's wound(s)/ulcer(s) today, debridement is required to promote healing and evaluate the wound base.     Performed by: VIDA Quiroz CNP    Consent obtained:  Yes    Time out taken:  Yes    Pain Control: Anesthetic  Anesthetic: 5% Lidocaine Ointment Topical       Debridement: Excisional Debridement    Using curette the wound(s)/ulcer(s) was/were debrided down through and including the removal of epidermis, dermis, and subcutaneous tissue.         Devitalized Tissue Debrided:  fibrin, biofilm, and slough    Pre Debridement Measurements:  Are located in the [place]  Documentation Flow Sheet    Diabetic/Pressure/Non Pressure Ulcers only:  Ulcer: Non-Pressure ulcer, fat layer exposed     Wound/Ulcer #: 1    Post Debridement Measurements:  Wound/Ulcer Descriptions are Pre Debridement except measurements:    Wound 10/05/22 Leg Right;Lateral;Proximal;Lower #1 Noted 9/28/22 (Active)   Wound Image   11/02/22 1023   Wound Etiology Venous 10/05/22 0913   Dressing Status Old drainage noted 11/02/22 1023   Dressing/Treatment Collagen with Ag;Moisten with saline;Gauze dressing/dressing sponge;Roll gauze 11/02/22 1128   Wound Length (cm) 1.5 cm 11/02/22 1023   Wound Width (cm) 0.6 cm 11/02/22 1023   Wound Depth (cm) 0.1 cm 11/02/22 1023   Wound Surface Area (cm^2) 0.9 cm^2 11/02/22 1023   Change in Wound Size % (l*w) -100 11/02/22 1023   Wound Volume (cm^3) 0.09 cm^3 11/02/22 1023   Wound Healing % -100 11/02/22 1023   Post-Procedure Length (cm) 1.6 cm 11/02/22 1046   Post-Procedure Width (cm) 0.7 cm 11/02/22 1046   Post-Procedure Depth (cm) 0.2 cm 11/02/22 1046   Post-Procedure Surface Area (cm^2) 1.12 cm^2 11/02/22 1046   Post-Procedure Volume (cm^3) 0.224 cm^3 11/02/22 1046   Wound Assessment Dry;Granulation tissue;Slough 11/02/22 1023   Drainage Amount Small 11/02/22 1023   Drainage Description Serosanguinous 11/02/22 1023   Odor None 11/02/22 1023   Consuelo-wound Assessment Dry/flaky 11/02/22 1023   Margins Attached edges 11/02/22 1023   Wound Thickness Description not for Pressure Injury Full thickness 11/02/22 1023   Number of days: 29       Wound 10/05/22 Leg Right;Distal;Lower #2 Noted 9/28/22 (Active)   Wound Image   11/02/22 1023   Wound Etiology Venous 10/05/22 0913   Dressing Status Old drainage noted 10/26/22 0951   Dressing/Treatment Collagen with Ag;Moisten with saline;Gauze dressing/dressing sponge;Roll gauze 10/26/22 1015   Wound Length (cm) 0 cm 11/02/22 1023   Wound Width (cm) 0 cm 11/02/22 1023   Wound Depth (cm) 0 cm 11/02/22 1023   Wound Surface Area (cm^2) 0 cm^2 11/02/22 1023   Change in Wound Size % (l*w) 100 11/02/22 1023   Wound Volume (cm^3) 0 cm^3 11/02/22 1023   Wound Healing % 100 11/02/22 1023   Post-Procedure Length (cm) 0 cm 11/02/22 1051   Post-Procedure Width (cm) 0 cm 11/02/22 1051   Post-Procedure Depth (cm) 0 cm 11/02/22 1051   Post-Procedure Surface Area (cm^2) 0 cm^2 11/02/22 1051   Post-Procedure Volume (cm^3) 0 cm^3 11/02/22 1051   Wound Assessment Epithelialization 11/02/22 1023   Drainage Amount None 10/26/22 0951   Odor None 10/26/22 0951   Consuelo-wound Assessment Dry/flaky 10/26/22 0951   Margins Undefined edges 10/26/22 0951   Wound Thickness Description not for Pressure Injury Full thickness 10/26/22 0951   Number of days: 29          Total Surface Area Debrided:  1.12 sq cm     Estimated Blood Loss:  Minimal    Hemostasis Achieved:  not needed    Procedural Pain:  1  / 10     Post Procedural Pain:  0 / 10     Response to treatment:  Well tolerated by patient. Plan:     Treatment Note please see attached Discharge Instructions    Written patient dismissal instructions given to patient and signed by patient or POA.          Discharge [facility] and [facility]   Physician Orders and Discharge Instructions  [address]   [address], [facility] [address]  Telephone: [phone]     NAME:  Asuncion Ewing OF BIRTH:  1945  MEDICAL RECORD NUMBER:  [id number]  DATE: 11/2/2022     Wound Cleansing:   Do not scrub or use excessive force. Cleanse wound prior to applying a clean dressing with:  [x] Normal Saline  [] Keep Wound Dry in Shower    [] Wound Cleanser   [] Cleanse wound with Mild Soap & Water  [] May Shower at Discharge   [] Other:   [facility]     Topical Treatments:  Do not apply lotions, creams, or ointments to wound bed unless directed. [x] Apply moisturizing lotion to skin surrounding the wound prior to dressing change.  [] Apply antifungal ointment to skin surrounding the wound prior to dressing change.  [] Apply thin film of moisture barrier ointment to skin immediately around wound. [] Other:                 Dressings:                Wound Location RIGHT LOWER LEG       [x] Apply Primary Dressing:                                             [] MediHoney Gel      [] Alginate with Silver [] Alginate             [] Collagen     [x] Collagen with Silver   [] Santyl with Moisten saline gauze               [] Hydrocolloid            [] MediHoney Alginate        [] Foam with Silver             [] Foam   [] Hydrofera Blue            [] Mepilex Border                    [x] Moisten with Saline      [] Hydrogel     [] Mepitel                     [] Bactroban/Mupirocin         [] Polysporin              [] Other:       [x] Cover and Secure with:                        [x] Gauze       [] Filiberto          [x] Roll gauze             [] Ace Wrap    [] Cover Roll Tape     [] ABD                         [] Other:              Avoid contact of tape with skin.   [x] Change dressing:          [] Daily          [x] Every Other Day [] Three times per week             [] Once a week         [] Do Not Change Dressing     [] Other:        Edema Control:  Apply:  [] Compression Stocking       []Right Leg     []Left Leg             [] Tubigrip      []Right Leg Double Layer      []Left Leg Double Layer                                              []Right Leg Single Layer []Left Leg Single Layer             [x] SpandaGrip         [x]Right Leg   []Left Leg                                   []Low compression 5-10 mm/Hg                                            [x]Medium compression 10-20 mm/Hg                                   []High compression  20-30 mm/Hg             every morning immediately when getting up should be applied to affected leg(s) from mid foot to knee making sure to cover the heel. Remove every night before going to bed. [x] Elevate leg(s) above the level of the heart when sitting. [] Avoid prolonged standing in one place. [] Elevate arm/hand above the level of the heart    []RightArm     []LeftArm            Compression:  Apply:  [] Multilayer Compression Wrap Applied in Clinic     []RightLeg      []Left Leg             [] Multi-layer compression. Do not get leg(s) with wrap wet. If wraps become too tight call the center or completely remove the wrap. [] Elevate leg(s) above the level of the heart when sitting. [] Avoid prolonged standing in one place. [x] Diet as tolerated:     [x] Calorie Diabetic Diet:          [] No Added Salt:  [] Increase Protein:     [] Other:            Activity:  [x] Activity as tolerated:  [] Patient has no activity restrictions     [] Strict Bedrest:         [] Remain off Work:     [] May return to full duty work: If you are still having pain after you go home:  [] Elevate the affected limb. [] Use over-the-counter medications you would normally use for pain as permitted by your family doctor. [] For persistent pain not relieved by the above interventions, please call your family doctor.              Return Appointment:  [x] Wound and dressing supply provider: Segundo Jimenez  [] ECF or Home Healthcare:  [] Wound Assessment:         [] Physician or NP scheduled for Wound Assessment:   [x] Return Appointment: With [facility]

## 2022-11-09 ENCOUNTER — HOSPITAL ENCOUNTER (OUTPATIENT)
Dept: WOUND CARE | Age: 77
Discharge: HOME | End: 2022-11-09
Payer: MEDICARE

## 2022-11-09 VITALS
DIASTOLIC BLOOD PRESSURE: 90 MMHG | SYSTOLIC BLOOD PRESSURE: 162 MMHG | TEMPERATURE: 97 F | RESPIRATION RATE: 18 BRPM | HEART RATE: 92 BPM

## 2022-11-09 DIAGNOSIS — L97.912 SKIN ULCER OF RIGHT LOWER LEG WITH FAT LAYER EXPOSED (HCC): ICD-10-CM

## 2022-11-09 DIAGNOSIS — M79.89 LEG SWELLING: Primary | ICD-10-CM

## 2022-11-09 DIAGNOSIS — I87.2 PERIPHERAL VENOUS INSUFFICIENCY: ICD-10-CM

## 2022-11-09 PROCEDURE — 11042 DBRDMT SUBQ TIS 1ST 20SQCM/<: CPT | Performed by: NURSE PRACTITIONER

## 2022-11-09 PROCEDURE — 11042 DBRDMT SUBQ TIS 1ST 20SQCM/<: CPT

## 2022-11-09 RX ORDER — LIDOCAINE 40 MG/G
CREAM TOPICAL ONCE
Status: CANCELLED | OUTPATIENT
Start: 2022-11-09 | End: 2022-11-09

## 2022-11-09 RX ORDER — LIDOCAINE HYDROCHLORIDE 20 MG/ML
JELLY TOPICAL ONCE
Status: CANCELLED | OUTPATIENT
Start: 2022-11-09 | End: 2022-11-09

## 2022-11-09 RX ORDER — LIDOCAINE HYDROCHLORIDE 40 MG/ML
SOLUTION TOPICAL ONCE
Status: CANCELLED | OUTPATIENT
Start: 2022-11-09 | End: 2022-11-09

## 2022-11-09 RX ORDER — GENTAMICIN SULFATE 1 MG/G
OINTMENT TOPICAL ONCE
Status: CANCELLED | OUTPATIENT
Start: 2022-11-09 | End: 2022-11-09

## 2022-11-09 RX ORDER — LIDOCAINE 50 MG/G
OINTMENT TOPICAL ONCE
Status: CANCELLED | OUTPATIENT
Start: 2022-11-09 | End: 2022-11-09

## 2022-11-09 RX ORDER — BETAMETHASONE DIPROPIONATE 0.05 %
OINTMENT (GRAM) TOPICAL ONCE
Status: CANCELLED | OUTPATIENT
Start: 2022-11-09 | End: 2022-11-09

## 2022-11-09 RX ORDER — LIDOCAINE 50 MG/G
OINTMENT TOPICAL ONCE
Status: COMPLETED | OUTPATIENT
Start: 2022-11-09 | End: 2022-11-09

## 2022-11-09 RX ORDER — BACITRACIN, NEOMYCIN, POLYMYXIN B 400; 3.5; 5 [USP'U]/G; MG/G; [USP'U]/G
OINTMENT TOPICAL ONCE
Status: CANCELLED | OUTPATIENT
Start: 2022-11-09 | End: 2022-11-09

## 2022-11-09 RX ORDER — GINSENG 100 MG
CAPSULE ORAL ONCE
Status: CANCELLED | OUTPATIENT
Start: 2022-11-09 | End: 2022-11-09

## 2022-11-09 RX ORDER — CLOBETASOL PROPIONATE 0.5 MG/G
OINTMENT TOPICAL ONCE
Status: CANCELLED | OUTPATIENT
Start: 2022-11-09 | End: 2022-11-09

## 2022-11-09 RX ORDER — BACITRACIN ZINC AND POLYMYXIN B SULFATE 500; 1000 [USP'U]/G; [USP'U]/G
OINTMENT TOPICAL ONCE
Status: CANCELLED | OUTPATIENT
Start: 2022-11-09 | End: 2022-11-09

## 2022-11-09 RX ADMIN — LIDOCAINE: 50 OINTMENT TOPICAL at 10:14

## 2022-11-09 NOTE — PROGRESS NOTES
[address]   Progress Note and Procedure Note      [address] RECORD NUMBER:  [id number]  AGE: 78 y.o. GENDER: female  : 1945  EPISODE DATE:  2022    Subjective:     Chief Complaint   Patient presents with    Wound Check     Follow Up on Right Lower Leg         HISTORY of PRESENT ILLNESS HPI    History of Wound Context: Several weeks ago right lateral leg wounds noted by pt, no excessive drainage, pain or redness noted. She denies constitutional issues, and has been dressing wounds at home. Denies itching or pain. States not using compression but \"once in [place], when needs them. \"  Does not have any difficulty getting compression on or tolerating them. Wound/Ulcer Pain Timing/Severity:  none  Quality of pain: N/A  Severity:  0 / 10 at present  Modifying Factors:  None  Associated Signs/Symptoms: edema     Ulcer Identification:  Ulcer Type: venous     Contributing Factors: edema, venous stasis, and obesity     Acute Wound: Other: venous wounds.       PAST MEDICAL HISTORY        Diagnosis Date    Asthma     Localized edema 2021    Non-pressure chronic ulcer of right lower leg with fat layer exposed ([address] 2021    Peripheral venous insufficiency 2021    Right foot ulcer, limited to breakdown of skin ([address] 2021    Skin ulcer of right lower leg with fat layer exposed ([address] 2021       PAST SURGICAL HISTORY    Past Surgical History:   Procedure Laterality Date    APPENDECTOMY      CHOLECYSTECTOMY         FAMILY HISTORY    Family History   Problem Relation Age of Onset    Migraines Mother        SOCIAL HISTORY    Social History     Tobacco Use    Smoking status: Never    Smokeless tobacco: Never   Substance Use Topics    Drug use: Never       ALLERGIES    Allergies   Allergen Reactions    Iodides Other (See Comments)     IVP dye, pt does not remember he reaction, just that the nurses stated to not let anyone give to her ever again Furosemide      Pt. States it makes her \"ditzy\", dizzy, and gives her muscle ridgity and nausea. MEDICATIONS    Current Outpatient Medications on File Prior to Encounter   Medication Sig Dispense Refill    clotrimazole-betamethasone (LOTRISONE) 1-0.05 % cream Apply topically 2 times daily. 45 g 3    triamterene-hydroCHLOROthiazide (MAXZIDE-25) 37.5-25 MG per tablet TAKE 1 TABLET BY MOUTH DAILY 90 tablet 1    metOLazone (ZAROXOLYN) 5 MG tablet TAKE ONE TABLET BY MOUTH DAILY AS NEEDED FOR SWELLING 30 tablet 1    albuterol sulfate HFA (PROAIR HFA) 108 (90 Base) MCG/ACT inhaler Inhale 2 puffs into the lungs every 6 hours as needed for Wheezing 1 Inhaler 0    Nebulizers (AIRIAL COMPACT MINI NEBULIZER) MISC 1 nebule by Does not apply route 4 times daily 1 each 0     No current facility-administered medications on file prior to encounter. REVIEW OF SYSTEMS    Pertinent items are noted in HPI.       Objective:      BP (!) 162/90   Pulse 92   Temp 97 °F (36.1 °C) (Temporal)   Resp 18     Wt Readings from Last 3 Encounters:   10/05/22 216 lb 11.4 oz (98.3 kg)   09/28/22 217 lb (98.4 kg)   08/18/22 223 lb (101.2 kg)       PHYSICAL EXAM    General Appearance: alert and oriented to person, place and time  Skin: warm and dry; right lateral leg ulcer with fat layer exposed  Head: normocephalic and atraumatic  Eyes: pupils equal, round, and reactive to light  Pulmonary/Chest:  normal air movement, no respiratory distress  Cardiovascular: bilateral DP's +1  Extremities: no cyanosis or clubbing; RLE +2 pitting edema, LLE nonpitting edema      Assessment:        Problem List Items Addressed This Visit       Skin ulcer of right lower leg with fat layer exposed ([address]    Relevant Orders    Initiate Outpatient Wound Care Protocol    Peripheral venous insufficiency    Relevant Orders    Initiate Outpatient Wound Care Protocol    Leg swelling - Primary    Relevant Orders    Initiate Outpatient Wound Care Protocol Procedure Note  Indications:  Based on my examination of this patient's wound(s)/ulcer(s) today, debridement is required to promote healing and evaluate the wound base. Performed by: VIDA Stone CNP    Consent obtained:  Yes    Time out taken:  Yes    Pain Control: Anesthetic  Anesthetic: 5% Lidocaine Ointment Topical       Debridement: Excisional Debridement    Using curette the wound(s)/ulcer(s) was/were debrided down through and including the removal of epidermis, dermis, and subcutaneous tissue.         Devitalized Tissue Debrided:  fibrin, biofilm, and slough    Pre Debridement Measurements:  Are located in the [place]  Documentation Flow Sheet    Diabetic/Pressure/Non Pressure Ulcers only:  Ulcer: Non-Pressure ulcer, fat layer exposed     Wound/Ulcer #: 1    Post Debridement Measurements:  Wound/Ulcer Descriptions are Pre Debridement except measurements:    Wound 10/05/22 Leg Right;Lateral;Proximal;Lower #1 Noted 9/28/22 (Active)   Wound Image   11/02/22 1023   Wound Etiology Venous 10/05/22 0913   Dressing Status Old drainage noted 11/09/22 1010   Dressing/Treatment Collagen with Ag;Moisten with saline;Gauze dressing/dressing sponge;Roll gauze 11/09/22 1110   Wound Length (cm) 1.9 cm 11/09/22 1010   Wound Width (cm) 1 cm 11/09/22 1010   Wound Depth (cm) 0.2 cm 11/09/22 1010   Wound Surface Area (cm^2) 1.9 cm^2 11/09/22 1010   Change in Wound Size % (l*w) -322.22 11/09/22 1010   Wound Volume (cm^3) 0.38 cm^3 11/09/22 1010   Wound Healing % -744 11/09/22 1010   Post-Procedure Length (cm) 2 cm 11/09/22 1048   Post-Procedure Width (cm) 1.1 cm 11/09/22 1048   Post-Procedure Depth (cm) 0.3 cm 11/09/22 1048   Post-Procedure Surface Area (cm^2) 2.2 cm^2 11/09/22 1048   Post-Procedure Volume (cm^3) 0.66 cm^3 11/09/22 1048   Wound Assessment Dry;Granulation tissue;Slough 11/09/22 1010   Drainage Amount Small 11/09/22 1010   Drainage Description Serosanguinous 11/09/22 1010   Odor None 11/09/22 1010 Consuelo-wound Assessment Dry/flaky 11/09/22 1010   Margins Attached edges 11/09/22 1010   Wound Thickness Description not for Pressure Injury Full thickness 11/09/22 1010   Number of days: 35          Total Surface Area Debrided:  2.2 sq cm     Estimated Blood Loss:  Minimal    Hemostasis Achieved:  by pressure    Procedural Pain:  2  / 10     Post Procedural Pain:  0 / 10     Response to treatment:  Well tolerated by patient. Continue using collagen with silver, moistened with NS. Cover with gauze and roll gauze, followed by a medium SpandaGrip. Change dressing every other day. Call with questions or concerns. Plan:     Treatment Note please see attached Discharge Instructions    Written patient dismissal instructions given to patient and signed by patient or POA. Discharge Instructions         [address] and [facility] Oxygen Therapy   Physician Orders and Discharge Instructions  [address] [place], [facility] [place] [facility]  Telephone: [phone]     NAME:  Fatemeh Mercer OF BIRTH:  1945  MEDICAL RECORD NUMBER:  [id number]  DATE:  11/9/2022     Wound Cleansing:   Do not scrub or use excessive force. Cleanse wound prior to applying a clean dressing with:  [x] Normal Saline  [] Keep Wound Dry in Shower    [] Wound Cleanser   [] Cleanse wound with Mild Soap & Water  [] May Shower at Discharge   [x] Other:   [address]     Topical Treatments:  Do not apply lotions, creams, or ointments to wound bed unless directed. [x] Apply moisturizing lotion to skin surrounding the wound prior to dressing change.  [] Apply antifungal ointment to skin surrounding the wound prior to dressing change.  [] Apply thin film of moisture barrier ointment to skin immediately around wound.   [] Other:                 Dressings:                Wound Location RIGHT LOWER LEG       [x] Apply Primary Dressing: [] MediHoney Gel      [] Alginate with Silver [] Alginate             [] Collagen     [x] Collagen with Silver   [] Santyl with Moisten saline gauze               [] Hydrocolloid            [] MediHoney Alginate        [] Foam with Silver             [] Foam   [] Hydrofera Blue            [] Mepilex Border                    [x] Moisten with Saline      [] Hydrogel     [] Mepitel                     [] Bactroban/Mupirocin         [] Polysporin              [] Other:       [x] Cover and Secure with:                        [x] Gauze       [] Filiberto          [x] Roll gauze             [] Ace Wrap    [] Cover Roll Tape     [] ABD                         [] Other:              Avoid contact of tape with skin. [x] Change dressing:          [] Daily          [x] Every Other Day [] Three times per week             [] Once a week         [] Do Not Change Dressing     [] Other:        Edema Control:  Apply:  [] Compression Stocking       []Right Leg     []Left Leg             [] Tubigrip      []Right Leg Double Layer      []Left Leg Double Layer                                              []Right Leg Single Layer       []Left Leg Single Layer             [x] SpandaGrip         [x]Right Leg   []Left Leg                                   []Low compression 5-10 mm/Hg                                            [x]Medium compression 10-20 mm/Hg                                   []High compression  20-30 mm/Hg             every morning immediately when getting up should be applied to affected leg(s) from mid foot to knee making sure to cover the heel. Remove every night before going to bed. [x] Elevate leg(s) above the level of the heart when sitting. [] Avoid prolonged standing in one place.              [] Elevate arm/hand above the level of the heart    []RightArm     []LeftArm            Compression:  Apply:  [] Multilayer Compression Wrap Applied in Clinic []RightLeg      []Left Leg             [] Multi-layer compression. Do not get leg(s) with wrap wet. If wraps become too tight call the center or completely remove the wrap. [] Elevate leg(s) above the level of the heart when sitting. [] Avoid prolonged standing in one place. [x] Diet as tolerated:     [x] Calorie Diabetic Diet:          [] No Added Salt:  [] Increase Protein:     [] Other:            Activity:  [x] Activity as tolerated:  [] Patient has no activity restrictions     [] Strict Bedrest:         [] Remain off Work:     [] May return to full duty work: If you are still having pain after you go home:  [] Elevate the affected limb. [] Use over-the-counter medications you would normally use for pain as permitted by your family doctor. [] For persistent pain not relieved by the above interventions, please call your family doctor. Return Appointment:  [x] Wound and dressing supply provider: Desi Suero  [] ECF or Home Healthcare:  [] Wound Assessment:         [] Physician or NP scheduled for Wound Assessment:   [x] Return Appointment: With Ambreen Dillon CNP  in  1 Week(s)  [] Ordered tests:      Nurse Case Manger:  Therese     Electronically signed by Saul Cross RN on 11/9/2022 at 10:49 AM          [facility] Information: Should you experience any significant changes in your wound(s) or have questions about your wound care, please contact the [address] [place] [address] at [address] 8:00 am - 4:30 pm and Friday 8:00 am - 12:30 pm.  If you need help with your wound outside these hours and cannot wait until we are again available, contact your PCP or go to the hospital emergency room. PLEASE NOTE: IF YOU ARE UNABLE TO OBTAIN WOUND SUPPLIES, CONTINUE TO USE THE SUPPLIES YOU HAVE AVAILABLE UNTIL YOU ARE ABLE TO REACH US. IT IS MOST IMPORTANT TO KEEP THE WOUND COVERED AT ALL TIMES. Physician Signature:_______________________     Date: ___________ Time:  ____________                              Zachary Sosa CNP/  Kentrell Crandall CNP        Electronically signed by Emory Schirmer, APRN - CNP on 11/9/2022 at 12:16 PM

## 2022-11-10 NOTE — DISCHARGE INSTRUCTIONS
[address] and [facility] Oxygen Therapy   Physician Orders and Discharge Instructions  [address]   [address], [place] [address]  Telephone: [phone]     NAME:  Lucrecia Hamman OF BIRTH:  1945  MEDICAL RECORD NUMBER:  [id number]  DATE:  11/16/2022     Wound Cleansing:   Do not scrub or use excessive force. Cleanse wound prior to applying a clean dressing with:  [x] Normal Saline  [] Keep Wound Dry in Shower    [] Wound Cleanser   [] Cleanse wound with Mild Soap & Water  [] May Shower at Discharge   [x] Other:   [address]     Topical Treatments:  Do not apply lotions, creams, or ointments to wound bed unless directed. [x] Apply moisturizing lotion to skin surrounding the wound prior to dressing change.  [] Apply antifungal ointment to skin surrounding the wound prior to dressing change.  [] Apply thin film of moisture barrier ointment to skin immediately around wound. [] Other:                 Dressings:                Wound Location RIGHT LOWER LEG       [x] Apply Primary Dressing:                                             [] MediHoney Gel      [] Alginate with Silver [] Alginate             [] Collagen     [x] Collagen with Silver   [] Santyl with Moisten saline gauze               [] Hydrocolloid            [] MediHoney Alginate        [] Foam with Silver             [] Foam   [] Hydrofera Blue            [] Mepilex Border                    [x] Moisten with Saline      [] Hydrogel     [] Mepitel                     [] Bactroban/Mupirocin         [] Polysporin              [] Other:       [x] Cover and Secure with:                        [x] Gauze       [] Filiberto          [x] Roll gauze             [] Ace Wrap    [] Cover Roll Tape     [] ABD                         [] Other:              Avoid contact of tape with skin.   [x] Change dressing:          [] Daily          [x] Every Other Day [] Three times per week             [] Once a week         [] Do Not Change Dressing     [] Other:        Edema Control:  Apply:  [] Compression Stocking       []Right Leg     []Left Leg             [] Tubigrip      []Right Leg Double Layer      []Left Leg Double Layer                                              []Right Leg Single Layer       []Left Leg Single Layer             [x] SpandaGrip         [x]Right Leg   []Left Leg                                   []Low compression 5-10 mm/Hg                                            [x]Medium compression 10-20 mm/Hg                                   []High compression  20-30 mm/Hg             every morning immediately when getting up should be applied to affected leg(s) from mid foot to knee making sure to cover the heel. Remove every night before going to bed. [x] Elevate leg(s) above the level of the heart when sitting. [] Avoid prolonged standing in one place. [] Elevate arm/hand above the level of the heart    []RightArm     []LeftArm            Compression:  Apply:  [] Multilayer Compression Wrap Applied in Clinic     []RightLeg      []Left Leg             [] Multi-layer compression. Do not get leg(s) with wrap wet. If wraps become too tight call the center or completely remove the wrap. [] Elevate leg(s) above the level of the heart when sitting. [] Avoid prolonged standing in one place. [x] Diet as tolerated:     [x] Calorie Diabetic Diet:          [] No Added Salt:  [] Increase Protein:     [] Other:            Activity:  [x] Activity as tolerated:  [] Patient has no activity restrictions     [] Strict Bedrest:         [] Remain off Work:     [] May return to full duty work: If you are still having pain after you go home:  [] Elevate the affected limb.     [] Use over-the-counter medications you would normally use for pain as permitted by your family doctor. [] For persistent pain not relieved by the above interventions, please call your family doctor. Return Appointment:  [x] Wound and dressing supply provider: Jerilyn Osler  [] ECF or Home Healthcare:  [] Wound Assessment:         [] Physician or NP scheduled for Wound Assessment:   [x] Return Appointment: With Eddie Olsen CNP  in  1 Week(s)  [] Ordered tests:      Nurse Case Manger:  [id number] Information: Should you experience any significant changes in your wound(s) or have questions about your wound care, please contact the [address] [place] [address] at [address] 8:00 am - 4:30 pm and Friday 8:00 am - 12:30 pm.  If you need help with your wound outside these hours and cannot wait until we are again available, contact your PCP or go to the hospital emergency room. PLEASE NOTE: IF YOU ARE UNABLE TO OBTAIN WOUND SUPPLIES, CONTINUE TO USE THE SUPPLIES YOU HAVE AVAILABLE UNTIL YOU ARE ABLE TO REACH US. IT IS MOST IMPORTANT TO KEEP THE WOUND COVERED AT ALL TIMES.      Physician Signature:_______________________     Date: ___________ Time:  ____________                              Brianna Grimes CNP

## 2022-11-16 ENCOUNTER — HOSPITAL ENCOUNTER (OUTPATIENT)
Dept: WOUND CARE | Age: 77
Discharge: HOME | End: 2022-11-16

## 2022-11-23 ENCOUNTER — HOSPITAL ENCOUNTER (OUTPATIENT)
Dept: WOUND CARE | Age: 77
Discharge: HOME | End: 2022-11-23
Payer: MEDICARE

## 2022-11-23 VITALS
HEART RATE: 108 BPM | TEMPERATURE: 98.1 F | SYSTOLIC BLOOD PRESSURE: 163 MMHG | RESPIRATION RATE: 18 BRPM | DIASTOLIC BLOOD PRESSURE: 86 MMHG

## 2022-11-23 DIAGNOSIS — M79.89 LEG SWELLING: Primary | ICD-10-CM

## 2022-11-23 DIAGNOSIS — L97.912 SKIN ULCER OF RIGHT LOWER LEG WITH FAT LAYER EXPOSED (HCC): ICD-10-CM

## 2022-11-23 DIAGNOSIS — I87.2 PERIPHERAL VENOUS INSUFFICIENCY: ICD-10-CM

## 2022-11-23 PROCEDURE — 11042 DBRDMT SUBQ TIS 1ST 20SQCM/<: CPT

## 2022-11-23 PROCEDURE — 11042 DBRDMT SUBQ TIS 1ST 20SQCM/<: CPT | Performed by: NURSE PRACTITIONER

## 2022-11-23 RX ORDER — LIDOCAINE HYDROCHLORIDE 20 MG/ML
JELLY TOPICAL ONCE
OUTPATIENT
Start: 2022-11-23 | End: 2022-11-23

## 2022-11-23 RX ORDER — LIDOCAINE 50 MG/G
OINTMENT TOPICAL ONCE
Status: COMPLETED | OUTPATIENT
Start: 2022-11-23 | End: 2022-11-23

## 2022-11-23 RX ORDER — LIDOCAINE 40 MG/G
CREAM TOPICAL ONCE
OUTPATIENT
Start: 2022-11-23 | End: 2022-11-23

## 2022-11-23 RX ORDER — BETAMETHASONE DIPROPIONATE 0.05 %
OINTMENT (GRAM) TOPICAL ONCE
OUTPATIENT
Start: 2022-11-23 | End: 2022-11-23

## 2022-11-23 RX ORDER — BACITRACIN, NEOMYCIN, POLYMYXIN B 400; 3.5; 5 [USP'U]/G; MG/G; [USP'U]/G
OINTMENT TOPICAL ONCE
OUTPATIENT
Start: 2022-11-23 | End: 2022-11-23

## 2022-11-23 RX ORDER — GINSENG 100 MG
CAPSULE ORAL ONCE
OUTPATIENT
Start: 2022-11-23 | End: 2022-11-23

## 2022-11-23 RX ORDER — GENTAMICIN SULFATE 1 MG/G
OINTMENT TOPICAL ONCE
OUTPATIENT
Start: 2022-11-23 | End: 2022-11-23

## 2022-11-23 RX ORDER — LIDOCAINE HYDROCHLORIDE 40 MG/ML
SOLUTION TOPICAL ONCE
OUTPATIENT
Start: 2022-11-23 | End: 2022-11-23

## 2022-11-23 RX ORDER — CLOBETASOL PROPIONATE 0.5 MG/G
OINTMENT TOPICAL ONCE
OUTPATIENT
Start: 2022-11-23 | End: 2022-11-23

## 2022-11-23 RX ORDER — LIDOCAINE 50 MG/G
OINTMENT TOPICAL ONCE
Status: CANCELLED | OUTPATIENT
Start: 2022-11-23 | End: 2022-11-23

## 2022-11-23 RX ORDER — BACITRACIN ZINC AND POLYMYXIN B SULFATE 500; 1000 [USP'U]/G; [USP'U]/G
OINTMENT TOPICAL ONCE
OUTPATIENT
Start: 2022-11-23 | End: 2022-11-23

## 2022-11-23 RX ADMIN — LIDOCAINE: 50 OINTMENT TOPICAL at 10:28

## 2022-11-23 ASSESSMENT — PAIN SCALES - GENERAL: PAINLEVEL_OUTOF10: 0

## 2022-11-23 NOTE — PROGRESS NOTES
[address]   Progress Note and Procedure Note      [address] RECORD NUMBER:  [id number]  AGE: 78 y.o. GENDER: female  : 1945  EPISODE DATE:  2022    Subjective:     Chief Complaint   Patient presents with    Wound Check     Follow up for right lower leg wound. HISTORY of PRESENT ILLNESS HPI  History of Wound Context: Several weeks ago right lateral leg wounds noted by pt, no excessive drainage, pain or redness noted. She denies constitutional issues, and has been dressing wounds at home. Denies itching or pain. Does not have any difficulty getting compression on or tolerating them. Wound/Ulcer Pain Timing/Severity:  none  Quality of pain: N/A  Severity:  0 / 10 at present  Modifying Factors:  None  Associated Signs/Symptoms: edema     Ulcer Identification:  Ulcer Type: venous     Contributing Factors: edema, venous stasis, and obesity     Acute Wound: Other: venous wounds. PAST MEDICAL HISTORY        Diagnosis Date    Asthma     Localized edema 2021    Non-pressure chronic ulcer of right lower leg with fat layer exposed ([address] 2021    Peripheral venous insufficiency 2021    Right foot ulcer, limited to breakdown of skin ([address] 2021    Skin ulcer of right lower leg with fat layer exposed ([address] 2021       PAST SURGICAL HISTORY    Past Surgical History:   Procedure Laterality Date    APPENDECTOMY      CHOLECYSTECTOMY         FAMILY HISTORY    Family History   Problem Relation Age of Onset    Migraines Mother        SOCIAL HISTORY    Social History     Tobacco Use    Smoking status: Never    Smokeless tobacco: Never   Substance Use Topics    Drug use: Never       ALLERGIES    Allergies   Allergen Reactions    Iodides Other (See Comments)     IVP dye, pt does not remember he reaction, just that the nurses stated to not let anyone give to her ever again    Furosemide      Pt.  States it makes her \"ditzy\", dizzy, and gives her muscle ridgity and nausea. MEDICATIONS    Current Outpatient Medications on File Prior to Encounter   Medication Sig Dispense Refill    clotrimazole-betamethasone (LOTRISONE) 1-0.05 % cream Apply topically 2 times daily. 45 g 3    triamterene-hydroCHLOROthiazide (MAXZIDE-25) 37.5-25 MG per tablet TAKE 1 TABLET BY MOUTH DAILY 90 tablet 1    metOLazone (ZAROXOLYN) 5 MG tablet TAKE ONE TABLET BY MOUTH DAILY AS NEEDED FOR SWELLING 30 tablet 1    albuterol sulfate HFA (PROAIR HFA) 108 (90 Base) MCG/ACT inhaler Inhale 2 puffs into the lungs every 6 hours as needed for Wheezing 1 Inhaler 0    Nebulizers (AIRIAL COMPACT MINI NEBULIZER) MISC 1 nebule by Does not apply route 4 times daily 1 each 0     No current facility-administered medications on file prior to encounter. REVIEW OF SYSTEMS  Review of Systems    Pertinent items are noted in HPI.     Objective:      BP (!) 163/86   Pulse (!) 108   Temp 98.1 °F (36.7 °C) (Infrared)   Resp 18     Wt Readings from Last 3 Encounters:   10/05/22 216 lb 11.4 oz (98.3 kg)   09/28/22 217 lb (98.4 kg)   08/18/22 223 lb (101.2 kg)       PHYSICAL EXAM  Physical Exam    General Appearance: alert and oriented to person, place and time  Skin: warm and dry, no rash or erythema  Head: normocephalic and atraumatic  Eyes: pupils equal, round, and reactive to light  Pulmonary/Chest:  normal air movement, no respiratory distress  Cardiovascular: normal rate, regular rhythm, and intact distal pulses      Assessment:        Problem List Items Addressed This Visit       Skin ulcer of right lower leg with fat layer exposed ([facility]    Relevant Orders    Initiate Outpatient Wound Care Protocol    Peripheral venous insufficiency    Relevant Orders    Initiate Outpatient Wound Care Protocol    Leg swelling - Primary    Relevant Orders    Initiate Outpatient Wound Care Protocol        Procedure Note  Indications:  Based on my examination of this patient's wound(s)/ulcer(s) today, debridement is required to promote healing and evaluate the wound base. Performed by: VIDA Cid CNP    Consent obtained:  Yes    Time out taken:  Yes    Pain Control: Anesthetic  Anesthetic: 5% Lidocaine Ointment Topical       Debridement: Excisional Debridement    Using curette and forceps the wound(s)/ulcer(s) was/were debrided down through and including the removal of epidermis, dermis, and subcutaneous tissue.         Devitalized Tissue Debrided:  fibrin, biofilm, and slough    Pre Debridement Measurements:  Are located in the [place]  Documentation Flow Sheet    Diabetic/Pressure/Non Pressure Ulcers only:  Ulcer: Non-Pressure ulcer, fat layer exposed     Wound/Ulcer #: 1    Post Debridement Measurements:  Wound/Ulcer Descriptions are Pre Debridement except measurements:    Wound 10/05/22 Leg Right;Lateral;Proximal;Lower #1 Noted 9/28/22 (Active)   Wound Image   11/02/22 1023   Wound Etiology Venous 10/05/22 0913   Dressing Status Old drainage noted 11/09/22 1010   Dressing/Treatment Collagen with Ag;Moisten with saline;Gauze dressing/dressing sponge;Roll gauze 11/23/22 1114   Wound Length (cm) 1.6 cm 11/23/22 1026   Wound Width (cm) 1 cm 11/23/22 1026   Wound Depth (cm) 0.1 cm 11/23/22 1026   Wound Surface Area (cm^2) 1.6 cm^2 11/23/22 1026   Change in Wound Size % (l*w) -255.56 11/23/22 1026   Wound Volume (cm^3) 0.16 cm^3 11/23/22 1026   Wound Healing % -256 11/23/22 1026   Post-Procedure Length (cm) 1.7 cm 11/23/22 1047   Post-Procedure Width (cm) 1.1 cm 11/23/22 1047   Post-Procedure Depth (cm) 0.2 cm 11/23/22 1047   Post-Procedure Surface Area (cm^2) 1.87 cm^2 11/23/22 1047   Post-Procedure Volume (cm^3) 0.374 cm^3 11/23/22 1047   Wound Assessment Granulation tissue;Slough 11/23/22 1026   Drainage Amount Small 11/23/22 1026   Drainage Description Serosanguinous 11/23/22 1026   Odor None 11/23/22 1026   Consuelo-wound Assessment Dry/flaky 11/23/22 1026   Margins Attached edges 11/23/22 1026 Wound Thickness Description not for Pressure Injury Full thickness 11/23/22 1026   Number of days: 49          Total Surface Area Debrided:  1.87 sq cm     Estimated Blood Loss:  Minimal    Hemostasis Achieved:  by pressure    Procedural Pain:  1  / 10     Post Procedural Pain:  0 / 10     Response to treatment:  Well tolerated by patient. Plan:     Treatment Note please see attached Discharge Instructions    Written patient dismissal instructions given to patient and signed by patient or POA. Discharge Instructions         [address] and Hyperbaric Oxygen Therapy   Physician Orders and Discharge Instructions  [address]   [address], [address]  Telephone: [phone]     NAME:  Eliseo Willoughby OF BIRTH:  1945  MEDICAL RECORD NUMBER:  [id number]  DATE:  11/23/2022     Wound Cleansing:   Do not scrub or use excessive force. Cleanse wound prior to applying a clean dressing with:  [x] Normal Saline  [] Keep Wound Dry in Shower    [] Wound Cleanser   [] Cleanse wound with Mild Soap & Water  [] May Shower at Discharge   [x] Other:   [facility]     Topical Treatments:  Do not apply lotions, creams, or ointments to wound bed unless directed. [x] Apply moisturizing lotion to skin surrounding the wound prior to dressing change.  [] Apply antifungal ointment to skin surrounding the wound prior to dressing change.  [] Apply thin film of moisture barrier ointment to skin immediately around wound.   [] Other:                 Dressings:                Wound Location RIGHT LOWER LEG       [x] Apply Primary Dressing:                                             [] MediHoney Gel      [] Alginate with Silver [] Alginate             [] Collagen     [x] Collagen with Silver   [] Santyl with Moisten saline gauze               [] Hydrocolloid            [] MediHoney Alginate        [] Foam with Silver             [] Foam   [] Hydrofera Blue            [] Mepilex Border                    [x] Moisten with Saline      [] Hydrogel     [] Mepitel                     [] Bactroban/Mupirocin         [] Polysporin              [] Other:       [x] Cover and Secure with:                        [x] Gauze       [] Filiberto          [x] Roll gauze             [] Ace Wrap    [] Cover Roll Tape     [] ABD                         [] Other:              Avoid contact of tape with skin. [x] Change dressing:          [] Daily          [x] Every Other Day [] Three times per week             [] Once a week         [] Do Not Change Dressing     [] Other:        Edema Control:  Apply:  [] Compression Stocking       []Right Leg     []Left Leg             [] Tubigrip      []Right Leg Double Layer      []Left Leg Double Layer                                              []Right Leg Single Layer       []Left Leg Single Layer             [x] SpandaGrip         [x]Right Leg   []Left Leg                                   []Low compression 5-10 mm/Hg                                            [x]Medium compression 10-20 mm/Hg                                   []High compression  20-30 mm/Hg             every morning immediately when getting up should be applied to affected leg(s) from mid foot to knee making sure to cover the heel. Remove every night before going to bed. [x] Elevate leg(s) above the level of the heart when sitting. [] Avoid prolonged standing in one place. [] Elevate arm/hand above the level of the heart    []RightArm     []LeftArm            Compression:  Apply:  [] Multilayer Compression Wrap Applied in Clinic     []RightLeg      []Left Leg             [] Multi-layer compression. Do not get leg(s) with wrap wet. If wraps become too tight call the center or completely remove the wrap. [] Elevate leg(s) above the level of the heart when sitting.               [] Avoid prolonged standing in one place. [x] Diet as tolerated:     [x] Calorie Diabetic Diet:          [] No Added Salt:  [] Increase Protein:     [] Other:            Activity:  [x] Activity as tolerated:  [] Patient has no activity restrictions     [] Strict Bedrest:         [] Remain off Work:     [] May return to full duty work: If you are still having pain after you go home:  [] Elevate the affected limb. [] Use over-the-counter medications you would normally use for pain as permitted by your family doctor. [] For persistent pain not relieved by the above interventions, please call your family doctor. Return Appointment:  [x] Wound and dressing supply provider: Srinath Madera  [] ECF or Home Healthcare:  [] Wound Assessment:         [] Physician or NP scheduled for Wound Assessment:   [x] Return Appointment: With Jessica Luna CNP  in  1 Week(s)  [] Ordered tests:      Nurse Case Taqueriaer:  Therese        Electronically signed by Dameon Fuentes RN on 11/23/2022 at 10:48 AM          [address] [place] [address] Information: Should you experience any significant changes in your wound(s) or have questions about your wound care, please contact the [address] [place] [address] at [address] 8:00 am - 4:30 pm and Friday 8:00 am - 12:30 pm.  If you need help with your wound outside these hours and cannot wait until we are again available, contact your PCP or go to the hospital emergency room. PLEASE NOTE: IF YOU ARE UNABLE TO OBTAIN WOUND SUPPLIES, CONTINUE TO USE THE SUPPLIES YOU HAVE AVAILABLE UNTIL YOU ARE ABLE TO REACH US. IT IS MOST IMPORTANT TO KEEP THE WOUND COVERED AT ALL TIMES.      Physician Signature:_______________________     Date: ___________ Time:  ____________                              Paul Capone CNP           Electronically signed by VIDA Oswald CNP on 11/23/2022 at 2:54 PM

## 2022-11-28 NOTE — DISCHARGE INSTRUCTIONS
[address] and Hyper[facility]ic Oxygen Therapy   Physician Orders and Discharge Instructions  [place] [id number] [facility]   [address] [facility]  Telephone: [phone]     NAME:  Jeana Gill OF BIRTH:  1945  MEDICAL RECORD NUMBER:  [id number]  DATE:  11/30/2022     Wound Cleansing:   Do not scrub or use excessive force. Cleanse wound prior to applying a clean dressing with:  [x] Normal Saline  [] Keep Wound Dry in Shower    [] Wound Cleanser   [] Cleanse wound with Mild Soap & Water  [] May Shower at Discharge   [] Other:        Topical Treatments:  Do not apply lotions, creams, or ointments to wound bed unless directed. [x] Apply moisturizing lotion to skin surrounding the wound prior to dressing change.  [] Apply antifungal ointment to skin surrounding the wound prior to dressing change.  [] Apply thin film of moisture barrier ointment to skin immediately around wound. [] Other:                 Dressings:                Wound Location RIGHT LOWER LEG       [x] Apply Primary Dressing:                                             [] MediHoney Gel      [] Alginate with Silver [] Alginate             [] Collagen     [] Collagen with Silver   [] Santyl with Moisten saline gauze               [] Hydrocolloid            [] MediHoney Alginate        [] Foam with Silver             [] Foam   [x] Hydrofera Blue  Border       [] Mepilex Border                    [] Moisten with Saline      [] Hydrogel     [] Mepitel                     [] Bactroban/Mupirocin         [] Polysporin              [] Other:       [x] Cover and Secure with:                        [] Gauze       [] Filiberto          [] Roll gauze             [] Ace Wrap    [] Cover Roll Tape     [] ABD                         [] Other:              Avoid contact of tape with skin.   [x] Change dressing:          [] Daily          [] Every Other Day [x] Three times per week  (Twice at home Friday and Monday)             [] Once a week         [] Do Not Change Dressing     [] Other:        Edema Control:  Apply:  [] Compression Stocking       []Right Leg     []Left Leg             [] Tubigrip      []Right Leg Double Layer      []Left Leg Double Layer                                              []Right Leg Single Layer       []Left Leg Single Layer             [x] SpandaGrip         [x]Right Leg   []Left Leg                                   []Low compression 5-10 mm/Hg                                            [x]Medium compression 10-20 mm/Hg                                   []High compression  20-30 mm/Hg             every morning immediately when getting up should be applied to affected leg(s) from mid foot to knee making sure to cover the heel. Remove every night before going to bed. [x] Elevate leg(s) above the level of the heart when sitting. [] Avoid prolonged standing in one place. [] Elevate arm/hand above the level of the heart    []RightArm     []LeftArm            Compression:  Apply:  [] Multilayer Compression Wrap Applied in Clinic     []RightLeg      []Left Leg             [] Multi-layer compression. Do not get leg(s) with wrap wet. If wraps become too tight call the center or completely remove the wrap. [] Elevate leg(s) above the level of the heart when sitting. [] Avoid prolonged standing in one place. [x] Diet as tolerated:     [x] Calorie Diabetic Diet:          [] No Added Salt:  [] Increase Protein:     [] Other:            Activity:  [x] Activity as tolerated:  [] Patient has no activity restrictions     [] Strict Bedrest:         [] Remain off Work:     [] May return to full duty work: If you are still having pain after you go home:  [] Elevate the affected limb.     [] Use over-the-counter medications you would normally use for pain as permitted by your family doctor. [] For persistent pain not relieved by the above interventions, please call your family doctor. Return Appointment:  [x] Wound and dressing supply provider: Lauri Cote  [] ECF or Home Healthcare:  [] Wound Assessment:         [] Physician or NP scheduled for Wound Assessment:   [x] Return Appointment: With India Trejo CNP  in  1 Week(s)  [] Ordered tests:      Nurse Case Manger:  Therese        Electronically signed by Emelina Wiley RN on 11/30/2022 at 11:51 [address] Information: Should you experience any significant changes in your wound(s) or have questions about your wound care, please contact the [address] [place] [address] at [address] 8:00 am - 4:30 pm and Friday 8:00 am - 12:30 pm.  If you need help with your wound outside these hours and cannot wait until we are again available, contact your PCP or go to the hospital emergency room. PLEASE NOTE: IF YOU ARE UNABLE TO OBTAIN WOUND SUPPLIES, CONTINUE TO USE THE SUPPLIES YOU HAVE AVAILABLE UNTIL YOU ARE ABLE TO REACH US. IT IS MOST IMPORTANT TO KEEP THE WOUND COVERED AT ALL TIMES.      Physician Signature:_______________________     Date: ___________ Time:  ____________                              Henri Mancilla CNP

## 2022-11-30 ENCOUNTER — HOSPITAL ENCOUNTER (OUTPATIENT)
Dept: WOUND CARE | Age: 77
Discharge: HOME | End: 2022-11-30
Payer: MEDICARE

## 2022-11-30 VITALS
TEMPERATURE: 96.8 F | RESPIRATION RATE: 18 BRPM | HEART RATE: 80 BPM | DIASTOLIC BLOOD PRESSURE: 82 MMHG | SYSTOLIC BLOOD PRESSURE: 155 MMHG

## 2022-11-30 DIAGNOSIS — M79.89 LEG SWELLING: Primary | ICD-10-CM

## 2022-11-30 DIAGNOSIS — L97.912 SKIN ULCER OF RIGHT LOWER LEG WITH FAT LAYER EXPOSED (HCC): ICD-10-CM

## 2022-11-30 DIAGNOSIS — I87.2 PERIPHERAL VENOUS INSUFFICIENCY: ICD-10-CM

## 2022-11-30 PROCEDURE — 11042 DBRDMT SUBQ TIS 1ST 20SQCM/<: CPT

## 2022-11-30 RX ORDER — LIDOCAINE HYDROCHLORIDE 20 MG/ML
JELLY TOPICAL ONCE
OUTPATIENT
Start: 2022-11-30 | End: 2022-11-30

## 2022-11-30 RX ORDER — BACITRACIN ZINC AND POLYMYXIN B SULFATE 500; 1000 [USP'U]/G; [USP'U]/G
OINTMENT TOPICAL ONCE
OUTPATIENT
Start: 2022-11-30 | End: 2022-11-30

## 2022-11-30 RX ORDER — LIDOCAINE HYDROCHLORIDE 40 MG/ML
SOLUTION TOPICAL ONCE
OUTPATIENT
Start: 2022-11-30 | End: 2022-11-30

## 2022-11-30 RX ORDER — BETAMETHASONE DIPROPIONATE 0.05 %
OINTMENT (GRAM) TOPICAL ONCE
OUTPATIENT
Start: 2022-11-30 | End: 2022-11-30

## 2022-11-30 RX ORDER — LIDOCAINE 50 MG/G
OINTMENT TOPICAL ONCE
Status: COMPLETED | OUTPATIENT
Start: 2022-11-30 | End: 2022-11-30

## 2022-11-30 RX ORDER — CLOBETASOL PROPIONATE 0.5 MG/G
OINTMENT TOPICAL ONCE
OUTPATIENT
Start: 2022-11-30 | End: 2022-11-30

## 2022-11-30 RX ORDER — GINSENG 100 MG
CAPSULE ORAL ONCE
OUTPATIENT
Start: 2022-11-30 | End: 2022-11-30

## 2022-11-30 RX ORDER — LIDOCAINE 40 MG/G
CREAM TOPICAL ONCE
OUTPATIENT
Start: 2022-11-30 | End: 2022-11-30

## 2022-11-30 RX ORDER — GENTAMICIN SULFATE 1 MG/G
OINTMENT TOPICAL ONCE
OUTPATIENT
Start: 2022-11-30 | End: 2022-11-30

## 2022-11-30 RX ORDER — BACITRACIN, NEOMYCIN, POLYMYXIN B 400; 3.5; 5 [USP'U]/G; MG/G; [USP'U]/G
OINTMENT TOPICAL ONCE
OUTPATIENT
Start: 2022-11-30 | End: 2022-11-30

## 2022-11-30 RX ORDER — LIDOCAINE 50 MG/G
OINTMENT TOPICAL ONCE
OUTPATIENT
Start: 2022-11-30 | End: 2022-11-30

## 2022-11-30 RX ADMIN — LIDOCAINE: 50 OINTMENT TOPICAL at 11:58

## 2022-11-30 ASSESSMENT — PAIN SCALES - GENERAL
PAINLEVEL_OUTOF10: 0
PAINLEVEL_OUTOF10: 0

## 2022-11-30 NOTE — PROGRESS NOTES
[address]   Progress Note and Procedure Note      [address] RECORD NUMBER:  [id number]  AGE: 78 y.o. GENDER: female  : 1945  EPISODE DATE:  2022    Subjective:     Chief Complaint   Patient presents with    Wound Check     Follow up for right lower leg wound. HISTORY of PRESENT ILLNESS HPI  History of Wound Context: Several weeks ago right lateral leg wounds noted by pt, no excessive drainage, pain or redness noted. She denies constitutional issues, and has been dressing wounds at home. Denies itching or pain. Does not have any difficulty getting compression on or tolerating them. Today states wound has been looking better and she denies constitutional issues. Wound/Ulcer Pain Timing/Severity:  none  Quality of pain: N/A  Severity:  0 / 10 at present  Modifying Factors:  None  Associated Signs/Symptoms: edema     Ulcer Identification:  Ulcer Type: venous     Contributing Factors: edema, venous stasis, and obesity     Acute Wound: Other: venous wounds.     PAST MEDICAL HISTORY        Diagnosis Date    Asthma     Localized edema 2021    Non-pressure chronic ulcer of right lower leg with fat layer exposed ([address] 2021    Peripheral venous insufficiency 2021    Right foot ulcer, limited to breakdown of skin ([address] 2021    Skin ulcer of right lower leg with fat layer exposed ([address] 2021       PAST SURGICAL HISTORY    Past Surgical History:   Procedure Laterality Date    APPENDECTOMY      CHOLECYSTECTOMY         FAMILY HISTORY    Family History   Problem Relation Age of Onset    Migraines Mother        SOCIAL HISTORY    Social History     Tobacco Use    Smoking status: Never    Smokeless tobacco: Never   Substance Use Topics    Drug use: Never       ALLERGIES    Allergies   Allergen Reactions    Iodides Other (See Comments)     IVP dye, pt does not remember he reaction, just that the nurses stated to not let anyone give to her ever again    Furosemide      Pt. States it makes her \"ditzy\", dizzy, and gives her muscle ridgity and nausea. MEDICATIONS    Current Outpatient Medications on File Prior to Encounter   Medication Sig Dispense Refill    clotrimazole-betamethasone (LOTRISONE) 1-0.05 % cream Apply topically 2 times daily. 45 g 3    triamterene-hydroCHLOROthiazide (MAXZIDE-25) 37.5-25 MG per tablet TAKE 1 TABLET BY MOUTH DAILY 90 tablet 1    metOLazone (ZAROXOLYN) 5 MG tablet TAKE ONE TABLET BY MOUTH DAILY AS NEEDED FOR SWELLING 30 tablet 1    albuterol sulfate HFA (PROAIR HFA) 108 (90 Base) MCG/ACT inhaler Inhale 2 puffs into the lungs every 6 hours as needed for Wheezing 1 Inhaler 0    Nebulizers (AIRIAL COMPACT MINI NEBULIZER) MISC 1 nebule by Does not apply route 4 times daily 1 each 0     No current facility-administered medications on file prior to encounter. REVIEW OF SYSTEMS  Review of Systems    Pertinent items are noted in HPI.     Objective:      BP (!) 155/82   Pulse 80   Temp 96.8 °F (36 °C) (Infrared)   Resp 18     Wt Readings from Last 3 Encounters:   10/05/22 216 lb 11.4 oz (98.3 kg)   09/28/22 217 lb (98.4 kg)   08/18/22 223 lb (101.2 kg)       PHYSICAL EXAM  Physical Exam    General Appearance: alert and oriented to person, place and time, well-developed and well-nourished, in no acute distress  Skin: warm and dry, no rash or erythema  Head: normocephalic and atraumatic  Eyes: pupils equal, round, and reactive to light  Pulmonary/Chest: normal air movement, no respiratory distress  Cardiovascular: normal rate, regular rhythm, and intact distal pulses      Assessment:        Problem List Items Addressed This Visit       Skin ulcer of right lower leg with fat layer exposed ([address]    Peripheral venous insufficiency    Leg swelling - Primary        Procedure Note  Indications:  Based on my examination of this patient's wound(s)/ulcer(s) today, debridement is required to promote healing and evaluate the wound base.    Performed by: VIDA Damon CNP    Consent obtained:  Yes    Time out taken:  Yes    Pain Control: Anesthetic  Anesthetic: 5% Lidocaine Ointment Topical       Debridement: Excisional Debridement    Using curette the wound(s)/ulcer(s) was/were debrided down through and including the removal of epidermis, dermis, and subcutaneous tissue.         Devitalized Tissue Debrided:  fibrin, biofilm, and slough    Pre Debridement Measurements:  Are located in the [place]  Documentation Flow Sheet    Diabetic/Pressure/Non Pressure Ulcers only:  Ulcer: Non-Pressure ulcer, fat layer exposed     Wound/Ulcer #: 1    Post Debridement Measurements:  Wound/Ulcer Descriptions are Pre Debridement except measurements:    Wound 10/05/22 Leg Right;Lateral;Proximal;Lower #1 Noted 9/28/22 (Active)   Wound Image   11/02/22 1023   Wound Etiology Venous 10/05/22 0913   Dressing Status Old drainage noted 11/09/22 1010   Dressing/Treatment Hydrofera blue 11/30/22 1200   Wound Length (cm) 1.4 cm 11/30/22 1121   Wound Width (cm) 0.8 cm 11/30/22 1121   Wound Depth (cm) 0.1 cm 11/30/22 1121   Wound Surface Area (cm^2) 1.12 cm^2 11/30/22 1121   Change in Wound Size % (l*w) -148.89 11/30/22 1121   Wound Volume (cm^3) 0.112 cm^3 11/30/22 1121   Wound Healing % -149 11/30/22 1121   Post-Procedure Length (cm) 1.5 cm 11/30/22 1152   Post-Procedure Width (cm) 0.9 cm 11/30/22 1152   Post-Procedure Depth (cm) 0.2 cm 11/30/22 1152   Post-Procedure Surface Area (cm^2) 1.35 cm^2 11/30/22 1152   Post-Procedure Volume (cm^3) 0.27 cm^3 11/30/22 1152   Wound Assessment Granulation tissue;Slough 11/30/22 1121   Drainage Amount Small 11/30/22 1121   Drainage Description Serosanguinous 11/30/22 1121   Odor None 11/30/22 1121   Consuelo-wound Assessment Dry/flaky 11/30/22 1121   Margins Attached edges 11/30/22 1121   Wound Thickness Description not for Pressure Injury Full thickness 11/30/22 1121   Number of days: 57          Total Surface Area Debrided: 1.35 sq cm     Estimated Blood Loss:  Minimal    Hemostasis Achieved:  by pressure    Procedural Pain:  1  / 10     Post Procedural Pain:  0 / 10     Response to treatment:  Well tolerated by patient. Plan:     Treatment Note please see attached Discharge Instructions    Written patient dismissal instructions given to patient and signed by patient or POA. Discharge Instructions         [address] and Hyperbaric Oxygen Therapy   Physician Orders and Discharge Instructions  [address]   [address] [facility] [place]  Telephone: [phone]     NAME:  Kelvin Hope OF BIRTH:  1945  MEDICAL RECORD NUMBER:  [id number]  DATE:  11/30/2022     Wound Cleansing:   Do not scrub or use excessive force. Cleanse wound prior to applying a clean dressing with:  [x] Normal Saline  [] Keep Wound Dry in Shower    [] Wound Cleanser   [] Cleanse wound with Mild Soap & Water  [] May Shower at Discharge   [] Other:        Topical Treatments:  Do not apply lotions, creams, or ointments to wound bed unless directed. [x] Apply moisturizing lotion to skin surrounding the wound prior to dressing change.  [] Apply antifungal ointment to skin surrounding the wound prior to dressing change.  [] Apply thin film of moisture barrier ointment to skin immediately around wound.   [] Other:                 Dressings:                Wound Location RIGHT LOWER LEG       [x] Apply Primary Dressing:                                             [] MediHoney Gel      [] Alginate with Silver [] Alginate             [] Collagen     [] Collagen with Silver   [] Santyl with Moisten saline gauze               [] Hydrocolloid            [] MediHoney Alginate        [] Foam with Silver             [] Foam   [x] Hydrofera Blue  Border       [] Mepilex Border                    [] Moisten with Saline      [] Hydrogel     [] Mepitel [] Bactroban/Mupirocin         [] Polysporin              [] Other:       [x] Cover and Secure with:                        [] Gauze       [] Filiberto          [] Roll gauze             [] Ace Wrap    [] Cover Roll Tape     [] ABD                         [] Other:              Avoid contact of tape with skin. [x] Change dressing:          [] Daily          [] Every Other Day [x] Three times per week  (Twice at home Friday and Monday)             [] Once a week         [] Do Not Change Dressing     [] Other:        Edema Control:  Apply:  [] Compression Stocking       []Right Leg     []Left Leg             [] Tubigrip      []Right Leg Double Layer      []Left Leg Double Layer                                              []Right Leg Single Layer       []Left Leg Single Layer             [x] SpandaGrip         [x]Right Leg   []Left Leg                                   []Low compression 5-10 mm/Hg                                            [x]Medium compression 10-20 mm/Hg                                   []High compression  20-30 mm/Hg             every morning immediately when getting up should be applied to affected leg(s) from mid foot to knee making sure to cover the heel. Remove every night before going to bed. [x] Elevate leg(s) above the level of the heart when sitting. [] Avoid prolonged standing in one place. [] Elevate arm/hand above the level of the heart    []RightArm     []LeftArm            Compression:  Apply:  [] Multilayer Compression Wrap Applied in Clinic     []RightLeg      []Left Leg             [] Multi-layer compression. Do not get leg(s) with wrap wet. If wraps become too tight call the center or completely remove the wrap. [] Elevate leg(s) above the level of the heart when sitting. [] Avoid prolonged standing in one place.      [x] Diet as tolerated:     [x] Calorie Diabetic Diet:          [] No Added Salt:  [] Increase Protein:     [] Other:            Activity:  [x] Activity as tolerated:  [] Patient has no activity restrictions     [] Strict Bedrest:         [] Remain off Work:     [] May return to full duty work: If you are still having pain after you go home:  [] Elevate the affected limb. [] Use over-the-counter medications you would normally use for pain as permitted by your family doctor. [] For persistent pain not relieved by the above interventions, please call your family doctor. Return Appointment:  [x] Wound and dressing supply provider: Eva Nazario  [] ECF or Home Healthcare:  [] Wound Assessment:         [] Physician or NP scheduled for Wound Assessment:   [x] Return Appointment: With Cole Pickett CNP  in  1 Week(s)  [] Ordered tests:      Nurse Case Manger:  Therese        Electronically signed by Augusto Donis RN on 11/30/2022 at 11:51 [address] Information: Should you experience any significant changes in your wound(s) or have questions about your wound care, please contact the [address] [place] [address] at [address] 8:00 am - 4:30 pm and Friday 8:00 am - 12:30 pm.  If you need help with your wound outside these hours and cannot wait until we are again available, contact your PCP or go to the hospital emergency room. PLEASE NOTE: IF YOU ARE UNABLE TO OBTAIN WOUND SUPPLIES, CONTINUE TO USE THE SUPPLIES YOU HAVE AVAILABLE UNTIL YOU ARE ABLE TO REACH US. IT IS MOST IMPORTANT TO KEEP THE WOUND COVERED AT ALL TIMES.      Physician Signature:_______________________     Date: ___________ Time:  ____________                              Kimberly Pacheco CNP               Electronically signed by VIDA Wylie CNP on 12/1/2022 at 9:21 AM

## 2022-12-01 NOTE — DISCHARGE INSTRUCTIONS
[address] and Hyperbaric Oxygen Therapy   Physician Orders and Discharge Instructions  [place] [id number] [facility]   [address]  Telephone: [phone]     NAME:  Barb Avery OF BIRTH:  1945  MEDICAL RECORD NUMBER:  [id number]  DATE:  12/7/2022     Wound Cleansing:   Do not scrub or use excessive force. Cleanse wound prior to applying a clean dressing with:  [x] Normal Saline  [] Keep Wound Dry in Shower    [] Wound Cleanser   [] Cleanse wound with Mild Soap & Water  [] May Shower at Discharge   [] Other:        Topical Treatments:  Do not apply lotions, creams, or ointments to wound bed unless directed. [x] Apply moisturizing lotion to skin surrounding the wound prior to dressing change.  [] Apply antifungal ointment to skin surrounding the wound prior to dressing change.  [] Apply thin film of moisture barrier ointment to skin immediately around wound. [] Other:                 Dressings:                Wound Location RIGHT LOWER LEG       [x] Apply Primary Dressing:                                             [] MediHoney Gel      [] Alginate with Silver [] Alginate             [] Collagen     [] Collagen with Silver   [] Santyl with Moisten saline gauze               [] Hydrocolloid            [] MediHoney Alginate        [] Foam with Silver             [] Foam   [x] Hydrofera Blue  Border       [] Mepilex Border                    [] Moisten with Saline      [] Hydrogel     [] Mepitel                     [] Bactroban/Mupirocin         [] Polysporin              [] Other:       [x] Cover and Secure with:                        [] Gauze       [] Filiberto          [] Roll gauze             [] Ace Wrap    [] Cover Roll Tape     [] ABD                         [] Other:              Avoid contact of tape with skin.   [x] Change dressing:          [] Daily          [] Every Other Day [x] Three times per week  (Twice at home Friday and Monday)             [] Once a week         [] Do Not Change Dressing     [] Other:        Edema Control:  Apply:  [] Compression Stocking       []Right Leg     []Left Leg             [] Tubigrip      []Right Leg Double Layer      []Left Leg Double Layer                                              []Right Leg Single Layer       []Left Leg Single Layer             [x] SpandaGrip         [x]Right Leg   []Left Leg                                   []Low compression 5-10 mm/Hg                                            [x]Medium compression 10-20 mm/Hg                                   []High compression  20-30 mm/Hg             every morning immediately when getting up should be applied to affected leg(s) from mid foot to knee making sure to cover the heel. Remove every night before going to bed. [x] Elevate leg(s) above the level of the heart when sitting. [] Avoid prolonged standing in one place. [] Elevate arm/hand above the level of the heart    []RightArm     []LeftArm            Compression:  Apply:  [] Multilayer Compression Wrap Applied in Clinic     []RightLeg      []Left Leg             [] Multi-layer compression. Do not get leg(s) with wrap wet. If wraps become too tight call the center or completely remove the wrap. [] Elevate leg(s) above the level of the heart when sitting. [] Avoid prolonged standing in one place. [x] Diet as tolerated:     [x] Calorie Diabetic Diet:          [] No Added Salt:  [] Increase Protein:     [] Other:            Activity:  [x] Activity as tolerated:  [] Patient has no activity restrictions     [] Strict Bedrest:         [] Remain off Work:     [] May return to full duty work: If you are still having pain after you go home:  [] Elevate the affected limb.     [] Use over-the-counter medications you would normally use for pain as permitted by your family doctor. [] For persistent pain not relieved by the above interventions, please call your family doctor. Return Appointment:  [x] Wound and dressing supply provider: Khari Alfaro  [] ECF or Home Healthcare:  [] Wound Assessment:         [] Physician or NP scheduled for Wound Assessment:   [x] Return Appointment: With Osman Camargo CNP  in  1 Week(s)  [] Ordered tests:      Nurse Case Manger:  Therese              Electronically signed by Chaya Cool RN on 12/7/2022 at 9:52 AM       [facility] Information: Should you experience any significant changes in your wound(s) or have questions about your wound care, please contact the [address] [place] [address] at [address] 8:00 am - 4:30 pm and Friday 8:00 am - 12:30 pm.  If you need help with your wound outside these hours and cannot wait until we are again available, contact your PCP or go to the hospital emergency room. PLEASE NOTE: IF YOU ARE UNABLE TO OBTAIN WOUND SUPPLIES, CONTINUE TO USE THE SUPPLIES YOU HAVE AVAILABLE UNTIL YOU ARE ABLE TO REACH US. IT IS MOST IMPORTANT TO KEEP THE WOUND COVERED AT ALL TIMES.      Physician Signature:_______________________     Date: ___________ Time:  ____________                              Sigrid Vogel CNP

## 2022-12-07 ENCOUNTER — HOSPITAL ENCOUNTER (OUTPATIENT)
Dept: WOUND CARE | Age: 77
Discharge: HOME | End: 2022-12-07
Payer: MEDICARE

## 2022-12-07 VITALS
DIASTOLIC BLOOD PRESSURE: 78 MMHG | SYSTOLIC BLOOD PRESSURE: 161 MMHG | HEART RATE: 82 BPM | RESPIRATION RATE: 18 BRPM | TEMPERATURE: 97.3 F

## 2022-12-07 DIAGNOSIS — M79.89 LEG SWELLING: Primary | ICD-10-CM

## 2022-12-07 DIAGNOSIS — L97.912 SKIN ULCER OF RIGHT LOWER LEG WITH FAT LAYER EXPOSED (HCC): ICD-10-CM

## 2022-12-07 DIAGNOSIS — I87.2 PERIPHERAL VENOUS INSUFFICIENCY: ICD-10-CM

## 2022-12-07 PROCEDURE — 11042 DBRDMT SUBQ TIS 1ST 20SQCM/<: CPT

## 2022-12-07 RX ORDER — LIDOCAINE 40 MG/G
CREAM TOPICAL ONCE
OUTPATIENT
Start: 2022-12-07 | End: 2022-12-07

## 2022-12-07 RX ORDER — GINSENG 100 MG
CAPSULE ORAL ONCE
OUTPATIENT
Start: 2022-12-07 | End: 2022-12-07

## 2022-12-07 RX ORDER — LIDOCAINE HYDROCHLORIDE 20 MG/ML
JELLY TOPICAL ONCE
OUTPATIENT
Start: 2022-12-07 | End: 2022-12-07

## 2022-12-07 RX ORDER — LIDOCAINE HYDROCHLORIDE 40 MG/ML
SOLUTION TOPICAL ONCE
OUTPATIENT
Start: 2022-12-07 | End: 2022-12-07

## 2022-12-07 RX ORDER — LIDOCAINE HYDROCHLORIDE 40 MG/ML
SOLUTION TOPICAL ONCE
Status: COMPLETED | OUTPATIENT
Start: 2022-12-07 | End: 2022-12-07

## 2022-12-07 RX ORDER — CLOBETASOL PROPIONATE 0.5 MG/G
OINTMENT TOPICAL ONCE
OUTPATIENT
Start: 2022-12-07 | End: 2022-12-07

## 2022-12-07 RX ORDER — BACITRACIN, NEOMYCIN, POLYMYXIN B 400; 3.5; 5 [USP'U]/G; MG/G; [USP'U]/G
OINTMENT TOPICAL ONCE
OUTPATIENT
Start: 2022-12-07 | End: 2022-12-07

## 2022-12-07 RX ORDER — BETAMETHASONE DIPROPIONATE 0.05 %
OINTMENT (GRAM) TOPICAL ONCE
OUTPATIENT
Start: 2022-12-07 | End: 2022-12-07

## 2022-12-07 RX ORDER — GENTAMICIN SULFATE 1 MG/G
OINTMENT TOPICAL ONCE
OUTPATIENT
Start: 2022-12-07 | End: 2022-12-07

## 2022-12-07 RX ORDER — BACITRACIN ZINC AND POLYMYXIN B SULFATE 500; 1000 [USP'U]/G; [USP'U]/G
OINTMENT TOPICAL ONCE
OUTPATIENT
Start: 2022-12-07 | End: 2022-12-07

## 2022-12-07 RX ORDER — LIDOCAINE 50 MG/G
OINTMENT TOPICAL ONCE
OUTPATIENT
Start: 2022-12-07 | End: 2022-12-07

## 2022-12-07 RX ADMIN — LIDOCAINE HYDROCHLORIDE: 40 SOLUTION TOPICAL at 09:39

## 2022-12-07 ASSESSMENT — PAIN SCALES - GENERAL
PAINLEVEL_OUTOF10: 0
PAINLEVEL_OUTOF10: 0

## 2022-12-07 NOTE — DISCHARGE INSTRUCTIONS
[address] and Hyper[facility]ic Oxygen Therapy   Physician Orders and Discharge Instructions  [address]   [address] [place] [address]  Telephone: [phone]     NAME:  Eliseo Willoughby OF BIRTH:  1945  MEDICAL RECORD NUMBER:  [id number]  DATE:  12/14/2022     Wound Cleansing:   Do not scrub or use excessive force. Cleanse wound prior to applying a clean dressing with:  [x] Normal Saline  [] Keep Wound Dry in Shower    [] Wound Cleanser   [] Cleanse wound with Mild Soap & Water  [] May Shower at Discharge   [] Other:        Topical Treatments:  Do not apply lotions, creams, or ointments to wound bed unless directed. [x] Apply moisturizing lotion to skin surrounding the wound prior to dressing change.  [] Apply antifungal ointment to skin surrounding the wound prior to dressing change.  [] Apply thin film of moisture barrier ointment to skin immediately around wound. [] Other:                 Dressings:                Wound Location RIGHT LOWER LEG       [x] Apply Primary Dressing:                                             [] MediHoney Gel      [] Alginate with Silver [] Alginate             [] Collagen     [] Collagen with Silver   [] Santyl with Moisten saline gauze               [] Hydrocolloid            [] MediHoney Alginate        [] Foam with Silver             [] Foam   [x] Hydrofera Blue READY WITH SILCONE  Border       [] Mepilex Border                    [] Moisten with Saline      [] Hydrogel     [] Mepitel                     [] Bactroban/Mupirocin         [] Polysporin              [] Other:       [x] Cover and Secure with:                        [] Gauze       [] Filiberto          [] Roll gauze             [] Ace Wrap    [] Cover Roll Tape     [] ABD                         [] Other:              Avoid contact of tape with skin.   [x] Change dressing:          [] Daily          [] Every Other Day [x] Three times per week  (Twice at home Friday and Monday)             [] Once a week         [] Do Not Change Dressing     [] Other:        Edema Control:  Apply:  [] Compression Stocking       []Right Leg     []Left Leg             [] Tubigrip      []Right Leg Double Layer      []Left Leg Double Layer                                              []Right Leg Single Layer       []Left Leg Single Layer             [x] SpandaGrip         [x]Right Leg   []Left Leg                                   []Low compression 5-10 mm/Hg                                            [x]Medium compression 10-20 mm/Hg                                   []High compression  20-30 mm/Hg             every morning immediately when getting up should be applied to affected leg(s) from mid foot to knee making sure to cover the heel. Remove every night before going to bed. [x] Elevate leg(s) above the level of the heart when sitting. [] Avoid prolonged standing in one place. [] Elevate arm/hand above the level of the heart    []RightArm     []LeftArm              [x] Diet as tolerated:     [x] Calorie Diabetic Diet:          [] No Added Salt:  [] Increase Protein:     [] Other:            Activity:  [x] Activity as tolerated:  [] Patient has no activity restrictions     [] Strict Bedrest:         [] Remain off Work:     [] May return to full duty work: If you are still having pain after you go home:  [] Elevate the affected limb. [] Use over-the-counter medications you would normally use for pain as permitted by your family doctor. [] For persistent pain not relieved by the above interventions, please call your family doctor.              Return Appointment:  [x] Wound and dressing supply provider: Michelle Tatum  [] ECF or Home Healthcare:  [] Wound Assessment:         [] Physician or NP scheduled for Wound Assessment:   [x] Return Appointment: With Tru Castro CNP in  1 Week(s)  [] Ordered tests:      Nurse Case Manger:  Therese            Electronically signed by : Roxane Hubbard on 12/14/2022 at 10:49 AM             [address] [facility] [address] Information: Should you experience any significant changes in your wound(s) or have questions about your wound care, please contact the [address] [place] [address] at [address] 8:00 am - 4:30 pm and Friday 8:00 am - 12:30 pm.  If you need help with your wound outside these hours and cannot wait until we are again available, contact your PCP or go to the hospital emergency room. PLEASE NOTE: IF YOU ARE UNABLE TO OBTAIN WOUND SUPPLIES, CONTINUE TO USE THE SUPPLIES YOU HAVE AVAILABLE UNTIL YOU ARE ABLE TO REACH US. IT IS MOST IMPORTANT TO KEEP THE WOUND COVERED AT ALL TIMES.      Physician Signature:_______________________     Date: ___________ Time:  ____________                              Reema Bautista CNP

## 2022-12-07 NOTE — PROGRESS NOTES
[address]   Progress Note and Procedure Note      [address] RECORD NUMBER:  [id number]  AGE: 78 y.o. GENDER: female  : 1945  EPISODE DATE:  2022    Subjective:     Chief Complaint   Patient presents with    Wound Check     Follow up visit for right lower leg. HISTORY of PRESENT ILLNESS HPI  History of Wound Context: Several weeks ago right lateral leg wounds noted by pt, no excessive drainage, pain or redness noted. She denies constitutional issues, and has been dressing wounds at home. Denies itching or pain. Does not have any difficulty getting compression on or tolerating them. Today states wound has been looking better and she denies constitutional issues. Is pleased with progress of wound and ease of current dressing changes. Wound/Ulcer Pain Timing/Severity:  none  Quality of pain: N/A  Severity:  0 / 10 at present  Modifying Factors:  None  Associated Signs/Symptoms: edema     Ulcer Identification:  Ulcer Type: venous     Contributing Factors: edema, venous stasis, and obesity     Acute Wound: Other: venous wounds.     PAST MEDICAL HISTORY        Diagnosis Date    Asthma     Localized edema 2021    Non-pressure chronic ulcer of right lower leg with fat layer exposed ([address] 2021    Peripheral venous insufficiency 2021    Right foot ulcer, limited to breakdown of skin ([address] 2021    Skin ulcer of right lower leg with fat layer exposed ([address] 2021       PAST SURGICAL HISTORY    Past Surgical History:   Procedure Laterality Date    APPENDECTOMY      CHOLECYSTECTOMY         FAMILY HISTORY    Family History   Problem Relation Age of Onset    Migraines Mother        SOCIAL HISTORY    Social History     Tobacco Use    Smoking status: Never    Smokeless tobacco: Never   Substance Use Topics    Drug use: Never       ALLERGIES    Allergies   Allergen Reactions    Iodides Other (See Comments)     IVP dye, pt does not remember he reaction, just that the nurses stated to not let anyone give to her ever again    Furosemide      Pt. States it makes her \"ditzy\", dizzy, and gives her muscle ridgity and nausea. MEDICATIONS    Current Outpatient Medications on File Prior to Encounter   Medication Sig Dispense Refill    clotrimazole-betamethasone (LOTRISONE) 1-0.05 % cream Apply topically 2 times daily. 45 g 3    triamterene-hydroCHLOROthiazide (MAXZIDE-25) 37.5-25 MG per tablet TAKE 1 TABLET BY MOUTH DAILY 90 tablet 1    metOLazone (ZAROXOLYN) 5 MG tablet TAKE ONE TABLET BY MOUTH DAILY AS NEEDED FOR SWELLING 30 tablet 1    albuterol sulfate HFA (PROAIR HFA) 108 (90 Base) MCG/ACT inhaler Inhale 2 puffs into the lungs every 6 hours as needed for Wheezing 1 Inhaler 0    Nebulizers (AIRIAL COMPACT MINI NEBULIZER) MISC 1 nebule by Does not apply route 4 times daily 1 each 0     No current facility-administered medications on file prior to encounter. REVIEW OF SYSTEMS  Review of Systems    Pertinent items are noted in HPI.     Objective:      BP (!) 161/78   Pulse 82   Temp 97.3 °F (36.3 °C) (Infrared)   Resp 18     Wt Readings from Last 3 Encounters:   10/05/22 216 lb 11.4 oz (98.3 kg)   09/28/22 217 lb (98.4 kg)   08/18/22 223 lb (101.2 kg)       PHYSICAL EXAM  Physical Exam    General Appearance: alert and oriented to person, place and time and pale  Skin: warm and dry  Head: normocephalic and atraumatic  Eyes: pupils equal, round, and reactive to light  Pulmonary/Chest: normal air movement, no respiratory distress  Cardiovascular: normal rate, regular rhythm, and distal pulses diminished  Extremities: no cyanosis, no clubbing, and no edema      Assessment:        Problem List Items Addressed This Visit       Skin ulcer of right lower leg with fat layer exposed [facility])    Relevant Orders    Initiate Outpatient Wound Care Protocol    Peripheral venous insufficiency    Relevant Orders    Initiate Outpatient Wound Care Protocol    Leg swelling - Primary    Relevant Orders    Initiate Outpatient Wound Care Protocol        Procedure Note  Indications:  Based on my examination of this patient's wound(s)/ulcer(s) today, debridement is required to promote healing and evaluate the wound base. Performed by: VIDA Silva CNP    Consent obtained:  Yes    Time out taken:  Yes    Pain Control: Anesthetic  Anesthetic: 4% Lidocaine Liquid Topical       Debridement: Excisional Debridement    Using curette and forceps the wound(s)/ulcer(s) was/were debrided down through and including the removal of epidermis, dermis, and subcutaneous tissue.         Devitalized Tissue Debrided:  fibrin, biofilm, and slough    Pre Debridement Measurements:  Are located in the [place]  Documentation Flow Sheet    Diabetic/Pressure/Non Pressure Ulcers only:  Ulcer: Non-Pressure ulcer, fat layer exposed     Wound/Ulcer #: 1    Post Debridement Measurements:  Wound/Ulcer Descriptions are Pre Debridement except measurements:    Wound 10/05/22 Leg Right;Lateral;Proximal;Lower #1 Noted 9/28/22 (Active)   Wound Image   12/07/22 0933   Wound Etiology Venous 10/05/22 0913   Dressing Status Old drainage noted 11/09/22 1010   Dressing/Treatment Hydrofera blue 12/07/22 0959   Wound Length (cm) 1.1 cm 12/07/22 0933   Wound Width (cm) 0.7 cm 12/07/22 0933   Wound Depth (cm) 0.1 cm 12/07/22 0933   Wound Surface Area (cm^2) 0.77 cm^2 12/07/22 0933   Change in Wound Size % (l*w) -71.11 12/07/22 0933   Wound Volume (cm^3) 0.077 cm^3 12/07/22 0933   Wound Healing % -71 12/07/22 0933   Post-Procedure Length (cm) 1.2 cm 12/07/22 0947   Post-Procedure Width (cm) 0.8 cm 12/07/22 0947   Post-Procedure Depth (cm) 0.2 cm 12/07/22 0947   Post-Procedure Surface Area (cm^2) 0.96 cm^2 12/07/22 0947   Post-Procedure Volume (cm^3) 0.192 cm^3 12/07/22 0947   Wound Assessment Granulation tissue;Slough 12/07/22 0933   Drainage Amount Moderate 12/07/22 0933   Drainage Description Serosanguinous 12/07/22 0933   Odor None 12/07/22 0933   Consuelo-wound Assessment Dry/flaky 12/07/22 0933   Margins Attached edges 12/07/22 0933   Wound Thickness Description not for Pressure Injury Full thickness 12/07/22 0933   Number of days: 63          Total Surface Area Debrided:  0.96 sq cm     Estimated Blood Loss:  Minimal    Hemostasis Achieved:  by pressure    Procedural Pain:  1  / 10     Post Procedural Pain:  0 / 10     Response to treatment:  Well tolerated by patient. Plan:     Treatment Note please see attached Discharge Instructions    Written patient dismissal instructions given to patient and signed by patient or POA. Discharge Instructions              [address] and Hyperbaric Oxygen Therapy   Physician Orders and Discharge Instructions  [address]   [address] [place] [id number], [facility]  Telephone: [phone]     NAME:  Riya Del Real OF BIRTH:  1945  MEDICAL RECORD NUMBER:  [id number]  DATE:  12/7/2022     Wound Cleansing:   Do not scrub or use excessive force. Cleanse wound prior to applying a clean dressing with:  [x] Normal Saline  [] Keep Wound Dry in Shower    [] Wound Cleanser   [] Cleanse wound with Mild Soap & Water  [] May Shower at Discharge   [] Other:        Topical Treatments:  Do not apply lotions, creams, or ointments to wound bed unless directed. [x] Apply moisturizing lotion to skin surrounding the wound prior to dressing change.  [] Apply antifungal ointment to skin surrounding the wound prior to dressing change.  [] Apply thin film of moisture barrier ointment to skin immediately around wound.   [] Other:                 Dressings:                Wound Location RIGHT LOWER LEG       [x] Apply Primary Dressing:                                             [] MediHoney Gel      [] Alginate with Silver [] Alginate             [] Collagen     [] Collagen with Silver   [] Santyl with Moisten saline gauze               [] Hydrocolloid            [] MediHoney Alginate        [] Foam with Silver             [] Foam   [x] Hydrofera Blue  Border       [] Mepilex Border                    [] Moisten with Saline      [] Hydrogel     [] Mepitel                     [] Bactroban/Mupirocin         [] Polysporin              [] Other:       [x] Cover and Secure with:                        [] Gauze       [] Filiberto          [] Roll gauze             [] Ace Wrap    [] Cover Roll Tape     [] ABD                         [] Other:              Avoid contact of tape with skin. [x] Change dressing:          [] Daily          [] Every Other Day [x] Three times per week  (Twice at home Friday and Monday)             [] Once a week         [] Do Not Change Dressing     [] Other:        Edema Control:  Apply:  [] Compression Stocking       []Right Leg     []Left Leg             [] Tubigrip      []Right Leg Double Layer      []Left Leg Double Layer                                              []Right Leg Single Layer       []Left Leg Single Layer             [x] SpandaGrip         [x]Right Leg   []Left Leg                                   []Low compression 5-10 mm/Hg                                            [x]Medium compression 10-20 mm/Hg                                   []High compression  20-30 mm/Hg             every morning immediately when getting up should be applied to affected leg(s) from mid foot to knee making sure to cover the heel. Remove every night before going to bed. [x] Elevate leg(s) above the level of the heart when sitting. [] Avoid prolonged standing in one place. [] Elevate arm/hand above the level of the heart    []RightArm     []LeftArm            Compression:  Apply:  [] Multilayer Compression Wrap Applied in Clinic     []RightLeg      []Left Leg             [] Multi-layer compression. Do not get leg(s) with wrap wet.   If wraps become too tight call the center or completely remove the wrap. [] Elevate leg(s) above the level of the heart when sitting. [] Avoid prolonged standing in one place. [x] Diet as tolerated:     [x] Calorie Diabetic Diet:          [] No Added Salt:  [] Increase Protein:     [] Other:            Activity:  [x] Activity as tolerated:  [] Patient has no activity restrictions     [] Strict Bedrest:         [] Remain off Work:     [] May return to full duty work: If you are still having pain after you go home:  [] Elevate the affected limb. [] Use over-the-counter medications you would normally use for pain as permitted by your family doctor. [] For persistent pain not relieved by the above interventions, please call your family doctor. Return Appointment:  [x] Wound and dressing supply provider: Srinath Madera  [] ECF or Home Healthcare:  [] Wound Assessment:         [] Physician or NP scheduled for Wound Assessment:   [x] Return Appointment: With Jessica Luna CNP  in  1 Week(s)  [] Ordered tests:      Nurse Case Manger:  Therese              Electronically signed by Dameon Fuentes RN on 12/7/2022 at 9:52 AM       [facility] Information: Should you experience any significant changes in your wound(s) or have questions about your wound care, please contact the [address] [place] [address] at [address] 8:00 am - 4:30 pm and Friday 8:00 am - 12:30 pm.  If you need help with your wound outside these hours and cannot wait until we are again available, contact your PCP or go to the hospital emergency room. PLEASE NOTE: IF YOU ARE UNABLE TO OBTAIN WOUND SUPPLIES, CONTINUE TO USE THE SUPPLIES YOU HAVE AVAILABLE UNTIL YOU ARE ABLE TO REACH US. IT IS MOST IMPORTANT TO KEEP THE WOUND COVERED AT ALL TIMES.      Physician Signature:_______________________     Date: ___________ Time:  ____________                              Paul Capone CNP        Electronically signed by VIDA Ellis CNP on 12/7/2022 at 10:48 AM

## 2022-12-07 NOTE — PLAN OF CARE
[address] [facility]:     [facility] [address] [facility]ony Mares  U:[phone] f: [phone]     [facility]:      [address]  [address]  [phone]  WOUND CARE Dept: [phone]   FAX NUMBER [email or web address]    Patient Information:      [address] [id number]   [phone]   : 1945  AGE: 78 y.o.      GENDER: female   TODAYS DATE:  2022    Insurance:      PRIMARY INSURANCE:  Plan: MEDICARE PART A AND B  Coverage: MEDICARE  Effective Date: 2015  [id number] - (Medicare)    SECONDARY INSURANCE:  Plan: [facility]  Coverage: [employer]  Effective Date: 2015  Nohemy Dunne  [id number]  [unfilled]   [unfilled]     Patient Wound Information:      Problem List Items Addressed This Visit          Circulatory    Peripheral venous insufficiency    Relevant Orders    Initiate Outpatient Wound Care Protocol       Other    Skin ulcer of right lower leg with fat layer exposed [facility])    Relevant Orders    Initiate Outpatient Wound Care Protocol    Leg swelling - Primary    Relevant Orders    Initiate Outpatient Wound Care Protocol     ICD-10 codes: A97.697    WOUNDS REQUIRING DRESSING SUPPLIES:     Wound 10/05/22 Leg Right;Lateral;Proximal;Lower #1 Noted 22 (Active)   Wound Image   22 0933   Wound Etiology Venous 10/05/22 0913   Dressing Status Old drainage noted 22 1010   Dressing/Treatment Hydrofera blue 22 1200   Wound Length (cm) 1.1 cm 22 0933   Wound Width (cm) 0.7 cm 22 0933   Wound Depth (cm) 0.1 cm 22 0933   Wound Surface Area (cm^2) 0.77 cm^2 22 0933   Change in Wound Size % (l*w) -71.11 22 0933   Wound Volume (cm^3) 0.077 cm^3 22 0933   Wound Healing % -71 22 0933   Post-Procedure Length (cm) 1.2 cm 22 0947   Post-Procedure Width (cm) 0.8 cm 12/07/22 0947   Post-Procedure Depth (cm) 0.2 cm 12/07/22 0947   Post-Procedure Surface Area (cm^2) 0.96 cm^2 12/07/22 0947   Post-Procedure Volume (cm^3) 0.192 cm^3 12/07/22 0947   Wound Assessment Granulation tissue;Slough 12/07/22 0933   Drainage Amount Moderate 12/07/22 0933   Drainage Description Serosanguinous 12/07/22 0933   Odor None 12/07/22 0933   Consuelo-wound Assessment Dry/flaky 12/07/22 0933   Margins Attached edges 12/07/22 0933   Wound Thickness Description not for Pressure Injury Full thickness 12/07/22 0933   Number of days: 63          Supplies Requested :      WOUND #: 1   PRIMARY DRESSING:  Other: Hydrofera blue ready border   Cover and Secure with: None     FREQUENCY OF DRESSING CHANGES:  Every other day           ADDITIONAL ITEMS:  [] Gloves Small  [] Gloves Medium [] Gloves Large [] Gloves XLarge  [] Tape 1\" [] Tape 2\" [] Tape 3\"  [] Medipore Tape  [] Saline  [] Skin Prep   [] Adhesive Remover   [] Cotton Tip Applicators   [] Other:    Patient Wound(s) Debrided: [x] Yes   [] No    Debribement Type: subcutaneous tissue    Debridement Date: 12/7/22    Patient currently being seen by Home Health: [] Yes   [x] No    Duration for needed supplies:  []15  []30  []60  [x]90 Days    Provider Information:      PROVIDER'S NAME: VIDA Ann CNP     NPI: LYNDA - MARIAELENA  [id number]

## 2022-12-14 ENCOUNTER — HOSPITAL ENCOUNTER (OUTPATIENT)
Dept: WOUND CARE | Age: 77
Discharge: HOME | End: 2022-12-14
Payer: MEDICARE

## 2022-12-14 VITALS
RESPIRATION RATE: 18 BRPM | DIASTOLIC BLOOD PRESSURE: 79 MMHG | HEART RATE: 94 BPM | SYSTOLIC BLOOD PRESSURE: 149 MMHG | TEMPERATURE: 97.5 F

## 2022-12-14 DIAGNOSIS — I87.2 PERIPHERAL VENOUS INSUFFICIENCY: ICD-10-CM

## 2022-12-14 DIAGNOSIS — M79.89 LEG SWELLING: Primary | ICD-10-CM

## 2022-12-14 DIAGNOSIS — L97.912 SKIN ULCER OF RIGHT LOWER LEG WITH FAT LAYER EXPOSED (HCC): ICD-10-CM

## 2022-12-14 PROCEDURE — 11042 DBRDMT SUBQ TIS 1ST 20SQCM/<: CPT

## 2022-12-14 RX ORDER — LIDOCAINE 40 MG/G
CREAM TOPICAL ONCE
OUTPATIENT
Start: 2022-12-14 | End: 2022-12-14

## 2022-12-14 RX ORDER — BETAMETHASONE DIPROPIONATE 0.05 %
OINTMENT (GRAM) TOPICAL ONCE
OUTPATIENT
Start: 2022-12-14 | End: 2022-12-14

## 2022-12-14 RX ORDER — LIDOCAINE HYDROCHLORIDE 40 MG/ML
SOLUTION TOPICAL ONCE
OUTPATIENT
Start: 2022-12-14 | End: 2022-12-14

## 2022-12-14 RX ORDER — LIDOCAINE HYDROCHLORIDE 20 MG/ML
JELLY TOPICAL ONCE
OUTPATIENT
Start: 2022-12-14 | End: 2022-12-14

## 2022-12-14 RX ORDER — LIDOCAINE HYDROCHLORIDE 40 MG/ML
SOLUTION TOPICAL ONCE
Status: COMPLETED | OUTPATIENT
Start: 2022-12-14 | End: 2022-12-14

## 2022-12-14 RX ORDER — BACITRACIN, NEOMYCIN, POLYMYXIN B 400; 3.5; 5 [USP'U]/G; MG/G; [USP'U]/G
OINTMENT TOPICAL ONCE
OUTPATIENT
Start: 2022-12-14 | End: 2022-12-14

## 2022-12-14 RX ORDER — CLOBETASOL PROPIONATE 0.5 MG/G
OINTMENT TOPICAL ONCE
OUTPATIENT
Start: 2022-12-14 | End: 2022-12-14

## 2022-12-14 RX ORDER — GENTAMICIN SULFATE 1 MG/G
OINTMENT TOPICAL ONCE
OUTPATIENT
Start: 2022-12-14 | End: 2022-12-14

## 2022-12-14 RX ORDER — GINSENG 100 MG
CAPSULE ORAL ONCE
OUTPATIENT
Start: 2022-12-14 | End: 2022-12-14

## 2022-12-14 RX ORDER — BACITRACIN ZINC AND POLYMYXIN B SULFATE 500; 1000 [USP'U]/G; [USP'U]/G
OINTMENT TOPICAL ONCE
OUTPATIENT
Start: 2022-12-14 | End: 2022-12-14

## 2022-12-14 RX ORDER — LIDOCAINE 50 MG/G
OINTMENT TOPICAL ONCE
OUTPATIENT
Start: 2022-12-14 | End: 2022-12-14

## 2022-12-14 RX ADMIN — LIDOCAINE HYDROCHLORIDE 5 ML: 40 SOLUTION TOPICAL at 10:27

## 2022-12-14 ASSESSMENT — PAIN SCALES - GENERAL
PAINLEVEL_OUTOF10: 0
PAINLEVEL_OUTOF10: 0

## 2022-12-14 NOTE — PROGRESS NOTES
[address]   Progress Note and Procedure Note      [address] RECORD NUMBER:  [id number]  AGE: 78 y.o. GENDER: female  : 1945  EPISODE DATE:  2022    Subjective:     Chief Complaint   Patient presents with    Wound Check     Follow up visit right lower leg wound         HISTORY of PRESENT ILLNESS HPI    History of Wound Context: Several weeks ago right lateral leg wounds noted by pt, no excessive drainage, pain or redness noted. She denies constitutional issues, and has been dressing wounds at home. Denies itching or pain. Does not have any difficulty getting compression on or tolerating them. Today states wound has been looking better and she denies constitutional issues. Is pleased with progress of wound and ease of current dressing changes. Wound/Ulcer Pain Timing/Severity:  none  Quality of pain: N/A  Severity:  0 / 10 at present  Modifying Factors:  None  Associated Signs/Symptoms: edema     Ulcer Identification:  Ulcer Type: venous     Contributing Factors: edema, venous stasis, and obesity     Acute Wound: Other: venous wounds.       PAST MEDICAL HISTORY        Diagnosis Date    Asthma     Localized edema 2021    Non-pressure chronic ulcer of right lower leg with fat layer exposed ([address] 2021    Peripheral venous insufficiency 2021    Right foot ulcer, limited to breakdown of skin ([address] 2021    Skin ulcer of right lower leg with fat layer exposed ([address] 2021       PAST SURGICAL HISTORY    Past Surgical History:   Procedure Laterality Date    APPENDECTOMY      CHOLECYSTECTOMY         FAMILY HISTORY    Family History   Problem Relation Age of Onset    Migraines Mother        SOCIAL HISTORY    Social History     Tobacco Use    Smoking status: Never    Smokeless tobacco: Never   Substance Use Topics    Drug use: Never       ALLERGIES    Allergies   Allergen Reactions    Iodides Other (See Comments)     IVP dye, pt does not remember he reaction, just that the nurses stated to not let anyone give to her ever again    Furosemide      Pt. States it makes her \"ditzy\", dizzy, and gives her muscle ridgity and nausea. MEDICATIONS    Current Outpatient Medications on File Prior to Encounter   Medication Sig Dispense Refill    clotrimazole-betamethasone (LOTRISONE) 1-0.05 % cream Apply topically 2 times daily. 45 g 3    triamterene-hydroCHLOROthiazide (MAXZIDE-25) 37.5-25 MG per tablet TAKE 1 TABLET BY MOUTH DAILY 90 tablet 1    metOLazone (ZAROXOLYN) 5 MG tablet TAKE ONE TABLET BY MOUTH DAILY AS NEEDED FOR SWELLING 30 tablet 1    albuterol sulfate HFA (PROAIR HFA) 108 (90 Base) MCG/ACT inhaler Inhale 2 puffs into the lungs every 6 hours as needed for Wheezing 1 Inhaler 0    Nebulizers (AIRIAL COMPACT MINI NEBULIZER) MISC 1 nebule by Does not apply route 4 times daily 1 each 0     No current facility-administered medications on file prior to encounter. REVIEW OF SYSTEMS    Pertinent items are noted in HPI.       Objective:      BP (!) 149/79   Pulse 94   Temp 97.5 °F (36.4 °C) (Infrared)   Resp 18     Wt Readings from Last 3 Encounters:   10/05/22 216 lb 11.4 oz (98.3 kg)   09/28/22 217 lb (98.4 kg)   08/18/22 223 lb (101.2 kg)       PHYSICAL EXAM    General Appearance: alert and oriented to person, place and time and pale  Skin: warm and dry  Head: normocephalic and atraumatic  Eyes: pupils equal, round, and reactive to light  Pulmonary/Chest: normal air movement, no respiratory distress  Cardiovascular: bilateral DP's +1  Extremities: no cyanosis or clubbing; RLE +2 pitting edema, LLE nonpitting edema      Assessment:        Problem List Items Addressed This Visit       Skin ulcer of right lower leg with fat layer exposed ([address]    Relevant Orders    Initiate Outpatient Wound Care Protocol    Peripheral venous insufficiency    Relevant Orders    Initiate Outpatient Wound Care Protocol    Leg swelling - Primary    Relevant Orders Initiate Outpatient Wound Care Protocol        Procedure Note  Indications:  Based on my examination of this patient's wound(s)/ulcer(s) today, debridement is required to promote healing and evaluate the wound base. Performed by: VIDA Lee CNP    Consent obtained:  Yes    Time out taken:  Yes    Pain Control: Anesthetic  Anesthetic: 4% Lidocaine Liquid Topical       Debridement: Excisional Debridement    Using curette and forceps the wound(s)/ulcer(s) was/were debrided down through and including the removal of epidermis, dermis, and subcutaneous tissue.         Devitalized Tissue Debrided:  fibrin, biofilm, and slough    Pre Debridement Measurements:  Are located in the [place]  Documentation Flow Sheet    Diabetic/Pressure/Non Pressure Ulcers only:  Ulcer: Non-Pressure ulcer, fat layer exposed     Wound/Ulcer #: 1    Post Debridement Measurements:  Wound/Ulcer Descriptions are Pre Debridement except measurements:    Wound 10/05/22 Leg Right;Lateral;Proximal;Lower #1 Noted 9/28/22 (Active)   Wound Image   12/07/22 0933   Wound Etiology Venous 10/05/22 0913   Dressing Status Old drainage noted 11/09/22 1010   Dressing/Treatment Hydrofera blue 12/14/22 1048   Wound Length (cm) 1 cm 12/14/22 1028   Wound Width (cm) 0.8 cm 12/14/22 1028   Wound Depth (cm) 0.1 cm 12/14/22 1028   Wound Surface Area (cm^2) 0.8 cm^2 12/14/22 1028   Change in Wound Size % (l*w) -77.78 12/14/22 1028   Wound Volume (cm^3) 0.08 cm^3 12/14/22 1028   Wound Healing % -78 12/14/22 1028   Post-Procedure Length (cm) 1.1 cm 12/14/22 1047   Post-Procedure Width (cm) 0.9 cm 12/14/22 1047   Post-Procedure Depth (cm) 0.2 cm 12/14/22 1047   Post-Procedure Surface Area (cm^2) 0.99 cm^2 12/14/22 1047   Post-Procedure Volume (cm^3) 0.198 cm^3 12/14/22 1047   Wound Assessment Granulation tissue;Slough 12/14/22 1028   Drainage Amount Small 12/14/22 1028   Drainage Description Serosanguinous 12/14/22 1028   Odor None 12/14/22 1028 Consuelo-wound Assessment Dry/flaky 12/14/22 1028   Margins Attached edges 12/14/22 1028   Wound Thickness Description not for Pressure Injury Full thickness 12/14/22 1028   Number of days: 70          Total Surface Area Debrided:  0.99 sq cm     Estimated Blood Loss:  Minimal    Hemostasis Achieved:  by pressure    Procedural Pain:  0  / 10     Post Procedural Pain:  0 / 10     Response to treatment:  Well tolerated by patient. Plan:     Treatment Note please see attached Discharge Instructions    Written patient dismissal instructions given to patient and signed by patient or POA. Discharge Instructions              [address] and Hyperbaric Oxygen Therapy   Physician Orders and Discharge Instructions  [facility]  [address] [address], [facility]  Telephone: [phone]     NAME:  Vicente File OF BIRTH:  1945  MEDICAL RECORD NUMBER:  [id number]  DATE:  12/14/2022     Wound Cleansing:   Do not scrub or use excessive force. Cleanse wound prior to applying a clean dressing with:  [x] Normal Saline  [] Keep Wound Dry in Shower    [] Wound Cleanser   [] Cleanse wound with Mild Soap & Water  [] May Shower at Discharge   [] Other:        Topical Treatments:  Do not apply lotions, creams, or ointments to wound bed unless directed. [x] Apply moisturizing lotion to skin surrounding the wound prior to dressing change.  [] Apply antifungal ointment to skin surrounding the wound prior to dressing change.  [] Apply thin film of moisture barrier ointment to skin immediately around wound.   [] Other:                 Dressings:                Wound Location RIGHT LOWER LEG       [x] Apply Primary Dressing:                                             [] MediHoney Gel      [] Alginate with Silver [] Alginate             [] Collagen     [] Collagen with Silver   [] Santyl with Moisten saline gauze               [] Hydrocolloid            [] MediHoney Alginate        [] Foam with Silver             [] Foam   [x] Hydrofera Blue READY WITH SILCONE  Border       [] Mepilex Border                    [] Moisten with Saline      [] Hydrogel     [] Mepitel                     [] Bactroban/Mupirocin         [] Polysporin              [] Other:       [x] Cover and Secure with:                        [] Gauze       [] Filiberto          [] Roll gauze             [] Ace Wrap    [] Cover Roll Tape     [] ABD                         [] Other:              Avoid contact of tape with skin. [x] Change dressing:          [] Daily          [] Every Other Day [x] Three times per week  (Twice at home Friday and Monday)             [] Once a week         [] Do Not Change Dressing     [] Other:        Edema Control:  Apply:  [] Compression Stocking       []Right Leg     []Left Leg             [] Tubigrip      []Right Leg Double Layer      []Left Leg Double Layer                                              []Right Leg Single Layer       []Left Leg Single Layer             [x] SpandaGrip         [x]Right Leg   []Left Leg                                   []Low compression 5-10 mm/Hg                                            [x]Medium compression 10-20 mm/Hg                                   []High compression  20-30 mm/Hg             every morning immediately when getting up should be applied to affected leg(s) from mid foot to knee making sure to cover the heel. Remove every night before going to bed. [x] Elevate leg(s) above the level of the heart when sitting. [] Avoid prolonged standing in one place.              [] Elevate arm/hand above the level of the heart    []RightArm     []LeftArm              [x] Diet as tolerated:     [x] Calorie Diabetic Diet:          [] No Added Salt:  [] Increase Protein:     [] Other:            Activity:  [x] Activity as tolerated:  [] Patient has no activity restrictions     [] Strict Bedrest:         [] Remain off Work:     [] May return to full duty work: If you are still having pain after you go home:  [] Elevate the affected limb. [] Use over-the-counter medications you would normally use for pain as permitted by your family doctor. [] For persistent pain not relieved by the above interventions, please call your family doctor. Return Appointment:  [x] Wound and dressing supply provider: Giuliana Carvajal  [] ECF or Home Healthcare:  [] Wound Assessment:         [] Physician or NP scheduled for Wound Assessment:   [x] Return Appointment: With Siomara Castorena CNP  in  1 Week(s)  [] Ordered tests:      Nurse Case Manger:  Therese            Electronically signed by : Charlie Saab. on 12/14/2022 at 10:49 AM             [facility] Information: Should you experience any significant changes in your wound(s) or have questions about your wound care, please contact the [address] [place] [address] at [address] 8:00 am - 4:30 pm and Friday 8:00 am - 12:30 pm.  If you need help with your wound outside these hours and cannot wait until we are again available, contact your PCP or go to the hospital emergency room. PLEASE NOTE: IF YOU ARE UNABLE TO OBTAIN WOUND SUPPLIES, CONTINUE TO USE THE SUPPLIES YOU HAVE AVAILABLE UNTIL YOU ARE ABLE TO REACH US. IT IS MOST IMPORTANT TO KEEP THE WOUND COVERED AT ALL TIMES.      Physician Signature:_______________________     Date: ___________ Time:  ____________                              Jose Armando Layne CNP                Electronically signed by VIDA Jones CNP on 12/14/2022 at 12:18 PM

## 2022-12-19 NOTE — DISCHARGE INSTRUCTIONS
[address] and Hyper[facility] Oxygen Therapy   Physician Orders and Discharge Instructions  [address]   [address], [place]  Telephone: [phone]     NAME:  Eliseo Willoughby OF BIRTH:  1945  MEDICAL RECORD NUMBER:  [id number]  DATE:  12/21/2022     Wound Cleansing:   Do not scrub or use excessive force. Cleanse wound prior to applying a clean dressing with:  [x] Normal Saline  [] Keep Wound Dry in Shower    [] Wound Cleanser   [] Cleanse wound with Mild Soap & Water  [] May Shower at Discharge   [] Other:        Topical Treatments:  Do not apply lotions, creams, or ointments to wound bed unless directed. [x] Apply moisturizing lotion to skin surrounding the wound prior to dressing change.  [] Apply antifungal ointment to skin surrounding the wound prior to dressing change.  [] Apply thin film of moisture barrier ointment to skin immediately around wound. [] Other:                 Dressings:                Wound Location RIGHT LOWER LEG       [x] Apply Primary Dressing:                                             [] MediHoney Gel      [] Alginate with Silver [] Alginate             [] Collagen     [] Collagen with Silver   [] Santyl with Moisten saline gauze               [] Hydrocolloid            [] MediHoney Alginate        [] Foam with Silver             [] Foam   [x] Hydrofera Blue READY WITH SILCONE  Border       [] Mepilex Border                    [] Moisten with Saline      [] Hydrogel     [] Mepitel                     [] Bactroban/Mupirocin         [] Polysporin              [] Other:       [x] Cover and Secure with:                        [] Gauze       [] Filiberto          [] Roll gauze             [] Ace Wrap    [] Cover Roll Tape     [] ABD                         [] Other:              Avoid contact of tape with skin.   [x] Change dressing:          [] Daily          [] Every Other Day [x] Three times per week  (Twice at home Friday and Monday)             [] Once a week         [] Do Not Change Dressing     [] Other:        Edema Control:  Apply:  [] Compression Stocking       []Right Leg     []Left Leg             [] Tubigrip      []Right Leg Double Layer      []Left Leg Double Layer                                              []Right Leg Single Layer       []Left Leg Single Layer             [x] SpandaGrip         [x]Right Leg   []Left Leg                                   []Low compression 5-10 mm/Hg                                            [x]Medium compression 10-20 mm/Hg                                   []High compression  20-30 mm/Hg             every morning immediately when getting up should be applied to affected leg(s) from mid foot to knee making sure to cover the heel. Remove every night before going to bed. [x] Elevate leg(s) above the level of the heart when sitting. [] Avoid prolonged standing in one place. [] Elevate arm/hand above the level of the heart    []RightArm     []LeftArm               [x] Diet as tolerated:     [x] Calorie Diabetic Diet:          [] No Added Salt:  [] Increase Protein:     [] Other:            Activity:  [x] Activity as tolerated:  [] Patient has no activity restrictions     [] Strict Bedrest:         [] Remain off Work:     [] May return to full duty work: If you are still having pain after you go home:  [] Elevate the affected limb. [] Use over-the-counter medications you would normally use for pain as permitted by your family doctor. [] For persistent pain not relieved by the above interventions, please call your family doctor.              Return Appointment:  [x] Wound and dressing supply provider: Arleth Vergara  [] ECF or Home Healthcare:  [] Wound Assessment:         [] Physician or NP scheduled for Wound Assessment:   [x] Return Appointment: With Major Aliment  CNP  in 2 Week(s)  [] Ordered tests:      Nurse Case MangerNatan Saleh               Electronically signed by Codey Harrington RN on 12/21/2022 at 10:15 [address] [place] [address] [facility] Information: Should you experience any significant changes in your wound(s) or have questions about your wound care, please contact the [address] [place] [address] at [address] 8:00 am - 4:30 pm and Friday 8:00 am - 12:30 pm.  If you need help with your wound outside these hours and cannot wait until we are again available, contact your PCP or go to the hospital emergency room. PLEASE NOTE: IF YOU ARE UNABLE TO OBTAIN WOUND SUPPLIES, CONTINUE TO USE THE SUPPLIES YOU HAVE AVAILABLE UNTIL YOU ARE ABLE TO REACH US. IT IS MOST IMPORTANT TO KEEP THE WOUND COVERED AT ALL TIMES.      Physician Signature:_______________________     Date: ___________ Time:  ____________                              Abel Tompkins CNP

## 2022-12-21 ENCOUNTER — HOSPITAL ENCOUNTER (OUTPATIENT)
Dept: WOUND CARE | Age: 77
Discharge: HOME | End: 2022-12-21
Payer: MEDICARE

## 2022-12-21 VITALS
SYSTOLIC BLOOD PRESSURE: 162 MMHG | HEART RATE: 89 BPM | DIASTOLIC BLOOD PRESSURE: 78 MMHG | RESPIRATION RATE: 18 BRPM | TEMPERATURE: 97.2 F

## 2022-12-21 DIAGNOSIS — M79.89 LEG SWELLING: Primary | ICD-10-CM

## 2022-12-21 DIAGNOSIS — L97.912 SKIN ULCER OF RIGHT LOWER LEG WITH FAT LAYER EXPOSED (HCC): ICD-10-CM

## 2022-12-21 DIAGNOSIS — I87.2 PERIPHERAL VENOUS INSUFFICIENCY: ICD-10-CM

## 2022-12-21 PROCEDURE — 11042 DBRDMT SUBQ TIS 1ST 20SQCM/<: CPT

## 2022-12-21 RX ORDER — LIDOCAINE 40 MG/G
CREAM TOPICAL ONCE
OUTPATIENT
Start: 2022-12-21 | End: 2022-12-21

## 2022-12-21 RX ORDER — BETAMETHASONE DIPROPIONATE 0.05 %
OINTMENT (GRAM) TOPICAL ONCE
OUTPATIENT
Start: 2022-12-21 | End: 2022-12-21

## 2022-12-21 RX ORDER — LIDOCAINE HYDROCHLORIDE 40 MG/ML
SOLUTION TOPICAL ONCE
Status: COMPLETED | OUTPATIENT
Start: 2022-12-21 | End: 2022-12-21

## 2022-12-21 RX ORDER — LIDOCAINE HYDROCHLORIDE 20 MG/ML
JELLY TOPICAL ONCE
OUTPATIENT
Start: 2022-12-21 | End: 2022-12-21

## 2022-12-21 RX ORDER — BACITRACIN, NEOMYCIN, POLYMYXIN B 400; 3.5; 5 [USP'U]/G; MG/G; [USP'U]/G
OINTMENT TOPICAL ONCE
OUTPATIENT
Start: 2022-12-21 | End: 2022-12-21

## 2022-12-21 RX ORDER — BACITRACIN ZINC AND POLYMYXIN B SULFATE 500; 1000 [USP'U]/G; [USP'U]/G
OINTMENT TOPICAL ONCE
OUTPATIENT
Start: 2022-12-21 | End: 2022-12-21

## 2022-12-21 RX ORDER — LIDOCAINE 50 MG/G
OINTMENT TOPICAL ONCE
OUTPATIENT
Start: 2022-12-21 | End: 2022-12-21

## 2022-12-21 RX ORDER — GENTAMICIN SULFATE 1 MG/G
OINTMENT TOPICAL ONCE
OUTPATIENT
Start: 2022-12-21 | End: 2022-12-21

## 2022-12-21 RX ORDER — LIDOCAINE HYDROCHLORIDE 40 MG/ML
SOLUTION TOPICAL ONCE
OUTPATIENT
Start: 2022-12-21 | End: 2022-12-21

## 2022-12-21 RX ORDER — CLOBETASOL PROPIONATE 0.5 MG/G
OINTMENT TOPICAL ONCE
OUTPATIENT
Start: 2022-12-21 | End: 2022-12-21

## 2022-12-21 RX ORDER — GINSENG 100 MG
CAPSULE ORAL ONCE
OUTPATIENT
Start: 2022-12-21 | End: 2022-12-21

## 2022-12-21 RX ADMIN — LIDOCAINE HYDROCHLORIDE: 40 SOLUTION TOPICAL at 09:52

## 2022-12-21 ASSESSMENT — PAIN SCALES - GENERAL
PAINLEVEL_OUTOF10: 0
PAINLEVEL_OUTOF10: 0

## 2022-12-21 NOTE — PROGRESS NOTES
[address]   Progress Note and Procedure Note      [address] RECORD NUMBER:  [id number]  AGE: 78 y.o. GENDER: female  : 1945  EPISODE DATE:  2022    Subjective:     Chief Complaint   Patient presents with    Wound Check     Follow up for right leg wound. HISTORY of PRESENT ILLNESS HPI  History of Wound Context: Several weeks ago right lateral leg wounds noted by pt, no excessive drainage, pain or redness noted. She denies constitutional issues, and has been dressing wounds at home. Denies itching or pain. Does not have any difficulty getting compression on or tolerating them. Today states wound has been looking better and she denies constitutional issues. Is pleased with progress of wound and ease of current dressing changes. Wound/Ulcer Pain Timing/Severity:  none  Quality of pain: N/A  Severity:  0 / 10 at present  Modifying Factors:  None  Associated Signs/Symptoms: edema     Ulcer Identification:  Ulcer Type: venous     Contributing Factors: edema, venous stasis, and obesity     Acute Wound: Other: venous wounds.     PAST MEDICAL HISTORY        Diagnosis Date    Asthma     Localized edema 2021    Non-pressure chronic ulcer of right lower leg with fat layer exposed ([address] 2021    Peripheral venous insufficiency 2021    Right foot ulcer, limited to breakdown of skin ([address] 2021    Skin ulcer of right lower leg with fat layer exposed ([address] 2021       PAST SURGICAL HISTORY    Past Surgical History:   Procedure Laterality Date    APPENDECTOMY      CHOLECYSTECTOMY         FAMILY HISTORY    Family History   Problem Relation Age of Onset    Migraines Mother        SOCIAL HISTORY    Social History     Tobacco Use    Smoking status: Never    Smokeless tobacco: Never   Substance Use Topics    Drug use: Never       ALLERGIES    Allergies   Allergen Reactions    Iodides Other (See Comments)     IVP dye, pt does not remember he reaction, just that the nurses stated to not let anyone give to her ever again    Furosemide      Pt. States it makes her \"ditzy\", dizzy, and gives her muscle ridgity and nausea. MEDICATIONS    Current Outpatient Medications on File Prior to Encounter   Medication Sig Dispense Refill    clotrimazole-betamethasone (LOTRISONE) 1-0.05 % cream Apply topically 2 times daily. 45 g 3    triamterene-hydroCHLOROthiazide (MAXZIDE-25) 37.5-25 MG per tablet TAKE 1 TABLET BY MOUTH DAILY 90 tablet 1    metOLazone (ZAROXOLYN) 5 MG tablet TAKE ONE TABLET BY MOUTH DAILY AS NEEDED FOR SWELLING 30 tablet 1    albuterol sulfate HFA (PROAIR HFA) 108 (90 Base) MCG/ACT inhaler Inhale 2 puffs into the lungs every 6 hours as needed for Wheezing 1 Inhaler 0    Nebulizers (AIRIAL COMPACT MINI NEBULIZER) MISC 1 nebule by Does not apply route 4 times daily 1 each 0     No current facility-administered medications on file prior to encounter. REVIEW OF SYSTEMS  Review of Systems    Pertinent items are noted in HPI.     Objective:      BP (!) 162/78   Pulse 89   Temp 97.2 °F (36.2 °C) (Infrared)   Resp 18     Wt Readings from Last 3 Encounters:   10/05/22 216 lb 11.4 oz (98.3 kg)   09/28/22 217 lb (98.4 kg)   08/18/22 223 lb (101.2 kg)       PHYSICAL EXAM  Physical Exam    General Appearance: alert and oriented to person, place and time, well-developed and well-nourished, in no acute distress  Skin: warm and dry  Head: normocephalic and atraumatic  Eyes: pupils equal, round, and reactive to light  Pulmonary/Chest:  normal air movement, no respiratory distress  Cardiovascular: normal rate, regular rhythm, and intact distal pulses      Assessment:        Problem List Items Addressed This Visit       Skin ulcer of right lower leg with fat layer exposed ([address]    Relevant Orders    Initiate Outpatient Wound Care Protocol    Peripheral venous insufficiency    Relevant Orders    Initiate Outpatient Wound Care Protocol    Leg swelling - Primary    Relevant Orders    Initiate Outpatient Wound Care Protocol        Procedure Note  Indications:  Based on my examination of this patient's wound(s)/ulcer(s) today, debridement is required to promote healing and evaluate the wound base. Performed by: VIDA Deutsch CNP    Consent obtained:  Yes    Time out taken:  Yes    Pain Control: Anesthetic  Anesthetic: 4% Lidocaine Liquid Topical       Debridement: Excisional Debridement    Using curette the wound(s)/ulcer(s) was/were debrided down through and including the removal of epidermis, dermis, and subcutaneous tissue.         Devitalized Tissue Debrided:  fibrin, biofilm, and slough    Pre Debridement Measurements:  Are located in the [place]  Documentation Flow Sheet    Diabetic/Pressure/Non Pressure Ulcers only:  Ulcer: Non-Pressure ulcer, fat layer exposed     Wound/Ulcer #: 1    Post Debridement Measurements:  Wound/Ulcer Descriptions are Pre Debridement except measurements:    Wound 10/05/22 Leg Right;Lateral;Proximal;Lower #1 Noted 9/28/22 (Active)   Wound Image   12/07/22 0933   Wound Etiology Venous 10/05/22 0913   Dressing Status Old drainage noted 11/09/22 1010   Dressing/Treatment Hydrofera blue 12/21/22 1030   Wound Length (cm) 0.9 cm 12/21/22 0952   Wound Width (cm) 0.7 cm 12/21/22 0952   Wound Depth (cm) 0.1 cm 12/21/22 0952   Wound Surface Area (cm^2) 0.63 cm^2 12/21/22 0952   Change in Wound Size % (l*w) -40 12/21/22 0952   Wound Volume (cm^3) 0.063 cm^3 12/21/22 0952   Wound Healing % -40 12/21/22 0952   Post-Procedure Length (cm) 1.1 cm 12/14/22 1047   Post-Procedure Width (cm) 0.9 cm 12/14/22 1047   Post-Procedure Depth (cm) 0.2 cm 12/14/22 1047   Post-Procedure Surface Area (cm^2) 0.99 cm^2 12/14/22 1047   Post-Procedure Volume (cm^3) 0.198 cm^3 12/14/22 1047   Wound Assessment Granulation tissue;Slough 12/21/22 0952   Drainage Amount Small 12/21/22 0952   Drainage Description Serosanguinous 12/21/22 0952   Odor None 12/21/22 6267   Consuelo-wound Assessment Dry/flaky 12/21/22 0952   Margins Attached edges 12/21/22 8510   Wound Thickness Description not for Pressure Injury Full thickness 12/21/22 0952   Number of days: 77          Total Surface Area Debrided:  0.99 sq cm     Estimated Blood Loss:  Minimal    Hemostasis Achieved:  not needed    Procedural Pain:  0  / 10     Post Procedural Pain:  0 / 10     Response to treatment:  Well tolerated by patient. Plan:     Treatment Note please see attached Discharge Instructions    Written patient dismissal instructions given to patient and signed by patient or POA. Discharge Instructions         [address] and Hyper[facility]ic Oxygen Therapy   Physician Orders and Discharge Instructions  [address]   [address] [facility] [address], [facility]  Telephone: [phone]     NAME:  Kylie Brood OF BIRTH:  1945  MEDICAL RECORD NUMBER:  [id number]  DATE:  12/21/2022     Wound Cleansing:   Do not scrub or use excessive force. Cleanse wound prior to applying a clean dressing with:  [x] Normal Saline  [] Keep Wound Dry in Shower    [] Wound Cleanser   [] Cleanse wound with Mild Soap & Water  [] May Shower at Discharge   [] Other:        Topical Treatments:  Do not apply lotions, creams, or ointments to wound bed unless directed. [x] Apply moisturizing lotion to skin surrounding the wound prior to dressing change.  [] Apply antifungal ointment to skin surrounding the wound prior to dressing change.  [] Apply thin film of moisture barrier ointment to skin immediately around wound.   [] Other:                 Dressings:                Wound Location RIGHT LOWER LEG       [x] Apply Primary Dressing:                                             [] MediHoney Gel      [] Alginate with Silver [] Alginate             [] Collagen     [] Collagen with Silver   [] Santyl with Moisten saline gauze               [] Hydrocolloid            [] MediHoney Alginate        [] Foam with Silver             [] Foam   [x] Hydrofera Blue READY WITH SILCONE  Border       [] Mepilex Border                    [] Moisten with Saline      [] Hydrogel     [] Mepitel                     [] Bactroban/Mupirocin         [] Polysporin              [] Other:       [x] Cover and Secure with:                        [] Gauze       [] Filiberto          [] Roll gauze             [] Ace Wrap    [] Cover Roll Tape     [] ABD                         [] Other:              Avoid contact of tape with skin. [x] Change dressing:          [] Daily          [] Every Other Day [x] Three times per week  (Twice at home Friday and Monday)             [] Once a week         [] Do Not Change Dressing     [] Other:        Edema Control:  Apply:  [] Compression Stocking       []Right Leg     []Left Leg             [] Tubigrip      []Right Leg Double Layer      []Left Leg Double Layer                                              []Right Leg Single Layer       []Left Leg Single Layer             [x] SpandaGrip         [x]Right Leg   []Left Leg                                   []Low compression 5-10 mm/Hg                                            [x]Medium compression 10-20 mm/Hg                                   []High compression  20-30 mm/Hg             every morning immediately when getting up should be applied to affected leg(s) from mid foot to knee making sure to cover the heel. Remove every night before going to bed. [x] Elevate leg(s) above the level of the heart when sitting. [] Avoid prolonged standing in one place.              [] Elevate arm/hand above the level of the heart    []RightArm     []LeftArm               [x] Diet as tolerated:     [x] Calorie Diabetic Diet:          [] No Added Salt:  [] Increase Protein:     [] Other:            Activity:  [x] Activity as tolerated:  [] Patient has no activity restrictions     [] Strict Bedrest:         [] Remain off Work:     [] May return to full duty work: If you are still having pain after you go home:  [] Elevate the affected limb. [] Use over-the-counter medications you would normally use for pain as permitted by your family doctor. [] For persistent pain not relieved by the above interventions, please call your family doctor. Return Appointment:  [x] Wound and dressing supply provider: Khari Alfaro  [] ECF or Home Healthcare:  [] Wound Assessment:         [] Physician or NP scheduled for Wound Assessment:   [x] Return Appointment: With Osman Camargo CNP  in 2 Week(s)  [] Ordered tests:      Nurse Case Manger:  Therese               Electronically signed by Chaya Cool RN on 12/21/2022 at 10:15 [address] [place] [address] Information: Should you experience any significant changes in your wound(s) or have questions about your wound care, please contact the [address] [place] [address] at [address] 8:00 am - 4:30 pm and Friday 8:00 am - 12:30 pm.  If you need help with your wound outside these hours and cannot wait until we are again available, contact your PCP or go to the hospital emergency room. PLEASE NOTE: IF YOU ARE UNABLE TO OBTAIN WOUND SUPPLIES, CONTINUE TO USE THE SUPPLIES YOU HAVE AVAILABLE UNTIL YOU ARE ABLE TO REACH US. IT IS MOST IMPORTANT TO KEEP THE WOUND COVERED AT ALL TIMES.      Physician Signature:_______________________     Date: ___________ Time:  ____________                              Sigrid Vogel CNP        Electronically signed by VIDA Sy CNP on 12/21/2022 at 12:14 PM

## 2022-12-29 NOTE — DISCHARGE INSTRUCTIONS
[address] and [facility] O[facility] Therapy   Physician Orders and Discharge Instructions  [address]   [address]  Telephone: [phone]     NAME:  Abiodun Parents OF BIRTH:  1945  MEDICAL RECORD NUMBER:  [id number]  DATE:  1/04/2023     Wound Cleansing:   Do not scrub or use excessive force. Cleanse wound prior to applying a clean dressing with:  [x] Normal Saline  [] Keep Wound Dry in Shower    [] Wound Cleanser   [] Cleanse wound with Mild Soap & Water  [] May Shower at Discharge   [] Other:        Topical Treatments:  Do not apply lotions, creams, or ointments to wound bed unless directed. [x] Apply moisturizing lotion to skin surrounding the wound prior to dressing change.  [] Apply antifungal ointment to skin surrounding the wound prior to dressing change.  [] Apply thin film of moisture barrier ointment to skin immediately around wound. [] Other:                 Dressings:                Wound Location RIGHT LOWER LEG       [x] Apply Primary Dressing:                                             [] MediHoney Gel      [] Alginate with Silver [] Alginate             [] Collagen     [] Collagen with Silver   [] Santyl with Moisten saline gauze               [] Hydrocolloid            [] MediHoney Alginate        [] Foam with Silver             [] Foam   [x] Hydrofera Blue READY WITH SILCONE  Border       [] Mepilex Border                    [] Moisten with Saline      [] Hydrogel     [] Mepitel                     [] Bactroban/Mupirocin         [] Polysporin              [] Other:       [x] Cover and Secure with:                        [] Gauze       [] Filiberto          [] Roll gauze             [] Ace Wrap    [] Cover Roll Tape     [] ABD                         [] Other:              Avoid contact of tape with skin.   [x] Change dressing:          [] Daily          [] Every Other Day [x] Three times per week  (Twice at home Friday and Monday)             [] Once a week         [] Do Not Change Dressing     [] Other:        Edema Control:  Apply:  [] Compression Stocking       []Right Leg     []Left Leg             [] Tubigrip      []Right Leg Double Layer      []Left Leg Double Layer                                              []Right Leg Single Layer       []Left Leg Single Layer             [x] SpandaGrip         [x]Right Leg   []Left Leg                                   []Low compression 5-10 mm/Hg                                            [x]Medium compression 10-20 mm/Hg                                   []High compression  20-30 mm/Hg             every morning immediately when getting up should be applied to affected leg(s) from mid foot to knee making sure to cover the heel. Remove every night before going to bed. [x] Elevate leg(s) above the level of the heart when sitting. [] Avoid prolonged standing in one place. [] Elevate arm/hand above the level of the heart    []RightArm     []LeftArm               [x] Diet as tolerated:     [x] Calorie Diabetic Diet:          [] No Added Salt:  [] Increase Protein:     [] Other:            Activity:  [x] Activity as tolerated:  [] Patient has no activity restrictions     [] Strict Bedrest:         [] Remain off Work:     [] May return to full duty work: If you are still having pain after you go home:  [] Elevate the affected limb. [] Use over-the-counter medications you would normally use for pain as permitted by your family doctor. [] For persistent pain not relieved by the above interventions, please call your family doctor.              Return Appointment:  [x] Wound and dressing supply provider: Esther Blackwood  [] ECF or Home Healthcare:  [] Wound Assessment:         [] Physician or NP scheduled for Wound Assessment:   [x] Return Appointment: With Emilie Mercado CNP  in 1 Week(s)  [] Ordered tests:      Nurse Case Manger:  Therese            Electronically signed by Matt Pablo RN on 1/4/2023 at 10:13 AM              [facility] Information: Should you experience any significant changes in your wound(s) or have questions about your wound care, please contact the [address] [place] [address] at [address] 8:00 am - 4:30 pm and Friday 8:00 am - 12:30 pm.  If you need help with your wound outside these hours and cannot wait until we are again available, contact your PCP or go to the hospital emergency room. PLEASE NOTE: IF YOU ARE UNABLE TO OBTAIN WOUND SUPPLIES, CONTINUE TO USE THE SUPPLIES YOU HAVE AVAILABLE UNTIL YOU ARE ABLE TO REACH US. IT IS MOST IMPORTANT TO KEEP THE WOUND COVERED AT ALL TIMES.      Physician Signature:_______________________     Date: ___________ Time:  ____________                              Carol Woods CNP

## 2023-01-04 ENCOUNTER — HOSPITAL ENCOUNTER (OUTPATIENT)
Dept: WOUND CARE | Age: 78
Discharge: HOME | End: 2023-01-04
Payer: MEDICARE

## 2023-01-04 VITALS
HEART RATE: 97 BPM | SYSTOLIC BLOOD PRESSURE: 148 MMHG | TEMPERATURE: 97.3 F | RESPIRATION RATE: 18 BRPM | DIASTOLIC BLOOD PRESSURE: 86 MMHG

## 2023-01-04 DIAGNOSIS — L97.912 SKIN ULCER OF RIGHT LOWER LEG WITH FAT LAYER EXPOSED (HCC): ICD-10-CM

## 2023-01-04 DIAGNOSIS — I87.2 PERIPHERAL VENOUS INSUFFICIENCY: ICD-10-CM

## 2023-01-04 DIAGNOSIS — M79.89 LEG SWELLING: Primary | ICD-10-CM

## 2023-01-04 PROCEDURE — 11042 DBRDMT SUBQ TIS 1ST 20SQCM/<: CPT

## 2023-01-04 PROCEDURE — 11042 DBRDMT SUBQ TIS 1ST 20SQCM/<: CPT | Performed by: NURSE PRACTITIONER

## 2023-01-04 RX ORDER — BETAMETHASONE DIPROPIONATE 0.05 %
OINTMENT (GRAM) TOPICAL ONCE
OUTPATIENT
Start: 2023-01-04 | End: 2023-01-04

## 2023-01-04 RX ORDER — GINSENG 100 MG
CAPSULE ORAL ONCE
OUTPATIENT
Start: 2023-01-04 | End: 2023-01-04

## 2023-01-04 RX ORDER — LIDOCAINE HYDROCHLORIDE 20 MG/ML
JELLY TOPICAL ONCE
OUTPATIENT
Start: 2023-01-04 | End: 2023-01-04

## 2023-01-04 RX ORDER — LIDOCAINE HYDROCHLORIDE 40 MG/ML
SOLUTION TOPICAL ONCE
Status: COMPLETED | OUTPATIENT
Start: 2023-01-04 | End: 2023-01-04

## 2023-01-04 RX ORDER — LIDOCAINE HYDROCHLORIDE 40 MG/ML
SOLUTION TOPICAL ONCE
OUTPATIENT
Start: 2023-01-04 | End: 2023-01-04

## 2023-01-04 RX ORDER — LIDOCAINE 50 MG/G
OINTMENT TOPICAL ONCE
OUTPATIENT
Start: 2023-01-04 | End: 2023-01-04

## 2023-01-04 RX ORDER — BACITRACIN, NEOMYCIN, POLYMYXIN B 400; 3.5; 5 [USP'U]/G; MG/G; [USP'U]/G
OINTMENT TOPICAL ONCE
OUTPATIENT
Start: 2023-01-04 | End: 2023-01-04

## 2023-01-04 RX ORDER — LIDOCAINE 40 MG/G
CREAM TOPICAL ONCE
OUTPATIENT
Start: 2023-01-04 | End: 2023-01-04

## 2023-01-04 RX ORDER — CLOBETASOL PROPIONATE 0.5 MG/G
OINTMENT TOPICAL ONCE
OUTPATIENT
Start: 2023-01-04 | End: 2023-01-04

## 2023-01-04 RX ORDER — BACITRACIN ZINC AND POLYMYXIN B SULFATE 500; 1000 [USP'U]/G; [USP'U]/G
OINTMENT TOPICAL ONCE
OUTPATIENT
Start: 2023-01-04 | End: 2023-01-04

## 2023-01-04 RX ORDER — GENTAMICIN SULFATE 1 MG/G
OINTMENT TOPICAL ONCE
OUTPATIENT
Start: 2023-01-04 | End: 2023-01-04

## 2023-01-04 RX ADMIN — LIDOCAINE HYDROCHLORIDE 5 ML: 40 SOLUTION TOPICAL at 10:03

## 2023-01-04 ASSESSMENT — PAIN SCALES - GENERAL
PAINLEVEL_OUTOF10: 0
PAINLEVEL_OUTOF10: 0

## 2023-01-04 NOTE — PROGRESS NOTES
[address]   Progress Note and Procedure Note      [address] RECORD NUMBER:  [id number]  AGE: 78 y.o. GENDER: female  : 1945  EPISODE DATE:  2023    Subjective:     Chief Complaint   Patient presents with    Wound Check     Follow up visit right lower leg wound         HISTORY of PRESENT ILLNESS HPI  History of Wound Context: Several weeks ago right lateral leg wounds noted by pt, no excessive drainage, pain or redness noted. She denies constitutional issues, and has been dressing wounds at home. Denies itching or pain. Does not have any difficulty getting compression on or tolerating them. Today states wound has been looking better and she denies constitutional issues. Is pleased with progress of wound and ease of current dressing changes. Reports she take compression stockings off at home occasionally. Wound/Ulcer Pain Timing/Severity:  none  Quality of pain: N/A  Severity:  0 / 10 at present  Modifying Factors:  None  Associated Signs/Symptoms: edema     Ulcer Identification:  Ulcer Type: venous     Contributing Factors: edema, venous stasis, and obesity     Acute Wound: Other: venous wounds.     PAST MEDICAL HISTORY        Diagnosis Date    Asthma     Localized edema 2021    Non-pressure chronic ulcer of right lower leg with fat layer exposed ([address] 2021    Peripheral venous insufficiency 2021    Right foot ulcer, limited to breakdown of skin ([address] 2021    Skin ulcer of right lower leg with fat layer exposed ([address] 2021       PAST SURGICAL HISTORY    Past Surgical History:   Procedure Laterality Date    APPENDECTOMY      CHOLECYSTECTOMY         FAMILY HISTORY    Family History   Problem Relation Age of Onset    Migraines Mother        SOCIAL HISTORY    Social History     Tobacco Use    Smoking status: Never    Smokeless tobacco: Never   Substance Use Topics    Drug use: Never       ALLERGIES    Allergies   Allergen Reactions Iodides Other (See Comments)     IVP dye, pt does not remember he reaction, just that the nurses stated to not let anyone give to her ever again    Furosemide      Pt. States it makes her \"ditzy\", dizzy, and gives her muscle ridgity and nausea. MEDICATIONS    Current Outpatient Medications on File Prior to Encounter   Medication Sig Dispense Refill    clotrimazole-betamethasone (LOTRISONE) 1-0.05 % cream Apply topically 2 times daily. 45 g 3    triamterene-hydroCHLOROthiazide (MAXZIDE-25) 37.5-25 MG per tablet TAKE 1 TABLET BY MOUTH DAILY 90 tablet 1    metOLazone (ZAROXOLYN) 5 MG tablet TAKE ONE TABLET BY MOUTH DAILY AS NEEDED FOR SWELLING 30 tablet 1    albuterol sulfate HFA (PROAIR HFA) 108 (90 Base) MCG/ACT inhaler Inhale 2 puffs into the lungs every 6 hours as needed for Wheezing 1 Inhaler 0    Nebulizers (AIRIAL COMPACT MINI NEBULIZER) MISC 1 nebule by Does not apply route 4 times daily 1 each 0     No current facility-administered medications on file prior to encounter. REVIEW OF SYSTEMS  Review of Systems    Pertinent items are noted in HPI.     Objective:      BP (!) 148/86   Pulse 97   Temp 97.3 °F (36.3 °C) (Infrared)   Resp 18     Wt Readings from Last 3 Encounters:   10/05/22 216 lb 11.4 oz (98.3 kg)   09/28/22 217 lb (98.4 kg)   08/18/22 223 lb (101.2 kg)       PHYSICAL EXAM  Physical Exam    General Appearance: alert and oriented to person, place and time  Skin: warm and dry  Head: normocephalic and atraumatic  Eyes: pupils equal, round, and reactive to light  Pulmonary/Chest: clear to auscultation bilaterally- no wheezes, rales or rhonchi, normal air movement, no respiratory distress  Cardiovascular: normal rate, regular rhythm, and intact distal pulses      Assessment:        Problem List Items Addressed This Visit       Skin ulcer of right lower leg with fat layer exposed [facility])    Relevant Orders    Initiate Outpatient Wound Care Protocol    Peripheral venous insufficiency Relevant Orders    Initiate Outpatient Wound Care Protocol    Leg swelling - Primary    Relevant Orders    Initiate Outpatient Wound Care Protocol        Procedure Note  Indications:  Based on my examination of this patient's wound(s)/ulcer(s) today, debridement is required to promote healing and evaluate the wound base. Performed by: VIDA Marie CNP    Consent obtained:  Yes    Time out taken:  Yes    Pain Control: Anesthetic  Anesthetic: 4% Lidocaine Liquid Topical       Debridement: Excisional Debridement    Using curette the wound(s)/ulcer(s) was/were debrided down through and including the removal of epidermis, dermis, and subcutaneous tissue.         Devitalized Tissue Debrided:  fibrin, biofilm, and slough    Pre Debridement Measurements:  Are located in the [facility]  Documentation Flow Sheet    Diabetic/Pressure/Non Pressure Ulcers only:  Ulcer: Non-Pressure ulcer, fat layer exposed     Wound/Ulcer #: 1    Post Debridement Measurements:  Wound/Ulcer Descriptions are Pre Debridement except measurements:    Wound 10/05/22 Leg Right;Lateral;Proximal;Lower #1 Noted 9/28/22 (Active)   Wound Image   01/04/23 0957   Wound Etiology Venous 10/05/22 0913   Dressing Status Old drainage noted 11/09/22 1010   Dressing/Treatment Hydrofera blue 12/21/22 1030   Wound Length (cm) 0.4 cm 01/04/23 0957   Wound Width (cm) 0.3 cm 01/04/23 0957   Wound Depth (cm) 0.1 cm 01/04/23 0957   Wound Surface Area (cm^2) 0.12 cm^2 01/04/23 0957   Change in Wound Size % (l*w) 73.33 01/04/23 0957   Wound Volume (cm^3) 0.012 cm^3 01/04/23 0957   Wound Healing % 73 01/04/23 0957   Post-Procedure Length (cm) 0.5 cm 01/04/23 1012   Post-Procedure Width (cm) 0.4 cm 01/04/23 1012   Post-Procedure Depth (cm) 0.2 cm 01/04/23 1012   Post-Procedure Surface Area (cm^2) 0.2 cm^2 01/04/23 1012   Post-Procedure Volume (cm^3) 0.04 cm^3 01/04/23 1012   Wound Assessment Granulation tissue;Slough 01/04/23 0957   Drainage Amount Scant 01/04/23 0957   Drainage Description Serosanguinous 01/04/23 0957   Odor None 01/04/23 0957   Consuelo-wound Assessment Dry/flaky 01/04/23 0957   Margins Attached edges 01/04/23 0957   Wound Thickness Description not for Pressure Injury Full thickness 01/04/23 0957   Number of days: 91          Total Surface Area Debrided:  0.2 sq cm     Estimated Blood Loss:  Minimal    Hemostasis Achieved:  by pressure    Procedural Pain:  1  / 10     Post Procedural Pain:  0 / 10     Response to treatment:  Well tolerated by patient. Plan:     Treatment Note please see attached Discharge Instructions    Written patient dismissal instructions given to patient and signed by patient or POA. Discharge Instructions         [address] and [facility] O[facility] Therapy   Physician Orders and Discharge Instructions  [place] [address], [place] [address]  Telephone: [phone]     NAME:  Dwain Mcfarland OF BIRTH:  1945  MEDICAL RECORD NUMBER:  [id number]  DATE:  1/04/2023     Wound Cleansing:   Do not scrub or use excessive force. Cleanse wound prior to applying a clean dressing with:  [x] Normal Saline  [] Keep Wound Dry in Shower    [] Wound Cleanser   [] Cleanse wound with Mild Soap & Water  [] May Shower at Discharge   [] Other:        Topical Treatments:  Do not apply lotions, creams, or ointments to wound bed unless directed. [x] Apply moisturizing lotion to skin surrounding the wound prior to dressing change.  [] Apply antifungal ointment to skin surrounding the wound prior to dressing change.  [] Apply thin film of moisture barrier ointment to skin immediately around wound.   [] Other:                 Dressings:                Wound Location RIGHT LOWER LEG       [x] Apply Primary Dressing:                                             [] MediHoney Gel      [] Alginate with Silver [] Alginate             [] Collagen     [] Collagen with Silver   [] Santyl with Moisten saline gauze               [] Hydrocolloid            [] MediHoney Alginate        [] Foam with Silver             [] Foam   [x] Hydrofera Blue READY WITH SILCONE  Border       [] Mepilex Border                    [] Moisten with Saline      [] Hydrogel     [] Mepitel                     [] Bactroban/Mupirocin         [] Polysporin              [] Other:       [x] Cover and Secure with:                        [] Gauze       [] Filiberto          [] Roll gauze             [] Ace Wrap    [] Cover Roll Tape     [] ABD                         [] Other:              Avoid contact of tape with skin. [x] Change dressing:          [] Daily          [] Every Other Day [x] Three times per week  (Twice at home Friday and Monday)             [] Once a week         [] Do Not Change Dressing     [] Other:        Edema Control:  Apply:  [] Compression Stocking       []Right Leg     []Left Leg             [] Tubigrip      []Right Leg Double Layer      []Left Leg Double Layer                                              []Right Leg Single Layer       []Left Leg Single Layer             [x] SpandaGrip         [x]Right Leg   []Left Leg                                   []Low compression 5-10 mm/Hg                                            [x]Medium compression 10-20 mm/Hg                                   []High compression  20-30 mm/Hg             every morning immediately when getting up should be applied to affected leg(s) from mid foot to knee making sure to cover the heel. Remove every night before going to bed. [x] Elevate leg(s) above the level of the heart when sitting. [] Avoid prolonged standing in one place.              [] Elevate arm/hand above the level of the heart    []RightArm     []LeftArm               [x] Diet as tolerated:     [x] Calorie Diabetic Diet:          [] No Added Salt:  [] Increase Protein:     [] Other:            Activity:  [x] Activity as tolerated:  [] Patient has no activity restrictions     [] Strict Bedrest:         [] Remain off Work:     [] May return to full duty work: If you are still having pain after you go home:  [] Elevate the affected limb. [] Use over-the-counter medications you would normally use for pain as permitted by your family doctor. [] For persistent pain not relieved by the above interventions, please call your family doctor. Return Appointment:  [x] Wound and dressing supply provider: Artie Corbin  [] ECF or Home Healthcare:  [] Wound Assessment:         [] Physician or NP scheduled for Wound Assessment:   [x] Return Appointment: With Gaby Mojica CNP  in 1 Week(s)  [] Ordered tests:      Nurse Case Manger:  Therese            Electronically signed by Laure Schwartz RN on 1/4/2023 at 10:13 AM              [address] [facility] Information: Should you experience any significant changes in your wound(s) or have questions about your wound care, please contact the [address] [place] [address] at [address] 8:00 am - 4:30 pm and Friday 8:00 am - 12:30 pm.  If you need help with your wound outside these hours and cannot wait until we are again available, contact your PCP or go to the hospital emergency room. PLEASE NOTE: IF YOU ARE UNABLE TO OBTAIN WOUND SUPPLIES, CONTINUE TO USE THE SUPPLIES YOU HAVE AVAILABLE UNTIL YOU ARE ABLE TO REACH US. IT IS MOST IMPORTANT TO KEEP THE WOUND COVERED AT ALL TIMES.      Physician Signature:_______________________     Date: ___________ Time:  ____________                              Mateo Gunn CNP        Electronically signed by VIDA Vasques CNP on 1/4/2023 at 10:38 AM

## 2023-01-05 NOTE — DISCHARGE INSTRUCTIONS
[address] and [facility] Therapy   Physician Orders and Discharge Instructions  [facility]  [address] [facility] [address]  Telephone: [phone]     NAME:  Elena Daniels OF BIRTH:  1945  MEDICAL RECORD NUMBER:  [id number]  DATE:  1/11/2023     Wound Cleansing:   Do not scrub or use excessive force. Cleanse wound prior to applying a clean dressing with:  [x] Normal Saline  [] Keep Wound Dry in Shower    [] Wound Cleanser   [] Cleanse wound with Mild Soap & Water  [] May Shower at Discharge   [] Other:        Topical Treatments:  Do not apply lotions, creams, or ointments to wound bed unless directed. [x] Apply moisturizing lotion to skin surrounding the wound prior to dressing change.  [] Apply antifungal ointment to skin surrounding the wound prior to dressing change.  [] Apply thin film of moisture barrier ointment to skin immediately around wound. [] Other:                 Dressings:                Wound Location RIGHT LOWER LEG       [x] Apply Primary Dressing:                                             [] MediHoney Gel      [] Alginate with Silver [] Alginate             [] Collagen     [] Collagen with Silver   [] Santyl with Moisten saline gauze               [] Hydrocolloid            [] MediHoney Alginate        [] Foam with Silver             [] Foam   [x] Hydrofera Blue READY WITH SILCONE  Border       [] Mepilex Border                    [] Moisten with Saline      [] Hydrogel     [] Mepitel                     [] Bactroban/Mupirocin         [] Polysporin              [x] Other:  ENDOFORM     [] Cover and Secure with:                        [] Gauze       [] Filiberto          [] Roll gauze             [] Ace Wrap    [] Cover Roll Tape     [] ABD                         [] Other:              Avoid contact of tape with skin.   [x] Change dressing:          [] Daily          [] Every Other Day [x] Three times per week  (Twice at home Friday and Monday)             [] Once a week         [] Do Not Change Dressing     [] Other:        Edema Control:  Apply:  [] Compression Stocking       []Right Leg     []Left Leg             [] Tubigrip      []Right Leg Double Layer      []Left Leg Double Layer                                              []Right Leg Single Layer       []Left Leg Single Layer             [x] SpandaGrip         [x]Right Leg   []Left Leg                                   []Low compression 5-10 mm/Hg                                            [x]Medium compression 10-20 mm/Hg                                   []High compression  20-30 mm/Hg             every morning immediately when getting up should be applied to affected leg(s) from mid foot to knee making sure to cover the heel. Remove every night before going to bed. [x] Elevate leg(s) above the level of the heart when sitting. [] Avoid prolonged standing in one place. [] Elevate arm/hand above the level of the heart    []RightArm     []LeftArm               [x] Diet as tolerated:     [x] Calorie Diabetic Diet:          [] No Added Salt:  [] Increase Protein:     [] Other:            Activity:  [x] Activity as tolerated:  [] Patient has no activity restrictions     [] Strict Bedrest:         [] Remain off Work:     [] May return to full duty work: If you are still having pain after you go home:  [] Elevate the affected limb. [] Use over-the-counter medications you would normally use for pain as permitted by your family doctor. [] For persistent pain not relieved by the above interventions, please call your family doctor.              Return Appointment:  [x] Wound and dressing supply provider: Nelia Agarwal  [] ECF or Home Healthcare:  [] Wound Assessment:         [] Physician or NP scheduled for Wound Assessment:   [x] Return Appointment: With Franny Kim CNP  in 1 Week(s)  [] Ordered tests:      Nurse Case Manger:  Therese     Electronically signed by Evy Merchant RN on 1/11/2023 at 10:13 AM

## 2023-01-06 ENCOUNTER — TELEPHONE (OUTPATIENT)
Dept: FAMILY MEDICINE CLINIC | Age: 78
End: 2023-01-06

## 2023-01-06 ENCOUNTER — OFFICE VISIT (OUTPATIENT)
Dept: FAMILY MEDICINE CLINIC | Age: 78
End: 2023-01-06

## 2023-01-06 VITALS
SYSTOLIC BLOOD PRESSURE: 130 MMHG | WEIGHT: 213 LBS | DIASTOLIC BLOOD PRESSURE: 82 MMHG | HEIGHT: 66 IN | BODY MASS INDEX: 34.23 KG/M2

## 2023-01-06 DIAGNOSIS — J45.40 MODERATE PERSISTENT ASTHMA WITHOUT COMPLICATION: ICD-10-CM

## 2023-01-06 DIAGNOSIS — I87.2 PERIPHERAL VENOUS INSUFFICIENCY: Primary | ICD-10-CM

## 2023-01-06 ASSESSMENT — ENCOUNTER SYMPTOMS
DIARRHEA: 0
ABDOMINAL PAIN: 0
NAUSEA: 0
VOMITING: 0
COUGH: 0
WHEEZING: 0
SHORTNESS OF BREATH: 0
CHEST TIGHTNESS: 0

## 2023-01-06 ASSESSMENT — PATIENT HEALTH QUESTIONNAIRE - PHQ9
SUM OF ALL RESPONSES TO PHQ QUESTIONS 1-9: 0
SUM OF ALL RESPONSES TO PHQ QUESTIONS 1-9: 0
SUM OF ALL RESPONSES TO PHQ9 QUESTIONS 1 & 2: 0
SUM OF ALL RESPONSES TO PHQ QUESTIONS 1-9: 0
1. LITTLE INTEREST OR PLEASURE IN DOING THINGS: 0
SUM OF ALL RESPONSES TO PHQ QUESTIONS 1-9: 0
2. FEELING DOWN, DEPRESSED OR HOPELESS: 0

## 2023-01-06 NOTE — PROGRESS NOTES
2023     Aayush Preston (:  1945) is a 78 y.o. female, here for evaluation of the following medical concerns:    HPI  Patient presented to the clinic for follow up on chronic medical conditions. She stated that again she developed mild erythema and edema in left arm that occurred after the COVID booster. Right knee pain- edema, osteoarthritis, has had this for years, believes this is worse, having more problems with ambulation. Followed with orthopedist, wants conservative treatment. Asthma-  patient feels well today,  patient feels much improved, decided not to have imaging studies, understands the need but is not wanting these. She understands her decision. h denied fever or chills, Denied rhinorrhea or nasal congestion. Right leg cellulitis/edema- right leg is much improved but has a wrap on it today patient is going to the wound clinic, lateral aspect, ankle, patient has swelling in her leg, erythematous,has possible nodule secondary to cellulitis, patient is not  having pain or discomfort at this time. Overall much improved, following up with wound clinic. Today, chest pain sob, n, v, or diarrhea. Review of Systems   Constitutional:  Negative for activity change, fatigue, fever and unexpected weight change. Eyes:  Negative for visual disturbance. Respiratory:  Negative for cough, chest tightness, shortness of breath and wheezing. Cardiovascular:  Negative for chest pain and leg swelling. Gastrointestinal:  Negative for abdominal pain, diarrhea, nausea and vomiting. Endocrine: Negative for cold intolerance, heat intolerance, polydipsia and polyphagia. Musculoskeletal:  Positive for arthralgias. Skin:  Positive for wound. Negative for rash. Neurological:  Negative for dizziness, tremors, syncope, numbness and headaches. Psychiatric/Behavioral:  Negative for dysphoric mood. The patient is not nervous/anxious.       Prior to Visit Medications Medication Sig Taking? Authorizing Provider   clotrimazole-betamethasone (LOTRISONE) 1-0.05 % cream Apply topically 2 times daily. Yes Porsha Vanegas MD   metOLazone (ZAROXOLYN) 5 MG tablet TAKE ONE TABLET BY MOUTH DAILY AS NEEDED FOR SWELLING Yes Orlando Munguia,    albuterol sulfate HFA (PROAIR HFA) 108 (90 Base) MCG/ACT inhaler Inhale 2 puffs into the lungs every 6 hours as needed for Wheezing Yes Codie Colon, DO   Nebulizers (AIRIAL COMPACT MINI NEBULIZER) MISC 1 nebule by Does not apply route 4 times daily Yes Codie Colon, DO   triamterene-hydroCHLOROthiazide (MAXZIDE-25) 37.5-25 MG per tablet TAKE 1 TABLET BY MOUTH DAILY  Codie Colon, DO        Social History     Tobacco Use    Smoking status: Never    Smokeless tobacco: Never   Substance Use Topics    Alcohol use: Not on file        Vitals:    01/06/23 0849   BP: 130/82   Weight: 213 lb (96.6 kg)   Height: 5' 6\" (1.676 m)     Estimated body mass index is 34.38 kg/m² as calculated from the following:    Height as of this encounter: 5' 6\" (1.676 m). Weight as of this encounter: 213 lb (96.6 kg). Physical Exam  Vitals and nursing note reviewed. Constitutional:       Appearance: She is well-developed. HENT:      Head: Normocephalic and atraumatic. Right Ear: External ear normal.      Left Ear: External ear normal.      Nose: Nose normal.   Eyes:      Conjunctiva/sclera: Conjunctivae normal.      Pupils: Pupils are equal, round, and reactive to light. Cardiovascular:      Rate and Rhythm: Normal rate and regular rhythm. Heart sounds: Normal heart sounds. No murmur heard. Pulmonary:      Effort: Pulmonary effort is normal.      Breath sounds: Normal breath sounds. No wheezing. Abdominal:      General: Bowel sounds are normal.      Palpations: Abdomen is soft. Tenderness: There is no abdominal tenderness. Musculoskeletal:      Cervical back: Normal range of motion and neck supple.    Skin:     General: Skin is warm and dry.   Neurological:      Mental Status: She is alert and oriented to person, place, and time. Psychiatric:         Behavior: Behavior normal.       ASSESSMENT/PLAN:  1. Peripheral venous insufficiency  Stable  Continue with medication  Keep appointments with specialist.   Maegan Face questions     2. Moderate persistent asthma without complication  Stable  Continue with medication  Answered questions     Return in about 3 months (around 4/6/2023).

## 2023-01-06 NOTE — TELEPHONE ENCOUNTER
pt needs call back she forgot to tell her pcp   information that he might find interesting about a concerning call she   received, wants only joseph to call her   ---------------------------------------------------------------------------

## 2023-01-06 NOTE — TELEPHONE ENCOUNTER
----- Message from Maggy Kaiser sent at 1/6/2023 12:46 PM EST -----  Subject: Message to Provider    QUESTIONS  Information for Provider? pt needs call back she forgot to tell her pcp   information that he might find interesting about a concerning call she   received, wants only joseph to call her  ---------------------------------------------------------------------------  --------------  [id number]  [phone]; OK to leave message on voicemail  ---------------------------------------------------------------------------  --------------  SCRIPT ANSWERS  Relationship to Patient?  Self

## 2023-01-09 RX ORDER — TRIAMTERENE AND HYDROCHLOROTHIAZIDE 37.5; 25 MG/1; MG/1
1 TABLET ORAL DAILY
Qty: 90 TABLET | Refills: 1 | Status: SHIPPED | OUTPATIENT
Start: 2023-01-09 | End: 2023-04-09

## 2023-01-11 ENCOUNTER — HOSPITAL ENCOUNTER (OUTPATIENT)
Dept: WOUND CARE | Age: 78
Discharge: HOME | End: 2023-01-11
Payer: MEDICARE

## 2023-01-11 VITALS
RESPIRATION RATE: 18 BRPM | HEART RATE: 90 BPM | DIASTOLIC BLOOD PRESSURE: 78 MMHG | TEMPERATURE: 97.2 F | SYSTOLIC BLOOD PRESSURE: 158 MMHG

## 2023-01-11 DIAGNOSIS — M79.89 LEG SWELLING: Primary | ICD-10-CM

## 2023-01-11 DIAGNOSIS — I87.2 PERIPHERAL VENOUS INSUFFICIENCY: ICD-10-CM

## 2023-01-11 DIAGNOSIS — L97.912 SKIN ULCER OF RIGHT LOWER LEG WITH FAT LAYER EXPOSED (HCC): ICD-10-CM

## 2023-01-11 PROCEDURE — 11042 DBRDMT SUBQ TIS 1ST 20SQCM/<: CPT

## 2023-01-11 RX ORDER — GENTAMICIN SULFATE 1 MG/G
OINTMENT TOPICAL ONCE
OUTPATIENT
Start: 2023-01-11 | End: 2023-01-11

## 2023-01-11 RX ORDER — LIDOCAINE HYDROCHLORIDE 40 MG/ML
SOLUTION TOPICAL ONCE
Status: COMPLETED | OUTPATIENT
Start: 2023-01-11 | End: 2023-01-11

## 2023-01-11 RX ORDER — LIDOCAINE HYDROCHLORIDE 20 MG/ML
JELLY TOPICAL ONCE
OUTPATIENT
Start: 2023-01-11 | End: 2023-01-11

## 2023-01-11 RX ORDER — CLOBETASOL PROPIONATE 0.5 MG/G
OINTMENT TOPICAL ONCE
OUTPATIENT
Start: 2023-01-11 | End: 2023-01-11

## 2023-01-11 RX ORDER — LIDOCAINE HYDROCHLORIDE 40 MG/ML
SOLUTION TOPICAL ONCE
OUTPATIENT
Start: 2023-01-11 | End: 2023-01-11

## 2023-01-11 RX ORDER — BETAMETHASONE DIPROPIONATE 0.05 %
OINTMENT (GRAM) TOPICAL ONCE
OUTPATIENT
Start: 2023-01-11 | End: 2023-01-11

## 2023-01-11 RX ORDER — LIDOCAINE 50 MG/G
OINTMENT TOPICAL ONCE
OUTPATIENT
Start: 2023-01-11 | End: 2023-01-11

## 2023-01-11 RX ORDER — BACITRACIN ZINC AND POLYMYXIN B SULFATE 500; 1000 [USP'U]/G; [USP'U]/G
OINTMENT TOPICAL ONCE
OUTPATIENT
Start: 2023-01-11 | End: 2023-01-11

## 2023-01-11 RX ORDER — LIDOCAINE 40 MG/G
CREAM TOPICAL ONCE
OUTPATIENT
Start: 2023-01-11 | End: 2023-01-11

## 2023-01-11 RX ORDER — BACITRACIN, NEOMYCIN, POLYMYXIN B 400; 3.5; 5 [USP'U]/G; MG/G; [USP'U]/G
OINTMENT TOPICAL ONCE
OUTPATIENT
Start: 2023-01-11 | End: 2023-01-11

## 2023-01-11 RX ORDER — GINSENG 100 MG
CAPSULE ORAL ONCE
OUTPATIENT
Start: 2023-01-11 | End: 2023-01-11

## 2023-01-11 RX ADMIN — LIDOCAINE HYDROCHLORIDE 5 ML: 40 SOLUTION TOPICAL at 10:01

## 2023-01-11 ASSESSMENT — PAIN SCALES - GENERAL
PAINLEVEL_OUTOF10: 0
PAINLEVEL_OUTOF10: 0

## 2023-01-11 NOTE — PROGRESS NOTES
[address]   Progress Note and Procedure Note      [address] RECORD NUMBER:  [id number]  AGE: 78 y.o. GENDER: female  : 1945  EPISODE DATE:  2023    Subjective:     Chief Complaint   Patient presents with    Wound Check     Follow up visit right lower leg wound         HISTORY of PRESENT ILLNESS RAQUEL Smith is a 78year old female presenting today for follow-up for right lateral lower leg wound  History of Wound Context: Several weeks ago right lateral leg wounds noted by pt, no excessive drainage, pain or redness noted. She denies constitutional issues, and has been dressing wounds at home. Denies itching or pain. Does not have any difficulty getting compression on or tolerating them. Today states wound has been looking better and she denies constitutional issues. Is pleased with progress of wound and ease of current dressing changes. Reports she take compression stockings off at home occasionally. Wound/Ulcer Pain Timing/Severity:  none  Quality of pain: N/A  Severity:  0 / 10 at present  Modifying Factors:  None  Associated Signs/Symptoms: edema     Ulcer Identification:  Ulcer Type: venous     Contributing Factors: edema, venous stasis, and obesity     Acute Wound: Other: venous wounds.     PAST MEDICAL HISTORY        Diagnosis Date    Asthma     Localized edema 2021    Non-pressure chronic ulcer of right lower leg with fat layer exposed ([address] 2021    Peripheral venous insufficiency 2021    Right foot ulcer, limited to breakdown of skin ([address] 2021    Skin ulcer of right lower leg with fat layer exposed ([address] 2021       PAST SURGICAL HISTORY    Past Surgical History:   Procedure Laterality Date    APPENDECTOMY      CHOLECYSTECTOMY         FAMILY HISTORY    Family History   Problem Relation Age of Onset    Migraines Mother        SOCIAL HISTORY    Social History     Tobacco Use    Smoking status: Never    Smokeless tobacco: Never   Substance Use Topics    Drug use: Never       ALLERGIES    Allergies   Allergen Reactions    Iodides Other (See Comments)     IVP dye, pt does not remember he reaction, just that the nurses stated to not let anyone give to her ever again    Furosemide      Pt. States it makes her \"ditzy\", dizzy, and gives her muscle ridgity and nausea. MEDICATIONS    Current Outpatient Medications on File Prior to Encounter   Medication Sig Dispense Refill    triamterene-hydroCHLOROthiazide (MAXZIDE-25) 37.5-25 MG per tablet TAKE 1 TABLET BY MOUTH DAILY 90 tablet 1    clotrimazole-betamethasone (LOTRISONE) 1-0.05 % cream Apply topically 2 times daily. 45 g 3    metOLazone (ZAROXOLYN) 5 MG tablet TAKE ONE TABLET BY MOUTH DAILY AS NEEDED FOR SWELLING 30 tablet 1    albuterol sulfate HFA (PROAIR HFA) 108 (90 Base) MCG/ACT inhaler Inhale 2 puffs into the lungs every 6 hours as needed for Wheezing 1 Inhaler 0    Nebulizers (AIRIAL COMPACT MINI NEBULIZER) MISC 1 nebule by Does not apply route 4 times daily 1 each 0     No current facility-administered medications on file prior to encounter. REVIEW OF SYSTEMS  Review of Systems    Pertinent items are noted in HPI.     Objective:      BP (!) 158/78   Pulse 90   Temp 97.2 °F (36.2 °C) (Infrared)   Resp 18     Wt Readings from Last 3 Encounters:   01/06/23 213 lb (96.6 kg)   10/05/22 216 lb 11.4 oz (98.3 kg)   09/28/22 217 lb (98.4 kg)       PHYSICAL EXAM  Physical Exam    General Appearance: alert and oriented to person, place and time  Skin: warm and dry, no rash or erythema  Head: normocephalic and atraumatic  Eyes: pupils equal, round, and reactive to light  Pulmonary/Chest:  normal air movement, no respiratory distress  Cardiovascular: normal rate and regular rhythm      Assessment:        Problem List Items Addressed This Visit       Skin ulcer of right lower leg with fat layer exposed [facility])    Relevant Orders    Initiate Outpatient Wound Care Protocol Peripheral venous insufficiency    Relevant Orders    Initiate Outpatient Wound Care Protocol    Leg swelling - Primary    Relevant Orders    Initiate Outpatient Wound Care Protocol        Procedure Note  Indications:  Based on my examination of this patient's wound(s)/ulcer(s) today, debridement is required to promote healing and evaluate the wound base. Performed by: VIDA Hart CNP    Consent obtained:  Yes    Time out taken:  Yes    Pain Control: Anesthetic  Anesthetic: 4% Lidocaine Liquid Topical       Debridement: Excisional Debridement    Using curette the wound(s)/ulcer(s) was/were debrided down through and including the removal of epidermis, dermis, and subcutaneous tissue.         Devitalized Tissue Debrided:  fibrin, biofilm, and slough    Pre Debridement Measurements:  Are located in the [place]  Documentation Flow Sheet    Diabetic/Pressure/Non Pressure Ulcers only:  Ulcer: Non-Pressure ulcer, fat layer exposed     Wound/Ulcer #: 1    Post Debridement Measurements:  Wound/Ulcer Descriptions are Pre Debridement except measurements:    Wound 10/05/22 Leg Right;Lateral;Proximal;Lower #1 Noted 9/28/22 (Active)   Wound Image   01/04/23 0957   Wound Etiology Venous 10/05/22 0913   Dressing Status Old drainage noted 11/09/22 1010   Dressing/Treatment Hydrofera blue 01/11/23 1030   Wound Length (cm) 0.5 cm 01/11/23 0954   Wound Width (cm) 0.3 cm 01/11/23 0954   Wound Depth (cm) 0.1 cm 01/11/23 0954   Wound Surface Area (cm^2) 0.15 cm^2 01/11/23 0954   Change in Wound Size % (l*w) 66.67 01/11/23 0954   Wound Volume (cm^3) 0.015 cm^3 01/11/23 0954   Wound Healing % 67 01/11/23 0954   Post-Procedure Length (cm) 0.6 cm 01/11/23 1012   Post-Procedure Width (cm) 0.4 cm 01/11/23 1012   Post-Procedure Depth (cm) 0.2 cm 01/11/23 1012   Post-Procedure Surface Area (cm^2) 0.24 cm^2 01/11/23 1012   Post-Procedure Volume (cm^3) 0.048 cm^3 01/11/23 1012   Wound Assessment Granulation tissue;Slough 01/11/23 5501   Drainage Amount Scant 01/11/23 0954   Drainage Description Serosanguinous 01/11/23 0954   Odor None 01/11/23 0954   Consuelo-wound Assessment Dry/flaky;Fragile 01/11/23 0954   Margins Attached edges 01/11/23 0954   Wound Thickness Description not for Pressure Injury Full thickness 01/11/23 0954   Number of days: 98          Total Surface Area Debrided:  0.24 sq cm     Estimated Blood Loss:  Minimal    Hemostasis Achieved:  not needed    Procedural Pain:  0  / 10     Post Procedural Pain:  0 / 10     Response to treatment:  Well tolerated by patient. Plan:     Treatment Note please see attached Discharge Instructions    Written patient dismissal instructions given to patient and signed by patient or POA. Discharge Instructions         [address] and [facility] Oxygen Therapy   Physician Orders and Discharge Instructions  [facility]  [address] [facility] [address], [facility] [address]  Telephone: [phone]     NAME:  Bridgett Bundy OF BIRTH:  1945  MEDICAL RECORD NUMBER:  [id number]  DATE:  1/11/2023     Wound Cleansing:   Do not scrub or use excessive force. Cleanse wound prior to applying a clean dressing with:  [x] Normal Saline  [] Keep Wound Dry in Shower    [] Wound Cleanser   [] Cleanse wound with Mild Soap & Water  [] May Shower at Discharge   [] Other:        Topical Treatments:  Do not apply lotions, creams, or ointments to wound bed unless directed. [x] Apply moisturizing lotion to skin surrounding the wound prior to dressing change.  [] Apply antifungal ointment to skin surrounding the wound prior to dressing change.  [] Apply thin film of moisture barrier ointment to skin immediately around wound.   [] Other:                 Dressings:                Wound Location RIGHT LOWER LEG       [x] Apply Primary Dressing:                                             [] MediHoney Gel      [] Alginate with Silver [] Alginate             [] Collagen     [] Collagen with Silver   [] Santyl with Moisten saline gauze               [] Hydrocolloid            [] MediHoney Alginate        [] Foam with Silver             [] Foam   [x] Hydrofera Blue READY WITH SILCONE  Border       [] Mepilex Border                    [] Moisten with Saline      [] Hydrogel     [] Mepitel                     [] Bactroban/Mupirocin         [] Polysporin              [x] Other:  ENDOFORM     [] Cover and Secure with:                        [] Gauze       [] Filiberto          [] Roll gauze             [] Ace Wrap    [] Cover Roll Tape     [] ABD                         [] Other:              Avoid contact of tape with skin. [x] Change dressing:          [] Daily          [] Every Other Day [x] Three times per week  (Twice at home Friday and Monday)             [] Once a week         [] Do Not Change Dressing     [] Other:        Edema Control:  Apply:  [] Compression Stocking       []Right Leg     []Left Leg             [] Tubigrip      []Right Leg Double Layer      []Left Leg Double Layer                                              []Right Leg Single Layer       []Left Leg Single Layer             [x] SpandaGrip         [x]Right Leg   []Left Leg                                   []Low compression 5-10 mm/Hg                                            [x]Medium compression 10-20 mm/Hg                                   []High compression  20-30 mm/Hg             every morning immediately when getting up should be applied to affected leg(s) from mid foot to knee making sure to cover the heel. Remove every night before going to bed. [x] Elevate leg(s) above the level of the heart when sitting. [] Avoid prolonged standing in one place.              [] Elevate arm/hand above the level of the heart    []RightArm     []LeftArm               [x] Diet as tolerated:     [x] Calorie Diabetic Diet:          [] No Added Salt:  [] Increase Protein:     [] Other:            Activity:  [x] Activity as tolerated:  [] Patient has no activity restrictions     [] Strict Bedrest:         [] Remain off Work:     [] May return to full duty work: If you are still having pain after you go home:  [] Elevate the affected limb. [] Use over-the-counter medications you would normally use for pain as permitted by your family doctor. [] For persistent pain not relieved by the above interventions, please call your family doctor.              Return Appointment:  [x] Wound and dressing supply provider: Melonie Martinez  [] ECF or Home Healthcare:  [] Wound Assessment:         [] Physician or NP scheduled for Wound Assessment:   [x] Return Appointment: With Semaj Salinas CNP  in 1 Week(s)  [] Ordered tests:      Nurse Lee Saleh     Electronically signed by Virgen Morley RN on 1/11/2023 at 10:13 AM          Electronically signed by VIDA Fisher CNP on 1/11/2023 at 11:35 AM

## 2023-01-12 NOTE — DISCHARGE INSTRUCTIONS
[address] and [facility] Therapy   Physician Orders and Discharge Instructions  [address]   [address]  Telephone: [phone]     NAME:  Eli Mantilla OF BIRTH:  1945  MEDICAL RECORD NUMBER:  [id number]  DATE:  1/18/2023     Wound Cleansing:   Do not scrub or use excessive force. Cleanse wound prior to applying a clean dressing with:  [x] Normal Saline  [] Keep Wound Dry in Shower    [] Wound Cleanser   [] Cleanse wound with Mild Soap & Water  [] May Shower at Discharge   [] Other:        Topical Treatments:  Do not apply lotions, creams, or ointments to wound bed unless directed. [x] Apply moisturizing lotion to skin surrounding the wound prior to dressing change.  [] Apply antifungal ointment to skin surrounding the wound prior to dressing change.  [] Apply thin film of moisture barrier ointment to skin immediately around wound. [] Other:                 Dressings:                Wound Location RIGHT LOWER LEG       [x] Apply Primary Dressing:                                             [] MediHoney Gel      [] Alginate with Silver [] Alginate             [] Collagen     [] Collagen with Silver   [] Santyl with Moisten saline gauze               [] Hydrocolloid            [] MediHoney Alginate        [] Foam with Silver             [] Foam   [x] Hydrofera Blue READY WITH SILCONE  Border       [] Mepilex Border                    [] Moisten with Saline      [] Hydrogel     [] Mepitel                     [] Bactroban/Mupirocin         [] Polysporin              [] Other:       [x] Cover and Secure with:                        [] Gauze       [] Filiberto          [] Roll gauze             [] Ace Wrap    [] Cover Roll Tape     [] ABD                         [] Other:              Avoid contact of tape with skin.   [x] Change dressing:          [] Daily          [] Every Other Day [x] Three times per week  (Twice at home Friday and Monday)             [] Once a week         [] Do Not Change Dressing     [] Other:        Edema Control:  Apply:  [] Compression Stocking       []Right Leg     []Left Leg             [] Tubigrip      []Right Leg Double Layer      []Left Leg Double Layer                                              []Right Leg Single Layer       []Left Leg Single Layer             [x] SpandaGrip         [x]Right Leg   []Left Leg                                   []Low compression 5-10 mm/Hg                                            [x]Medium compression 10-20 mm/Hg                                   []High compression  20-30 mm/Hg             every morning immediately when getting up should be applied to affected leg(s) from mid foot to knee making sure to cover the heel. Remove every night before going to bed. [x] Elevate leg(s) above the level of the heart when sitting. [] Avoid prolonged standing in one place. [] Elevate arm/hand above the level of the heart    []RightArm     []LeftArm               [x] Diet as tolerated:     [x] Calorie Diabetic Diet:          [] No Added Salt:  [] Increase Protein:     [] Other:            Activity:  [x] Activity as tolerated:  [] Patient has no activity restrictions     [] Strict Bedrest:         [] Remain off Work:     [] May return to full duty work: If you are still having pain after you go home:  [] Elevate the affected limb. [] Use over-the-counter medications you would normally use for pain as permitted by your family doctor. [] For persistent pain not relieved by the above interventions, please call your family doctor.              Return Appointment:  [x] Wound and dressing supply provider: Basilia Cespedes  [] ECF or Home Healthcare:  [] Wound Assessment:         [] Physician or NP scheduled for Wound Assessment:   [x] Return Appointment: With Dutch Rubio CNP in 1 Week(s)  [] Ordered tests:      Nurse Case Manger:  Therese      Electronically signed by Kiya Alonzo RN on 1/18/2023 at 1045 [facility] Information: Should you experience any significant changes in your wound(s) or have questions about your wound care, please contact the [address] [place], [place] [address]ie [place] at [address] 8:00 am - 4:30 pm and Friday 8:00 am - 12:30 pm.  If you need help with your wound outside these hours and cannot wait until we are again available, contact your PCP or go to the hospital emergency room. PLEASE NOTE: IF YOU ARE UNABLE TO OBTAIN WOUND SUPPLIES, CONTINUE TO USE THE SUPPLIES YOU HAVE AVAILABLE UNTIL YOU ARE ABLE TO REACH US. IT IS MOST IMPORTANT TO KEEP THE WOUND COVERED AT ALL TIMES.      Physician Signature:_______________________     Date: ___________ Time:  ____________                              Juhi Nails CNP

## 2023-01-18 ENCOUNTER — HOSPITAL ENCOUNTER (OUTPATIENT)
Dept: WOUND CARE | Age: 78
Discharge: HOME | End: 2023-01-18
Payer: MEDICARE

## 2023-01-18 VITALS
DIASTOLIC BLOOD PRESSURE: 84 MMHG | RESPIRATION RATE: 18 BRPM | HEART RATE: 88 BPM | SYSTOLIC BLOOD PRESSURE: 142 MMHG | TEMPERATURE: 97.3 F

## 2023-01-18 DIAGNOSIS — L97.912 SKIN ULCER OF RIGHT LOWER LEG WITH FAT LAYER EXPOSED (HCC): ICD-10-CM

## 2023-01-18 DIAGNOSIS — M79.89 LEG SWELLING: Primary | ICD-10-CM

## 2023-01-18 DIAGNOSIS — I87.2 PERIPHERAL VENOUS INSUFFICIENCY: ICD-10-CM

## 2023-01-18 PROCEDURE — 11042 DBRDMT SUBQ TIS 1ST 20SQCM/<: CPT

## 2023-01-18 PROCEDURE — 11042 DBRDMT SUBQ TIS 1ST 20SQCM/<: CPT | Performed by: NURSE PRACTITIONER

## 2023-01-18 RX ORDER — LIDOCAINE HYDROCHLORIDE 40 MG/ML
SOLUTION TOPICAL ONCE
OUTPATIENT
Start: 2023-01-18 | End: 2023-01-18

## 2023-01-18 RX ORDER — BACITRACIN, NEOMYCIN, POLYMYXIN B 400; 3.5; 5 [USP'U]/G; MG/G; [USP'U]/G
OINTMENT TOPICAL ONCE
OUTPATIENT
Start: 2023-01-18 | End: 2023-01-18

## 2023-01-18 RX ORDER — GENTAMICIN SULFATE 1 MG/G
OINTMENT TOPICAL ONCE
OUTPATIENT
Start: 2023-01-18 | End: 2023-01-18

## 2023-01-18 RX ORDER — LIDOCAINE HYDROCHLORIDE 20 MG/ML
JELLY TOPICAL ONCE
OUTPATIENT
Start: 2023-01-18 | End: 2023-01-18

## 2023-01-18 RX ORDER — GINSENG 100 MG
CAPSULE ORAL ONCE
OUTPATIENT
Start: 2023-01-18 | End: 2023-01-18

## 2023-01-18 RX ORDER — LIDOCAINE HYDROCHLORIDE 40 MG/ML
SOLUTION TOPICAL ONCE
Status: COMPLETED | OUTPATIENT
Start: 2023-01-18 | End: 2023-01-18

## 2023-01-18 RX ORDER — BACITRACIN ZINC AND POLYMYXIN B SULFATE 500; 1000 [USP'U]/G; [USP'U]/G
OINTMENT TOPICAL ONCE
OUTPATIENT
Start: 2023-01-18 | End: 2023-01-18

## 2023-01-18 RX ORDER — LIDOCAINE 40 MG/G
CREAM TOPICAL ONCE
OUTPATIENT
Start: 2023-01-18 | End: 2023-01-18

## 2023-01-18 RX ORDER — CLOBETASOL PROPIONATE 0.5 MG/G
OINTMENT TOPICAL ONCE
OUTPATIENT
Start: 2023-01-18 | End: 2023-01-18

## 2023-01-18 RX ORDER — BETAMETHASONE DIPROPIONATE 0.05 %
OINTMENT (GRAM) TOPICAL ONCE
OUTPATIENT
Start: 2023-01-18 | End: 2023-01-18

## 2023-01-18 RX ORDER — LIDOCAINE 50 MG/G
OINTMENT TOPICAL ONCE
OUTPATIENT
Start: 2023-01-18 | End: 2023-01-18

## 2023-01-18 RX ADMIN — LIDOCAINE HYDROCHLORIDE 2.5 ML: 40 SOLUTION TOPICAL at 10:53

## 2023-01-18 ASSESSMENT — PAIN SCALES - GENERAL
PAINLEVEL_OUTOF10: 0
PAINLEVEL_OUTOF10: 0

## 2023-01-18 NOTE — PROGRESS NOTES
[address]   Progress Note and Procedure Note      [address] RECORD NUMBER:  [id number]  AGE: 78 y.o. GENDER: female  : 1945  EPISODE DATE:  2023    Subjective:     Chief Complaint   Patient presents with    Wound Check     Follow up for right lower leg. HISTORY of PRESENT ILLNESS HPI  Diego Gold is a 78year old female presenting today for follow-up for right lateral lower leg wound  History of Wound Context: Several weeks ago right lateral leg wounds noted by pt, no excessive drainage, pain or redness noted. She denies constitutional issues, and has been dressing wounds at home. Denies itching or pain. Does not have any difficulty getting compression on or tolerating them. Today states wound has been looking better and she denies constitutional issues. Is pleased with progress of wound and ease of current dressing changes. Reports she take compression stockings off at home occasionally. Wound/Ulcer Pain Timing/Severity:  none  Quality of pain: N/A  Severity:  0 / 10 at present  Modifying Factors:  None  Associated Signs/Symptoms: edema     Ulcer Identification:  Ulcer Type: venous     Contributing Factors: edema, venous stasis, and obesity     Acute Wound: Other: venous wounds.     PAST MEDICAL HISTORY        Diagnosis Date    Asthma     Localized edema 2021    Non-pressure chronic ulcer of right lower leg with fat layer exposed ([address] 2021    Peripheral venous insufficiency 2021    Right foot ulcer, limited to breakdown of skin ([address] 2021    Skin ulcer of right lower leg with fat layer exposed ([address] 2021       PAST SURGICAL HISTORY    Past Surgical History:   Procedure Laterality Date    APPENDECTOMY      CHOLECYSTECTOMY         FAMILY HISTORY    Family History   Problem Relation Age of Onset    Migraines Mother        SOCIAL HISTORY    Social History     Tobacco Use    Smoking status: Never    Smokeless tobacco: Never   Substance Use Topics    Drug use: Never       ALLERGIES    Allergies   Allergen Reactions    Iodides Other (See Comments)     IVP dye, pt does not remember he reaction, just that the nurses stated to not let anyone give to her ever again    Furosemide      Pt. States it makes her \"ditzy\", dizzy, and gives her muscle ridgity and nausea. MEDICATIONS    Current Outpatient Medications on File Prior to Encounter   Medication Sig Dispense Refill    triamterene-hydroCHLOROthiazide (MAXZIDE-25) 37.5-25 MG per tablet TAKE 1 TABLET BY MOUTH DAILY 90 tablet 1    clotrimazole-betamethasone (LOTRISONE) 1-0.05 % cream Apply topically 2 times daily. 45 g 3    metOLazone (ZAROXOLYN) 5 MG tablet TAKE ONE TABLET BY MOUTH DAILY AS NEEDED FOR SWELLING 30 tablet 1    albuterol sulfate HFA (PROAIR HFA) 108 (90 Base) MCG/ACT inhaler Inhale 2 puffs into the lungs every 6 hours as needed for Wheezing 1 Inhaler 0    Nebulizers (AIRIAL COMPACT MINI NEBULIZER) MISC 1 nebule by Does not apply route 4 times daily 1 each 0     No current facility-administered medications on file prior to encounter. REVIEW OF SYSTEMS  Review of Systems    Pertinent items are noted in HPI.     Objective:      BP (!) 142/84   Pulse 88   Temp 97.3 °F (36.3 °C) (Infrared)   Resp 18     Wt Readings from Last 3 Encounters:   01/06/23 213 lb (96.6 kg)   10/05/22 216 lb 11.4 oz (98.3 kg)   09/28/22 217 lb (98.4 kg)       PHYSICAL EXAM  Physical Exam    General Appearance: alert and oriented to person, place and time and pale  Skin: warm and dry, no rash or erythema  Head: normocephalic and atraumatic  Eyes: pupils equal, round, and reactive to light  Pulmonary/Chest:normal air movement, no respiratory distress  Cardiovascular: normal rate, regular rhythm, and intact distal pulses  Extremities: no cyanosis, no clubbing, and no edema      Assessment:        Problem List Items Addressed This Visit       Skin ulcer of right lower leg with fat layer exposed [facility])    Relevant Orders    Initiate Outpatient Wound Care Protocol    Peripheral venous insufficiency    Relevant Orders    Initiate Outpatient Wound Care Protocol    Leg swelling - Primary    Relevant Orders    Initiate Outpatient Wound Care Protocol        Procedure Note  Indications:  Based on my examination of this patient's wound(s)/ulcer(s) today, debridement is required to promote healing and evaluate the wound base. Performed by: VIDA Hernandez CNP    Consent obtained:  Yes    Time out taken:  Yes    Pain Control: Anesthetic  Anesthetic: 4% Lidocaine Liquid Topical (2.5ml)       Debridement: Excisional Debridement    Using curette and forceps the wound(s)/ulcer(s) was/were debrided down through and including the removal of epidermis, dermis, and subcutaneous tissue.         Devitalized Tissue Debrided:  fibrin, biofilm, and slough    Pre Debridement Measurements:  Are located in the [place]  Documentation Flow Sheet    Diabetic/Pressure/Non Pressure Ulcers only:  Ulcer: Non-Pressure ulcer, fat layer exposed     Wound/Ulcer #: 1    Post Debridement Measurements:  Wound/Ulcer Descriptions are Pre Debridement except measurements:    Wound 10/05/22 Leg Right;Lateral;Proximal;Lower #1 Noted 9/28/22 (Active)   Wound Image   01/04/23 0957   Wound Etiology Venous 10/05/22 0913   Dressing Status Old drainage noted 11/09/22 1010   Dressing/Treatment Hydrofera blue 01/11/23 1030   Wound Length (cm) 0.5 cm 01/18/23 1046   Wound Width (cm) 0.5 cm 01/18/23 1046   Wound Depth (cm) 0.2 cm 01/18/23 1046   Wound Surface Area (cm^2) 0.25 cm^2 01/18/23 1046   Change in Wound Size % (l*w) 44.44 01/18/23 1046   Wound Volume (cm^3) 0.05 cm^3 01/18/23 1046   Wound Healing % -11 01/18/23 1046   Post-Procedure Length (cm) 0.7 cm 01/18/23 1114   Post-Procedure Width (cm) 0.7 cm 01/18/23 1114   Post-Procedure Depth (cm) 0.3 cm 01/18/23 1114   Post-Procedure Surface Area (cm^2) 0.49 cm^2 01/18/23 1114 Post-Procedure Volume (cm^3) 0.147 cm^3 01/18/23 1114   Wound Assessment Granulation tissue;Slough 01/18/23 1046   Drainage Amount Scant 01/18/23 1046   Drainage Description Serosanguinous 01/18/23 1046   Odor None 01/18/23 1046   Consuelo-wound Assessment Dry/flaky;Fragile 01/18/23 1046   Margins Attached edges 01/18/23 1046   Wound Thickness Description not for Pressure Injury Full thickness 01/18/23 1046   Number of days: 105          Total Surface Area Debrided:  0.49 sq cm     Estimated Blood Loss:  Minimal    Hemostasis Achieved:  not needed    Procedural Pain:  0  / 10     Post Procedural Pain:  0 / 10     Response to treatment:  Well tolerated by patient. Plan:     Treatment Note please see attached Discharge Instructions    Written patient dismissal instructions given to patient and signed by patient or POA. Discharge Instructions              [address] and Hyperbaric Oxygen Therapy   Physician Orders and Discharge Instructions  [place] [address], [place] [address]  Telephone: [phone]     NAME:  Martha Huddleston OF BIRTH:  1945  MEDICAL RECORD NUMBER:  [id number]  DATE:  1/18/2023     Wound Cleansing:   Do not scrub or use excessive force. Cleanse wound prior to applying a clean dressing with:  [x] Normal Saline  [] Keep Wound Dry in Shower    [] Wound Cleanser   [] Cleanse wound with Mild Soap & Water  [] May Shower at Discharge   [] Other:        Topical Treatments:  Do not apply lotions, creams, or ointments to wound bed unless directed. [x] Apply moisturizing lotion to skin surrounding the wound prior to dressing change.  [] Apply antifungal ointment to skin surrounding the wound prior to dressing change.  [] Apply thin film of moisture barrier ointment to skin immediately around wound.   [] Other:                 Dressings:                Wound Location RIGHT LOWER LEG [x] Apply Primary Dressing:                                             [] MediHoney Gel      [] Alginate with Silver [] Alginate             [] Collagen     [] Collagen with Silver   [] Santyl with Moisten saline gauze               [] Hydrocolloid            [] MediHoney Alginate        [] Foam with Silver             [] Foam   [x] Hydrofera Blue READY WITH SILCONE  Border       [] Mepilex Border                    [] Moisten with Saline      [] Hydrogel     [] Mepitel                     [] Bactroban/Mupirocin         [] Polysporin              [] Other:       [x] Cover and Secure with:                        [] Gauze       [] Filiberto          [] Roll gauze             [] Ace Wrap    [] Cover Roll Tape     [] ABD                         [] Other:              Avoid contact of tape with skin. [x] Change dressing:          [] Daily          [] Every Other Day [x] Three times per week  (Twice at home Friday and Monday)             [] Once a week         [] Do Not Change Dressing     [] Other:        Edema Control:  Apply:  [] Compression Stocking       []Right Leg     []Left Leg             [] Tubigrip      []Right Leg Double Layer      []Left Leg Double Layer                                              []Right Leg Single Layer       []Left Leg Single Layer             [x] SpandaGrip         [x]Right Leg   []Left Leg                                   []Low compression 5-10 mm/Hg                                            [x]Medium compression 10-20 mm/Hg                                   []High compression  20-30 mm/Hg             every morning immediately when getting up should be applied to affected leg(s) from mid foot to knee making sure to cover the heel. Remove every night before going to bed. [x] Elevate leg(s) above the level of the heart when sitting. [] Avoid prolonged standing in one place.              [] Elevate arm/hand above the level of the heart    []RightArm     []LeftArm [x] Diet as tolerated:     [x] Calorie Diabetic Diet:          [] No Added Salt:  [] Increase Protein:     [] Other:            Activity:  [x] Activity as tolerated:  [] Patient has no activity restrictions     [] Strict Bedrest:         [] Remain off Work:     [] May return to full duty work: If you are still having pain after you go home:  [] Elevate the affected limb. [] Use over-the-counter medications you would normally use for pain as permitted by your family doctor. [] For persistent pain not relieved by the above interventions, please call your family doctor. Return Appointment:  [x] Wound and dressing supply provider: [facility]  [] ECF or Home Healthcare:  [] Wound Assessment:         [] Physician or NP scheduled for Wound Assessment:   [x] Return Appointment: With Vanesa Ramos CNP  in 1 Week(s)  [] Ordered tests:      Nurse Case Manger:  Therese      Electronically signed by Keo Harrington RN on 1/18/2023 at 1045 [facility] Information: Should you experience any significant changes in your wound(s) or have questions about your wound care, please contact the [address] [place] [address] at [address]  8:00 am - 4:30 pm and Friday 8:00 am - 12:30 pm.  If you need help with your wound outside these hours and cannot wait until we are again available, contact your PCP or go to the hospital emergency room. PLEASE NOTE: IF YOU ARE UNABLE TO OBTAIN WOUND SUPPLIES, CONTINUE TO USE THE SUPPLIES YOU HAVE AVAILABLE UNTIL YOU ARE ABLE TO REACH US. IT IS MOST IMPORTANT TO KEEP THE WOUND COVERED AT ALL TIMES.      Physician Signature:_______________________     Date: ___________ Time:  ____________                              Armandina Lennox CNP        Electronically signed by VIDA Vyas CNP on 1/18/2023 at 11:39 AM

## 2023-01-25 NOTE — DISCHARGE INSTRUCTIONS
[address] [place] [address] Physician Orders   [facility] [place]  [address] [facility] [address], [address]  Telephone: [phone]    NAME:  Devonte Smith OF BIRTH:  1945  MEDICAL RECORD NUMBER:  [id number]  DATE:  2/1/2023    Congratulations! You have completed your treatment. PLEASE APPLY MOISTURIZING CREAM TO AREA FOR PROTECTION. Return to your Primary Care Physician for all your health issues. Resume your ordinary activities as tolerated. Take your medications as prescribed by your primary care physician. Check your skin daily for cracks, bruises, sores, or dryness. Use a moisturizer as needed. Clean and dry your skin, using mild soap and warm water (not hot). Avoid alcohol and caffeine and do not smoke. Maintain a nutritious diet. Avoid pressure on your wound site. Keep your legs elevated above the level of the heart whenever possible. Wear well-fitting shoes and leg garments.       Physician Signature:______________________    Date: ___________ Time:  ____________    Socrates Diss CNP            Electronically signed by Felicia Aguillon RN on 2/1/2023 at 11:03 AM

## 2023-02-01 ENCOUNTER — HOSPITAL ENCOUNTER (OUTPATIENT)
Dept: WOUND CARE | Age: 78
Discharge: HOME | End: 2023-02-01
Payer: MEDICARE

## 2023-02-01 VITALS
TEMPERATURE: 97.9 F | HEART RATE: 90 BPM | DIASTOLIC BLOOD PRESSURE: 69 MMHG | RESPIRATION RATE: 18 BRPM | SYSTOLIC BLOOD PRESSURE: 158 MMHG

## 2023-02-01 DIAGNOSIS — I87.2 PERIPHERAL VENOUS INSUFFICIENCY: ICD-10-CM

## 2023-02-01 DIAGNOSIS — L97.912 SKIN ULCER OF RIGHT LOWER LEG WITH FAT LAYER EXPOSED (HCC): ICD-10-CM

## 2023-02-01 DIAGNOSIS — M79.89 LEG SWELLING: Primary | ICD-10-CM

## 2023-02-01 PROCEDURE — 99213 OFFICE O/P EST LOW 20 MIN: CPT | Performed by: NURSE PRACTITIONER

## 2023-02-01 PROCEDURE — 99211 OFF/OP EST MAY X REQ PHY/QHP: CPT

## 2023-02-01 RX ORDER — BACITRACIN ZINC AND POLYMYXIN B SULFATE 500; 1000 [USP'U]/G; [USP'U]/G
OINTMENT TOPICAL ONCE
OUTPATIENT
Start: 2023-02-01 | End: 2023-02-01

## 2023-02-01 RX ORDER — GINSENG 100 MG
CAPSULE ORAL ONCE
OUTPATIENT
Start: 2023-02-01 | End: 2023-02-01

## 2023-02-01 RX ORDER — LIDOCAINE 40 MG/G
CREAM TOPICAL ONCE
OUTPATIENT
Start: 2023-02-01 | End: 2023-02-01

## 2023-02-01 RX ORDER — BACITRACIN, NEOMYCIN, POLYMYXIN B 400; 3.5; 5 [USP'U]/G; MG/G; [USP'U]/G
OINTMENT TOPICAL ONCE
OUTPATIENT
Start: 2023-02-01 | End: 2023-02-01

## 2023-02-01 RX ORDER — BETAMETHASONE DIPROPIONATE 0.05 %
OINTMENT (GRAM) TOPICAL ONCE
OUTPATIENT
Start: 2023-02-01 | End: 2023-02-01

## 2023-02-01 RX ORDER — LIDOCAINE HYDROCHLORIDE 20 MG/ML
JELLY TOPICAL ONCE
OUTPATIENT
Start: 2023-02-01 | End: 2023-02-01

## 2023-02-01 RX ORDER — CLOBETASOL PROPIONATE 0.5 MG/G
OINTMENT TOPICAL ONCE
OUTPATIENT
Start: 2023-02-01 | End: 2023-02-01

## 2023-02-01 RX ORDER — GENTAMICIN SULFATE 1 MG/G
OINTMENT TOPICAL ONCE
OUTPATIENT
Start: 2023-02-01 | End: 2023-02-01

## 2023-02-01 RX ORDER — LIDOCAINE HYDROCHLORIDE 40 MG/ML
SOLUTION TOPICAL ONCE
Status: COMPLETED | OUTPATIENT
Start: 2023-02-01 | End: 2023-02-01

## 2023-02-01 RX ORDER — LIDOCAINE HYDROCHLORIDE 40 MG/ML
SOLUTION TOPICAL ONCE
OUTPATIENT
Start: 2023-02-01 | End: 2023-02-01

## 2023-02-01 RX ORDER — LIDOCAINE 50 MG/G
OINTMENT TOPICAL ONCE
OUTPATIENT
Start: 2023-02-01 | End: 2023-02-01

## 2023-02-01 RX ADMIN — LIDOCAINE HYDROCHLORIDE 5 ML: 40 SOLUTION TOPICAL at 10:41

## 2023-02-01 ASSESSMENT — PAIN SCALES - GENERAL
PAINLEVEL_OUTOF10: 0
PAINLEVEL_OUTOF10: 0

## 2023-02-01 NOTE — PROGRESS NOTES
[address]   Progress Note and Procedure Note      [address] RECORD NUMBER:  [id number]  AGE: 78 y.o. GENDER: female  : 1945  EPISODE DATE:  2023    Subjective:     Chief Complaint   Patient presents with    Wound Check     Follow up visit right lower leg wound         HISTORY of PRESENT ILLNESS RAQUEL Smith is a 78year old female presenting today for follow-up for right lateral lower leg wound  History of Wound Context: Several weeks ago right lateral leg wounds noted by pt, no excessive drainage, pain or redness noted. She denies constitutional issues, and has been dressing wounds at home. Denies itching or pain. Does not have any difficulty getting compression on or tolerating them. Pt presents today with closed wound. Discharged from [facility]  Wound/Ulcer Pain Timing/Severity:  none  Quality of pain: N/A  Severity:  0 / 10 at present  Modifying Factors:  None  Associated Signs/Symptoms: edema     Ulcer Identification:  Ulcer Type: venous     Contributing Factors: edema, venous stasis, and obesity     Acute Wound: Other: venous wounds.     PAST MEDICAL HISTORY        Diagnosis Date    Asthma     Localized edema 2021    Non-pressure chronic ulcer of right lower leg with fat layer exposed ([address] 2021    Peripheral venous insufficiency 2021    Right foot ulcer, limited to breakdown of skin ([address] 2021    Skin ulcer of right lower leg with fat layer exposed ([address] 2021       PAST SURGICAL HISTORY    Past Surgical History:   Procedure Laterality Date    APPENDECTOMY      CHOLECYSTECTOMY         FAMILY HISTORY    Family History   Problem Relation Age of Onset    Migraines Mother        SOCIAL HISTORY    Social History     Tobacco Use    Smoking status: Never    Smokeless tobacco: Never   Substance Use Topics    Drug use: Never       ALLERGIES    Allergies   Allergen Reactions    Iodides Other (See Comments)     IVP dye, pt does not remember he reaction, just that the nurses stated to not let anyone give to her ever again    Furosemide      Pt. States it makes her \"ditzy\", dizzy, and gives her muscle ridgity and nausea. MEDICATIONS    Current Outpatient Medications on File Prior to Encounter   Medication Sig Dispense Refill    triamterene-hydroCHLOROthiazide (MAXZIDE-25) 37.5-25 MG per tablet TAKE 1 TABLET BY MOUTH DAILY 90 tablet 1    clotrimazole-betamethasone (LOTRISONE) 1-0.05 % cream Apply topically 2 times daily. 45 g 3    metOLazone (ZAROXOLYN) 5 MG tablet TAKE ONE TABLET BY MOUTH DAILY AS NEEDED FOR SWELLING 30 tablet 1    albuterol sulfate HFA (PROAIR HFA) 108 (90 Base) MCG/ACT inhaler Inhale 2 puffs into the lungs every 6 hours as needed for Wheezing 1 Inhaler 0    Nebulizers (AIRIAL COMPACT MINI NEBULIZER) MISC 1 nebule by Does not apply route 4 times daily 1 each 0     No current facility-administered medications on file prior to encounter. REVIEW OF SYSTEMS  Review of Systems    Pertinent items are noted in HPI.     Objective:      BP (!) 158/69   Pulse 90   Temp 97.9 °F (36.6 °C) (Infrared)   Resp 18     Wt Readings from Last 3 Encounters:   01/06/23 213 lb (96.6 kg)   10/05/22 216 lb 11.4 oz (98.3 kg)   09/28/22 217 lb (98.4 kg)       PHYSICAL EXAM  Physical Exam    General Appearance: alert and oriented to person, place and time  Skin: warm and dry  Head: normocephalic and atraumatic  Eyes: pupils equal, round, and reactive to light  Pulmonary/Chest: normal air movement, no respiratory distress  Cardiovascular: normal rate and regular rhythm  Extremities: no cyanosis, no clubbing, and no edema      Assessment:        Problem List Items Addressed This Visit       Skin ulcer of right lower leg with fat layer exposed ([address]    Relevant Orders    Initiate Outpatient Wound Care Protocol    Peripheral venous insufficiency    Relevant Orders    Initiate Outpatient Wound Care Protocol    Leg swelling - Primary    Relevant Orders    Initiate Outpatient Wound Care Protocol        Procedure Note  Indications:  Based on my examination of this patient's wound(s)/ulcer(s) today, debridement is not required to promote healing and evaluate the wound base. Wound/Ulcer Descriptions are Pre Debridement except measurements:    Wound 10/05/22 Leg Right;Lateral;Proximal;Lower #1 Noted 9/28/22 (Active)   Wound Image   02/01/23 1100   Wound Etiology Venous 10/05/22 0913   Dressing Status Old drainage noted 11/09/22 1010   Dressing/Treatment Hydrofera blue 01/18/23 1115   Wound Length (cm) 0 cm 02/01/23 1029   Wound Width (cm) 0 cm 02/01/23 1029   Wound Depth (cm) 0 cm 02/01/23 1029   Wound Surface Area (cm^2) 0 cm^2 02/01/23 1029   Change in Wound Size % (l*w) 100 02/01/23 1029   Wound Volume (cm^3) 0 cm^3 02/01/23 1029   Wound Healing % 100 02/01/23 1029   Post-Procedure Length (cm) 0 cm 02/01/23 1100   Post-Procedure Width (cm) 0 cm 02/01/23 1100   Post-Procedure Depth (cm) 0 cm 02/01/23 1100   Post-Procedure Surface Area (cm^2) 0 cm^2 02/01/23 1100   Post-Procedure Volume (cm^3) 0 cm^3 02/01/23 1100   Wound Assessment Epithelialization 02/01/23 1100   Drainage Amount None 02/01/23 1029   Drainage Description Serosanguinous 01/18/23 1046   Odor None 01/18/23 1046   Consuelo-wound Assessment Dry/flaky;Fragile 01/18/23 1046   Margins Attached edges 01/18/23 1046   Wound Thickness Description not for Pressure Injury Full thickness 01/18/23 1046   Number of days: 119          Plan:     Treatment Note please see attached Discharge Instructions    Written patient dismissal instructions given to patient and signed by patient or POA.          Discharge Instructions           [address]  Physician [facility]  [address] [facility] [address], [facility] [address]  Telephone: [phone]    NAME:  Aaron Busch OF BIRTH:  1945  MEDICAL RECORD NUMBER:  [id number]  DATE: 2/1/2023    Congratulations! You have completed your treatment. PLEASE APPLY MOISTURIZING CREAM TO AREA FOR PROTECTION. Return to your Primary Care Physician for all your health issues. Resume your ordinary activities as tolerated. Take your medications as prescribed by your primary care physician. Check your skin daily for cracks, bruises, sores, or dryness. Use a moisturizer as needed. Clean and dry your skin, using mild soap and warm water (not hot). Avoid alcohol and caffeine and do not smoke. Maintain a nutritious diet. Avoid pressure on your wound site. Keep your legs elevated above the level of the heart whenever possible. Wear well-fitting shoes and leg garments.       Physician Signature:______________________    Date: ___________ Time:  ____________    Jose Armando Layne CNP            Electronically signed by Kayla Silveira RN on 2/1/2023 at 11:03 AM                           Electronically signed by VIDA Anderson CNP on 2/1/2023 at 1:10 PM

## 2023-03-15 RX ORDER — TRIAMTERENE AND HYDROCHLOROTHIAZIDE 37.5; 25 MG/1; MG/1
1 TABLET ORAL DAILY
Qty: 90 TABLET | Refills: 1 | Status: SHIPPED | OUTPATIENT
Start: 2023-03-15 | End: 2023-06-13

## 2023-03-15 NOTE — TELEPHONE ENCOUNTER
Last office visit 1/6/2023     Last written      Next office visit scheduled 4/6/2023    Requested Prescriptions     Pending Prescriptions Disp Refills    triamterene-hydroCHLOROthiazide (MAXZIDE-25) 37.5-25 MG per tablet [Pharmacy Med Name: TRIAMTERENE-HCTZ 37.5-25 MG 37.5-25 Tablet] 90 tablet 1     Sig: TAKE 1 TABLET BY MOUTH DAILY

## 2023-04-06 ENCOUNTER — OFFICE VISIT (OUTPATIENT)
Dept: FAMILY MEDICINE CLINIC | Age: 78
End: 2023-04-06

## 2023-04-06 VITALS
WEIGHT: 210 LBS | DIASTOLIC BLOOD PRESSURE: 82 MMHG | SYSTOLIC BLOOD PRESSURE: 130 MMHG | HEIGHT: 66 IN | BODY MASS INDEX: 33.75 KG/M2

## 2023-04-06 DIAGNOSIS — D69.6 THROMBOCYTOPENIA, UNSPECIFIED (HCC): ICD-10-CM

## 2023-04-06 DIAGNOSIS — I87.2 PERIPHERAL VENOUS INSUFFICIENCY: Primary | ICD-10-CM

## 2023-04-06 DIAGNOSIS — J45.40 MODERATE PERSISTENT ASTHMA WITHOUT COMPLICATION: ICD-10-CM

## 2023-04-06 LAB
ALBUMIN SERPL-MCNC: 4.2 G/DL (ref 3.4–5)
ALBUMIN/GLOB SERPL: 1.3 {RATIO} (ref 1.1–2.2)
ALP SERPL-CCNC: 89 U/L (ref 40–129)
ALT SERPL-CCNC: 17 U/L (ref 10–40)
ANION GAP SERPL CALCULATED.3IONS-SCNC: 17 MMOL/L (ref 3–16)
AST SERPL-CCNC: 26 U/L (ref 15–37)
BASOPHILS # BLD: 0 K/UL (ref 0–0.2)
BASOPHILS NFR BLD: 0.6 %
BILIRUB SERPL-MCNC: 0.8 MG/DL (ref 0–1)
BUN SERPL-MCNC: 20 MG/DL (ref 7–20)
CALCIUM SERPL-MCNC: 9.8 MG/DL (ref 8.3–10.6)
CHLORIDE SERPL-SCNC: 96 MMOL/L (ref 99–110)
CHOLEST SERPL-MCNC: 221 MG/DL (ref 0–199)
CO2 SERPL-SCNC: 25 MMOL/L (ref 21–32)
CREAT SERPL-MCNC: 0.8 MG/DL (ref 0.6–1.2)
DEPRECATED RDW RBC AUTO: 13.7 % (ref 12.4–15.4)
EOSINOPHIL # BLD: 0.1 K/UL (ref 0–0.6)
EOSINOPHIL NFR BLD: 1.2 %
GFR SERPLBLD CREATININE-BSD FMLA CKD-EPI: >60 ML/MIN/{1.73_M2}
GLUCOSE SERPL-MCNC: 113 MG/DL (ref 70–99)
HCT VFR BLD AUTO: 41.1 % (ref 36–48)
HDLC SERPL-MCNC: 66 MG/DL (ref 40–60)
HGB BLD-MCNC: 14.2 G/DL (ref 12–16)
LDLC SERPL CALC-MCNC: 127 MG/DL
LYMPHOCYTES # BLD: 1.5 K/UL (ref 1–5.1)
LYMPHOCYTES NFR BLD: 29.8 %
MCH RBC QN AUTO: 29.7 PG (ref 26–34)
MCHC RBC AUTO-ENTMCNC: 34.5 G/DL (ref 31–36)
MCV RBC AUTO: 86.2 FL (ref 80–100)
MONOCYTES # BLD: 0.5 K/UL (ref 0–1.3)
MONOCYTES NFR BLD: 8.9 %
NEUTROPHILS # BLD: 3.1 K/UL (ref 1.7–7.7)
NEUTROPHILS NFR BLD: 59.5 %
PLATELET # BLD AUTO: 183 K/UL (ref 135–450)
PMV BLD AUTO: 9.1 FL (ref 5–10.5)
POTASSIUM SERPL-SCNC: 3.7 MMOL/L (ref 3.5–5.1)
PROT SERPL-MCNC: 7.5 G/DL (ref 6.4–8.2)
RBC # BLD AUTO: 4.76 M/UL (ref 4–5.2)
SODIUM SERPL-SCNC: 138 MMOL/L (ref 136–145)
TRIGL SERPL-MCNC: 140 MG/DL (ref 0–150)
VLDLC SERPL CALC-MCNC: 28 MG/DL
WBC # BLD AUTO: 5.1 K/UL (ref 4–11)

## 2023-04-06 ASSESSMENT — ENCOUNTER SYMPTOMS
SHORTNESS OF BREATH: 0
COUGH: 0
NAUSEA: 0
VOMITING: 0
CHEST TIGHTNESS: 0
ABDOMINAL PAIN: 0
WHEEZING: 0
DIARRHEA: 0

## 2023-04-06 NOTE — PROGRESS NOTES
with Auto Differential  - Lipid Panel    3. Moderate persistent asthma without complication  Stable  Continue with medication  Answered questions       Return in about 3 months (around 7/6/2023).

## 2023-04-19 ENCOUNTER — HOSPITAL ENCOUNTER (OUTPATIENT)
Dept: WOUND CARE | Age: 78
Discharge: HOME | End: 2023-04-19
Payer: MEDICARE

## 2023-04-19 VITALS
TEMPERATURE: 96.8 F | HEART RATE: 85 BPM | DIASTOLIC BLOOD PRESSURE: 85 MMHG | SYSTOLIC BLOOD PRESSURE: 141 MMHG | RESPIRATION RATE: 18 BRPM

## 2023-04-19 DIAGNOSIS — I87.2 PERIPHERAL VENOUS INSUFFICIENCY: ICD-10-CM

## 2023-04-19 DIAGNOSIS — M79.89 LEG SWELLING: Primary | ICD-10-CM

## 2023-04-19 DIAGNOSIS — L97.911 SKIN ULCER OF RIGHT LOWER LEG, LIMITED TO BREAKDOWN OF SKIN (HCC): ICD-10-CM

## 2023-04-19 PROCEDURE — 11042 DBRDMT SUBQ TIS 1ST 20SQCM/<: CPT

## 2023-04-19 RX ORDER — LIDOCAINE HYDROCHLORIDE 20 MG/ML
JELLY TOPICAL ONCE
OUTPATIENT
Start: 2023-04-19 | End: 2023-04-19

## 2023-04-19 RX ORDER — GINSENG 100 MG
CAPSULE ORAL ONCE
OUTPATIENT
Start: 2023-04-19 | End: 2023-04-19

## 2023-04-19 RX ORDER — GENTAMICIN SULFATE 1 MG/G
OINTMENT TOPICAL ONCE
OUTPATIENT
Start: 2023-04-19 | End: 2023-04-19

## 2023-04-19 RX ORDER — BETAMETHASONE DIPROPIONATE 0.05 %
OINTMENT (GRAM) TOPICAL ONCE
OUTPATIENT
Start: 2023-04-19 | End: 2023-04-19

## 2023-04-19 RX ORDER — LIDOCAINE HYDROCHLORIDE 40 MG/ML
SOLUTION TOPICAL ONCE
OUTPATIENT
Start: 2023-04-19 | End: 2023-04-19

## 2023-04-19 RX ORDER — LIDOCAINE 50 MG/G
OINTMENT TOPICAL ONCE
OUTPATIENT
Start: 2023-04-19 | End: 2023-04-19

## 2023-04-19 RX ORDER — BACITRACIN ZINC AND POLYMYXIN B SULFATE 500; 1000 [USP'U]/G; [USP'U]/G
OINTMENT TOPICAL ONCE
OUTPATIENT
Start: 2023-04-19 | End: 2023-04-19

## 2023-04-19 RX ORDER — LIDOCAINE 40 MG/G
CREAM TOPICAL ONCE
OUTPATIENT
Start: 2023-04-19 | End: 2023-04-19

## 2023-04-19 RX ORDER — CLOBETASOL PROPIONATE 0.5 MG/G
OINTMENT TOPICAL ONCE
OUTPATIENT
Start: 2023-04-19 | End: 2023-04-19

## 2023-04-19 RX ORDER — BACITRACIN, NEOMYCIN, POLYMYXIN B 400; 3.5; 5 [USP'U]/G; MG/G; [USP'U]/G
OINTMENT TOPICAL ONCE
OUTPATIENT
Start: 2023-04-19 | End: 2023-04-19

## 2023-04-19 RX ORDER — LIDOCAINE HYDROCHLORIDE 40 MG/ML
SOLUTION TOPICAL ONCE
Status: COMPLETED | OUTPATIENT
Start: 2023-04-19 | End: 2023-04-19

## 2023-04-19 RX ADMIN — LIDOCAINE HYDROCHLORIDE: 40 SOLUTION TOPICAL at 09:44

## 2023-04-19 ASSESSMENT — PAIN SCALES - GENERAL
PAINLEVEL_OUTOF10: 0
PAINLEVEL_OUTOF10: 0

## 2023-04-19 NOTE — PROGRESS NOTES
nausea. MEDICATIONS    Current Outpatient Medications on File Prior to Encounter   Medication Sig Dispense Refill    triamterene-hydroCHLOROthiazide (MAXZIDE-25) 37.5-25 MG per tablet TAKE 1 TABLET BY MOUTH DAILY 90 tablet 1    clotrimazole-betamethasone (LOTRISONE) 1-0.05 % cream Apply topically 2 times daily. 45 g 3    metOLazone (ZAROXOLYN) 5 MG tablet TAKE ONE TABLET BY MOUTH DAILY AS NEEDED FOR SWELLING 30 tablet 1    albuterol sulfate HFA (PROAIR HFA) 108 (90 Base) MCG/ACT inhaler Inhale 2 puffs into the lungs every 6 hours as needed for Wheezing 1 Inhaler 0    Nebulizers (AIRIAL COMPACT MINI NEBULIZER) MISC 1 nebule by Does not apply route 4 times daily 1 each 0     No current facility-administered medications on file prior to encounter. REVIEW OF SYSTEMS  Review of Systems    Pertinent items are noted in HPI.     Objective:      BP (!) 141/85   Pulse 85   Temp 96.8 °F (36 °C) (Infrared)   Resp 18     Wt Readings from Last 3 Encounters:   04/12/23 209 lb 14.1 oz (95.2 kg)   04/06/23 210 lb (95.3 kg)   01/06/23 213 lb (96.6 kg)       PHYSICAL EXAM  Physical Exam    General Appearance: alert and oriented to person, place and time, well-developed and well-nourished, in no acute distress, obese, and pale  Skin: warm and dry  Head: normocephalic and atraumatic  Eyes: pupils equal, round, and reactive to light  Pulmonary/Chest: normal air movement, no respiratory distress  Cardiovascular: normal rate, regular rhythm, and intact distal pulses  Extremities: no cyanosis, no clubbing, and 1 + edema-  right lower leg      Assessment:        Problem List Items Addressed This Visit       Skin ulcer of right lower leg, limited to breakdown of skin [facility])    Relevant Orders    Initiate Outpatient Wound Care Protocol    Peripheral venous insufficiency    Relevant Orders    Initiate Outpatient Wound Care Protocol    Leg swelling - Primary    Relevant Orders    Initiate Outpatient Wound Care Protocol

## 2023-04-25 NOTE — DISCHARGE INSTRUCTIONS
[address] and Hyperbaric Oxygen Therapy   Physician Orders and Discharge Instructions  [address]   [address]  Telephone: [phone]     NAME:  Shanell Null OF BIRTH:  1945  MEDICAL RECORD NUMBER:  [id number]  DATE:  4/26/2023     Wound Cleansing:   Do not scrub or use excessive force. Cleanse wound prior to applying a clean dressing with:  [x] Normal Saline  [] Keep Wound Dry in Shower    [] Wound Cleanser   [] Cleanse wound with Mild Soap & Water  [] May Shower at Discharge   [] Other:        Topical Treatments:  Do not apply lotions, creams, or ointments to wound bed unless directed. [] Apply moisturizing lotion to skin surrounding the wound prior to dressing change.  [] Apply antifungal ointment to skin surrounding the wound prior to dressing change.  [] Apply thin film of moisture barrier ointment to skin immediately around wound. [] Other:                 Dressings:                Wound Location LEFT MEDIAL LEG         [x] Apply Primary Dressing:                                             [] MediHoney Gel      [] Alginate with Silver [] Alginate             [] Collagen     [] Collagen with Silver   [] Santyl with Moisten saline gauze               [] Hydrocolloid            [] MediHoney Alginate        [] Foam with Silver             [] Foam   [x] Hydrofera Blue READY        [] Mepilex Border                    [] Moisten with Saline           [] Hydrogel     [] Mepitel                     [] Bactroban/Mupirocin         [] Polysporin              [] Other:       [x] Cover and Secure with:                          [x] Gauze        [x] Filiberto         [] Roll gauze             [] Ace Wrap    [] Cover Roll Tape     [] ABD                         [] Other:              Avoid contact of tape with skin.   [x] Change dressing:          [] Daily          [] Every Other Day    [] Three

## 2023-04-26 ENCOUNTER — HOSPITAL ENCOUNTER (OUTPATIENT)
Dept: WOUND CARE | Age: 78
Discharge: HOME | End: 2023-04-26
Payer: MEDICARE

## 2023-04-26 VITALS
RESPIRATION RATE: 20 BRPM | SYSTOLIC BLOOD PRESSURE: 155 MMHG | DIASTOLIC BLOOD PRESSURE: 79 MMHG | TEMPERATURE: 97.9 F | HEART RATE: 74 BPM

## 2023-04-26 DIAGNOSIS — I87.2 PERIPHERAL VENOUS INSUFFICIENCY: ICD-10-CM

## 2023-04-26 DIAGNOSIS — M79.89 LEG SWELLING: Primary | ICD-10-CM

## 2023-04-26 DIAGNOSIS — L97.911 SKIN ULCER OF RIGHT LOWER LEG, LIMITED TO BREAKDOWN OF SKIN (HCC): ICD-10-CM

## 2023-04-26 PROCEDURE — 11042 DBRDMT SUBQ TIS 1ST 20SQCM/<: CPT

## 2023-04-26 PROCEDURE — 11042 DBRDMT SUBQ TIS 1ST 20SQCM/<: CPT | Performed by: NURSE PRACTITIONER

## 2023-04-26 PROCEDURE — 11045 DBRDMT SUBQ TISS EACH ADDL: CPT

## 2023-04-26 RX ORDER — LIDOCAINE 40 MG/G
CREAM TOPICAL ONCE
OUTPATIENT
Start: 2023-04-26 | End: 2023-04-26

## 2023-04-26 RX ORDER — CLOBETASOL PROPIONATE 0.5 MG/G
OINTMENT TOPICAL ONCE
OUTPATIENT
Start: 2023-04-26 | End: 2023-04-26

## 2023-04-26 RX ORDER — LIDOCAINE HYDROCHLORIDE 20 MG/ML
JELLY TOPICAL ONCE
OUTPATIENT
Start: 2023-04-26 | End: 2023-04-26

## 2023-04-26 RX ORDER — BACITRACIN ZINC AND POLYMYXIN B SULFATE 500; 1000 [USP'U]/G; [USP'U]/G
OINTMENT TOPICAL ONCE
OUTPATIENT
Start: 2023-04-26 | End: 2023-04-26

## 2023-04-26 RX ORDER — GINSENG 100 MG
CAPSULE ORAL ONCE
OUTPATIENT
Start: 2023-04-26 | End: 2023-04-26

## 2023-04-26 RX ORDER — BETAMETHASONE DIPROPIONATE 0.05 %
OINTMENT (GRAM) TOPICAL ONCE
OUTPATIENT
Start: 2023-04-26 | End: 2023-04-26

## 2023-04-26 RX ORDER — LIDOCAINE 50 MG/G
OINTMENT TOPICAL ONCE
OUTPATIENT
Start: 2023-04-26 | End: 2023-04-26

## 2023-04-26 RX ORDER — GENTAMICIN SULFATE 1 MG/G
OINTMENT TOPICAL ONCE
OUTPATIENT
Start: 2023-04-26 | End: 2023-04-26

## 2023-04-26 RX ORDER — LIDOCAINE HYDROCHLORIDE 40 MG/ML
SOLUTION TOPICAL ONCE
Status: COMPLETED | OUTPATIENT
Start: 2023-04-26 | End: 2023-04-26

## 2023-04-26 RX ORDER — BACITRACIN, NEOMYCIN, POLYMYXIN B 400; 3.5; 5 [USP'U]/G; MG/G; [USP'U]/G
OINTMENT TOPICAL ONCE
OUTPATIENT
Start: 2023-04-26 | End: 2023-04-26

## 2023-04-26 RX ORDER — LIDOCAINE HYDROCHLORIDE 40 MG/ML
SOLUTION TOPICAL ONCE
OUTPATIENT
Start: 2023-04-26 | End: 2023-04-26

## 2023-04-26 RX ADMIN — LIDOCAINE HYDROCHLORIDE 5 ML: 40 SOLUTION TOPICAL at 10:27

## 2023-04-26 ASSESSMENT — PAIN SCALES - GENERAL
PAINLEVEL_OUTOF10: 0
PAINLEVEL_OUTOF10: 0

## 2023-04-26 NOTE — PROGRESS NOTES
leg(s) above the level of the heart when sitting. [] Avoid prolonged standing in one place. Dietary:  [x] Diet as tolerated:     [] Calorie Diabetic Diet:          [] No Added Salt:  [x] Increase Protein:     [] Other:         Activity:  [x] Activity as tolerated:  [x] Patient has no activity restrictions     [] Strict Bedrest:         [] Remain off Work:     [] May return to full duty work:                                   [] Return to work with restrictions: If you are still having pain after you go home:  [] Elevate the affected limb. [x] Use over-the-counter medications you would normally use for pain as permitted by your family doctor. [] For persistent pain not relieved by the above interventions, please call your family doctor. Return Appointment:  [] Wound and dressing supply provider:   [] ECF or Home Healthcare:  [] Wound Assessment:         [] Physician or NP scheduled for Wound Assessment:   [x] Return Appointment: With 1  in  [address] Week(s)    [] Ordered tests:      Nurse Case Manger:  NILTON   Electronically signed by Mohsen Lainez RN on 4/26/2023 at 10:43 AM        [facility] Information: Should you experience any significant changes in your wound(s) or have questions about your wound care, please contact the [address] [place] [address] at [address] 8:00 am - 4:30 pm and Friday 8:00 am - 12:30 pm.  If you need help with your wound outside these hours and cannot wait until we are again available, contact your PCP or go to the hospital emergency room. PLEASE NOTE: IF YOU ARE UNABLE TO OBTAIN WOUND SUPPLIES, CONTINUE TO USE THE SUPPLIES YOU HAVE AVAILABLE UNTIL YOU ARE ABLE TO REACH US. IT IS MOST IMPORTANT TO KEEP THE WOUND COVERED AT ALL TIMES.      Physician Signature:_______________________     Date: ___________ Time:  ____________      [address]           Electronically signed by VIDA Hart - CNP

## 2023-05-03 ENCOUNTER — HOSPITAL ENCOUNTER (OUTPATIENT)
Dept: WOUND CARE | Age: 78
Discharge: HOME | End: 2023-05-03
Payer: MEDICARE

## 2023-05-03 VITALS
DIASTOLIC BLOOD PRESSURE: 78 MMHG | HEART RATE: 88 BPM | SYSTOLIC BLOOD PRESSURE: 171 MMHG | RESPIRATION RATE: 18 BRPM | TEMPERATURE: 97.1 F

## 2023-05-03 DIAGNOSIS — I87.2 PERIPHERAL VENOUS INSUFFICIENCY: ICD-10-CM

## 2023-05-03 DIAGNOSIS — L97.911 SKIN ULCER OF RIGHT LOWER LEG, LIMITED TO BREAKDOWN OF SKIN (HCC): ICD-10-CM

## 2023-05-03 DIAGNOSIS — M79.89 LEG SWELLING: Primary | ICD-10-CM

## 2023-05-03 PROCEDURE — 11042 DBRDMT SUBQ TIS 1ST 20SQCM/<: CPT

## 2023-05-03 RX ORDER — CLOBETASOL PROPIONATE 0.5 MG/G
OINTMENT TOPICAL ONCE
OUTPATIENT
Start: 2023-05-03 | End: 2023-05-03

## 2023-05-03 RX ORDER — LIDOCAINE HYDROCHLORIDE 20 MG/ML
JELLY TOPICAL ONCE
OUTPATIENT
Start: 2023-05-03 | End: 2023-05-03

## 2023-05-03 RX ORDER — BACITRACIN ZINC AND POLYMYXIN B SULFATE 500; 1000 [USP'U]/G; [USP'U]/G
OINTMENT TOPICAL ONCE
OUTPATIENT
Start: 2023-05-03 | End: 2023-05-03

## 2023-05-03 RX ORDER — BETAMETHASONE DIPROPIONATE 0.05 %
OINTMENT (GRAM) TOPICAL ONCE
OUTPATIENT
Start: 2023-05-03 | End: 2023-05-03

## 2023-05-03 RX ORDER — LIDOCAINE HYDROCHLORIDE 40 MG/ML
SOLUTION TOPICAL ONCE
Status: COMPLETED | OUTPATIENT
Start: 2023-05-03 | End: 2023-05-03

## 2023-05-03 RX ORDER — BACITRACIN, NEOMYCIN, POLYMYXIN B 400; 3.5; 5 [USP'U]/G; MG/G; [USP'U]/G
OINTMENT TOPICAL ONCE
OUTPATIENT
Start: 2023-05-03 | End: 2023-05-03

## 2023-05-03 RX ORDER — LIDOCAINE HYDROCHLORIDE 40 MG/ML
SOLUTION TOPICAL ONCE
OUTPATIENT
Start: 2023-05-03 | End: 2023-05-03

## 2023-05-03 RX ORDER — LIDOCAINE 40 MG/G
CREAM TOPICAL ONCE
OUTPATIENT
Start: 2023-05-03 | End: 2023-05-03

## 2023-05-03 RX ORDER — GINSENG 100 MG
CAPSULE ORAL ONCE
OUTPATIENT
Start: 2023-05-03 | End: 2023-05-03

## 2023-05-03 RX ORDER — GENTAMICIN SULFATE 1 MG/G
OINTMENT TOPICAL ONCE
OUTPATIENT
Start: 2023-05-03 | End: 2023-05-03

## 2023-05-03 RX ORDER — LIDOCAINE 50 MG/G
OINTMENT TOPICAL ONCE
OUTPATIENT
Start: 2023-05-03 | End: 2023-05-03

## 2023-05-03 RX ADMIN — LIDOCAINE HYDROCHLORIDE: 40 SOLUTION TOPICAL at 09:49

## 2023-05-03 ASSESSMENT — PAIN DESCRIPTION - DESCRIPTORS: DESCRIPTORS: BURNING

## 2023-05-03 ASSESSMENT — PAIN DESCRIPTION - ONSET: ONSET: GRADUAL

## 2023-05-03 ASSESSMENT — PAIN - FUNCTIONAL ASSESSMENT: PAIN_FUNCTIONAL_ASSESSMENT: ACTIVITIES ARE NOT PREVENTED

## 2023-05-03 ASSESSMENT — PAIN DESCRIPTION - FREQUENCY: FREQUENCY: INTERMITTENT

## 2023-05-03 ASSESSMENT — PAIN SCALES - GENERAL: PAINLEVEL_OUTOF10: 1

## 2023-05-03 ASSESSMENT — PAIN DESCRIPTION - ORIENTATION: ORIENTATION: RIGHT

## 2023-05-03 ASSESSMENT — PAIN DESCRIPTION - PAIN TYPE: TYPE: ACUTE PAIN

## 2023-05-03 ASSESSMENT — PAIN DESCRIPTION - LOCATION: LOCATION: LEG

## 2023-05-03 NOTE — PROGRESS NOTES
[address]   Progress Note and Procedure Note      [address] RECORD NUMBER:  [id number]  AGE: 78 y.o. GENDER: female  : 1945  EPISODE DATE:  5/3/2023    Subjective:     Chief Complaint   Patient presents with    Wound Check     Follow Up on Right Lower Leg         HISTORY of PRESENT ILLNESS RAQUEL Garza is a 78 y.o. female who presents today for wound/ulcer evaluation. History of Wound Context: venous. Patient presents with  right posterior lower leg. Wounds improving with current plan of care. Pt advised to remove spandigrip at night when she goes to sleep in her bed. Wound/Ulcer Pain Timing/Severity: none  Quality of pain: N/A  Severity:  0 / 10   Modifying Factors: none  Associated Signs/Symptoms: edema and drainage     Ulcer Identification:  Ulcer Type: venous     Contributing Factors: edema and obesity     Acute Wound: N/A not an acute wound      PAST MEDICAL HISTORY        Diagnosis Date    Asthma     Localized edema 2021    Non-pressure chronic ulcer of right lower leg with fat layer exposed ([address] 2021    Peripheral venous insufficiency 2021    Right foot ulcer, limited to breakdown of skin ([address] 2021    Skin ulcer of right lower leg with fat layer exposed ([address] 2021       PAST SURGICAL HISTORY    Past Surgical History:   Procedure Laterality Date    APPENDECTOMY      CHOLECYSTECTOMY         FAMILY HISTORY    Family History   Problem Relation Age of Onset    Migraines Mother        SOCIAL HISTORY    Social History     Tobacco Use    Smoking status: Never    Smokeless tobacco: Never   Substance Use Topics    Drug use: Never       ALLERGIES    Allergies   Allergen Reactions    Iodides Other (See Comments)     IVP dye, pt does not remember he reaction, just that the nurses stated to not let anyone give to her ever again    Furosemide      Pt. States it makes her \"ditzy\", dizzy, and gives her muscle ridgity and nausea.

## 2023-05-10 ENCOUNTER — HOSPITAL ENCOUNTER (OUTPATIENT)
Dept: WOUND CARE | Age: 78
Discharge: HOME | End: 2023-05-10
Payer: MEDICARE

## 2023-05-10 VITALS — DIASTOLIC BLOOD PRESSURE: 75 MMHG | SYSTOLIC BLOOD PRESSURE: 168 MMHG | TEMPERATURE: 97.3 F | HEART RATE: 87 BPM

## 2023-05-10 DIAGNOSIS — M79.89 LEG SWELLING: Primary | ICD-10-CM

## 2023-05-10 DIAGNOSIS — I87.2 PERIPHERAL VENOUS INSUFFICIENCY: ICD-10-CM

## 2023-05-10 DIAGNOSIS — L97.911 SKIN ULCER OF RIGHT LOWER LEG, LIMITED TO BREAKDOWN OF SKIN (HCC): ICD-10-CM

## 2023-05-10 PROCEDURE — 11042 DBRDMT SUBQ TIS 1ST 20SQCM/<: CPT

## 2023-05-10 RX ORDER — GENTAMICIN SULFATE 1 MG/G
OINTMENT TOPICAL ONCE
OUTPATIENT
Start: 2023-05-10 | End: 2023-05-10

## 2023-05-10 RX ORDER — LIDOCAINE HYDROCHLORIDE 40 MG/ML
SOLUTION TOPICAL ONCE
OUTPATIENT
Start: 2023-05-10 | End: 2023-05-10

## 2023-05-10 RX ORDER — CLOBETASOL PROPIONATE 0.5 MG/G
OINTMENT TOPICAL ONCE
OUTPATIENT
Start: 2023-05-10 | End: 2023-05-10

## 2023-05-10 RX ORDER — BETAMETHASONE DIPROPIONATE 0.05 %
OINTMENT (GRAM) TOPICAL ONCE
OUTPATIENT
Start: 2023-05-10 | End: 2023-05-10

## 2023-05-10 RX ORDER — LIDOCAINE HYDROCHLORIDE 40 MG/ML
SOLUTION TOPICAL ONCE
Status: COMPLETED | OUTPATIENT
Start: 2023-05-10 | End: 2023-05-10

## 2023-05-10 RX ORDER — BACITRACIN, NEOMYCIN, POLYMYXIN B 400; 3.5; 5 [USP'U]/G; MG/G; [USP'U]/G
OINTMENT TOPICAL ONCE
OUTPATIENT
Start: 2023-05-10 | End: 2023-05-10

## 2023-05-10 RX ORDER — LIDOCAINE HYDROCHLORIDE 20 MG/ML
JELLY TOPICAL ONCE
OUTPATIENT
Start: 2023-05-10 | End: 2023-05-10

## 2023-05-10 RX ORDER — LIDOCAINE 40 MG/G
CREAM TOPICAL ONCE
OUTPATIENT
Start: 2023-05-10 | End: 2023-05-10

## 2023-05-10 RX ORDER — GINSENG 100 MG
CAPSULE ORAL ONCE
OUTPATIENT
Start: 2023-05-10 | End: 2023-05-10

## 2023-05-10 RX ORDER — LIDOCAINE 50 MG/G
OINTMENT TOPICAL ONCE
OUTPATIENT
Start: 2023-05-10 | End: 2023-05-10

## 2023-05-10 RX ORDER — BACITRACIN ZINC AND POLYMYXIN B SULFATE 500; 1000 [USP'U]/G; [USP'U]/G
OINTMENT TOPICAL ONCE
OUTPATIENT
Start: 2023-05-10 | End: 2023-05-10

## 2023-05-10 RX ADMIN — LIDOCAINE HYDROCHLORIDE 2.5 ML: 40 SOLUTION TOPICAL at 10:11

## 2023-05-10 ASSESSMENT — PAIN SCALES - GENERAL: PAINLEVEL_OUTOF10: 0

## 2023-05-10 NOTE — PROGRESS NOTES
[address]   Progress Note and Procedure Note      [address] RECORD NUMBER:  [id number]  AGE: 78 y.o. GENDER: female  : 1945  EPISODE DATE:  5/10/2023    Subjective:     Chief Complaint   Patient presents with    Wound Check     Follow up visit for right lower leg wound         HISTORY of PRESENT ILLNESS RAQUEL Jay is a 78 y.o. female who presents today for wound/ulcer evaluation. History of Wound Context: venous. Patient presents with wounds right posterior lower leg. Wounds improving with current plan of care. Pt advised to remove spandigrip at night when she goes to sleep in her bed. Wound/Ulcer Pain Timing/Severity: none  Quality of pain: N/A  Severity:  0 / 10   Modifying Factors: none  Associated Signs/Symptoms: edema and drainage     Ulcer Identification:  Ulcer Type: venous     Contributing Factors: edema and obesity     Acute Wound: N/A not an acute wound  PAST MEDICAL HISTORY        Diagnosis Date    Asthma     Localized edema 2021    Non-pressure chronic ulcer of right lower leg with fat layer exposed ([address] 2021    Peripheral venous insufficiency 2021    Right foot ulcer, limited to breakdown of skin ([address] 2021    Skin ulcer of right lower leg with fat layer exposed ([address] 2021       PAST SURGICAL HISTORY    Past Surgical History:   Procedure Laterality Date    APPENDECTOMY      CHOLECYSTECTOMY         FAMILY HISTORY    Family History   Problem Relation Age of Onset    Migraines Mother        SOCIAL HISTORY    Social History     Tobacco Use    Smoking status: Never    Smokeless tobacco: Never   Substance Use Topics    Drug use: Never       ALLERGIES    Allergies   Allergen Reactions    Iodides Other (See Comments)     IVP dye, pt does not remember he reaction, just that the nurses stated to not let anyone give to her ever again    Furosemide      Pt.  States it makes her \"ditzy\", dizzy, and gives her muscle ridgity

## 2023-05-24 ENCOUNTER — HOSPITAL ENCOUNTER (OUTPATIENT)
Dept: WOUND CARE | Age: 78
Discharge: HOME | End: 2023-05-24
Payer: MEDICARE

## 2023-05-24 VITALS
DIASTOLIC BLOOD PRESSURE: 84 MMHG | HEART RATE: 82 BPM | RESPIRATION RATE: 18 BRPM | TEMPERATURE: 97.6 F | SYSTOLIC BLOOD PRESSURE: 154 MMHG

## 2023-05-24 DIAGNOSIS — I87.2 PERIPHERAL VENOUS INSUFFICIENCY: ICD-10-CM

## 2023-05-24 DIAGNOSIS — L97.911 SKIN ULCER OF RIGHT LOWER LEG, LIMITED TO BREAKDOWN OF SKIN (HCC): ICD-10-CM

## 2023-05-24 DIAGNOSIS — M79.89 LEG SWELLING: Primary | ICD-10-CM

## 2023-05-24 PROCEDURE — 11042 DBRDMT SUBQ TIS 1ST 20SQCM/<: CPT

## 2023-05-24 RX ORDER — LIDOCAINE 50 MG/G
OINTMENT TOPICAL ONCE
OUTPATIENT
Start: 2023-05-24 | End: 2023-05-24

## 2023-05-24 RX ORDER — LIDOCAINE HYDROCHLORIDE 20 MG/ML
JELLY TOPICAL ONCE
OUTPATIENT
Start: 2023-05-24 | End: 2023-05-24

## 2023-05-24 RX ORDER — BACITRACIN ZINC AND POLYMYXIN B SULFATE 500; 1000 [USP'U]/G; [USP'U]/G
OINTMENT TOPICAL ONCE
OUTPATIENT
Start: 2023-05-24 | End: 2023-05-24

## 2023-05-24 RX ORDER — LIDOCAINE 40 MG/G
CREAM TOPICAL ONCE
OUTPATIENT
Start: 2023-05-24 | End: 2023-05-24

## 2023-05-24 RX ORDER — GENTAMICIN SULFATE 1 MG/G
OINTMENT TOPICAL ONCE
OUTPATIENT
Start: 2023-05-24 | End: 2023-05-24

## 2023-05-24 RX ORDER — CLOBETASOL PROPIONATE 0.5 MG/G
OINTMENT TOPICAL ONCE
OUTPATIENT
Start: 2023-05-24 | End: 2023-05-24

## 2023-05-24 RX ORDER — LIDOCAINE HYDROCHLORIDE 40 MG/ML
SOLUTION TOPICAL ONCE
OUTPATIENT
Start: 2023-05-24 | End: 2023-05-24

## 2023-05-24 RX ORDER — LIDOCAINE HYDROCHLORIDE 40 MG/ML
SOLUTION TOPICAL ONCE
Status: COMPLETED | OUTPATIENT
Start: 2023-05-24 | End: 2023-05-24

## 2023-05-24 RX ORDER — BACITRACIN, NEOMYCIN, POLYMYXIN B 400; 3.5; 5 [USP'U]/G; MG/G; [USP'U]/G
OINTMENT TOPICAL ONCE
OUTPATIENT
Start: 2023-05-24 | End: 2023-05-24

## 2023-05-24 RX ORDER — BETAMETHASONE DIPROPIONATE 0.05 %
OINTMENT (GRAM) TOPICAL ONCE
OUTPATIENT
Start: 2023-05-24 | End: 2023-05-24

## 2023-05-24 RX ORDER — GINSENG 100 MG
CAPSULE ORAL ONCE
OUTPATIENT
Start: 2023-05-24 | End: 2023-05-24

## 2023-05-24 RX ADMIN — LIDOCAINE HYDROCHLORIDE 2.5 ML: 40 SOLUTION TOPICAL at 10:18

## 2023-05-24 ASSESSMENT — PAIN SCALES - GENERAL
PAINLEVEL_OUTOF10: 0
PAINLEVEL_OUTOF10: 0

## 2023-05-24 NOTE — PROGRESS NOTES
with restrictions: If you are still having pain after you go home:  [] Elevate the affected limb. [x] Use over-the-counter medications you would normally use for pain as permitted by your family doctor. [] For persistent pain not relieved by the above interventions, please call your family doctor. Return Appointment:  [] Wound and dressing supply provider:   [] ECF or Home Healthcare:  [] Wound Assessment:         [] Physician or NP scheduled for Wound Assessment:   [x] Return Appointment: With 2  in  [address] Week(s)    [] Ordered tests:      Nurse Case Manger:  NILTON        Electronically signed by Josefa Myrick RN on 5/24/2023 at 10:56 AM       [address] [facility] Information: Should you experience any significant changes in your wound(s) or have questions about your wound care, please contact the [address] [place] [address] at [address] 8:00 am - 4:30 pm and Friday 8:00 am - 12:30 pm.  If you need help with your wound outside these hours and cannot wait until we are again available, contact your PCP or go to the hospital emergency room. PLEASE NOTE: IF YOU ARE UNABLE TO OBTAIN WOUND SUPPLIES, CONTINUE TO USE THE SUPPLIES YOU HAVE AVAILABLE UNTIL YOU ARE ABLE TO REACH US. IT IS MOST IMPORTANT TO KEEP THE WOUND COVERED AT ALL TIMES.      Physician Signature:_______________________     Date: ___________ Time:  ____________      [address]           Electronically signed by VIDA Gutierrez CNP on 5/24/2023 at 11:54 AM

## 2023-06-06 NOTE — DISCHARGE INSTRUCTIONS
[address]  Physician Orders   [facility] [place]  [address] [facility] [address] [place] [address]  Telephone: [phone]    NAME:  Maninder Grady OF BIRTH:  1945  MEDICAL RECORD NUMBER:  [id number]  DATE:  6/7/2023    Congratulations! You have completed your treatment. Return to your Primary Care Physician for all your health issues. Resume your ordinary activities as tolerated. Take your medications as prescribed by your primary care physician. Check your skin daily for cracks, bruises, sores, or dryness. Use a moisturizer as needed. Clean and dry your skin, using mild soap and warm water (not hot). Avoid alcohol and caffeine and do not smoke. Maintain a nutritious diet. Avoid pressure on your wound site. Keep your legs elevated above the level of the heart whenever possible. Continue to use wraps/stockings/compression as prescribed. Replace compression stockings every four to six months as needed to ensure proper fit.      MOISTURIZING  LOTION TO LEG       Physician Signature:______________________    Date: ___________ Time:  ____________    Brianna Grimes CNP            Electronically signed by Venus Raygoza RN on 6/7/2023 at 10:49 AM

## 2023-06-07 ENCOUNTER — HOSPITAL ENCOUNTER (OUTPATIENT)
Dept: WOUND CARE | Age: 78
Discharge: HOME | End: 2023-06-07
Payer: MEDICARE

## 2023-06-07 VITALS
TEMPERATURE: 97.3 F | SYSTOLIC BLOOD PRESSURE: 147 MMHG | DIASTOLIC BLOOD PRESSURE: 82 MMHG | RESPIRATION RATE: 20 BRPM | HEART RATE: 78 BPM

## 2023-06-07 PROCEDURE — 99211 OFF/OP EST MAY X REQ PHY/QHP: CPT

## 2023-06-07 ASSESSMENT — PAIN SCALES - GENERAL
PAINLEVEL_OUTOF10: 0
PAINLEVEL_OUTOF10: 0

## 2023-06-08 PROBLEM — L97.909 VENOUS ULCER (HCC): Status: ACTIVE | Noted: 2023-06-08

## 2023-06-08 PROBLEM — I83.009 VENOUS ULCER (HCC): Status: ACTIVE | Noted: 2023-06-08

## 2023-06-08 NOTE — PROGRESS NOTES
site. Keep your legs elevated above the level of the heart whenever possible. Continue to use wraps/stockings/compression as prescribed. Replace compression stockings every four to six months as needed to ensure proper fit.      MOISTURIZING  LOTION TO LEG       Physician Signature:______________________    Date: ___________ Time:  ____________    Chip Hay CNP            Electronically signed by Saul Cross RN on 6/7/2023 at 10:49 AM                            Electronically signed by VIDA Box CNP on 6/8/2023 at 12:02 PM

## 2023-07-06 ENCOUNTER — TELEPHONE (OUTPATIENT)
Dept: FAMILY MEDICINE CLINIC | Age: 78
End: 2023-07-06

## 2023-07-06 ENCOUNTER — OFFICE VISIT (OUTPATIENT)
Dept: FAMILY MEDICINE CLINIC | Age: 78
End: 2023-07-06

## 2023-07-06 VITALS
SYSTOLIC BLOOD PRESSURE: 130 MMHG | BODY MASS INDEX: 33.27 KG/M2 | WEIGHT: 207 LBS | HEIGHT: 66 IN | DIASTOLIC BLOOD PRESSURE: 88 MMHG

## 2023-07-06 DIAGNOSIS — M17.11 PRIMARY OSTEOARTHRITIS OF RIGHT KNEE: ICD-10-CM

## 2023-07-06 DIAGNOSIS — I87.2 PERIPHERAL VENOUS INSUFFICIENCY: Primary | ICD-10-CM

## 2023-07-06 DIAGNOSIS — J45.40 MODERATE PERSISTENT ASTHMA WITHOUT COMPLICATION: ICD-10-CM

## 2023-07-06 NOTE — TELEPHONE ENCOUNTER
Jimbo with [facility] called to check the status of the hippa compliance form. He states that it needs a signature form the doctor and ov notes fax back to them.   If any questions give Carlos Lopez a call [phone]

## 2023-07-08 PROBLEM — M17.11 OSTEOARTHRITIS OF RIGHT KNEE: Status: ACTIVE | Noted: 2023-07-08

## 2023-07-08 ASSESSMENT — ENCOUNTER SYMPTOMS
COUGH: 0
DIARRHEA: 0
VOMITING: 0
NAUSEA: 0
ABDOMINAL PAIN: 0
SHORTNESS OF BREATH: 0

## 2023-07-17 ENCOUNTER — HOSPITAL ENCOUNTER (OUTPATIENT)
Dept: WOUND CARE | Age: 78
Discharge: HOME | End: 2023-07-17
Payer: MEDICARE

## 2023-07-17 VITALS
SYSTOLIC BLOOD PRESSURE: 160 MMHG | TEMPERATURE: 97.3 F | RESPIRATION RATE: 20 BRPM | DIASTOLIC BLOOD PRESSURE: 68 MMHG | HEART RATE: 90 BPM

## 2023-07-17 DIAGNOSIS — M79.89 LEG SWELLING: Primary | ICD-10-CM

## 2023-07-17 DIAGNOSIS — I87.2 PERIPHERAL VENOUS INSUFFICIENCY: ICD-10-CM

## 2023-07-17 DIAGNOSIS — L97.911 SKIN ULCER OF RIGHT LOWER LEG, LIMITED TO BREAKDOWN OF SKIN (HCC): ICD-10-CM

## 2023-07-17 PROCEDURE — 99213 OFFICE O/P EST LOW 20 MIN: CPT

## 2023-07-17 PROCEDURE — 99203 OFFICE O/P NEW LOW 30 MIN: CPT | Performed by: SURGERY

## 2023-07-17 PROCEDURE — 11045 DBRDMT SUBQ TISS EACH ADDL: CPT | Performed by: SURGERY

## 2023-07-17 PROCEDURE — 11045 DBRDMT SUBQ TISS EACH ADDL: CPT

## 2023-07-17 PROCEDURE — 11042 DBRDMT SUBQ TIS 1ST 20SQCM/<: CPT

## 2023-07-17 PROCEDURE — 11042 DBRDMT SUBQ TIS 1ST 20SQCM/<: CPT | Performed by: SURGERY

## 2023-07-17 RX ORDER — SODIUM CHLOR/HYPOCHLOROUS ACID 0.033 %
SOLUTION, IRRIGATION IRRIGATION ONCE
OUTPATIENT
Start: 2023-07-17 | End: 2023-07-17

## 2023-07-17 RX ORDER — LIDOCAINE 50 MG/G
OINTMENT TOPICAL ONCE
OUTPATIENT
Start: 2023-07-17 | End: 2023-07-17

## 2023-07-17 RX ORDER — LIDOCAINE HYDROCHLORIDE 20 MG/ML
JELLY TOPICAL ONCE
OUTPATIENT
Start: 2023-07-17 | End: 2023-07-17

## 2023-07-17 RX ORDER — BACITRACIN ZINC AND POLYMYXIN B SULFATE 500; 1000 [USP'U]/G; [USP'U]/G
OINTMENT TOPICAL ONCE
OUTPATIENT
Start: 2023-07-17 | End: 2023-07-17

## 2023-07-17 RX ORDER — BETAMETHASONE DIPROPIONATE 0.05 %
OINTMENT (GRAM) TOPICAL ONCE
OUTPATIENT
Start: 2023-07-17 | End: 2023-07-17

## 2023-07-17 RX ORDER — GINSENG 100 MG
CAPSULE ORAL ONCE
OUTPATIENT
Start: 2023-07-17 | End: 2023-07-17

## 2023-07-17 RX ORDER — CLOBETASOL PROPIONATE 0.5 MG/G
OINTMENT TOPICAL ONCE
OUTPATIENT
Start: 2023-07-17 | End: 2023-07-17

## 2023-07-17 RX ORDER — LIDOCAINE HYDROCHLORIDE 40 MG/ML
SOLUTION TOPICAL ONCE
Status: COMPLETED | OUTPATIENT
Start: 2023-07-17 | End: 2023-07-17

## 2023-07-17 RX ORDER — IBUPROFEN 200 MG
TABLET ORAL ONCE
OUTPATIENT
Start: 2023-07-17 | End: 2023-07-17

## 2023-07-17 RX ORDER — GENTAMICIN SULFATE 1 MG/G
OINTMENT TOPICAL ONCE
OUTPATIENT
Start: 2023-07-17 | End: 2023-07-17

## 2023-07-17 RX ORDER — LIDOCAINE HYDROCHLORIDE 40 MG/ML
SOLUTION TOPICAL ONCE
OUTPATIENT
Start: 2023-07-17 | End: 2023-07-17

## 2023-07-17 RX ORDER — LIDOCAINE 40 MG/G
CREAM TOPICAL ONCE
OUTPATIENT
Start: 2023-07-17 | End: 2023-07-17

## 2023-07-17 RX ADMIN — LIDOCAINE HYDROCHLORIDE: 40 SOLUTION TOPICAL at 12:26

## 2023-07-17 ASSESSMENT — PAIN SCALES - GENERAL
PAINLEVEL_OUTOF10: 0
PAINLEVEL_OUTOF10: 0

## 2023-07-17 NOTE — CONSULTS
[address] [facility]   Progress Note and Procedure Note      [address] [facility] RECORD NUMBER:  [id number]  AGE: 78 y.o. GENDER: female  : 1945  EPISODE DATE:  2023  HISTORY of PRESENT ILLNESS HPI  Akbar Silveira is a 78year old female presenting today for follow-up for right lateral lower leg wound  History of Wound Context: In  of this year right lateral leg wounds had been healed patient says she tries to wear her surgical compression stocking but does not wear it all the time no pain or redness noted. She denies constitutional issues, and has been dressing wounds at home. Denies itching or pain. Wound/Ulcer Pain Timing/Severity:  none  Quality of pain: N/A  Severity:  0 / 10 at present  Modifying Factors:  None  Associated Signs/Symptoms: edema     Ulcer Identification:  Ulcer Type: venous     Contributing Factors: edema, venous stasis, and obesity     Acute Wound: Other: venous wounds.     PAST MEDICAL HISTORY     Past Medical History             Diagnosis Date    Asthma      Localized edema 2021    Non-pressure chronic ulcer of right lower leg with fat layer exposed ([address]) 2021    Peripheral venous insufficiency 2021    Right foot ulcer, limited to breakdown of skin ([address]) 2021    Skin ulcer of right lower leg with fat layer exposed ([address]) 2021            PAST SURGICAL HISTORY     Past Surgical History         Past Surgical History:   Procedure Laterality Date    APPENDECTOMY        CHOLECYSTECTOMY                FAMILY HISTORY     Family History         Family History   Problem Relation Age of Onset    Migraines Mother              SOCIAL HISTORY     Social History           Tobacco Use    Smoking status: Never    Smokeless tobacco: Never   Substance Use Topics    Drug use: Never         ALLERGIES           Allergies   Allergen Reactions    Iodides Other (See Comments)       IVP dye, pt does not remember he reaction, just that the nurses

## 2023-07-17 NOTE — DISCHARGE INSTRUCTIONS
[address] [place] Physician Orders and Discharge Instructions  [address], [address] [facility], [address]  Telephone: [phone] [address] 8:30 am - 4:30 pm and Friday 8:30 am - 11:30 am       NAME:  Mp Sanchez OF BIRTH:  1945  MEDICAL RECORD NUMBER:  [id number]  DATE: 7/24/23        Return Appointment:     [x] Return Appointment: With Kenzie Martin CNP  or Antonia Klein CNP in  [address] [place] [address])                  [] Nurse Visit for Wound Assessment:  [x] DME/Wound Dressing Supplies Provided by: [address] [facility] [address] [place], [place]  p:[phone] f: [phone]    Please call them directly to reorder supplies when you run out)  [] ECF or Home Healthcare: Your Home Care Agency is responsible to order your supplies. [x] Orders placed during your visit:       culture pending- 7/24/23     Important Reminders:   Please wash hands with soap and water before and after every dressing change. Do not scrub wounds. Keep wounds dry in shower unless otherwise instructed by the physician. SMOKING can slow would healing. Stop smoking as soon as possible to improve healing and prevent further complications associated with smoking. Consuelo-Wound Topical Treatments:    Do not apply lotions, creams, or ointments to wound bed unless directed.    Apply immediately around the wound:    [x] Apply moisturizing lotion to skin surrounding the wound prior to dressing change.        ___________________________________________________________________  Wound Location: Right lower leg, venous      Wound Cleansing: Normal Saline     Primary Dressing:       [x] ALGINATE AG                 Secondary Dressing:           [x] Extrasorb            [x] Rolled Gauze                 Dressing Frequency:                      [x] Three times per week     _______________________________________________________  Compression and Edema Control:

## 2023-07-24 ENCOUNTER — HOSPITAL ENCOUNTER (OUTPATIENT)
Dept: WOUND CARE | Age: 78
Discharge: HOME | End: 2023-07-24
Payer: MEDICARE

## 2023-07-24 VITALS
SYSTOLIC BLOOD PRESSURE: 142 MMHG | DIASTOLIC BLOOD PRESSURE: 83 MMHG | RESPIRATION RATE: 18 BRPM | TEMPERATURE: 97 F | HEART RATE: 86 BPM

## 2023-07-24 DIAGNOSIS — I87.2 PERIPHERAL VENOUS INSUFFICIENCY: ICD-10-CM

## 2023-07-24 DIAGNOSIS — M79.89 LEG SWELLING: Primary | ICD-10-CM

## 2023-07-24 DIAGNOSIS — L97.911 SKIN ULCER OF RIGHT LOWER LEG, LIMITED TO BREAKDOWN OF SKIN (HCC): ICD-10-CM

## 2023-07-24 PROCEDURE — 11042 DBRDMT SUBQ TIS 1ST 20SQCM/<: CPT | Performed by: NURSE PRACTITIONER

## 2023-07-24 PROCEDURE — 11045 DBRDMT SUBQ TISS EACH ADDL: CPT

## 2023-07-24 PROCEDURE — 87205 SMEAR GRAM STAIN: CPT

## 2023-07-24 PROCEDURE — 87075 CULTR BACTERIA EXCEPT BLOOD: CPT

## 2023-07-24 PROCEDURE — 11042 DBRDMT SUBQ TIS 1ST 20SQCM/<: CPT

## 2023-07-24 PROCEDURE — 87077 CULTURE AEROBIC IDENTIFY: CPT

## 2023-07-24 PROCEDURE — 11045 DBRDMT SUBQ TISS EACH ADDL: CPT | Performed by: NURSE PRACTITIONER

## 2023-07-24 PROCEDURE — 87186 SC STD MICRODIL/AGAR DIL: CPT

## 2023-07-24 PROCEDURE — 87070 CULTURE OTHR SPECIMN AEROBIC: CPT

## 2023-07-24 RX ORDER — BACITRACIN ZINC AND POLYMYXIN B SULFATE 500; 1000 [USP'U]/G; [USP'U]/G
OINTMENT TOPICAL ONCE
OUTPATIENT
Start: 2023-07-24 | End: 2023-07-24

## 2023-07-24 RX ORDER — CLOBETASOL PROPIONATE 0.5 MG/G
OINTMENT TOPICAL ONCE
OUTPATIENT
Start: 2023-07-24 | End: 2023-07-24

## 2023-07-24 RX ORDER — LIDOCAINE HYDROCHLORIDE 20 MG/ML
JELLY TOPICAL ONCE
OUTPATIENT
Start: 2023-07-24 | End: 2023-07-24

## 2023-07-24 RX ORDER — LIDOCAINE HYDROCHLORIDE 40 MG/ML
SOLUTION TOPICAL ONCE
OUTPATIENT
Start: 2023-07-24 | End: 2023-07-24

## 2023-07-24 RX ORDER — SODIUM CHLOR/HYPOCHLOROUS ACID 0.033 %
SOLUTION, IRRIGATION IRRIGATION ONCE
OUTPATIENT
Start: 2023-07-24 | End: 2023-07-24

## 2023-07-24 RX ORDER — LIDOCAINE HYDROCHLORIDE 40 MG/ML
SOLUTION TOPICAL ONCE
Status: COMPLETED | OUTPATIENT
Start: 2023-07-24 | End: 2023-07-24

## 2023-07-24 RX ORDER — GENTAMICIN SULFATE 1 MG/G
OINTMENT TOPICAL ONCE
OUTPATIENT
Start: 2023-07-24 | End: 2023-07-24

## 2023-07-24 RX ORDER — GINSENG 100 MG
CAPSULE ORAL ONCE
OUTPATIENT
Start: 2023-07-24 | End: 2023-07-24

## 2023-07-24 RX ORDER — BETAMETHASONE DIPROPIONATE 0.05 %
OINTMENT (GRAM) TOPICAL ONCE
OUTPATIENT
Start: 2023-07-24 | End: 2023-07-24

## 2023-07-24 RX ORDER — LIDOCAINE 50 MG/G
OINTMENT TOPICAL ONCE
OUTPATIENT
Start: 2023-07-24 | End: 2023-07-24

## 2023-07-24 RX ORDER — LIDOCAINE 40 MG/G
CREAM TOPICAL ONCE
OUTPATIENT
Start: 2023-07-24 | End: 2023-07-24

## 2023-07-24 RX ORDER — IBUPROFEN 200 MG
TABLET ORAL ONCE
OUTPATIENT
Start: 2023-07-24 | End: 2023-07-24

## 2023-07-24 RX ADMIN — LIDOCAINE HYDROCHLORIDE: 40 SOLUTION TOPICAL at 10:23

## 2023-07-24 ASSESSMENT — PAIN SCALES - GENERAL
PAINLEVEL_OUTOF10: 0
PAINLEVEL_OUTOF10: 0

## 2023-07-24 NOTE — PLAN OF CARE
Length (cm) 5.6 cm 07/24/23 1105   Post-Procedure Width (cm) 8.1 cm 07/24/23 1105   Post-Procedure Depth (cm) 0.2 cm 07/24/23 1105   Post-Procedure Surface Area (cm^2) 45.36 cm^2 07/24/23 1105   Post-Procedure Volume (cm^3) 9.072 cm^3 07/24/23 1105   Wound Assessment Granulation tissue;Slough 07/24/23 1006   Drainage Amount Moderate 07/24/23 1006   Drainage Description Serosanguinous 07/24/23 1006   Odor None 07/24/23 1006   Consuelo-wound Assessment Dry/flaky;Fragile;Blanchable erythema 07/24/23 1006   Margins Undefined edges 07/24/23 1006   Wound Thickness Description not for Pressure Injury Full thickness 07/24/23 1006   Number of days: 7       Wound 07/17/23 Right; Lower;Distal;Lateral #2 (noted 7/17/23) (Active)   Wound Image   07/17/23 1010   Wound Etiology Venous 07/17/23 1010   Dressing Status Old drainage noted 07/24/23 1006   Wound Cleansed Cleansed with saline 07/24/23 1006   Dressing/Treatment Alginate with Ag;Roll gauze 07/17/23 1057   Wound Length (cm) 3.3 cm 07/24/23 1006   Wound Width (cm) 4 cm 07/24/23 1006   Wound Depth (cm) 0.2 cm 07/24/23 1006   Wound Surface Area (cm^2) 13.2 cm^2 07/24/23 1006   Change in Wound Size % (l*w) 5.71 07/24/23 1006   Wound Volume (cm^3) 2.64 cm^3 07/24/23 1006   Wound Healing % -89 07/24/23 1006   Post-Procedure Length (cm) 3.4 cm 07/24/23 1105   Post-Procedure Width (cm) 4.1 cm 07/24/23 1105   Post-Procedure Depth (cm) 0.3 cm 07/24/23 1105   Post-Procedure Surface Area (cm^2) 13.94 cm^2 07/24/23 1105   Post-Procedure Volume (cm^3) 4.182 cm^3 07/24/23 1105   Wound Assessment Granulation tissue;Slough 07/24/23 1006   Drainage Amount Moderate 07/24/23 1006   Drainage Description Serosanguinous 07/24/23 1006   Odor None 07/24/23 1006   Consuelo-wound Assessment Dry/flaky;Fragile;Blanchable erythema 07/24/23 1006   Margins Attached edges 07/24/23 1006   Wound Thickness Description not for Pressure Injury Full thickness 07/24/23 1006   Number of days: 7          Supplies Requested

## 2023-07-24 NOTE — PROGRESS NOTES
59.3 sq cm     Estimated Blood Loss:  Minimal    Hemostasis Achieved:  by pressure    Procedural Pain:  0  / 10     Post Procedural Pain:  0 / 10     Response to treatment:  Well tolerated by patient. n:     Treatment Note please see attached Discharge Instructions    Written patient dismissal instructions given to patient and signed by patient or POA. Discharge [address] [facility] Physician Orders and Discharge Instructions  [address], [address] [place] [address], [address] [place], [place] [address]  Telephone: [phone] [address] 8:30 am - 4:30 pm and Friday 8:30 am - 11:30 am       NAME:  Saloni English OF BIRTH:  1945  MEDICAL RECORD NUMBER:  [id number]  DATE: 7/24/23        Return Appointment:     [x] Return Appointment: With Ceferino Webber CNP  or Monica Adams CNP in  [address] [place] [address])                  [] Nurse Visit for Wound Assessment:  [x] DME/Wound Dressing Supplies Provided by: [address]  p:[phone] f: [phone]    Please call them directly to reorder supplies when you run out)  [] ECF or Home Healthcare: Your Home Care Agency is responsible to order your supplies. [x] Orders placed during your visit:       culture pending- 7/24/23     Important Reminders:   Please wash hands with soap and water before and after every dressing change. Do not scrub wounds. Keep wounds dry in shower unless otherwise instructed by the physician. SMOKING can slow would healing. Stop smoking as soon as possible to improve healing and prevent further complications associated with smoking. Consuelo-Wound Topical Treatments:    Do not apply lotions, creams, or ointments to wound bed unless directed. Apply immediately around the wound:    [x] Apply moisturizing lotion to skin surrounding the wound prior to dressing change.

## 2023-07-25 PROBLEM — I83.019 VENOUS ULCER OF RIGHT LEG (HCC): Status: ACTIVE | Noted: 2023-07-25

## 2023-07-25 PROBLEM — L97.919 VENOUS ULCER OF RIGHT LEG (HCC): Status: ACTIVE | Noted: 2023-07-25

## 2023-07-25 RX ORDER — TRIAMTERENE AND HYDROCHLOROTHIAZIDE 37.5; 25 MG/1; MG/1
1 TABLET ORAL DAILY
Qty: 90 TABLET | Refills: 1 | Status: SHIPPED | OUTPATIENT
Start: 2023-07-25 | End: 2023-10-23

## 2023-07-25 NOTE — TELEPHONE ENCOUNTER
Last office visit 7/6/2023     Last written      Next office visit scheduled 9/6/2023    Requested Prescriptions     Pending Prescriptions Disp Refills    triamterene-hydroCHLOROthiazide (MAXZIDE-25) 37.5-25 MG per tablet [Pharmacy Med Name: TRIAMTERENE-HCTZ 37.5-25 MG 37.5-25 Tablet] 90 tablet 1     Sig: TAKE 1 TABLET BY MOUTH DAILY

## 2023-07-26 RX ORDER — AMOXICILLIN AND CLAVULANATE POTASSIUM 875; 125 MG/1; MG/1
1 TABLET, FILM COATED ORAL 2 TIMES DAILY
Qty: 20 TABLET | Refills: 0 | Status: SHIPPED | OUTPATIENT
Start: 2023-07-26 | End: 2023-08-05

## 2023-07-29 LAB
BACTERIA SPEC AEROBE CULT: ABNORMAL
BACTERIA SPEC ANAEROBE CULT: ABNORMAL
GRAM STN SPEC: ABNORMAL
ORGANISM: ABNORMAL

## 2023-07-31 ENCOUNTER — HOSPITAL ENCOUNTER (OUTPATIENT)
Dept: WOUND CARE | Age: 78
Discharge: HOME | End: 2023-07-31
Payer: MEDICARE

## 2023-07-31 VITALS
SYSTOLIC BLOOD PRESSURE: 160 MMHG | HEART RATE: 86 BPM | DIASTOLIC BLOOD PRESSURE: 74 MMHG | TEMPERATURE: 98.4 F | RESPIRATION RATE: 18 BRPM

## 2023-07-31 DIAGNOSIS — M79.89 LEG SWELLING: Primary | ICD-10-CM

## 2023-07-31 DIAGNOSIS — L97.911 SKIN ULCER OF RIGHT LOWER LEG, LIMITED TO BREAKDOWN OF SKIN (HCC): ICD-10-CM

## 2023-07-31 DIAGNOSIS — I87.2 PERIPHERAL VENOUS INSUFFICIENCY: ICD-10-CM

## 2023-07-31 PROCEDURE — 11045 DBRDMT SUBQ TISS EACH ADDL: CPT

## 2023-07-31 PROCEDURE — 11042 DBRDMT SUBQ TIS 1ST 20SQCM/<: CPT | Performed by: NURSE PRACTITIONER

## 2023-07-31 PROCEDURE — 11042 DBRDMT SUBQ TIS 1ST 20SQCM/<: CPT

## 2023-07-31 PROCEDURE — 11045 DBRDMT SUBQ TISS EACH ADDL: CPT | Performed by: NURSE PRACTITIONER

## 2023-07-31 RX ORDER — SODIUM CHLOR/HYPOCHLOROUS ACID 0.033 %
SOLUTION, IRRIGATION IRRIGATION ONCE
OUTPATIENT
Start: 2023-07-31 | End: 2023-07-31

## 2023-07-31 RX ORDER — GINSENG 100 MG
CAPSULE ORAL ONCE
OUTPATIENT
Start: 2023-07-31 | End: 2023-07-31

## 2023-07-31 RX ORDER — CLOBETASOL PROPIONATE 0.5 MG/G
OINTMENT TOPICAL ONCE
OUTPATIENT
Start: 2023-07-31 | End: 2023-07-31

## 2023-07-31 RX ORDER — LIDOCAINE HYDROCHLORIDE 20 MG/ML
JELLY TOPICAL ONCE
OUTPATIENT
Start: 2023-07-31 | End: 2023-07-31

## 2023-07-31 RX ORDER — BETAMETHASONE DIPROPIONATE 0.05 %
OINTMENT (GRAM) TOPICAL ONCE
OUTPATIENT
Start: 2023-07-31 | End: 2023-07-31

## 2023-07-31 RX ORDER — IBUPROFEN 200 MG
TABLET ORAL ONCE
OUTPATIENT
Start: 2023-07-31 | End: 2023-07-31

## 2023-07-31 RX ORDER — LIDOCAINE 40 MG/G
CREAM TOPICAL ONCE
OUTPATIENT
Start: 2023-07-31 | End: 2023-07-31

## 2023-07-31 RX ORDER — LIDOCAINE 50 MG/G
OINTMENT TOPICAL ONCE
OUTPATIENT
Start: 2023-07-31 | End: 2023-07-31

## 2023-07-31 RX ORDER — GENTAMICIN SULFATE 1 MG/G
OINTMENT TOPICAL ONCE
OUTPATIENT
Start: 2023-07-31 | End: 2023-07-31

## 2023-07-31 RX ORDER — LIDOCAINE HYDROCHLORIDE 40 MG/ML
SOLUTION TOPICAL ONCE
Status: COMPLETED | OUTPATIENT
Start: 2023-07-31 | End: 2023-07-31

## 2023-07-31 RX ORDER — BACITRACIN ZINC AND POLYMYXIN B SULFATE 500; 1000 [USP'U]/G; [USP'U]/G
OINTMENT TOPICAL ONCE
OUTPATIENT
Start: 2023-07-31 | End: 2023-07-31

## 2023-07-31 RX ORDER — LIDOCAINE HYDROCHLORIDE 40 MG/ML
SOLUTION TOPICAL ONCE
OUTPATIENT
Start: 2023-07-31 | End: 2023-07-31

## 2023-07-31 RX ADMIN — LIDOCAINE HYDROCHLORIDE: 40 SOLUTION TOPICAL at 10:17

## 2023-07-31 ASSESSMENT — PAIN SCALES - GENERAL: PAINLEVEL_OUTOF10: 0

## 2023-07-31 NOTE — PROGRESS NOTES
(cm^2) 33 cm^2 07/31/23 1005   Change in Wound Size % (l*w) 8.33 07/31/23 1005   Wound Volume (cm^3) 3.3 cm^3 07/31/23 1005   Wound Healing % 8 07/31/23 1005   Post-Procedure Length (cm) 6.1 cm 07/31/23 1127   Post-Procedure Width (cm) 5.6 cm 07/31/23 1127   Post-Procedure Depth (cm) 0.2 cm 07/31/23 1127   Post-Procedure Surface Area (cm^2) 34.16 cm^2 07/31/23 1127   Post-Procedure Volume (cm^3) 6.832 cm^3 07/31/23 1127   Wound Assessment Granulation tissue;Slough 07/31/23 1005   Drainage Amount Moderate 07/31/23 1005   Drainage Description Serosanguinous; Thick; Yellow 07/31/23 1005   Odor None 07/31/23 1005   Consuelo-wound Assessment Dry/flaky;Fragile;Blanchable erythema 07/31/23 1005   Margins Undefined edges 07/31/23 1005   Wound Thickness Description not for Pressure Injury Full thickness 07/31/23 1005   Number of days: 14       Wound 07/17/23 Right; Lower;Distal;Lateral #2 (noted 7/17/23) (Active)   Wound Image   07/17/23 1010   Wound Etiology Venous 07/17/23 1010   Dressing Status New dressing applied 07/31/23 1201   Wound Cleansed Cleansed with saline 07/31/23 1201   Dressing/Treatment Alginate with Ag; Other (comment); Roll gauze 07/31/23 1201   Wound Length (cm) 3.5 cm 07/31/23 1005   Wound Width (cm) 3.5 cm 07/31/23 1005   Wound Depth (cm) 0.2 cm 07/31/23 1005   Wound Surface Area (cm^2) 12.25 cm^2 07/31/23 1005   Change in Wound Size % (l*w) 12.5 07/31/23 1005   Wound Volume (cm^3) 2.45 cm^3 07/31/23 1005   Wound Healing % -75 07/31/23 1005   Post-Procedure Length (cm) 3.6 cm 07/31/23 1127   Post-Procedure Width (cm) 3.6 cm 07/31/23 1127   Post-Procedure Depth (cm) 0.3 cm 07/31/23 1127   Post-Procedure Surface Area (cm^2) 12.96 cm^2 07/31/23 1127   Post-Procedure Volume (cm^3) 3.888 cm^3 07/31/23 1127   Wound Assessment Granulation tissue;Slough 07/31/23 1005   Drainage Amount Moderate 07/31/23 1005   Drainage Description Serosanguinous; Thick; Yellow 07/31/23 1005   Odor None 07/31/23 1005   Consuelo-wound Assessment

## 2023-07-31 NOTE — DISCHARGE INSTRUCTIONS
Discharge [address] [facility] Physician Orders and Discharge Instructions  [address], 433 [facility], [address]  Telephone: [phone] [address] 8:30 am - 4:30 pm and Friday 8:30 am - 11:30 am       NAME:  Duane Bland OF BIRTH:  1945  MEDICAL RECORD NUMBER:  [id number]  DATE: 7/31/23        Return Appointment:     [x] Return Appointment: With Mike Liao CNP  or Herb Bell CNP in  1  [facility])                  [] Nurse Visit for Wound Assessment:  [x] DME/Wound Dressing Supplies Provided by: 7/24/23 [employer] [address]  p:[phone] f: [phone]    Please call them directly to reorder supplies when you run out)  [] ECF or Home Healthcare: Your Home Care Agency is responsible to order your supplies. [x] Orders placed during your visit:       culture - 7/24/23     Important Reminders:   Please wash hands with soap and water before and after every dressing change. Do not scrub wounds. Keep wounds dry in shower unless otherwise instructed by the physician. SMOKING can slow would healing. Stop smoking as soon as possible to improve healing and prevent further complications associated with smoking. Consuelo-Wound Topical Treatments:    Do not apply lotions, creams, or ointments to wound bed unless directed.    Apply immediately around the wound:    [x] Apply moisturizing lotion to skin surrounding the wound prior to dressing change.        ___________________________________________________________________  Wound Location: Right lower leg, venous      Wound Cleansing: Normal Saline     Primary Dressing:       [x] ALGINATE AG                 Secondary Dressing:           [x] Extrasorb            [x] Rolled Gauze                 Dressing Frequency:                      [x] Three times per week

## 2023-08-03 NOTE — DISCHARGE INSTRUCTIONS
Discharge [address] Physician Orders and Discharge Instructions  [address], 433 [facility], [address]  Telephone: [phone] [address] 8:30 am - 4:30 pm and Friday 8:30 am - 11:30 am       NAME:  Jesus Young OF BIRTH:  1945  MEDICAL RECORD NUMBER:  [id number]  DATE: 8/7/23        Return Appointment:     [x] Return Appointment: With Lisa Moreira CNP  or Pily Edwards CNP in  1  [facility])                  [] Nurse Visit for Wound Assessment:  [x] DME/Wound Dressing Supplies Provided by: 7/24/23 [employer] [address]  p:[phone] f: [phone]    Please call them directly to reorder supplies when you run out)      [x] Orders placed during your visit:    please bring your products from the home delivery so we can view it    culture - 7/24/23     Important Reminders:   Please wash hands with soap and water before and after every dressing change. Do not scrub wounds. Keep wounds dry in shower unless otherwise instructed by the physician. SMOKING can slow would healing. Stop smoking as soon as possible to improve healing and prevent further complications associated with smoking. Consuelo-Wound Topical Treatments:    Do not apply lotions, creams, or ointments to wound bed unless directed.    Apply immediately around the wound:    [x] Apply moisturizing lotion to skin surrounding the wound prior to dressing change.        ___________________________________________________________________  Wound Location: Right lower leg, venous      Wound Cleansing: Normal Saline     Primary Dressing:       [x] ALGINATE AG  ( white and silver package)                Secondary Dressing:           [x] Extrasorb  ( Blue Backing)           [x] Rolled Gauze                 Dressing Frequency:                      [x] Three times per week, Wednesday and Fridays

## 2023-08-07 ENCOUNTER — HOSPITAL ENCOUNTER (OUTPATIENT)
Dept: WOUND CARE | Age: 78
Discharge: HOME | End: 2023-08-07
Payer: MEDICARE

## 2023-08-07 VITALS
TEMPERATURE: 97.2 F | DIASTOLIC BLOOD PRESSURE: 68 MMHG | SYSTOLIC BLOOD PRESSURE: 140 MMHG | HEART RATE: 77 BPM | RESPIRATION RATE: 20 BRPM

## 2023-08-07 DIAGNOSIS — M79.89 LEG SWELLING: Primary | ICD-10-CM

## 2023-08-07 DIAGNOSIS — L97.911 SKIN ULCER OF RIGHT LOWER LEG, LIMITED TO BREAKDOWN OF SKIN (HCC): ICD-10-CM

## 2023-08-07 DIAGNOSIS — I87.2 PERIPHERAL VENOUS INSUFFICIENCY: ICD-10-CM

## 2023-08-07 PROCEDURE — 11045 DBRDMT SUBQ TISS EACH ADDL: CPT | Performed by: NURSE PRACTITIONER

## 2023-08-07 PROCEDURE — 11042 DBRDMT SUBQ TIS 1ST 20SQCM/<: CPT | Performed by: NURSE PRACTITIONER

## 2023-08-07 PROCEDURE — 11042 DBRDMT SUBQ TIS 1ST 20SQCM/<: CPT

## 2023-08-07 PROCEDURE — 11045 DBRDMT SUBQ TISS EACH ADDL: CPT

## 2023-08-07 RX ORDER — LIDOCAINE HYDROCHLORIDE 20 MG/ML
JELLY TOPICAL ONCE
OUTPATIENT
Start: 2023-08-07 | End: 2023-08-07

## 2023-08-07 RX ORDER — CLOBETASOL PROPIONATE 0.5 MG/G
OINTMENT TOPICAL ONCE
OUTPATIENT
Start: 2023-08-07 | End: 2023-08-07

## 2023-08-07 RX ORDER — GINSENG 100 MG
CAPSULE ORAL ONCE
OUTPATIENT
Start: 2023-08-07 | End: 2023-08-07

## 2023-08-07 RX ORDER — LIDOCAINE 50 MG/G
OINTMENT TOPICAL ONCE
OUTPATIENT
Start: 2023-08-07 | End: 2023-08-07

## 2023-08-07 RX ORDER — LIDOCAINE HYDROCHLORIDE 40 MG/ML
SOLUTION TOPICAL ONCE
Status: COMPLETED | OUTPATIENT
Start: 2023-08-07 | End: 2023-08-07

## 2023-08-07 RX ORDER — IBUPROFEN 200 MG
TABLET ORAL ONCE
OUTPATIENT
Start: 2023-08-07 | End: 2023-08-07

## 2023-08-07 RX ORDER — LIDOCAINE HYDROCHLORIDE 40 MG/ML
SOLUTION TOPICAL ONCE
OUTPATIENT
Start: 2023-08-07 | End: 2023-08-07

## 2023-08-07 RX ORDER — GENTAMICIN SULFATE 1 MG/G
OINTMENT TOPICAL ONCE
OUTPATIENT
Start: 2023-08-07 | End: 2023-08-07

## 2023-08-07 RX ORDER — BACITRACIN ZINC AND POLYMYXIN B SULFATE 500; 1000 [USP'U]/G; [USP'U]/G
OINTMENT TOPICAL ONCE
OUTPATIENT
Start: 2023-08-07 | End: 2023-08-07

## 2023-08-07 RX ORDER — BETAMETHASONE DIPROPIONATE 0.05 %
OINTMENT (GRAM) TOPICAL ONCE
OUTPATIENT
Start: 2023-08-07 | End: 2023-08-07

## 2023-08-07 RX ORDER — LIDOCAINE 40 MG/G
CREAM TOPICAL ONCE
OUTPATIENT
Start: 2023-08-07 | End: 2023-08-07

## 2023-08-07 RX ORDER — SODIUM CHLOR/HYPOCHLOROUS ACID 0.033 %
SOLUTION, IRRIGATION IRRIGATION ONCE
OUTPATIENT
Start: 2023-08-07 | End: 2023-08-07

## 2023-08-07 RX ADMIN — LIDOCAINE HYDROCHLORIDE 5 ML: 40 SOLUTION TOPICAL at 10:14

## 2023-08-07 ASSESSMENT — PAIN SCALES - GENERAL
PAINLEVEL_OUTOF10: 0
PAINLEVEL_OUTOF10: 0

## 2023-08-07 NOTE — PROGRESS NOTES
doctor. Seek immediate medical care if:    You have symptoms of infection, such as: Increased pain, swelling, warmth, or redness. Red streaks leading from the area. Pus draining from the area. A fever. _______________________________________________________________________________________________        : LEANNA        Electronically signed by : Daniel Velasco on 8/7/2023 at 11:02 AM           [address]: Should you experience any significant changes in your wound(s) or have questions about your wound care, please contact the [address] [place] [address] at [address] [place] [address] 8:30 am - 4:30 pm and Friday 8:30 am - 11:30 am.  If you need help with your wound outside these hours and cannot wait until we are again available, contact your PCP or go to the hospital emergency room. PLEASE NOTE: IF YOU ARE UNABLE TO OBTAIN WOUND SUPPLIES, CONTINUE TO USE THE SUPPLIES YOU HAVE AVAILABLE UNTIL YOU ARE ABLE TO REACH US. IT IS MOST IMPORTANT TO KEEP THE WOUND COVERED AT ALL TIMES.      Physician Signature:_______________________     Date: ___________ Time:  ____________                    [x] Mike Liao CNP      [] Herb Bell CNP    [] Dr Kris Cintron                    Electronically signed by VIDA Arana CNP on 8/7/2023 at 4:42 PM

## 2023-08-08 PROBLEM — R60.0 VENOUS STASIS ULCER OF RIGHT LOWER LEG WITH EDEMA OF RIGHT LOWER LEG (HCC): Status: ACTIVE | Noted: 2023-07-25

## 2023-08-08 PROBLEM — I83.891 VENOUS STASIS ULCER OF RIGHT LOWER LEG WITH EDEMA OF RIGHT LOWER LEG (HCC): Status: ACTIVE | Noted: 2023-07-25

## 2023-08-08 NOTE — DISCHARGE INSTRUCTIONS
[address] [place] Physician Orders and Discharge Instructions  [address], 433 [facility], [address]  Telephone: [phone] [address] 8:30 am - 4:30 pm and Friday 8:30 am - 11:30 am       NAME:  Mary Boucher OF BIRTH:  1945  MEDICAL RECORD NUMBER:  [id number]  DATE: 8/14/23        Return Appointment:     [x] Return Appointment: With Haresh Rodriguez CNP  or Emilia Wallace CNP in  1  [facility])                  [] Nurse Visit for Wound Assessment:  [x] DME/Wound Dressing Supplies Provided by: 7/24/23 [employer] [address]  p:[phone] f: [phone]    Please call them directly to reorder supplies when you run out)       [x] Orders placed during your visit:    please bring your products from the home delivery so we can view it    culture - 7/24/23     Important Reminders:   Please wash hands with soap and water before and after every dressing change. Do not scrub wounds. Keep wounds dry in shower unless otherwise instructed by the physician. SMOKING can slow would healing. Stop smoking as soon as possible to improve healing and prevent further complications associated with smoking. Consuelo-Wound Topical Treatments:    Do not apply lotions, creams, or ointments to wound bed unless directed.    Apply immediately around the wound:    [x] Apply moisturizing lotion to skin surrounding the wound prior to dressing change.        ___________________________________________________________________  Wound Location: Right lower leg, venous      Wound Cleansing: Normal Saline     Primary Dressing:       [x] ALGINATE AG  ( white and silver package)                Secondary Dressing:           [x] Extrasorb  ( Blue Backing)           [x] Rolled Gauze                 Dressing Frequency:                      [x] Three times per week, Wednesday and Fridays

## 2023-08-14 ENCOUNTER — HOSPITAL ENCOUNTER (OUTPATIENT)
Dept: WOUND CARE | Age: 78
Discharge: HOME | End: 2023-08-14

## 2023-08-21 ENCOUNTER — HOSPITAL ENCOUNTER (OUTPATIENT)
Dept: WOUND CARE | Age: 78
Discharge: HOME | End: 2023-08-21
Payer: MEDICARE

## 2023-08-21 VITALS
RESPIRATION RATE: 18 BRPM | DIASTOLIC BLOOD PRESSURE: 77 MMHG | TEMPERATURE: 97.5 F | SYSTOLIC BLOOD PRESSURE: 154 MMHG | HEART RATE: 88 BPM

## 2023-08-21 DIAGNOSIS — M79.89 LEG SWELLING: Primary | ICD-10-CM

## 2023-08-21 DIAGNOSIS — I87.2 PERIPHERAL VENOUS INSUFFICIENCY: ICD-10-CM

## 2023-08-21 DIAGNOSIS — L97.911 SKIN ULCER OF RIGHT LOWER LEG, LIMITED TO BREAKDOWN OF SKIN (HCC): ICD-10-CM

## 2023-08-21 PROCEDURE — 11042 DBRDMT SUBQ TIS 1ST 20SQCM/<: CPT

## 2023-08-21 PROCEDURE — 11042 DBRDMT SUBQ TIS 1ST 20SQCM/<: CPT | Performed by: NURSE PRACTITIONER

## 2023-08-21 RX ORDER — LIDOCAINE HYDROCHLORIDE 40 MG/ML
SOLUTION TOPICAL ONCE
Status: COMPLETED | OUTPATIENT
Start: 2023-08-21 | End: 2023-08-21

## 2023-08-21 RX ORDER — LIDOCAINE HYDROCHLORIDE 40 MG/ML
SOLUTION TOPICAL ONCE
OUTPATIENT
Start: 2023-08-21 | End: 2023-08-21

## 2023-08-21 RX ORDER — LIDOCAINE HYDROCHLORIDE 20 MG/ML
JELLY TOPICAL ONCE
OUTPATIENT
Start: 2023-08-21 | End: 2023-08-21

## 2023-08-21 RX ORDER — BETAMETHASONE DIPROPIONATE 0.05 %
OINTMENT (GRAM) TOPICAL ONCE
OUTPATIENT
Start: 2023-08-21 | End: 2023-08-21

## 2023-08-21 RX ORDER — CLOBETASOL PROPIONATE 0.5 MG/G
OINTMENT TOPICAL ONCE
OUTPATIENT
Start: 2023-08-21 | End: 2023-08-21

## 2023-08-21 RX ORDER — SODIUM CHLOR/HYPOCHLOROUS ACID 0.033 %
SOLUTION, IRRIGATION IRRIGATION ONCE
OUTPATIENT
Start: 2023-08-21 | End: 2023-08-21

## 2023-08-21 RX ORDER — GINSENG 100 MG
CAPSULE ORAL ONCE
OUTPATIENT
Start: 2023-08-21 | End: 2023-08-21

## 2023-08-21 RX ORDER — GENTAMICIN SULFATE 1 MG/G
OINTMENT TOPICAL ONCE
OUTPATIENT
Start: 2023-08-21 | End: 2023-08-21

## 2023-08-21 RX ORDER — IBUPROFEN 200 MG
TABLET ORAL ONCE
OUTPATIENT
Start: 2023-08-21 | End: 2023-08-21

## 2023-08-21 RX ORDER — LIDOCAINE 40 MG/G
CREAM TOPICAL ONCE
OUTPATIENT
Start: 2023-08-21 | End: 2023-08-21

## 2023-08-21 RX ORDER — BACITRACIN ZINC AND POLYMYXIN B SULFATE 500; 1000 [USP'U]/G; [USP'U]/G
OINTMENT TOPICAL ONCE
OUTPATIENT
Start: 2023-08-21 | End: 2023-08-21

## 2023-08-21 RX ORDER — LIDOCAINE 50 MG/G
OINTMENT TOPICAL ONCE
OUTPATIENT
Start: 2023-08-21 | End: 2023-08-21

## 2023-08-21 RX ADMIN — LIDOCAINE HYDROCHLORIDE: 40 SOLUTION TOPICAL at 10:31

## 2023-08-21 ASSESSMENT — PAIN SCALES - GENERAL: PAINLEVEL_OUTOF10: 0

## 2023-08-21 NOTE — DISCHARGE INSTRUCTIONS
contact the [address] [place] [address] at [address] [place] [address] 8:30 am - 4:30 pm and Friday 8:30 am - 11:30 am.  If you need help with your wound outside these hours and cannot wait until we are again available, contact your PCP or go to the hospital emergency room. PLEASE NOTE: IF YOU ARE UNABLE TO OBTAIN WOUND SUPPLIES, CONTINUE TO USE THE SUPPLIES YOU HAVE AVAILABLE UNTIL YOU ARE ABLE TO REACH US. IT IS MOST IMPORTANT TO KEEP THE WOUND COVERED AT ALL TIMES.      Physician Signature:_______________________     Date: ___________ Time:  ____________                    [x] Tello Pagan CNP      [] Ludmila Burns CNP    [] Dr Heather Abraham

## 2023-08-21 NOTE — PROGRESS NOTES
: LEANNA         Electronically signed by : Maco Walton on 8/21/2023 at 10:53 AM        [address]: Should you experience any significant changes in your wound(s) or have questions about your wound care, please contact the [address] [place] [address] at [address] [place] [address] 8:30 am - 4:30 pm and Friday 8:30 am - 11:30 am.  If you need help with your wound outside these hours and cannot wait until we are again available, contact your PCP or go to the hospital emergency room. PLEASE NOTE: IF YOU ARE UNABLE TO OBTAIN WOUND SUPPLIES, CONTINUE TO USE THE SUPPLIES YOU HAVE AVAILABLE UNTIL YOU ARE ABLE TO REACH US. IT IS MOST IMPORTANT TO KEEP THE WOUND COVERED AT ALL TIMES.      Physician Signature:_______________________     Date: ___________ Time:  ____________                    [x] Laurie Crystal CNP      [] Natividad Burns CNP    [] Dr Emma Dumont           Electronically signed by VIDA Frank CNP on 8/21/2023 at 4:57 PM

## 2023-08-28 ENCOUNTER — HOSPITAL ENCOUNTER (OUTPATIENT)
Dept: WOUND CARE | Age: 78
Discharge: HOME | End: 2023-08-28
Payer: MEDICARE

## 2023-08-28 VITALS
DIASTOLIC BLOOD PRESSURE: 78 MMHG | SYSTOLIC BLOOD PRESSURE: 140 MMHG | TEMPERATURE: 98.3 F | HEART RATE: 92 BPM | RESPIRATION RATE: 18 BRPM

## 2023-08-28 DIAGNOSIS — L97.911 SKIN ULCER OF RIGHT LOWER LEG, LIMITED TO BREAKDOWN OF SKIN (HCC): ICD-10-CM

## 2023-08-28 DIAGNOSIS — I87.2 PERIPHERAL VENOUS INSUFFICIENCY: ICD-10-CM

## 2023-08-28 DIAGNOSIS — M79.89 LEG SWELLING: Primary | ICD-10-CM

## 2023-08-28 PROCEDURE — 11042 DBRDMT SUBQ TIS 1ST 20SQCM/<: CPT | Performed by: NURSE PRACTITIONER

## 2023-08-28 PROCEDURE — 11042 DBRDMT SUBQ TIS 1ST 20SQCM/<: CPT

## 2023-08-28 RX ORDER — BETAMETHASONE DIPROPIONATE 0.05 %
OINTMENT (GRAM) TOPICAL ONCE
OUTPATIENT
Start: 2023-08-28 | End: 2023-08-28

## 2023-08-28 RX ORDER — GENTAMICIN SULFATE 1 MG/G
OINTMENT TOPICAL ONCE
OUTPATIENT
Start: 2023-08-28 | End: 2023-08-28

## 2023-08-28 RX ORDER — CLOBETASOL PROPIONATE 0.5 MG/G
OINTMENT TOPICAL ONCE
OUTPATIENT
Start: 2023-08-28 | End: 2023-08-28

## 2023-08-28 RX ORDER — IBUPROFEN 200 MG
TABLET ORAL ONCE
OUTPATIENT
Start: 2023-08-28 | End: 2023-08-28

## 2023-08-28 RX ORDER — LIDOCAINE HYDROCHLORIDE 40 MG/ML
SOLUTION TOPICAL ONCE
Status: COMPLETED | OUTPATIENT
Start: 2023-08-28 | End: 2023-08-28

## 2023-08-28 RX ORDER — LIDOCAINE HYDROCHLORIDE 20 MG/ML
JELLY TOPICAL ONCE
OUTPATIENT
Start: 2023-08-28 | End: 2023-08-28

## 2023-08-28 RX ORDER — LIDOCAINE HYDROCHLORIDE 40 MG/ML
SOLUTION TOPICAL ONCE
OUTPATIENT
Start: 2023-08-28 | End: 2023-08-28

## 2023-08-28 RX ORDER — GINSENG 100 MG
CAPSULE ORAL ONCE
OUTPATIENT
Start: 2023-08-28 | End: 2023-08-28

## 2023-08-28 RX ORDER — BACITRACIN ZINC AND POLYMYXIN B SULFATE 500; 1000 [USP'U]/G; [USP'U]/G
OINTMENT TOPICAL ONCE
OUTPATIENT
Start: 2023-08-28 | End: 2023-08-28

## 2023-08-28 RX ORDER — LIDOCAINE 50 MG/G
OINTMENT TOPICAL ONCE
OUTPATIENT
Start: 2023-08-28 | End: 2023-08-28

## 2023-08-28 RX ORDER — LIDOCAINE 40 MG/G
CREAM TOPICAL ONCE
OUTPATIENT
Start: 2023-08-28 | End: 2023-08-28

## 2023-08-28 RX ORDER — SODIUM CHLOR/HYPOCHLOROUS ACID 0.033 %
SOLUTION, IRRIGATION IRRIGATION ONCE
OUTPATIENT
Start: 2023-08-28 | End: 2023-08-28

## 2023-08-28 RX ADMIN — LIDOCAINE HYDROCHLORIDE: 40 SOLUTION TOPICAL at 10:14

## 2023-08-28 ASSESSMENT — PAIN SCALES - GENERAL: PAINLEVEL_OUTOF10: 0

## 2023-08-28 NOTE — DISCHARGE INSTRUCTIONS
[address] [place] Physician Orders and Discharge Instructions  [address], 433 [facility], [address]  Telephone: [phone]  Nila Genin 8:30 am - 4:30 pm and Friday 8:30 am - 11:30 am       NAME:  Rosi Stephenson OF BIRTH:  1945  MEDICAL RECORD NUMBER:  [id number]  DATE: 8/28/23        Return Appointment:     [x] Return Appointment: With Naida Cordoba CNP  in  2  [facility])                  [] Nurse Visit for Wound Assessment:  [] DME/Wound Dressing Supplies Provided by: 7/24/23 [employer] [address]  p:[phone] f: [phone]    Please call them directly to reorder supplies when you run out)       [] Orders placed during your visit:                                                culture - 7/24/23     Important Reminders:   Please wash hands with soap and water before and after every dressing change. Do not scrub wounds. Keep wounds dry in shower unless otherwise instructed by the physician. SMOKING can slow would healing. Stop smoking as soon as possible to improve healing and prevent further complications associated with smoking. Consuelo-Wound Topical Treatments:    Do not apply lotions, creams, or ointments to wound bed unless directed.    Apply immediately around the wound:    [x] Apply moisturizing lotion to skin surrounding the wound prior to dressing change.        ___________________________________________________________________  Wound Location: Right lower leg, venous      Wound Cleansing: Normal Saline                         [facility]      Primary Dressing:       [x] ALGINATE AG  ( white and silver package)                Secondary Dressing:           [x] gauze or Extrasorb  (Blue Backing)           [x] Rolled Gauze                 Dressing Frequency:                      [x] Three times per week

## 2023-09-06 ENCOUNTER — OFFICE VISIT (OUTPATIENT)
Dept: FAMILY MEDICINE CLINIC | Age: 78
End: 2023-09-06

## 2023-09-06 VITALS
WEIGHT: 200 LBS | DIASTOLIC BLOOD PRESSURE: 80 MMHG | HEIGHT: 66 IN | SYSTOLIC BLOOD PRESSURE: 138 MMHG | BODY MASS INDEX: 32.14 KG/M2

## 2023-09-06 DIAGNOSIS — J45.40 MODERATE PERSISTENT ASTHMA WITHOUT COMPLICATION: ICD-10-CM

## 2023-09-06 DIAGNOSIS — L97.912 SKIN ULCER OF RIGHT LOWER LEG WITH FAT LAYER EXPOSED (HCC): ICD-10-CM

## 2023-09-06 DIAGNOSIS — M17.11 PRIMARY OSTEOARTHRITIS OF RIGHT KNEE: Primary | ICD-10-CM

## 2023-09-06 NOTE — PROGRESS NOTES
2023     Lia Nino (:  1945) is a 70 y.o. female, here for evaluation of the following medical concerns:    HPI  Patient presented to the clinic for follow up on chronic medical conditions. She stated that again she developed mild erythema and edema in left arm that occurred after the COVID booster. Right knee pain- edema, osteoarthritis, has had this for years, believes this is worse, having more problems with ambulation. Followed with orthopedist, wants conservative treatment. Asthma-  patient feels well today,  patient feels much improved, decided not to have imaging studies, understands the need but is not wanting these. She understands her decision. h denied fever or chills, Denied rhinorrhea or nasal congestion. Right leg cellulitis- patient has been back to the wound clinic, , secondary to venous insuff. She is wearing a bandage today, stated it has become worse but unable to view due to extensive bandage. Today, chest pain sob, n, v, or diarrhea. Review of Systems   Constitutional:  Negative for activity change and fatigue. Respiratory:  Negative for shortness of breath. Cardiovascular:  Negative for chest pain. Gastrointestinal:  Negative for abdominal pain, diarrhea, nausea and vomiting. Musculoskeletal:  Positive for arthralgias. Skin:  Positive for wound. Neurological:  Negative for light-headedness and headaches. Psychiatric/Behavioral:  Negative for hallucinations. The patient is not nervous/anxious. Prior to Visit Medications    Medication Sig Taking? Authorizing Provider   triamterene-hydroCHLOROthiazide (MAXZIDE-25) 37.5-25 MG per tablet TAKE 1 TABLET BY MOUTH DAILY Yes Orlando Munguia DO   clotrimazole-betamethasone (LOTRISONE) 1-0.05 % cream Apply topically 2 times daily.  Yes Cale Olson MD   metOLazone (ZAROXOLYN) 5 MG tablet TAKE ONE TABLET BY MOUTH DAILY AS NEEDED FOR SWELLING Yes Orlando Munguia DO   albuterol sulfate HFA

## 2023-09-10 ASSESSMENT — ENCOUNTER SYMPTOMS
DIARRHEA: 0
NAUSEA: 0
SHORTNESS OF BREATH: 0
VOMITING: 0
ABDOMINAL PAIN: 0

## 2023-09-11 ENCOUNTER — HOSPITAL ENCOUNTER (OUTPATIENT)
Dept: WOUND CARE | Age: 78
End: 2023-09-11
Payer: MEDICARE

## 2023-09-18 ENCOUNTER — HOSPITAL ENCOUNTER (OUTPATIENT)
Dept: WOUND CARE | Age: 78
Discharge: HOME | End: 2023-09-18
Payer: MEDICARE

## 2023-09-18 VITALS
SYSTOLIC BLOOD PRESSURE: 153 MMHG | HEART RATE: 102 BPM | DIASTOLIC BLOOD PRESSURE: 79 MMHG | RESPIRATION RATE: 18 BRPM | TEMPERATURE: 97.5 F

## 2023-09-18 DIAGNOSIS — L97.912 SKIN ULCER OF RIGHT LOWER LEG WITH FAT LAYER EXPOSED (HCC): ICD-10-CM

## 2023-09-18 DIAGNOSIS — I87.2 PERIPHERAL VENOUS INSUFFICIENCY: ICD-10-CM

## 2023-09-18 DIAGNOSIS — M79.89 LEG SWELLING: Primary | ICD-10-CM

## 2023-09-18 PROCEDURE — 11042 DBRDMT SUBQ TIS 1ST 20SQCM/<: CPT | Performed by: NURSE PRACTITIONER

## 2023-09-18 PROCEDURE — 11042 DBRDMT SUBQ TIS 1ST 20SQCM/<: CPT

## 2023-09-18 RX ORDER — IBUPROFEN 200 MG
TABLET ORAL ONCE
OUTPATIENT
Start: 2023-09-18 | End: 2023-09-18

## 2023-09-18 RX ORDER — BETAMETHASONE DIPROPIONATE 0.05 %
OINTMENT (GRAM) TOPICAL ONCE
OUTPATIENT
Start: 2023-09-18 | End: 2023-09-18

## 2023-09-18 RX ORDER — LIDOCAINE 50 MG/G
OINTMENT TOPICAL ONCE
OUTPATIENT
Start: 2023-09-18 | End: 2023-09-18

## 2023-09-18 RX ORDER — LIDOCAINE 40 MG/G
CREAM TOPICAL ONCE
OUTPATIENT
Start: 2023-09-18 | End: 2023-09-18

## 2023-09-18 RX ORDER — CLOBETASOL PROPIONATE 0.5 MG/G
OINTMENT TOPICAL ONCE
OUTPATIENT
Start: 2023-09-18 | End: 2023-09-18

## 2023-09-18 RX ORDER — LIDOCAINE HYDROCHLORIDE 20 MG/ML
JELLY TOPICAL ONCE
OUTPATIENT
Start: 2023-09-18 | End: 2023-09-18

## 2023-09-18 RX ORDER — TRIAMCINOLONE ACETONIDE 1 MG/G
OINTMENT TOPICAL ONCE
OUTPATIENT
Start: 2023-09-18 | End: 2023-09-18

## 2023-09-18 RX ORDER — GINSENG 100 MG
CAPSULE ORAL ONCE
OUTPATIENT
Start: 2023-09-18 | End: 2023-09-18

## 2023-09-18 RX ORDER — SODIUM CHLOR/HYPOCHLOROUS ACID 0.033 %
SOLUTION, IRRIGATION IRRIGATION ONCE
OUTPATIENT
Start: 2023-09-18 | End: 2023-09-18

## 2023-09-18 RX ORDER — BACITRACIN ZINC AND POLYMYXIN B SULFATE 500; 1000 [USP'U]/G; [USP'U]/G
OINTMENT TOPICAL ONCE
OUTPATIENT
Start: 2023-09-18 | End: 2023-09-18

## 2023-09-18 RX ORDER — GENTAMICIN SULFATE 1 MG/G
OINTMENT TOPICAL ONCE
OUTPATIENT
Start: 2023-09-18 | End: 2023-09-18

## 2023-09-18 RX ORDER — LIDOCAINE HYDROCHLORIDE 40 MG/ML
SOLUTION TOPICAL ONCE
OUTPATIENT
Start: 2023-09-18 | End: 2023-09-18

## 2023-09-18 RX ORDER — LIDOCAINE 40 MG/G
CREAM TOPICAL ONCE
Status: COMPLETED | OUTPATIENT
Start: 2023-09-18 | End: 2023-09-18

## 2023-09-18 RX ADMIN — LIDOCAINE: 40 CREAM TOPICAL at 10:47

## 2023-09-18 ASSESSMENT — PAIN SCALES - GENERAL: PAINLEVEL_OUTOF10: 0

## 2023-09-18 NOTE — DISCHARGE INSTRUCTIONS
and cannot wait until we are again available, contact your PCP or go to the hospital emergency room. PLEASE NOTE: IF YOU ARE UNABLE TO OBTAIN WOUND SUPPLIES, CONTINUE TO USE THE SUPPLIES YOU HAVE AVAILABLE UNTIL YOU ARE ABLE TO REACH US. IT IS MOST IMPORTANT TO KEEP THE WOUND COVERED AT ALL TIMES.      Physician Signature:_______________________     Date: ___________ Time:  ____________                    [x] Lisa Moreira CNP

## 2023-09-18 NOTE — PROGRESS NOTES
cm^2 09/18/23 1035   Change in Wound Size % (l*w) 93.67 09/18/23 1035   Wound Volume (cm^3) 0.228 cm^3 09/18/23 1035   Wound Healing % 94 09/18/23 1035   Post-Procedure Length (cm) 2 cm 09/18/23 1110   Post-Procedure Width (cm) 1.3 cm 09/18/23 1110   Post-Procedure Depth (cm) 0.2 cm 09/18/23 1110   Post-Procedure Surface Area (cm^2) 2.6 cm^2 09/18/23 1110   Post-Procedure Volume (cm^3) 0.52 cm^3 09/18/23 1110   Wound Assessment Granulation tissue;Slough 09/18/23 1035   Drainage Amount Moderate (25-50%) 09/18/23 1035   Drainage Description Serosanguinous;Brown 09/18/23 1035   Odor None 09/18/23 1035   Consuelo-wound Assessment Dry/flaky 09/18/23 1035   Margins Undefined edges 09/18/23 1035   Wound Thickness Description not for Pressure Injury Full thickness 09/18/23 1035   Number of days: 63          Total Surface Area Debrided:  2.6 sq cm     Estimated Blood Loss:  Minimal    Hemostasis Achieved:  by pressure    Procedural Pain:  0  / 10     Post Procedural Pain:  0 / 10     Response to treatment:  Well tolerated by patient. n:     Treatment Note please see attached Discharge Instructions    Written patient dismissal instructions given to patient and signed by patient or POA.          Discharge 5602 [facility] Physician Orders and Discharge Instructions  750 [facility], [address] [place] [address], [address] [place] [address]  Telephone: [phone] 2333 [facility] 8:30 am - 4:30 pm and Friday 8:30 am - 11:30 am       NAME:  Viann Holstein OF BIRTH:  1945  MEDICAL RECORD NUMBER:  [id number]  DATE: 9/18/23        Return Appointment:     [x] Return Appointment: With Ra Nixon CNP  in  1  [facility])                  [] Nurse Visit for Wound Assessment:  [] DME/Wound Dressing Supplies Provided by: 7/24/23 [employer]  p:[phone] f: [phone]    Please call them directly to reorder supplies when

## 2023-09-21 NOTE — DISCHARGE INSTRUCTIONS
Discharge 2942 [facility] Physician Orders and Discharge Instructions  [address], 433 [facility], [address]  Telephone: [phone] [address] 8:30 am - 4:30 pm and Friday 8:30 am - 11:30 am       NAME:  Yamilka Colvin OF BIRTH:  1945  MEDICAL RECORD NUMBER:  [id number]  DATE: 9/25/23        Return Appointment:     [x] Return Appointment: With Eusebio Sams CNP  in  1  [facility])                  [] Nurse Visit for Wound Assessment:  [] DME/Wound Dressing Supplies Provided by: 7/24/23 [employer] [facility] [address] [place], [place]  p:[phone] f: [phone]    Please call them directly to reorder supplies when you run out)       [] Orders placed during your visit:                                                culture - 7/24/23     Important Reminders:   Please wash hands with soap and water before and after every dressing change. Do not scrub wounds. Keep wounds dry in shower unless otherwise instructed by the physician. SMOKING can slow would healing. Stop smoking as soon as possible to improve healing and prevent further complications associated with smoking. Consuelo-Wound Topical Treatments:    Do not apply lotions, creams, or ointments to wound bed unless directed.    Apply immediately around the wound:    [x] Apply moisturizing lotion to skin surrounding the wound prior to dressing change.        ___________________________________________________________________  Wound Location: Right lower leg, venous      Wound Cleansing: Normal Saline                          Primary Dressing:       [x] hydrofera blue ready border          Secondary Dressing:                        Dressing Frequency:                      [x] Three times per week     _______________________________________________________  Compression and Edema Control:  Right Leg   [] Wear Home Compression Stockings   [x]

## 2023-09-25 ENCOUNTER — HOSPITAL ENCOUNTER (OUTPATIENT)
Dept: WOUND CARE | Age: 78
Discharge: HOME | End: 2023-09-25
Payer: MEDICARE

## 2023-09-25 VITALS
TEMPERATURE: 97 F | HEART RATE: 85 BPM | SYSTOLIC BLOOD PRESSURE: 136 MMHG | RESPIRATION RATE: 18 BRPM | DIASTOLIC BLOOD PRESSURE: 75 MMHG

## 2023-09-25 DIAGNOSIS — M79.89 LEG SWELLING: Primary | ICD-10-CM

## 2023-09-25 DIAGNOSIS — I87.2 PERIPHERAL VENOUS INSUFFICIENCY: ICD-10-CM

## 2023-09-25 DIAGNOSIS — L97.912 SKIN ULCER OF RIGHT LOWER LEG WITH FAT LAYER EXPOSED (HCC): ICD-10-CM

## 2023-09-25 PROCEDURE — 11042 DBRDMT SUBQ TIS 1ST 20SQCM/<: CPT

## 2023-09-25 PROCEDURE — 11042 DBRDMT SUBQ TIS 1ST 20SQCM/<: CPT | Performed by: NURSE PRACTITIONER

## 2023-09-25 RX ORDER — CLOBETASOL PROPIONATE 0.5 MG/G
OINTMENT TOPICAL ONCE
OUTPATIENT
Start: 2023-09-25 | End: 2023-09-25

## 2023-09-25 RX ORDER — IBUPROFEN 200 MG
TABLET ORAL ONCE
OUTPATIENT
Start: 2023-09-25 | End: 2023-09-25

## 2023-09-25 RX ORDER — BETAMETHASONE DIPROPIONATE 0.05 %
OINTMENT (GRAM) TOPICAL ONCE
OUTPATIENT
Start: 2023-09-25 | End: 2023-09-25

## 2023-09-25 RX ORDER — BACITRACIN ZINC AND POLYMYXIN B SULFATE 500; 1000 [USP'U]/G; [USP'U]/G
OINTMENT TOPICAL ONCE
OUTPATIENT
Start: 2023-09-25 | End: 2023-09-25

## 2023-09-25 RX ORDER — SODIUM CHLOR/HYPOCHLOROUS ACID 0.033 %
SOLUTION, IRRIGATION IRRIGATION ONCE
OUTPATIENT
Start: 2023-09-25 | End: 2023-09-25

## 2023-09-25 RX ORDER — GENTAMICIN SULFATE 1 MG/G
OINTMENT TOPICAL ONCE
OUTPATIENT
Start: 2023-09-25 | End: 2023-09-25

## 2023-09-25 RX ORDER — LIDOCAINE HYDROCHLORIDE 20 MG/ML
JELLY TOPICAL ONCE
OUTPATIENT
Start: 2023-09-25 | End: 2023-09-25

## 2023-09-25 RX ORDER — LIDOCAINE 50 MG/G
OINTMENT TOPICAL ONCE
OUTPATIENT
Start: 2023-09-25 | End: 2023-09-25

## 2023-09-25 RX ORDER — LIDOCAINE HYDROCHLORIDE 40 MG/ML
SOLUTION TOPICAL ONCE
Status: COMPLETED | OUTPATIENT
Start: 2023-09-25 | End: 2023-09-25

## 2023-09-25 RX ORDER — TRIAMCINOLONE ACETONIDE 1 MG/G
OINTMENT TOPICAL ONCE
OUTPATIENT
Start: 2023-09-25 | End: 2023-09-25

## 2023-09-25 RX ORDER — LIDOCAINE 40 MG/G
CREAM TOPICAL ONCE
OUTPATIENT
Start: 2023-09-25 | End: 2023-09-25

## 2023-09-25 RX ORDER — LIDOCAINE HYDROCHLORIDE 40 MG/ML
SOLUTION TOPICAL ONCE
OUTPATIENT
Start: 2023-09-25 | End: 2023-09-25

## 2023-09-25 RX ORDER — GINSENG 100 MG
CAPSULE ORAL ONCE
OUTPATIENT
Start: 2023-09-25 | End: 2023-09-25

## 2023-09-25 RX ADMIN — LIDOCAINE HYDROCHLORIDE: 40 SOLUTION TOPICAL at 10:23

## 2023-09-25 ASSESSMENT — PAIN SCALES - GENERAL: PAINLEVEL_OUTOF10: 0

## 2023-09-25 NOTE — PLAN OF CARE
exposed [facility]) L97.912    Peripheral venous insufficiency I87.2    Localized edema R60.0    Skin ulcer of third toe of right foot, limited to breakdown of skin ([facility]) L97.511    Leg swelling M79.89    Thrombocytopenia, unspecified D69.6    Venous ulcer ([address]) I83.009, L97.909    Osteoarthritis of right knee M17.11    Venous stasis ulcer of right lower leg with edema of right lower leg ([facility]) I83.019, I83.891, L97.919, R60.0       WOUNDS REQUIRING DRESSING SUPPLIES:     Wound 07/17/23 Leg Right;Lateral;Lower;Proximal #1 (noted7/17/23) (Active)   Wound Image   08/07/23 1001   Wound Etiology Venous 07/17/23 1010   Dressing Status Old drainage noted 09/25/23 1020   Wound Cleansed Cleansed with saline 09/18/23 1202   Dressing/Treatment Moisturizing cream;Hydrofera blue 09/18/23 1202   Wound Length (cm) 1.5 cm 09/25/23 1020   Wound Width (cm) 1.1 cm 09/25/23 1020   Wound Depth (cm) 0.1 cm 09/25/23 1020   Wound Surface Area (cm^2) 1.65 cm^2 09/25/23 1020   Change in Wound Size % (l*w) 95.42 09/25/23 1020   Wound Volume (cm^3) 0.165 cm^3 09/25/23 1020   Wound Healing % 95 09/25/23 1020   Post-Procedure Length (cm) 1.6 cm 09/25/23 1037   Post-Procedure Width (cm) 1.2 cm 09/25/23 1037   Post-Procedure Depth (cm) 0.2 cm 09/25/23 1037   Post-Procedure Surface Area (cm^2) 1.92 cm^2 09/25/23 1037   Post-Procedure Volume (cm^3) 0.384 cm^3 09/25/23 1037   Wound Assessment Granulation tissue;Slough 09/25/23 1020   Drainage Amount Moderate (25-50%) 09/25/23 1020   Drainage Description Serosanguinous;Brown 09/25/23 1020   Odor None 09/25/23 1020   Consuelo-wound Assessment Dry/flaky 09/25/23 1020   Margins Attached edges 09/25/23 1020   Wound Thickness Description not for Pressure Injury Full thickness 09/25/23 1020   Number of days: 70          Supplies Requested :           **DISPENSE AS WRITTEN**    WOUND #: 1   PRIMARY DRESSING: Hydrofera Blue ready WITH SILICONE BORDER      SECONDARY DRESSING     FREQUENCY OF DRESSING CHANGES:  Three

## 2023-09-25 NOTE — PROGRESS NOTES
[place]  p:[phone] f: [phone]    Please call them directly to reorder supplies when you run out)       [] Orders placed during your visit:                                                culture - 7/24/23     Important Reminders:   Please wash hands with soap and water before and after every dressing change. Do not scrub wounds. Keep wounds dry in shower unless otherwise instructed by the physician. SMOKING can slow would healing. Stop smoking as soon as possible to improve healing and prevent further complications associated with smoking. Consuelo-Wound Topical Treatments:    Do not apply lotions, creams, or ointments to wound bed unless directed. Apply immediately around the wound:    [x] Apply moisturizing lotion to skin surrounding the wound prior to dressing change.        ___________________________________________________________________  Wound Location: Right lower leg, venous      Wound Cleansing: Normal Saline                          Primary Dressing:       [x] hydrofera blue ready border          Secondary Dressing:                        Dressing Frequency:                      [x] Three times per week     _______________________________________________________  Compression and Edema Control:  Right Leg   [] Wear Home Compression Stockings   [x] Spandagrip to: Right Leg   Size: []Low compression 5-10 mm/Hg                 [x]Medium compression 10-20 mm/Hg           []High compression  20-30 mm/Hg          ______________________________________________________________________________________________                            [x]Activity: Activity as Tolerated     __________________________________________________________________________________________  Dietary:   Continue your diet as tolerated.   Increase Protein to promote new tissue growth. (i.e. Lean meats, fish, milk, cheese, eggs, legumes, and yogurt)  If you are DIABETIC, follow a diabetic diet and Try to keep your blood sugar

## 2023-10-02 ENCOUNTER — HOSPITAL ENCOUNTER (OUTPATIENT)
Dept: WOUND CARE | Age: 78
Discharge: HOME | End: 2023-10-02
Payer: MEDICARE

## 2023-10-02 VITALS
DIASTOLIC BLOOD PRESSURE: 68 MMHG | RESPIRATION RATE: 20 BRPM | SYSTOLIC BLOOD PRESSURE: 117 MMHG | HEART RATE: 74 BPM | TEMPERATURE: 98 F

## 2023-10-02 DIAGNOSIS — I87.2 PERIPHERAL VENOUS INSUFFICIENCY: ICD-10-CM

## 2023-10-02 DIAGNOSIS — L97.912 SKIN ULCER OF RIGHT LOWER LEG WITH FAT LAYER EXPOSED (HCC): ICD-10-CM

## 2023-10-02 DIAGNOSIS — M79.89 LEG SWELLING: Primary | ICD-10-CM

## 2023-10-02 PROCEDURE — 11042 DBRDMT SUBQ TIS 1ST 20SQCM/<: CPT | Performed by: NURSE PRACTITIONER

## 2023-10-02 PROCEDURE — 11042 DBRDMT SUBQ TIS 1ST 20SQCM/<: CPT

## 2023-10-02 RX ORDER — IBUPROFEN 200 MG
TABLET ORAL ONCE
OUTPATIENT
Start: 2023-10-02 | End: 2023-10-02

## 2023-10-02 RX ORDER — GINSENG 100 MG
CAPSULE ORAL ONCE
OUTPATIENT
Start: 2023-10-02 | End: 2023-10-02

## 2023-10-02 RX ORDER — LIDOCAINE HYDROCHLORIDE 40 MG/ML
SOLUTION TOPICAL ONCE
OUTPATIENT
Start: 2023-10-02 | End: 2023-10-02

## 2023-10-02 RX ORDER — LIDOCAINE HYDROCHLORIDE 20 MG/ML
JELLY TOPICAL ONCE
OUTPATIENT
Start: 2023-10-02 | End: 2023-10-02

## 2023-10-02 RX ORDER — LIDOCAINE 50 MG/G
OINTMENT TOPICAL ONCE
OUTPATIENT
Start: 2023-10-02 | End: 2023-10-02

## 2023-10-02 RX ORDER — GENTAMICIN SULFATE 1 MG/G
OINTMENT TOPICAL ONCE
OUTPATIENT
Start: 2023-10-02 | End: 2023-10-02

## 2023-10-02 RX ORDER — CLOBETASOL PROPIONATE 0.5 MG/G
OINTMENT TOPICAL ONCE
OUTPATIENT
Start: 2023-10-02 | End: 2023-10-02

## 2023-10-02 RX ORDER — TRIAMCINOLONE ACETONIDE 1 MG/G
OINTMENT TOPICAL ONCE
OUTPATIENT
Start: 2023-10-02 | End: 2023-10-02

## 2023-10-02 RX ORDER — SODIUM CHLOR/HYPOCHLOROUS ACID 0.033 %
SOLUTION, IRRIGATION IRRIGATION ONCE
OUTPATIENT
Start: 2023-10-02 | End: 2023-10-02

## 2023-10-02 RX ORDER — BETAMETHASONE DIPROPIONATE 0.05 %
OINTMENT (GRAM) TOPICAL ONCE
OUTPATIENT
Start: 2023-10-02 | End: 2023-10-02

## 2023-10-02 RX ORDER — BACITRACIN ZINC AND POLYMYXIN B SULFATE 500; 1000 [USP'U]/G; [USP'U]/G
OINTMENT TOPICAL ONCE
OUTPATIENT
Start: 2023-10-02 | End: 2023-10-02

## 2023-10-02 RX ORDER — LIDOCAINE 40 MG/G
CREAM TOPICAL ONCE
OUTPATIENT
Start: 2023-10-02 | End: 2023-10-02

## 2023-10-02 RX ORDER — LIDOCAINE HYDROCHLORIDE 40 MG/ML
SOLUTION TOPICAL ONCE
Status: COMPLETED | OUTPATIENT
Start: 2023-10-02 | End: 2023-10-02

## 2023-10-02 RX ADMIN — LIDOCAINE HYDROCHLORIDE 2.5 ML: 40 SOLUTION TOPICAL at 10:15

## 2023-10-02 ASSESSMENT — PAIN SCALES - GENERAL: PAINLEVEL_OUTOF10: 0

## 2023-10-02 NOTE — DISCHARGE INSTRUCTIONS
Spandagrip to: Right Leg   Size: []Low compression 5-10 mm/Hg                 [x]Medium compression 10-20 mm/Hg           []High compression  20-30 mm/Hg          ______________________________________________________________________________________________                            [x]Activity: Activity as Tolerated     __________________________________________________________________________________________  Dietary:   Continue your diet as tolerated. Increase Protein to promote new tissue growth. (i.e. Lean meats, fish, milk, cheese, eggs, legumes, and yogurt)  If you are DIABETIC, follow a diabetic diet and Try to keep your blood sugar within it's target range. Limit Sodium, Alcohol and Sugar. Dietary Supplements(I.e. Fausto, 30ml ProStat, Ensure Enlive, Ensure Max/Premier )    ____________________________________________________________________________________________  Pain:   Please Note : some pain, drainage and/or bleeding might be expected after seeing the provider. TO HELP ALLEVIATE PAIN WE RECOMMEND THE FOLLOWING:  Elevate the affected limb. Use over the counter medications as permitted by your family doctor. For Persistent Pain not relieved by the above interventions, please notify your family doctor. Seek immediate medical care if:    You have symptoms of infection, such as: Increased pain, swelling, warmth, or redness. Red streaks leading from the area. Pus draining from the area. A fever.       _______________________________________________________________________________________________        : LEANNA        Electronically signed by : Tresa Freedman on 10/2/2023 at 10:56 AM        [address] [place] [address] Information: Should you experience any significant changes in your wound(s) or have questions about your wound care, please contact the [address] [place] [address] at [address] [place] [address] 8:30 am - 4:30 pm and Friday 8:30 am - 11:30 am.  If you need help with your

## 2023-10-02 NOTE — PROGRESS NOTES
[facility]   Progress Note and Procedure Note      [address] [facility] [address] RECORD NUMBER:  [id number]  AGE: 70 y.o. GENDER: female  : 1945  EPISODE DATE:  10/2/2023    Subjective:     Chief Complaint   Patient presents with    Wound Check     F/u  visit - right leg wound         HISTORY of PRESENT ILLNESS RAQUEL Miranda is a 78year old female presenting today for follow-up for right lateral lower leg wound  History of Wound Context: In  of this year right lateral leg wounds had been healed patient says she tries to wear her surgical compression stocking but does not wear it all the time no pain or redness noted. She denies constitutional issues, and has been dressing wounds at home. Denies itching or pain. Wound cx +Beta Strep Group B, Proteus mirabilis, Klebsiella oxytoca; treated with Augmentin. 23:  Patient was confused as how to take care of her wounds this past week. Her wounds were too wet and caused maceration to the periwound. 23:  Patient is using the prescribed Aquacel Ag on her wound, but came in saying that she ran out of the \"blue square\" she was supposed to use. We told her that she was using what she was supposed to.    23:  Wound shows significant healing since her last visit. 23:  Wound continues to heal.    Wound/Ulcer Pain Timing/Severity:  none  Quality of pain: N/A  Severity:  0 / 10 at present  Modifying Factors:  None  Associated Signs/Symptoms: edema     Ulcer Identification:  Ulcer Type: venous     Contributing Factors: edema, venous stasis, and obesity     Acute Wound: Other: venous wounds.       PAST MEDICAL HISTORY        Diagnosis Date    Asthma     Localized edema 2021    Non-pressure chronic ulcer of right lower leg with fat layer exposed ([address]) 2021    Peripheral venous insufficiency 2021    Right foot ulcer, limited to breakdown of skin ([address]) 2021    Skin ulcer of right lower leg

## 2023-10-04 ENCOUNTER — TELEPHONE (OUTPATIENT)
Dept: WOUND CARE | Age: 78
End: 2023-10-04

## 2023-10-04 NOTE — TELEPHONE ENCOUNTER
Patient called in regards to recent message through her cell phone stated that [facility] was processing her order and it will be mailed soon. Patient was confused and wasn't sure if we placed a new order. RN clarified that we placed a new supply order 9/25/23 but she stated she already received it last week. RN instructed that she should call [facility] to verify if something is being mailed. Patient verbalized understanding and will call [facility]. Patient denies any further questions at this time and will call if any other concerns arise before next appointment. Patient aware of next appointment date and time.    Future Appointments   Date Time Provider [address]   10/9/2023 10:15 AM VIDA Goodson - CNP [facility]   11/8/2023  1:00 PM DO Indigo Fowler  [place] - DYD          Electronically signed by Shar Preston RN on 10/4/2023 at 3:20 PM

## 2023-10-09 ENCOUNTER — HOSPITAL ENCOUNTER (OUTPATIENT)
Dept: WOUND CARE | Age: 78
Discharge: HOME | End: 2023-10-09
Payer: MEDICARE

## 2023-10-09 VITALS
DIASTOLIC BLOOD PRESSURE: 74 MMHG | RESPIRATION RATE: 20 BRPM | SYSTOLIC BLOOD PRESSURE: 145 MMHG | HEART RATE: 76 BPM | TEMPERATURE: 97.3 F

## 2023-10-09 DIAGNOSIS — I87.2 PERIPHERAL VENOUS INSUFFICIENCY: ICD-10-CM

## 2023-10-09 DIAGNOSIS — M79.89 LEG SWELLING: Primary | ICD-10-CM

## 2023-10-09 DIAGNOSIS — L97.912 SKIN ULCER OF RIGHT LOWER LEG WITH FAT LAYER EXPOSED (HCC): ICD-10-CM

## 2023-10-09 PROCEDURE — 11042 DBRDMT SUBQ TIS 1ST 20SQCM/<: CPT | Performed by: NURSE PRACTITIONER

## 2023-10-09 PROCEDURE — 11042 DBRDMT SUBQ TIS 1ST 20SQCM/<: CPT

## 2023-10-09 RX ORDER — LIDOCAINE HYDROCHLORIDE 20 MG/ML
JELLY TOPICAL ONCE
OUTPATIENT
Start: 2023-10-09 | End: 2023-10-09

## 2023-10-09 RX ORDER — LIDOCAINE 50 MG/G
OINTMENT TOPICAL ONCE
OUTPATIENT
Start: 2023-10-09 | End: 2023-10-09

## 2023-10-09 RX ORDER — IBUPROFEN 200 MG
TABLET ORAL ONCE
OUTPATIENT
Start: 2023-10-09 | End: 2023-10-09

## 2023-10-09 RX ORDER — GENTAMICIN SULFATE 1 MG/G
OINTMENT TOPICAL ONCE
OUTPATIENT
Start: 2023-10-09 | End: 2023-10-09

## 2023-10-09 RX ORDER — LIDOCAINE HYDROCHLORIDE 40 MG/ML
SOLUTION TOPICAL ONCE
Status: COMPLETED | OUTPATIENT
Start: 2023-10-09 | End: 2023-10-09

## 2023-10-09 RX ORDER — LIDOCAINE HYDROCHLORIDE 40 MG/ML
SOLUTION TOPICAL ONCE
OUTPATIENT
Start: 2023-10-09 | End: 2023-10-09

## 2023-10-09 RX ORDER — LIDOCAINE 40 MG/G
CREAM TOPICAL ONCE
OUTPATIENT
Start: 2023-10-09 | End: 2023-10-09

## 2023-10-09 RX ORDER — GINSENG 100 MG
CAPSULE ORAL ONCE
OUTPATIENT
Start: 2023-10-09 | End: 2023-10-09

## 2023-10-09 RX ORDER — TRIAMCINOLONE ACETONIDE 1 MG/G
OINTMENT TOPICAL ONCE
OUTPATIENT
Start: 2023-10-09 | End: 2023-10-09

## 2023-10-09 RX ORDER — BACITRACIN ZINC AND POLYMYXIN B SULFATE 500; 1000 [USP'U]/G; [USP'U]/G
OINTMENT TOPICAL ONCE
OUTPATIENT
Start: 2023-10-09 | End: 2023-10-09

## 2023-10-09 RX ORDER — SODIUM CHLOR/HYPOCHLOROUS ACID 0.033 %
SOLUTION, IRRIGATION IRRIGATION ONCE
OUTPATIENT
Start: 2023-10-09 | End: 2023-10-09

## 2023-10-09 RX ORDER — CLOBETASOL PROPIONATE 0.5 MG/G
OINTMENT TOPICAL ONCE
OUTPATIENT
Start: 2023-10-09 | End: 2023-10-09

## 2023-10-09 RX ORDER — BETAMETHASONE DIPROPIONATE 0.05 %
OINTMENT (GRAM) TOPICAL ONCE
OUTPATIENT
Start: 2023-10-09 | End: 2023-10-09

## 2023-10-09 RX ADMIN — LIDOCAINE HYDROCHLORIDE 5 ML: 40 SOLUTION TOPICAL at 10:45

## 2023-10-09 ASSESSMENT — PAIN SCALES - GENERAL
PAINLEVEL_OUTOF10: 0
PAINLEVEL_OUTOF10: 0

## 2023-10-09 NOTE — PROGRESS NOTES
[address] [facility]   Progress Note and Procedure Note      [address] [facility] [address] RECORD NUMBER:  [id number]  AGE: 70 y.o. GENDER: female  : 1945  EPISODE DATE:  10/9/2023    Subjective:     Chief Complaint   Patient presents with    Wound Check     Follow up visit right lower leg wound         HISTORY of PRESENT ILLNESS RAQUEL Hahn is a 78year old female presenting today for follow-up for right lateral lower leg wound  History of Wound Context: In  of this year right lateral leg wounds had been healed patient says she tries to wear her surgical compression stocking but does not wear it all the time no pain or redness noted. She denies constitutional issues, and has been dressing wounds at home. Denies itching or pain. Wound cx +Beta Strep Group B, Proteus mirabilis, Klebsiella oxytoca; treated with Augmentin. 23:  Patient was confused as how to take care of her wounds this past week. Her wounds were too wet and caused maceration to the periwound. 23:  Patient is using the prescribed Aquacel Ag on her wound, but came in saying that she ran out of the \"blue square\" she was supposed to use. We told her that she was using what she was supposed to.    23:  Wound shows significant healing since her last visit. 23:  Wound continues to heal.    10/9/23:  No issues; wound is slowly healing. Wound/Ulcer Pain Timing/Severity:  none  Quality of pain: N/A  Severity:  0 / 10 at present  Modifying Factors:  None  Associated Signs/Symptoms: edema     Ulcer Identification:  Ulcer Type: venous     Contributing Factors: edema, venous stasis, and obesity     Acute Wound: Other: venous wounds.       PAST MEDICAL HISTORY        Diagnosis Date    Asthma     Localized edema 2021    Non-pressure chronic ulcer of right lower leg with fat layer exposed ([address]) 2021    Peripheral venous insufficiency 2021    Right foot ulcer, limited to breakdown

## 2023-10-09 NOTE — DISCHARGE INSTRUCTIONS
Discharge [address] [facility] Physician Orders and Discharge Instructions  [address], [address] [facility], [address]  Telephone: [phone] [address] 8:30 am - 4:30 pm and Friday 8:30 am - 11:30 am       NAME:  Gaurav Seo OF BIRTH:  1945  MEDICAL RECORD NUMBER:  [id number]  DATE: 10/9/23        Return Appointment:     [x] Return Appointment: With Lisa Boo CNP  in  1  [facility])                  [] Nurse Visit for Wound Assessment:  [] DME/Wound Dressing Supplies Provided by: 7/24/23 [facility] [address]  p:[phone] f: [phone]    Please call them directly to reorder supplies when you run out)       [] Orders placed during your visit:                                                culture - 7/24/23     Important Reminders:   Please wash hands with soap and water before and after every dressing change. Do not scrub wounds. Keep wounds dry in shower unless otherwise instructed by the physician. SMOKING can slow would healing. Stop smoking as soon as possible to improve healing and prevent further complications associated with smoking. Consuelo-Wound Topical Treatments:    Do not apply lotions, creams, or ointments to wound bed unless directed.    Apply immediately around the wound:    [x] Apply moisturizing lotion to skin surrounding the wound prior to dressing change.        ___________________________________________________________________  Wound Location: Right lower leg, venous      Wound Cleansing: Normal Saline                          Primary Dressing:       [x] hydrofera blue ready border          Secondary Dressing:                        Dressing Frequency:                      [x] Three times per week     _______________________________________________________  Compression and Edema Control:  Right Leg   [] Wear Home Compression Stockings   [x] Patient was out walking her dog.  Patient fell and complaining of right hip pain.  1.5mg dilaudid and 4mg zofran given PTA

## 2023-10-12 ENCOUNTER — TELEPHONE (OUTPATIENT)
Dept: WOUND CARE | Age: 78
End: 2023-10-12

## 2023-10-12 NOTE — TELEPHONE ENCOUNTER
RN placed call back to patient in regards to latest message, left with Wound Care Reception Desk, Valeria Terrie. Phone call information:   []  Phone call made today to patient at  2:23 PM EDT  at number provided in chart:                   [x]  Patient answered, conversation as follows:   Patient: \"Satish Lozano am calling because [address] [place] [address] came by with a package and said he had a package from the wound care clinic to delivery. I am very confused. Did you place an order? I thought you didn't and so I sent the package back. \"  RN: \" Angella Akhtar. Once again, we haven't ordered anything recently. Unless you called [employer], your supply company to order things. How does [address] [place] [address] know exactly who your package was from? We don't have a warehouse and we don't  specifically mail items. But remember Monday at your last appointment, I asked you if you figured out what the e-mail/text was about about a delivery? AT THE APPOINTMENT, you looked at me confused and stated that you don't remember calling here and even asking about an e-mail. \"   Patient: \" Bennett Blankenship. The [address] [place] [address] erica may have not said wound care clinic, he's a good erica and may be I assumed. He is a good erica and he delivers to me all the time with the [facility]. I called [facility] and they didn't even know what I was talking about. \"  RN: \" OK. .. Well do you know who the package was sent from? Do you have a receipt or notice note from [address] [place] [address]? If you receive other packages from other people, isn't it possible its a package from someone else? Even I am confused Licha Roman, you have called 3 weeks in a row asking this very question about supplies and a package being shipped. Unless you order something, it did not come from us and I will always inform before you leave the clinic if we are placing any orders. \"  Patient: \"Okay. I understand. Yeah I will call the [address] [place] [address] erica and ask him. I just didn't want to send the package away or do anything rash before talking to you. \"  RN: \" okay Licha Roman.  I was under the

## 2023-11-01 ENCOUNTER — TELEPHONE (OUTPATIENT)
Dept: WOUND CARE | Age: 78
End: 2023-11-01

## 2023-11-01 NOTE — TELEPHONE ENCOUNTER
Late Entry: Patient last seen 10/9/23 with recommendations for follow up within one week. During visit, RN asked about the package the patient was concerned about the week prior when she had made several phone calls asking if we ordered any wound care products for her. But We established it was not a package from us and she was going to call the [employer] company.  During the visit, Patient looked confused and stated she knows nothing about a package and stated she didn't call about anything this week. Subject dismissed and patient chuckled it off.    Patient was then scheduled an appointment 10/16/23.   Patient called that day to cancel appointment and state she looked healed \"enough\" and that she will not be back now that the weather is changing.   After discussion with Lakisha the  of wound care, patient had called again 10/23/23 to cancel her appointment. Lakisha notified  patient that she didn't have an appointment since she called last week and stated she was healed and wouldn't be back. Per Lakisha statement, patient laughed it off and then had a small discussion on her new car before ending conversation.   Per Lakisha, patient then called 10/24/23 and proceeded to have the same conversation over the phone.   As of 10/30/23, this past Monday. Patient did call again and didn't mention any appointment but notified that she is doing well and her leg looks good and she is healed and she wanted to tell Lakisha about her new car. Small discussion before ending conversation. No follow up needed at this time. Electronically signed by Belen Sears RN on 11/1/2023 at 12:17 PM

## 2023-11-08 ENCOUNTER — TELEMEDICINE (OUTPATIENT)
Dept: FAMILY MEDICINE CLINIC | Age: 78
End: 2023-11-08

## 2023-11-08 DIAGNOSIS — Z00.00 MEDICARE ANNUAL WELLNESS VISIT, SUBSEQUENT: Primary | ICD-10-CM

## 2023-11-08 ASSESSMENT — PATIENT HEALTH QUESTIONNAIRE - PHQ9
SUM OF ALL RESPONSES TO PHQ QUESTIONS 1-9: 1
SUM OF ALL RESPONSES TO PHQ QUESTIONS 1-9: 1
2. FEELING DOWN, DEPRESSED OR HOPELESS: 1
1. LITTLE INTEREST OR PLEASURE IN DOING THINGS: 0
SUM OF ALL RESPONSES TO PHQ9 QUESTIONS 1 & 2: 1
SUM OF ALL RESPONSES TO PHQ QUESTIONS 1-9: 1
SUM OF ALL RESPONSES TO PHQ QUESTIONS 1-9: 1

## 2023-11-08 ASSESSMENT — LIFESTYLE VARIABLES: HOW MANY STANDARD DRINKS CONTAINING ALCOHOL DO YOU HAVE ON A TYPICAL DAY: PATIENT DOES NOT DRINK

## 2023-11-08 NOTE — PROGRESS NOTES
Medicare Annual Wellness Visit    Dereje Hadley is here for Medicare AWV    Assessment & Plan   Medicare annual wellness visit, subsequent    Recommendations for Preventive Services Due: see orders and patient instructions/AVS.  Recommended screening schedule for the next 5-10 years is provided to the patient in written form: see Patient Instructions/AVS.     No follow-ups on file. Subjective   The following acute and/or chronic problems were also addressed today:  Chronic medical conditions    Patient's complete Health Risk Assessment and screening values have been reviewed and are found in Flowsheets. The following problems were reviewed today and where indicated follow up appointments were made and/or referrals ordered. Positive Risk Factor Screenings with Interventions:       Cognitive: Words recalled: 2 Words Recalled           Total Score Interpretation: Abnormal Mini-Cog               Weight and Activity:  Physical Activity: Inactive (11/8/2023)    Exercise Vital Sign     Days of Exercise per Week: 0 days     Minutes of Exercise per Session: 0 min     On average, how many days per week do you engage in moderate to strenuous exercise (like a brisk walk)?: 0 days  Have you lost any weight without trying in the past 3 months?: (!) Yes  There is no height or weight on file to calculate BMI. (!) Abnormal    Inactivity Interventions:  Patient comments: patient will exercise more  Obesity Interventions:  Patient declines any further evaluation or treatment                                 Objective      Patient-Reported Vitals  Patient-Reported Weight: 191     No PE       Allergies   Allergen Reactions    Iodides Other (See Comments)     IVP dye, pt does not remember he reaction, just that the nurses stated to not let anyone give to her ever again    Furosemide      Pt. States it makes her \"ditzy\", dizzy, and gives her muscle ridgity and nausea. Prior to Visit Medications    Medication Sig Taking?

## 2023-11-27 RX ORDER — TRIAMTERENE AND HYDROCHLOROTHIAZIDE 37.5; 25 MG/1; MG/1
1 TABLET ORAL DAILY
Qty: 90 TABLET | Refills: 1 | Status: SHIPPED | OUTPATIENT
Start: 2023-11-27 | End: 2024-02-25

## 2023-11-27 NOTE — TELEPHONE ENCOUNTER
Last office visit 11/8/2023       Next office visit scheduled 2/7/2024    Requested Prescriptions     Pending Prescriptions Disp Refills    triamterene-hydroCHLOROthiazide (MAXZIDE-25) 37.5-25 MG per tablet [Pharmacy Med Name: Kirill Simon 37.5-25 MG 37.5-25 Tablet] 90 tablet 1     Sig: TAKE 1 TABLET BY MOUTH DAILY

## 2024-02-07 ENCOUNTER — OFFICE VISIT (OUTPATIENT)
Dept: FAMILY MEDICINE CLINIC | Age: 79
End: 2024-02-07

## 2024-02-07 VITALS
HEIGHT: 66 IN | BODY MASS INDEX: 32.3 KG/M2 | DIASTOLIC BLOOD PRESSURE: 82 MMHG | SYSTOLIC BLOOD PRESSURE: 130 MMHG | WEIGHT: 201 LBS

## 2024-02-07 DIAGNOSIS — L97.919 VENOUS STASIS ULCER OF RIGHT LOWER LEG WITH EDEMA OF RIGHT LOWER LEG (HCC): ICD-10-CM

## 2024-02-07 DIAGNOSIS — I83.019 VENOUS STASIS ULCER OF RIGHT LOWER LEG WITH EDEMA OF RIGHT LOWER LEG (HCC): ICD-10-CM

## 2024-02-07 DIAGNOSIS — I87.2 PERIPHERAL VENOUS INSUFFICIENCY: Primary | ICD-10-CM

## 2024-02-07 DIAGNOSIS — J45.40 MODERATE PERSISTENT ASTHMA WITHOUT COMPLICATION: ICD-10-CM

## 2024-02-07 DIAGNOSIS — L97.912 SKIN ULCER OF RIGHT LOWER LEG WITH FAT LAYER EXPOSED (HCC): ICD-10-CM

## 2024-02-07 DIAGNOSIS — D69.6 THROMBOCYTOPENIA, UNSPECIFIED (HCC): ICD-10-CM

## 2024-02-07 DIAGNOSIS — I83.891 VENOUS STASIS ULCER OF RIGHT LOWER LEG WITH EDEMA OF RIGHT LOWER LEG (HCC): ICD-10-CM

## 2024-02-07 DIAGNOSIS — R60.0 VENOUS STASIS ULCER OF RIGHT LOWER LEG WITH EDEMA OF RIGHT LOWER LEG (HCC): ICD-10-CM

## 2024-02-07 LAB
ALBUMIN SERPL-MCNC: 4.2 G/DL (ref 3.4–5)
ALBUMIN/GLOB SERPL: 1.4 {RATIO} (ref 1.1–2.2)
ALP SERPL-CCNC: 98 U/L (ref 40–129)
ALT SERPL-CCNC: 11 U/L (ref 10–40)
ANION GAP SERPL CALCULATED.3IONS-SCNC: 11 MMOL/L (ref 3–16)
AST SERPL-CCNC: 21 U/L (ref 15–37)
BASOPHILS NFR BLD: 0.8 %
BILIRUB SERPL-MCNC: 0.8 MG/DL (ref 0–1)
BUN SERPL-MCNC: 17 MG/DL (ref 7–20)
CALCIUM SERPL-MCNC: 9 MG/DL (ref 8.3–10.6)
CHLORIDE SERPL-SCNC: 100 MMOL/L (ref 99–110)
CHOLEST SERPL-MCNC: 209 MG/DL (ref 0–199)
CO2 SERPL-SCNC: 32 MMOL/L (ref 21–32)
CREAT SERPL-MCNC: 0.7 MG/DL (ref 0.6–1.2)
DEPRECATED RDW RBC AUTO: 13.6 % (ref 12.4–15.4)
EOSINOPHIL # BLD: 0 K/UL (ref 0–0.6)
EOSINOPHIL NFR BLD: 0.8 %
GFR SERPLBLD CREATININE-BSD FMLA CKD-EPI: >60 ML/MIN/{1.73_M2}
GLUCOSE SERPL-MCNC: 111 MG/DL (ref 70–99)
HCT VFR BLD AUTO: 41.5 % (ref 36–48)
HDLC SERPL-MCNC: 63 MG/DL (ref 40–60)
HGB BLD-MCNC: 14.2 G/DL (ref 12–16)
LDLC SERPL CALC-MCNC: 123 MG/DL
LYMPHOCYTES # BLD: 1.4 K/UL (ref 1–5.1)
LYMPHOCYTES NFR BLD: 26.3 %
MCH RBC QN AUTO: 29.6 PG (ref 26–34)
MCHC RBC AUTO-ENTMCNC: 34.3 G/DL (ref 31–36)
MCV RBC AUTO: 86.2 FL (ref 80–100)
MONOCYTES # BLD: 0.5 K/UL (ref 0–1.3)
MONOCYTES NFR BLD: 8.8 %
NEUTROPHILS # BLD: 3.3 K/UL (ref 1.7–7.7)
NEUTROPHILS NFR BLD: 63.3 %
PLATELET # BLD AUTO: 185 K/UL (ref 135–450)
PMV BLD AUTO: 8.6 FL (ref 5–10.5)
POTASSIUM SERPL-SCNC: 4.1 MMOL/L (ref 3.5–5.1)
PROT SERPL-MCNC: 7.3 G/DL (ref 6.4–8.2)
RBC # BLD AUTO: 4.81 M/UL (ref 4–5.2)
SODIUM SERPL-SCNC: 143 MMOL/L (ref 136–145)
TRIGL SERPL-MCNC: 117 MG/DL (ref 0–150)
VLDLC SERPL CALC-MCNC: 23 MG/DL
WBC # BLD AUTO: 5.3 K/UL (ref 4–11)

## 2024-02-07 NOTE — PROGRESS NOTES
2024     Giovana Whitney (:  1945) is a 78 y.o. female, here for evaluation of the following medical concerns:    HPI  Patient presented to the clinic for follow up on chronic medical conditions.  r.      Right knee pain- edema, osteoarthritis, has had this for years, believes this is worse, having more problems with ambulation.  Followed with orthopedist, wants conservative treatment.      Asthma-  patient feels well today,  patient feels much improved, decided not to have imaging studies, understands the need but is not wanting these.  She understands her decision. h denied fever or chills, Denied rhinorrhea or nasal congestion.     Right leg cellulitis- patient has been back to the wound clinic, , secondary to venous insuff.  She is wearing a bandage today, stated it has become worse but unable to view due to extensive bandage.         Today, chest pain sob, n, v, or diarrhea.  Review of Systems   Constitutional:  Negative for activity change, fatigue, fever and unexpected weight change.   HENT:  Negative for congestion, ear pain, facial swelling, mouth sores, rhinorrhea, sinus pressure, sore throat and trouble swallowing.    Eyes:  Negative for visual disturbance.   Respiratory:  Negative for cough, chest tightness, shortness of breath and wheezing.    Cardiovascular:  Negative for chest pain and leg swelling.   Gastrointestinal:  Negative for abdominal pain, diarrhea, nausea and vomiting.   Endocrine: Negative for cold intolerance, heat intolerance, polydipsia and polyphagia.   Musculoskeletal:  Positive for arthralgias, back pain and gait problem.   Skin:  Negative for rash.   Allergic/Immunologic: Negative for food allergies.   Neurological:  Negative for dizziness, tremors, syncope, numbness and headaches.   Hematological:  Does not bruise/bleed easily.   Psychiatric/Behavioral:  Negative for suicidal ideas. The patient is not nervous/anxious.        Prior to Visit Medications    Medication Sig

## 2024-02-09 ENCOUNTER — TELEPHONE (OUTPATIENT)
Dept: FAMILY MEDICINE CLINIC | Age: 79
End: 2024-02-09

## 2024-02-09 NOTE — TELEPHONE ENCOUNTER
Please let the patient know overall her labs are good.  Her cholesterol is still slightly high and needs to continue with diet.     Also, may need our  to call her.  She has had problems like this in the future.  I believe somehow a third party has gotten her information and is calling her concerning medical issues.

## 2024-02-09 NOTE — TELEPHONE ENCOUNTER
Pt would like a return call from Dr Munguia. She states she is receiving calls from our office. I did not see any message in her chart. She also would like results from her blood work. Please give pt a call [phone].

## 2024-02-12 ENCOUNTER — TELEPHONE (OUTPATIENT)
Dept: FAMILY MEDICINE CLINIC | Age: 79
End: 2024-02-12

## 2024-02-12 NOTE — TELEPHONE ENCOUNTER
Spoke with patient and informed her it was probably a scam, informed her our office would make contact with her first.

## 2024-04-25 NOTE — DISCHARGE INSTRUCTIONS
Medicare Wellness Visit  Plan for Preventive Care    A good way for you to stay healthy is to use preventive care.  Medicare covers many services that can help you stay healthy.* The goal of these services is to find any health problems as quickly as possible. Finding problems early can help make them easier to treat.  Your personal plan below lists the services you may need and when they are due.      Health Maintenance Summary     Pneumococcal Vaccine 65+ (1 of 2 - PCV)  Never done    DTaP/Tdap/Td Vaccine (1 - Tdap)  Never done    Shingles Vaccine (1 of 2)  Never done    Hepatitis B Vaccine (For Physician/APC Discussion) (1 of 3 - Risk 3-dose series)  Never done    Colorectal Cancer Risk - Colonoscopy (Every 5 Years)  Overdue since 4/30/2023    COVID-19 Vaccine (1 - 2023-24 season)  Never done    Medicare Advantage- Medicare Wellness Visit (Yearly - January to December)  Overdue since 1/1/2024    Depression Screening (Yearly)  Due soon on 7/19/2024    Influenza Vaccine (Season Ended)  Next due on 9/1/2024    Osteoporosis Screening   Completed    Meningococcal Vaccine   Aged Out    HPV Vaccine   Aged Out           Preventive Care for Women and Men    Heart Screenings (Cardiovascular):  Blood tests are used to check your cholesterol, lipid and triglyceride levels. High levels can increase your risk for heart disease and stroke. High levels can be treated with medications, diet and exercise. Lowering your levels can help keep your heart and blood vessels healthy.  Your provider will order these tests if they are needed.    An ultrasound is done to see if you have an abdominal aortic aneurysm (AAA).  This is an enlargement of one of the main blood vessels that delivers blood to the body.   In the [place], 9,000 deaths are caused by AAA.  You may not even know you have this problem and as many as 1 in 3 people will have a serious problem if it is not treated.  Early diagnosis allows for more effective treatment  [address] and Hyperbaric Oxygen Therapy   Physician Orders and Discharge Instructions  [place] [address]   [address] [place] [address]  Telephone: [phone]     NAME:  Barb Avery OF BIRTH:  1945  MEDICAL RECORD NUMBER:  [id number]  DATE:  11/23/2022     Wound Cleansing:   Do not scrub or use excessive force. Cleanse wound prior to applying a clean dressing with:  [x] Normal Saline  [] Keep Wound Dry in Shower    [] Wound Cleanser   [] Cleanse wound with Mild Soap & Water  [] May Shower at Discharge   [x] Other:   [address]     Topical Treatments:  Do not apply lotions, creams, or ointments to wound bed unless directed. [x] Apply moisturizing lotion to skin surrounding the wound prior to dressing change.  [] Apply antifungal ointment to skin surrounding the wound prior to dressing change.  [] Apply thin film of moisture barrier ointment to skin immediately around wound. [] Other:                 Dressings:                Wound Location RIGHT LOWER LEG       [x] Apply Primary Dressing:                                             [] MediHoney Gel      [] Alginate with Silver [] Alginate             [] Collagen     [x] Collagen with Silver   [] Santyl with Moisten saline gauze               [] Hydrocolloid            [] MediHoney Alginate        [] Foam with Silver             [] Foam   [] Hydrofera Blue            [] Mepilex Border                    [x] Moisten with Saline      [] Hydrogel     [] Mepitel                     [] Bactroban/Mupirocin         [] Polysporin              [] Other:       [x] Cover and Secure with:                        [x] Gauze       [] Filiberto          [x] Roll gauze             [] Ace Wrap    [] Cover Roll Tape     [] ABD                         [] Other:              Avoid contact of tape with skin.   [x] Change dressing:          [] Daily          [x] Every Other Day [] Three times per week             [] Once a week         [] Do Not Change Dressing     [] Other:        Edema Control:  Apply:  [] Compression Stocking       []Right Leg     []Left Leg             [] Tubigrip      []Right Leg Double Layer      []Left Leg Double Layer                                              []Right Leg Single Layer       []Left Leg Single Layer             [x] SpandaGrip         [x]Right Leg   []Left Leg                                   []Low compression 5-10 mm/Hg                                            [x]Medium compression 10-20 mm/Hg                                   []High compression  20-30 mm/Hg             every morning immediately when getting up should be applied to affected leg(s) from mid foot to knee making sure to cover the heel. Remove every night before going to bed. [x] Elevate leg(s) above the level of the heart when sitting. [] Avoid prolonged standing in one place. [] Elevate arm/hand above the level of the heart    []RightArm     []LeftArm            Compression:  Apply:  [] Multilayer Compression Wrap Applied in Clinic     []RightLeg      []Left Leg             [] Multi-layer compression. Do not get leg(s) with wrap wet. If wraps become too tight call the center or completely remove the wrap. [] Elevate leg(s) above the level of the heart when sitting. [] Avoid prolonged standing in one place. [x] Diet as tolerated:     [x] Calorie Diabetic Diet:          [] No Added Salt:  [] Increase Protein:     [] Other:            Activity:  [x] Activity as tolerated:  [] Patient has no activity restrictions     [] Strict Bedrest:         [] Remain off Work:     [] May return to full duty work: If you are still having pain after you go home:  [] Elevate the affected limb.     [] Use over-the-counter medications you would normally use for pain as permitted by your and cure.  If you have a family history of AAA or are a male age 65-75 who has smoked, you are at higher risk of an AAA.  Your provider can order this test, if needed.    Colorectal Screening:  There are many tests that are used to check for cancer of your colon and rectum. You and your provider should discuss what test is best for you and when to have it done.  Options include:  Screening Colonoscopy: exam of the entire colon, seen through a flexible lighted tube.  Flexible Sigmoidoscopy: exam of the last third (sigmoid portion) of the colon and rectum, seen through a flexible lighted tube.  Cologuard DNA stool test: a sample of your stool is used to screen for cancer and unseen blood in your stool.  Fecal Occult Blood Test: a sample of your stool is studied to find any unseen blood    Flu Shot:  An immunization that helps to prevent influenza (the flu). You should get this every year. The best time to get the shot is in the fall.    Pneumococcal Shot:  Vaccines help prevent pneumococcal disease, which is any type of illness caused by Streptococcus pneumoniae bacteria. There are two kinds of pneumococcal vaccines available in the [place]:   Pneumococcal conjugate vaccines (PCV20 or Kjwkxdi27®)  Pneumococcal polysaccharide vaccine (PPSV23 or Qkugutznx00®)  For those who have never received any pneumococcal conjugate vaccine, CDC recommends PVC20 for adults 65 years or older and adults 19 through 64 years old with certain medical conditions or risk factors.   For those who have previously received PCV13, this should be followed by a dose of PPSV23.     Hepatitis B Shot:  An immunization that helps to protect people from getting Hepatitis B. Hepatitis B is a virus that spreads through contact with infected blood or body fluids. Many people with the virus do not have symptoms.  The virus can lead to serious problems, such as liver disease. Some people are at higher risk than others. Your doctor will tell you if  family doctor. [] For persistent pain not relieved by the above interventions, please call your family doctor. Return Appointment:  [x] Wound and dressing supply provider: Jerilyn Osler  [] ECF or Home Healthcare:  [] Wound Assessment:         [] Physician or NP scheduled for Wound Assessment:   [x] Return Appointment: With Eddie Olsen CNP  in  1 Week(s)  [] Ordered tests:      Nurse Case Manger:  Therese        Electronically signed by Venus Raygoza RN on 11/23/2022 at 10:48 AM          [address] [facility] Information: Should you experience any significant changes in your wound(s) or have questions about your wound care, please contact the [address] [place] [address] at [address] 8:00 am - 4:30 pm and Friday 8:00 am - 12:30 pm.  If you need help with your wound outside these hours and cannot wait until we are again available, contact your PCP or go to the hospital emergency room. PLEASE NOTE: IF YOU ARE UNABLE TO OBTAIN WOUND SUPPLIES, CONTINUE TO USE THE SUPPLIES YOU HAVE AVAILABLE UNTIL YOU ARE ABLE TO REACH US. IT IS MOST IMPORTANT TO KEEP THE WOUND COVERED AT ALL TIMES.      Physician Signature:_______________________     Date: ___________ Time:  ____________                              Brianna Grimes CNP you need this shot.     Diabetes Screening:  A test to measure sugar (glucose) in your blood is called a fasting blood sugar. Fasting means you cannot have food or drink for at least 8 hours before the test. This test can detect diabetes long before you may notice symptoms.    Glaucoma Screening:  Glaucoma screening is performed by your eye doctor. The test measures the fluid pressure inside your eyes to determine if you have glaucoma.     Hepatitis C Screening:  A blood test to see if you have the hepatitis C virus.  Hepatitis C attacks the liver and is a major cause of chronic liver disease.  Medicare will cover a single screening for all adults born between 1945 & 1965, or high risk patients (people who have injected illegal drugs or people who have had blood transfusions).  High risk patients who continue to inject illegal drugs can be screened for Hepatitis C every year.    Smoking and Tobacco-Use Cessation Counseling:  Tobacco is the single greatest cause of disease and early death in our country today. Medication and counseling together can increase a person’s chance of quitting for good.   Medicare covers two quitting attempts per year, with four counseling sessions per attempt (eight sessions in a 12 month period)    Preventive Screening tests for Women    Screening Mammograms and Breast Exams:  An x-ray of your breasts to check for breast cancer before you or your doctor may be able to feel it.  If breast cancer is found early it can usually be treated with success.    Pelvic Exams and Pap Tests:  An exam to check for cervical and vaginal cancer. A Pap test is a lab test in which cells are taken from your cervix and sent to the lab to look for signs of cervical cancer. If cancer of the cervix is found early, chances for a cure are good. Testing can generally end at age 65, or if a woman has a hysterectomy for a benign condition. Your provider may recommend more frequent testing if certain abnormal results  are found.    Bone Mass Measurements:  A painless x-ray of your bone density to see if you are at risk for a broken bone. Bone density refers to the thickness of bones or how tightly the bone tissue is packed.    Preventive Screening tests for Men    Prostate Screening:  Should you have a prostate cancer test (PSA)?  It is up to you to decide if you want a prostate cancer test. Talk to your clinician to find out if the test is right for you.  Things for you to consider and talk about should include:  Benefits and harms of the test  Your family history  How your race/ethnicity may influence the test  If the test may impact other medical conditions you have  Your values on screenings and treatments    *Medicare pays for many preventive services to keep you healthy. For some of these services, you might have to pay a deductible, coinsurance, and / or copayment.  The amounts vary depending on the type of services you need and the kind of Medicare health plan you have.    For further details on screenings offered by Medicare please visit: https://www.medicare.gov/coverage/preventive-screening-services

## 2024-05-07 ENCOUNTER — OFFICE VISIT (OUTPATIENT)
Dept: FAMILY MEDICINE CLINIC | Age: 79
End: 2024-05-07

## 2024-05-07 VITALS — BODY MASS INDEX: 32.35 KG/M2 | WEIGHT: 200.4 LBS | DIASTOLIC BLOOD PRESSURE: 70 MMHG | SYSTOLIC BLOOD PRESSURE: 138 MMHG

## 2024-05-07 DIAGNOSIS — M17.11 PRIMARY OSTEOARTHRITIS OF RIGHT KNEE: ICD-10-CM

## 2024-05-07 DIAGNOSIS — J45.40 MODERATE PERSISTENT ASTHMA WITHOUT COMPLICATION: Primary | ICD-10-CM

## 2024-05-07 DIAGNOSIS — L97.511 SKIN ULCER OF THIRD TOE OF RIGHT FOOT, LIMITED TO BREAKDOWN OF SKIN (HCC): ICD-10-CM

## 2024-05-07 SDOH — ECONOMIC STABILITY: FOOD INSECURITY: WITHIN THE PAST 12 MONTHS, THE FOOD YOU BOUGHT JUST DIDN'T LAST AND YOU DIDN'T HAVE MONEY TO GET MORE.: NEVER TRUE

## 2024-05-07 SDOH — ECONOMIC STABILITY: FOOD INSECURITY: WITHIN THE PAST 12 MONTHS, YOU WORRIED THAT YOUR FOOD WOULD RUN OUT BEFORE YOU GOT MONEY TO BUY MORE.: NEVER TRUE

## 2024-05-07 SDOH — ECONOMIC STABILITY: HOUSING INSECURITY
IN THE LAST 12 MONTHS, WAS THERE A TIME WHEN YOU DID NOT HAVE A STEADY PLACE TO SLEEP OR SLEPT IN A SHELTER (INCLUDING NOW)?: NO

## 2024-05-07 SDOH — ECONOMIC STABILITY: INCOME INSECURITY: HOW HARD IS IT FOR YOU TO PAY FOR THE VERY BASICS LIKE FOOD, HOUSING, MEDICAL CARE, AND HEATING?: NOT HARD AT ALL

## 2024-05-07 NOTE — PROGRESS NOTES
2024     Giovana Whitney (:  1945) is a 78 y.o. female, here for evaluation of the following medical concerns:    HPI  Patient presented to the clinic for follow up on chronic medical conditions.  Overall, she is doing well.       Right knee pain- edema, osteoarthritis, has had this for years, believes this is worse, having more problems with ambulation.  Followed with orthopedist, wants conservative treatment.   She is adamant that she is not interested in surgery.     Asthma-  patient feels well today,  patient feels much improved, decided not to have imaging studies, understands the need but is not wanting these.  She understands her decision. h denied fever or chills, Denied rhinorrhea or nasal congestion.     Right leg cellulitis- patient has been back to the wound clinic, , secondary to venous insuff.  She is note wearing a bandage today, stated it has improved, has hx of wound clinic.         Today, chest pain sob, n, v, or diarrhea.  Review of Systems   Constitutional:  Positive for fatigue. Negative for activity change, fever and unexpected weight change.   HENT:  Negative for congestion, ear pain, facial swelling, mouth sores, rhinorrhea, sinus pressure, sore throat and trouble swallowing.    Eyes:  Negative for visual disturbance.   Respiratory:  Negative for cough, chest tightness, shortness of breath and wheezing.    Cardiovascular:  Negative for chest pain and leg swelling.   Gastrointestinal:  Negative for abdominal pain, diarrhea, nausea and vomiting.   Endocrine: Negative for cold intolerance, heat intolerance, polydipsia and polyphagia.   Musculoskeletal:  Positive for arthralgias.   Skin:  Negative for rash.   Allergic/Immunologic: Negative for food allergies.   Neurological:  Negative for dizziness, tremors, syncope, numbness and headaches.   Hematological:  Bruises/bleeds easily.   Psychiatric/Behavioral:  Negative for suicidal ideas. The patient is not nervous/anxious.        Prior

## 2024-05-08 ASSESSMENT — PATIENT HEALTH QUESTIONNAIRE - PHQ9
SUM OF ALL RESPONSES TO PHQ9 QUESTIONS 1 & 2: 0
SUM OF ALL RESPONSES TO PHQ QUESTIONS 1-9: 0
SUM OF ALL RESPONSES TO PHQ QUESTIONS 1-9: 0
1. LITTLE INTEREST OR PLEASURE IN DOING THINGS: NOT AT ALL
SUM OF ALL RESPONSES TO PHQ QUESTIONS 1-9: 0
SUM OF ALL RESPONSES TO PHQ QUESTIONS 1-9: 0
2. FEELING DOWN, DEPRESSED OR HOPELESS: NOT AT ALL

## 2024-06-03 ENCOUNTER — OFFICE VISIT (OUTPATIENT)
Dept: GYNECOLOGY | Age: 79
End: 2024-06-03
Payer: MEDICARE

## 2024-06-03 VITALS — WEIGHT: 201 LBS | SYSTOLIC BLOOD PRESSURE: 138 MMHG | BODY MASS INDEX: 32.44 KG/M2 | DIASTOLIC BLOOD PRESSURE: 70 MMHG

## 2024-06-03 DIAGNOSIS — Z01.419 ENCOUNTER FOR GYNECOLOGICAL EXAMINATION (GENERAL) (ROUTINE) WITHOUT ABNORMAL FINDINGS: ICD-10-CM

## 2024-06-03 DIAGNOSIS — Z01.419 WELL WOMAN EXAM WITH ROUTINE GYNECOLOGICAL EXAM: Primary | ICD-10-CM

## 2024-06-03 DIAGNOSIS — Z12.31 ENCOUNTER FOR SCREENING MAMMOGRAM FOR BREAST CANCER: ICD-10-CM

## 2024-06-03 PROCEDURE — G8427 DOCREV CUR MEDS BY ELIG CLIN: HCPCS | Performed by: OBSTETRICS & GYNECOLOGY

## 2024-06-03 PROCEDURE — G0101 CA SCREEN;PELVIC/BREAST EXAM: HCPCS | Performed by: OBSTETRICS & GYNECOLOGY

## 2024-06-03 PROCEDURE — G8417 CALC BMI ABV UP PARAM F/U: HCPCS | Performed by: OBSTETRICS & GYNECOLOGY

## 2024-06-03 RX ORDER — TRIAMTERENE AND HYDROCHLOROTHIAZIDE 37.5; 25 MG/1; MG/1
1 TABLET ORAL DAILY
Qty: 90 TABLET | Refills: 0 | Status: SHIPPED | OUTPATIENT
Start: 2024-06-03 | End: 2024-09-01

## 2024-06-03 NOTE — PROGRESS NOTES
Subjective:      Patient ID: Giovana Whitney is a 78 y.o. female.    HPI  pts here for annual gyn exam.  She denies complaints.  No bleeding.  Due for pap and mammogram.    Review of Systems Pertinent review of systems items discussed above.  All others systems items not discussed above were negative.      Objective:   Physical Exam  Constitutional:       Appearance: She is well-developed.   HENT:      Head: Normocephalic and atraumatic.   Neck:      Thyroid: No thyromegaly.      Trachea: No tracheal deviation.   Cardiovascular:      Rate and Rhythm: Normal rate and regular rhythm.      Heart sounds: Normal heart sounds. No murmur heard.  Pulmonary:      Effort: Pulmonary effort is normal. No respiratory distress.      Breath sounds: Normal breath sounds. No wheezing or rales.   Chest:   Breasts:     Right: No mass, nipple discharge or skin change.      Left: No mass, nipple discharge or skin change.   Abdominal:      General: There is no distension.      Palpations: Abdomen is soft. There is no mass.      Tenderness: There is no abdominal tenderness. There is no rebound.   Genitourinary:     Labia:         Right: No lesion.         Left: No lesion.       Vagina: Normal. No foreign body. No vaginal discharge.      Adnexa:         Right: No mass or tenderness.          Left: No mass or tenderness.        Rectum: Normal. Guaiac result negative. No mass or external hemorrhoid.      Comments: Pap performed.   Uterus and cx absent.  Musculoskeletal:         General: Normal range of motion.   Lymphadenopathy:      Cervical: No cervical adenopathy.   Neurological:      Mental Status: She is alert and oriented to person, place, and time.         Assessment:   Normal gyn exam     Plan:   F/u annual gyn exam.  Call with results.       Fidel Carbajal MD

## 2024-06-04 DIAGNOSIS — Z12.31 ENCOUNTER FOR SCREENING MAMMOGRAM FOR MALIGNANT NEOPLASM OF BREAST: Primary | ICD-10-CM

## 2024-06-05 LAB
HPV HR 12 DNA SPEC QL NAA+PROBE: NOT DETECTED
HPV16 DNA SPEC QL NAA+PROBE: NOT DETECTED
HPV16+18+H RISK 12 DNA SPEC-IMP: NORMAL
HPV18 DNA SPEC QL NAA+PROBE: NOT DETECTED

## 2024-08-07 ENCOUNTER — OFFICE VISIT (OUTPATIENT)
Dept: FAMILY MEDICINE CLINIC | Age: 79
End: 2024-08-07

## 2024-08-07 ENCOUNTER — TELEPHONE (OUTPATIENT)
Dept: FAMILY MEDICINE CLINIC | Age: 79
End: 2024-08-07

## 2024-08-07 VITALS
DIASTOLIC BLOOD PRESSURE: 80 MMHG | BODY MASS INDEX: 31.34 KG/M2 | SYSTOLIC BLOOD PRESSURE: 122 MMHG | WEIGHT: 195 LBS | HEIGHT: 66 IN

## 2024-08-07 DIAGNOSIS — R60.0 VENOUS STASIS ULCER OF RIGHT LOWER LEG WITH EDEMA OF RIGHT LOWER LEG (HCC): ICD-10-CM

## 2024-08-07 DIAGNOSIS — I83.019 VENOUS STASIS ULCER OF RIGHT LOWER LEG WITH EDEMA OF RIGHT LOWER LEG (HCC): ICD-10-CM

## 2024-08-07 DIAGNOSIS — I83.891 VENOUS STASIS ULCER OF RIGHT LOWER LEG WITH EDEMA OF RIGHT LOWER LEG (HCC): ICD-10-CM

## 2024-08-07 DIAGNOSIS — L97.919 VENOUS STASIS ULCER OF RIGHT LOWER LEG WITH EDEMA OF RIGHT LOWER LEG (HCC): ICD-10-CM

## 2024-08-07 DIAGNOSIS — E66.01 CLASS 3 SEVERE OBESITY DUE TO EXCESS CALORIES WITHOUT SERIOUS COMORBIDITY WITH BODY MASS INDEX (BMI) OF 45.0 TO 49.9 IN ADULT (HCC): ICD-10-CM

## 2024-08-07 DIAGNOSIS — J45.40 MODERATE PERSISTENT ASTHMA WITHOUT COMPLICATION: Primary | ICD-10-CM

## 2024-08-07 ASSESSMENT — PATIENT HEALTH QUESTIONNAIRE - PHQ9
SUM OF ALL RESPONSES TO PHQ QUESTIONS 1-9: 0
1. LITTLE INTEREST OR PLEASURE IN DOING THINGS: NOT AT ALL
SUM OF ALL RESPONSES TO PHQ9 QUESTIONS 1 & 2: 0
2. FEELING DOWN, DEPRESSED OR HOPELESS: NOT AT ALL
SUM OF ALL RESPONSES TO PHQ QUESTIONS 1-9: 0

## 2024-08-07 ASSESSMENT — ENCOUNTER SYMPTOMS
DIARRHEA: 0
RHINORRHEA: 0
CHEST TIGHTNESS: 0
SHORTNESS OF BREATH: 0
SORE THROAT: 0
VOMITING: 0
NAUSEA: 0
COUGH: 0
FACIAL SWELLING: 0
WHEEZING: 0
TROUBLE SWALLOWING: 0
SINUS PRESSURE: 0
ABDOMINAL PAIN: 0

## 2024-08-07 NOTE — PROGRESS NOTES
2024     Giovana Whitney (:  1945) is a 79 y.o. female, here for evaluation of the following medical concerns:    HPI  Patient presented to the clinic for follow up on chronic medical conditions.  Overall, she is doing well.       Right knee pain- edema, osteoarthritis, has had this for years, believes this is worse, having more problems with ambulation.  Followed with orthopedist, wants conservative treatment.   She is adamant that she is not interested in surgery.     Asthma-  patient feels well today,  patient feels much improved, decided not to have imaging studies, understands the need but is not wanting these.  She understands her decision. h denied fever or chills, Denied rhinorrhea or nasal congestion.     Right leg cellulitis- much improved today, the patient has been back to the wound clinic, , secondary to venous insuff.  She is note wearing a bandage today, stated it has improved, has hx of wound clinic.         Today, chest pain sob, n, v, or diarrhea.  Review of Systems   Constitutional:  Positive for fatigue. Negative for activity change, fever and unexpected weight change.   HENT:  Negative for congestion, ear pain, facial swelling, mouth sores, rhinorrhea, sinus pressure, sore throat and trouble swallowing.    Eyes:  Negative for visual disturbance.   Respiratory:  Negative for cough, chest tightness, shortness of breath and wheezing.    Cardiovascular:  Negative for chest pain and leg swelling.   Gastrointestinal:  Negative for abdominal pain, diarrhea, nausea and vomiting.   Endocrine: Negative for cold intolerance, heat intolerance, polydipsia and polyphagia.   Musculoskeletal:  Positive for arthralgias, back pain and gait problem.   Skin:  Negative for rash.   Allergic/Immunologic: Negative for food allergies.   Neurological:  Negative for dizziness, tremors, syncope, numbness and headaches.   Hematological:  Does not bruise/bleed easily.   Psychiatric/Behavioral:  Negative for

## 2024-08-07 NOTE — TELEPHONE ENCOUNTER
Pt called to get clarification about her AVS for today. She states it said to stop the Metolazone(Zaroxolyn). She has concerns about her med's. She was told to stop the Zaroxolyn a while ago. Please give pt a call [phone].

## 2024-08-07 NOTE — TELEPHONE ENCOUNTER
That is not listed on her med list.  That was removed today perhaps AVS was printed prior to taking medication off but if no longer on her med list.

## 2024-10-08 ENCOUNTER — OFFICE VISIT (OUTPATIENT)
Dept: FAMILY MEDICINE CLINIC | Age: 79
End: 2024-10-08

## 2024-10-08 VITALS
HEIGHT: 66 IN | SYSTOLIC BLOOD PRESSURE: 124 MMHG | WEIGHT: 196 LBS | DIASTOLIC BLOOD PRESSURE: 78 MMHG | BODY MASS INDEX: 31.5 KG/M2

## 2024-10-08 DIAGNOSIS — M79.89 LEG SWELLING: ICD-10-CM

## 2024-10-08 DIAGNOSIS — J45.40 MODERATE PERSISTENT ASTHMA WITHOUT COMPLICATION: ICD-10-CM

## 2024-10-08 DIAGNOSIS — I87.2 PERIPHERAL VENOUS INSUFFICIENCY: Primary | ICD-10-CM

## 2024-10-08 ASSESSMENT — ENCOUNTER SYMPTOMS
NAUSEA: 0
ABDOMINAL PAIN: 0
TROUBLE SWALLOWING: 0
SINUS PRESSURE: 0
VOMITING: 0
CHEST TIGHTNESS: 0
FACIAL SWELLING: 0
SHORTNESS OF BREATH: 0
RHINORRHEA: 0
COUGH: 0
WHEEZING: 0
SORE THROAT: 0
DIARRHEA: 0

## 2024-10-08 NOTE — PROGRESS NOTES
Musculoskeletal:         General: Normal range of motion.      Cervical back: Normal range of motion and neck supple.   Skin:     General: Skin is warm and dry.   Neurological:      Mental Status: She is alert and oriented to person, place, and time.   Psychiatric:         Behavior: Behavior normal.         ASSESSMENT/PLAN:  1. Peripheral venous insufficiency  Stable  Continue with medication  Keep appointments with specialist.   Answered questions     2. Moderate persistent asthma without complication  Stable  Continue with medication  Keep appointments with specialist.   Answered questions     3.  Leg swelling  Stable  Continue with medication  Keep appointments with specialist.   Answered questions   No follow-ups on file.

## 2024-10-12 ASSESSMENT — ENCOUNTER SYMPTOMS: BACK PAIN: 1

## 2024-11-22 RX ORDER — TRIAMTERENE AND HYDROCHLOROTHIAZIDE 37.5; 25 MG/1; MG/1
1 TABLET ORAL DAILY
Qty: 90 TABLET | Refills: 4 | Status: SHIPPED | OUTPATIENT
Start: 2024-11-22 | End: 2026-02-15

## 2024-11-22 NOTE — TELEPHONE ENCOUNTER
Last office visit 10/8/2024     Last written      Next office visit scheduled 12/11/2024    Requested Prescriptions     Pending Prescriptions Disp Refills    triamterene-hydroCHLOROthiazide (MAXZIDE-25) 37.5-25 MG per tablet [Pharmacy Med Name: TRIAMTERENE-HCTZ 37.5-25 MG 37.5-25 Tablet] 90 tablet 4     Sig: TAKE 1 TABLET BY MOUTH DAILY

## 2024-12-11 ENCOUNTER — OFFICE VISIT (OUTPATIENT)
Dept: FAMILY MEDICINE CLINIC | Age: 79
End: 2024-12-11

## 2024-12-11 VITALS
BODY MASS INDEX: 30.53 KG/M2 | HEIGHT: 66 IN | DIASTOLIC BLOOD PRESSURE: 84 MMHG | SYSTOLIC BLOOD PRESSURE: 126 MMHG | WEIGHT: 190 LBS

## 2024-12-11 DIAGNOSIS — J45.40 MODERATE PERSISTENT ASTHMA WITHOUT COMPLICATION: Primary | ICD-10-CM

## 2024-12-11 DIAGNOSIS — M25.561 CHRONIC PAIN OF RIGHT KNEE: ICD-10-CM

## 2024-12-11 DIAGNOSIS — L97.912 SKIN ULCER OF RIGHT LOWER LEG WITH FAT LAYER EXPOSED (HCC): ICD-10-CM

## 2024-12-11 DIAGNOSIS — G89.29 CHRONIC PAIN OF RIGHT KNEE: ICD-10-CM

## 2024-12-11 NOTE — PROGRESS NOTES
2024     Giovana Whitney (:  1945) is a 79 y.o. female, here for evaluation of the following medical concerns:    HPI  Patient presented to the clinic for follow up on chronic medical conditions.  Overall, she is doing well.       Right knee pain- edema, osteoarthritis, has had this for years, believes this is worse, having more problems with ambulation.  Followed with orthopedist, wants conservative treatment.   She is adamant that she is not interested in surgery.     Asthma-  patient feels well today,  patient feels much improved, decided not to have imaging studies, understands the need but is not wanting these.  She understands her decision. h denied fever or chills, Denied rhinorrhea or nasal congestion.     Right leg cellulitis- patient has been back to the wound clinic, , secondary to venous insuff.  She is note wearing a bandage today, stated it has improved, has hx of wound clinic.         Today, chest pain sob, n, v, or diarrhea.  Review of Systems   Constitutional:  Negative for activity change, fatigue, fever and unexpected weight change.   HENT:  Negative for congestion, ear pain, facial swelling, mouth sores, rhinorrhea, sinus pressure, sore throat and trouble swallowing.    Eyes:  Negative for visual disturbance.   Respiratory:  Negative for cough, chest tightness, shortness of breath and wheezing.    Cardiovascular:  Negative for chest pain and leg swelling.   Gastrointestinal:  Negative for abdominal pain, diarrhea, nausea and vomiting.   Endocrine: Negative for cold intolerance, heat intolerance, polydipsia and polyphagia.   Genitourinary:  Negative for dyspareunia, flank pain, frequency, hematuria, pelvic pain, urgency and vaginal bleeding.   Musculoskeletal:  Positive for arthralgias, back pain, gait problem and joint swelling.   Skin:  Positive for color change. Negative for rash.   Allergic/Immunologic: Negative for food allergies.   Neurological:  Negative for dizziness,

## 2024-12-21 PROBLEM — G89.29 CHRONIC PAIN OF RIGHT KNEE: Status: ACTIVE | Noted: 2024-12-21

## 2024-12-21 PROBLEM — M25.561 CHRONIC PAIN OF RIGHT KNEE: Status: ACTIVE | Noted: 2024-12-21

## 2024-12-21 ASSESSMENT — ENCOUNTER SYMPTOMS
NAUSEA: 0
COLOR CHANGE: 1
SORE THROAT: 0
DIARRHEA: 0
BACK PAIN: 1
VOMITING: 0
TROUBLE SWALLOWING: 0
CHEST TIGHTNESS: 0
FACIAL SWELLING: 0
ABDOMINAL PAIN: 0
RHINORRHEA: 0
SHORTNESS OF BREATH: 0
WHEEZING: 0
SINUS PRESSURE: 0
COUGH: 0

## 2025-01-28 ENCOUNTER — TELEPHONE (OUTPATIENT)
Dept: PRIMARY CARE CLINIC | Age: 80
End: 2025-01-28

## 2025-01-28 NOTE — TELEPHONE ENCOUNTER
Pt is calling. She is concerned about driving to [place]. She is just checking to see if there is anything that she needs to follow up on or can she wait until spring for her next appointment? She could do a VV on the phone if Dr. Munguia felt she needed to be seen soon for a follow up?

## 2025-04-08 ENCOUNTER — TELEPHONE (OUTPATIENT)
Dept: PRIMARY CARE CLINIC | Age: 80
End: 2025-04-08

## 2025-04-08 NOTE — TELEPHONE ENCOUNTER
Blank\" fax from [facility]     Scanned into       Original copy placed in provider basket

## 2025-04-09 NOTE — TELEPHONE ENCOUNTER
Out of state pharmacy requests will not be processed.     Patient will need an appointment if these items are truly needed as the problem listed in the fax is not on the patient's problem list.     No further action is needed for this encounter.

## 2025-06-09 ENCOUNTER — TELEPHONE (OUTPATIENT)
Dept: PRIMARY CARE CLINIC | Age: 80
End: 2025-06-09

## 2025-06-09 NOTE — TELEPHONE ENCOUNTER
Pt wants to do a phone call instead of an in-person visit. She says she has done this in the past with him. Unfortunately this pt lives in [place] and he can't do a phone call with her. She says she just wants to do one last follow-up with him. She does have an appointment with another PCP.

## 2025-06-16 ENCOUNTER — OFFICE VISIT (OUTPATIENT)
Dept: GYNECOLOGY | Age: 80
End: 2025-06-16
Payer: MEDICARE

## 2025-06-16 VITALS — WEIGHT: 176.2 LBS | BODY MASS INDEX: 28.44 KG/M2 | DIASTOLIC BLOOD PRESSURE: 74 MMHG | SYSTOLIC BLOOD PRESSURE: 130 MMHG

## 2025-06-16 DIAGNOSIS — Z01.419 WELL WOMAN EXAM WITH ROUTINE GYNECOLOGICAL EXAM: Primary | ICD-10-CM

## 2025-06-16 DIAGNOSIS — Z12.31 ENCOUNTER FOR SCREENING MAMMOGRAM FOR BREAST CANCER: ICD-10-CM

## 2025-06-16 PROCEDURE — G0101 CA SCREEN;PELVIC/BREAST EXAM: HCPCS | Performed by: OBSTETRICS & GYNECOLOGY

## 2025-06-16 ASSESSMENT — PATIENT HEALTH QUESTIONNAIRE - PHQ9
2. FEELING DOWN, DEPRESSED OR HOPELESS: NOT AT ALL
SUM OF ALL RESPONSES TO PHQ QUESTIONS 1-9: 0
1. LITTLE INTEREST OR PLEASURE IN DOING THINGS: NOT AT ALL
SUM OF ALL RESPONSES TO PHQ QUESTIONS 1-9: 0

## 2025-06-16 NOTE — PROGRESS NOTES
Subjective:      Patient ID: Giovana Whitney is a 79 y.o. female.    HPI  pts here for annual gyn exam.  She denies complaints.  Up to date with colon cancer screening.  Due for pap and mammogram.    Review of Systems Pertinent review of systems items discussed above.  All others systems items not discussed above were negative.      Objective:   Physical Exam  Constitutional:       Appearance: She is well-developed.   HENT:      Head: Normocephalic and atraumatic.   Neck:      Thyroid: No thyromegaly.      Trachea: No tracheal deviation.   Cardiovascular:      Rate and Rhythm: Normal rate and regular rhythm.      Heart sounds: Normal heart sounds. No murmur heard.  Pulmonary:      Effort: Pulmonary effort is normal. No respiratory distress.      Breath sounds: Normal breath sounds. No wheezing or rales.   Chest:   Breasts:     Right: No mass, nipple discharge or skin change.      Left: No mass, nipple discharge or skin change.   Abdominal:      General: There is no distension.      Palpations: Abdomen is soft. There is no mass.      Tenderness: There is no abdominal tenderness. There is no rebound.   Genitourinary:     Labia:         Right: No lesion.         Left: No lesion.       Vagina: Normal. No foreign body. No vaginal discharge.      Adnexa:         Right: No mass or tenderness.          Left: No mass or tenderness.        Rectum: Normal. Guaiac result negative. No mass or external hemorrhoid.      Comments: Pap performed.  Uterus and cx absent  Musculoskeletal:         General: Normal range of motion.   Lymphadenopathy:      Cervical: No cervical adenopathy.   Neurological:      Mental Status: She is alert and oriented to person, place, and time.         Assessment:   Normal gyn exam     Plan:   F/u annual gyn exam.  Mammogram.  Call with results.       Fidel Carbajal MD

## 2025-06-20 ENCOUNTER — RESULTS FOLLOW-UP (OUTPATIENT)
Dept: GYNECOLOGY | Age: 80
End: 2025-06-20

## 2025-06-24 RX ORDER — ALBUTEROL SULFATE 90 UG/1
2 INHALANT RESPIRATORY (INHALATION) EVERY 6 HOURS PRN
Qty: 1 EACH | Refills: 0 | Status: SHIPPED | OUTPATIENT
Start: 2025-06-24

## 2025-06-24 NOTE — TELEPHONE ENCOUNTER
Last office visit 12/11/2024       Next office visit scheduled Visit date not found    Requested Prescriptions      No prescriptions requested or ordered in this encounter

## 2025-08-05 ENCOUNTER — OFFICE VISIT (OUTPATIENT)
Dept: FAMILY MEDICINE CLINIC | Age: 80
End: 2025-08-05

## 2025-08-05 VITALS
HEIGHT: 66 IN | SYSTOLIC BLOOD PRESSURE: 130 MMHG | DIASTOLIC BLOOD PRESSURE: 78 MMHG | WEIGHT: 179 LBS | BODY MASS INDEX: 28.77 KG/M2 | RESPIRATION RATE: 16 BRPM | HEART RATE: 80 BPM

## 2025-08-05 DIAGNOSIS — Z12.31 ENCOUNTER FOR SCREENING MAMMOGRAM FOR MALIGNANT NEOPLASM OF BREAST: ICD-10-CM

## 2025-08-05 DIAGNOSIS — Z91.199 GENERAL PATIENT NONCOMPLIANCE: ICD-10-CM

## 2025-08-05 DIAGNOSIS — I10 ESSENTIAL HYPERTENSION: Primary | ICD-10-CM

## 2025-08-05 DIAGNOSIS — Z00.00 MEDICARE ANNUAL WELLNESS VISIT, SUBSEQUENT: ICD-10-CM

## 2025-08-05 DIAGNOSIS — J45.20 MILD INTERMITTENT ASTHMA WITHOUT COMPLICATION: ICD-10-CM

## 2025-08-05 DIAGNOSIS — Z76.89 ESTABLISHING CARE WITH NEW DOCTOR, ENCOUNTER FOR: ICD-10-CM

## 2025-08-05 DIAGNOSIS — I87.2 PERIPHERAL VENOUS INSUFFICIENCY: ICD-10-CM

## 2025-08-05 PROBLEM — I83.019 VENOUS STASIS ULCER OF RIGHT LOWER LEG WITH EDEMA OF RIGHT LOWER LEG (HCC): Status: RESOLVED | Noted: 2023-07-25 | Resolved: 2025-08-05

## 2025-08-05 PROBLEM — L97.912 SKIN ULCER OF RIGHT LOWER LEG WITH FAT LAYER EXPOSED (HCC): Status: RESOLVED | Noted: 2021-01-12 | Resolved: 2025-08-05

## 2025-08-05 PROBLEM — I83.009 VENOUS ULCER (HCC): Status: RESOLVED | Noted: 2023-06-08 | Resolved: 2025-08-05

## 2025-08-05 PROBLEM — R60.0 VENOUS STASIS ULCER OF RIGHT LOWER LEG WITH EDEMA OF RIGHT LOWER LEG (HCC): Status: RESOLVED | Noted: 2023-07-25 | Resolved: 2025-08-05

## 2025-08-05 PROBLEM — I83.891 VENOUS STASIS ULCER OF RIGHT LOWER LEG WITH EDEMA OF RIGHT LOWER LEG (HCC): Status: RESOLVED | Noted: 2023-07-25 | Resolved: 2025-08-05

## 2025-08-05 PROBLEM — L97.919 VENOUS STASIS ULCER OF RIGHT LOWER LEG WITH EDEMA OF RIGHT LOWER LEG (HCC): Status: RESOLVED | Noted: 2023-07-25 | Resolved: 2025-08-05

## 2025-08-05 PROBLEM — L97.511 SKIN ULCER OF THIRD TOE OF RIGHT FOOT, LIMITED TO BREAKDOWN OF SKIN (HCC): Status: RESOLVED | Noted: 2021-01-12 | Resolved: 2025-08-05

## 2025-08-05 PROBLEM — L97.909 VENOUS ULCER (HCC): Status: RESOLVED | Noted: 2023-06-08 | Resolved: 2025-08-05

## 2025-08-05 SDOH — ECONOMIC STABILITY: FOOD INSECURITY: WITHIN THE PAST 12 MONTHS, YOU WORRIED THAT YOUR FOOD WOULD RUN OUT BEFORE YOU GOT MONEY TO BUY MORE.: NEVER TRUE

## 2025-08-05 SDOH — ECONOMIC STABILITY: FOOD INSECURITY: WITHIN THE PAST 12 MONTHS, THE FOOD YOU BOUGHT JUST DIDN'T LAST AND YOU DIDN'T HAVE MONEY TO GET MORE.: NEVER TRUE

## 2025-08-05 ASSESSMENT — PATIENT HEALTH QUESTIONNAIRE - PHQ9
SUM OF ALL RESPONSES TO PHQ QUESTIONS 1-9: 0
1. LITTLE INTEREST OR PLEASURE IN DOING THINGS: NOT AT ALL
2. FEELING DOWN, DEPRESSED OR HOPELESS: NOT AT ALL

## 2025-08-11 ENCOUNTER — HOSPITAL ENCOUNTER (OUTPATIENT)
Dept: WOMENS IMAGING | Age: 80
Discharge: HOME | End: 2025-08-11
Attending: FAMILY MEDICINE
Payer: MEDICARE

## 2025-08-11 VITALS — BODY MASS INDEX: 28.77 KG/M2 | WEIGHT: 179 LBS | HEIGHT: 66 IN

## 2025-08-11 DIAGNOSIS — Z12.31 ENCOUNTER FOR SCREENING MAMMOGRAM FOR MALIGNANT NEOPLASM OF BREAST: ICD-10-CM

## 2025-08-11 PROCEDURE — 77063 BREAST TOMOSYNTHESIS BI: CPT

## 2025-08-14 ENCOUNTER — TELEPHONE (OUTPATIENT)
Dept: FAMILY MEDICINE CLINIC | Age: 80
End: 2025-08-14

## 2025-08-16 ENCOUNTER — TELEPHONE (OUTPATIENT)
Dept: PRIMARY CARE CLINIC | Age: 80
End: 2025-08-16

## 2025-08-20 ENCOUNTER — TELEPHONE (OUTPATIENT)
Dept: FAMILY MEDICINE CLINIC | Age: 80
End: 2025-08-20

## 2025-08-21 ENCOUNTER — TELEPHONE (OUTPATIENT)
Dept: FAMILY MEDICINE CLINIC | Age: 80
End: 2025-08-21